# Patient Record
Sex: MALE | Race: OTHER | URBAN - NONMETROPOLITAN AREA
[De-identification: names, ages, dates, MRNs, and addresses within clinical notes are randomized per-mention and may not be internally consistent; named-entity substitution may affect disease eponyms.]

---

## 2017-04-24 ENCOUNTER — APPOINTMENT (RX ONLY)
Dept: URBAN - NONMETROPOLITAN AREA CLINIC 22 | Facility: CLINIC | Age: 77
Setting detail: DERMATOLOGY
End: 2017-04-24

## 2017-04-24 DIAGNOSIS — L71.8 OTHER ROSACEA: ICD-10-CM | Status: INADEQUATELY CONTROLLED

## 2017-04-24 DIAGNOSIS — L57.0 ACTINIC KERATOSIS: ICD-10-CM

## 2017-04-24 DIAGNOSIS — L28.1 PRURIGO NODULARIS: ICD-10-CM

## 2017-04-24 DIAGNOSIS — L57.8 OTHER SKIN CHANGES DUE TO CHRONIC EXPOSURE TO NONIONIZING RADIATION: ICD-10-CM

## 2017-04-24 DIAGNOSIS — D22 MELANOCYTIC NEVI: ICD-10-CM

## 2017-04-24 DIAGNOSIS — L72.0 EPIDERMAL CYST: ICD-10-CM

## 2017-04-24 DIAGNOSIS — L21.8 OTHER SEBORRHEIC DERMATITIS: ICD-10-CM | Status: INADEQUATELY CONTROLLED

## 2017-04-24 PROBLEM — L55.1 SUNBURN OF SECOND DEGREE: Status: ACTIVE | Noted: 2017-04-24

## 2017-04-24 PROBLEM — E78.5 HYPERLIPIDEMIA, UNSPECIFIED: Status: ACTIVE | Noted: 2017-04-24

## 2017-04-24 PROBLEM — H91.90 UNSPECIFIED HEARING LOSS, UNSPECIFIED EAR: Status: ACTIVE | Noted: 2017-04-24

## 2017-04-24 PROBLEM — I10 ESSENTIAL (PRIMARY) HYPERTENSION: Status: ACTIVE | Noted: 2017-04-24

## 2017-04-24 PROBLEM — D22.5 MELANOCYTIC NEVI OF TRUNK: Status: ACTIVE | Noted: 2017-04-24

## 2017-04-24 PROCEDURE — ? LIQUID NITROGEN

## 2017-04-24 PROCEDURE — 17003 DESTRUCT PREMALG LES 2-14: CPT

## 2017-04-24 PROCEDURE — ? ACNE SURGERY

## 2017-04-24 PROCEDURE — ? COUNSELING

## 2017-04-24 PROCEDURE — 99213 OFFICE O/P EST LOW 20 MIN: CPT | Mod: 25

## 2017-04-24 PROCEDURE — ? TREATMENT REGIMEN

## 2017-04-24 PROCEDURE — 17110 DESTRUCTION B9 LES UP TO 14: CPT

## 2017-04-24 PROCEDURE — 17000 DESTRUCT PREMALG LESION: CPT | Mod: 59

## 2017-04-24 PROCEDURE — ? PRESCRIPTION

## 2017-04-24 RX ORDER — SULFACETAMIDE SODIUM 10 MG/ML
LOTION TOPICAL
Qty: 1 | Refills: 0 | Status: ERX

## 2017-04-24 RX ORDER — METRONIDAZOLE 10 MG/G
GEL TOPICAL
Qty: 1 | Refills: 1 | Status: ERX

## 2017-04-24 ASSESSMENT — LOCATION DETAILED DESCRIPTION DERM
LOCATION DETAILED: LEFT MEDIAL MALAR CHEEK
LOCATION DETAILED: INFERIOR MID FOREHEAD
LOCATION DETAILED: NASAL SUPRATIP
LOCATION DETAILED: NASAL ROOT
LOCATION DETAILED: RIGHT MEDIAL MALAR CHEEK
LOCATION DETAILED: LEFT CENTRAL POSTAURICULAR SKIN
LOCATION DETAILED: LEFT SUPERIOR HELIX
LOCATION DETAILED: LEFT MEDIAL SUPERIOR CHEST

## 2017-04-24 ASSESSMENT — LOCATION ZONE DERM
LOCATION ZONE: SCALP
LOCATION ZONE: TRUNK
LOCATION ZONE: NOSE
LOCATION ZONE: EAR
LOCATION ZONE: FACE

## 2017-04-24 ASSESSMENT — LOCATION SIMPLE DESCRIPTION DERM
LOCATION SIMPLE: LEFT CHEEK
LOCATION SIMPLE: LEFT POSTAURICULAR SKIN
LOCATION SIMPLE: CHEST
LOCATION SIMPLE: INFERIOR FOREHEAD
LOCATION SIMPLE: NOSE
LOCATION SIMPLE: SCALP
LOCATION SIMPLE: RIGHT CHEEK
LOCATION SIMPLE: LEFT EAR

## 2017-04-24 NOTE — PROCEDURE: TREATMENT REGIMEN
Detail Level: Generalized
Otc Regimen: Apply head and shoulders selenium shampoo and let sit for a few minutes before washing off.
Continue Regimen: Apply Metrogel 1% topical gel q day to affected area.
Detail Level: Simple
Plan: Vaseline to areas until scabs fall off, encouraged pt to dc picking. If does not resolve bx.
Plan: Apply Klaron b.i.d. to nose/forehead during flare ups and then QD to control.
Plan: Vaseline to area until scab falls off

## 2017-04-24 NOTE — PROCEDURE: LIQUID NITROGEN
Number Of Freeze-Thaw Cycles: 1 freeze-thaw cycle
Duration Of Freeze Thaw-Cycle (Seconds): 10
Medical Necessity Clause: This procedure was medically necessary because the lesions that were treated were:
Consent: The patient's consent was obtained including but not limited to risks of crusting, scabbing, blistering, scarring, darker or lighter pigmentary change, recurrence, incomplete removal and infection.
Include Z78.9 (Other Specified Conditions Influencing Health Status) As An Associated Diagnosis?: No
Post-Care Instructions: I reviewed with the patient in detail post-care instructions. Patient is to wear sunprotection, and avoid picking at any of the treated lesions. Pt may apply Vaseline to crusted or scabbing areas.
Medical Necessity Information: It is in your best interest to select a reason for this procedure from the list below. All of these items fulfill various CMS LCD requirements except the new and changing color options.
Detail Level: Detailed
Render Post-Care Instructions In Note?: yes

## 2017-04-24 NOTE — PROCEDURE: ACNE SURGERY
Consent was obtained and risks were reviewed including but not limited to scarring, infection, bleeding, scabbing, incomplete removal, and allergy to anesthesia.
Render Post-Care Instructions In Note?: no
Extraction Method: cotton-tipped applicators and gentle pressure
Detail Level: Detailed
Post-Care Instructions: I reviewed with the patient in detail post-care instructions. Patient is to keep the treatment areaas dry overnight, and then apply bacitracin twice daily until healed. Patient may apply hydrogen peroxide soaks to remove any crusting.
Prep Text (Optional): Prior to removal the treatment areas were prepped in the usual fashion.
Acne Type: Comedonal Lesions

## 2017-11-17 RX ORDER — SULFACETAMIDE SODIUM 10 MG/ML
LOTION TOPICAL
Qty: 1 | Refills: 0 | Status: ERX

## 2017-11-17 RX ORDER — METRONIDAZOLE 10 MG/G
GEL TOPICAL
Qty: 1 | Refills: 1 | Status: ERX

## 2018-04-24 ENCOUNTER — HOSPITAL ENCOUNTER (OUTPATIENT)
Dept: VASCULAR LAB | Age: 78
Discharge: HOME | End: 2018-04-24
Attending: INTERNAL MEDICINE | Admitting: INTERNAL MEDICINE

## 2018-04-24 DIAGNOSIS — I65.29 OCCLUSION AND STENOSIS OF UNSPECIFIED CAROTID ARTERY: ICD-10-CM

## 2018-05-02 ENCOUNTER — APPOINTMENT (RX ONLY)
Dept: URBAN - NONMETROPOLITAN AREA CLINIC 22 | Facility: CLINIC | Age: 78
Setting detail: DERMATOLOGY
End: 2018-05-02

## 2018-05-02 DIAGNOSIS — D22 MELANOCYTIC NEVI: ICD-10-CM

## 2018-05-02 DIAGNOSIS — L71.8 OTHER ROSACEA: ICD-10-CM | Status: WELL CONTROLLED

## 2018-05-02 DIAGNOSIS — L21.8 OTHER SEBORRHEIC DERMATITIS: ICD-10-CM | Status: INADEQUATELY CONTROLLED

## 2018-05-02 DIAGNOSIS — Z71.89 OTHER SPECIFIED COUNSELING: ICD-10-CM

## 2018-05-02 DIAGNOSIS — D485 NEOPLASM OF UNCERTAIN BEHAVIOR OF SKIN: ICD-10-CM

## 2018-05-02 DIAGNOSIS — L28.1 PRURIGO NODULARIS: ICD-10-CM | Status: RESOLVED

## 2018-05-02 DIAGNOSIS — L57.0 ACTINIC KERATOSIS: ICD-10-CM | Status: RESOLVED

## 2018-05-02 DIAGNOSIS — L57.8 OTHER SKIN CHANGES DUE TO CHRONIC EXPOSURE TO NONIONIZING RADIATION: ICD-10-CM

## 2018-05-02 PROBLEM — E78.5 HYPERLIPIDEMIA, UNSPECIFIED: Status: ACTIVE | Noted: 2018-05-02

## 2018-05-02 PROBLEM — L20.84 INTRINSIC (ALLERGIC) ECZEMA: Status: ACTIVE | Noted: 2018-05-02

## 2018-05-02 PROBLEM — Z85.828 PERSONAL HISTORY OF OTHER MALIGNANT NEOPLASM OF SKIN: Status: ACTIVE | Noted: 2018-05-02

## 2018-05-02 PROBLEM — L55.1 SUNBURN OF SECOND DEGREE: Status: ACTIVE | Noted: 2018-05-02

## 2018-05-02 PROBLEM — F32.9 MAJOR DEPRESSIVE DISORDER, SINGLE EPISODE, UNSPECIFIED: Status: ACTIVE | Noted: 2018-05-02

## 2018-05-02 PROBLEM — I10 ESSENTIAL (PRIMARY) HYPERTENSION: Status: ACTIVE | Noted: 2018-05-02

## 2018-05-02 PROBLEM — D48.5 NEOPLASM OF UNCERTAIN BEHAVIOR OF SKIN: Status: ACTIVE | Noted: 2018-05-02

## 2018-05-02 PROBLEM — D22.5 MELANOCYTIC NEVI OF TRUNK: Status: ACTIVE | Noted: 2018-05-02

## 2018-05-02 PROCEDURE — 99213 OFFICE O/P EST LOW 20 MIN: CPT | Mod: 25

## 2018-05-02 PROCEDURE — ? TREATMENT REGIMEN

## 2018-05-02 PROCEDURE — ? BIOPSY BY SHAVE METHOD

## 2018-05-02 PROCEDURE — ? COUNSELING

## 2018-05-02 PROCEDURE — 11100: CPT

## 2018-05-02 PROCEDURE — ? EDUCATIONAL RESOURCES PROVIDED

## 2018-05-02 ASSESSMENT — LOCATION SIMPLE DESCRIPTION DERM
LOCATION SIMPLE: CHEST
LOCATION SIMPLE: INFERIOR FOREHEAD
LOCATION SIMPLE: LEFT EAR
LOCATION SIMPLE: RIGHT PRETIBIAL REGION
LOCATION SIMPLE: SCALP
LOCATION SIMPLE: RIGHT CHEEK
LOCATION SIMPLE: NOSE
LOCATION SIMPLE: LEFT CHEEK

## 2018-05-02 ASSESSMENT — LOCATION DETAILED DESCRIPTION DERM
LOCATION DETAILED: RIGHT MEDIAL MALAR CHEEK
LOCATION DETAILED: LEFT SUPERIOR HELIX
LOCATION DETAILED: LEFT MEDIAL MALAR CHEEK
LOCATION DETAILED: LEFT MEDIAL SUPERIOR CHEST
LOCATION DETAILED: RIGHT PROXIMAL PRETIBIAL REGION
LOCATION DETAILED: NASAL SUPRATIP
LOCATION DETAILED: INFERIOR MID FOREHEAD
LOCATION DETAILED: LEFT CENTRAL POSTAURICULAR SKIN

## 2018-05-02 ASSESSMENT — LOCATION ZONE DERM
LOCATION ZONE: NOSE
LOCATION ZONE: LEG
LOCATION ZONE: SCALP
LOCATION ZONE: FACE
LOCATION ZONE: EAR
LOCATION ZONE: TRUNK

## 2018-05-02 NOTE — PROCEDURE: TREATMENT REGIMEN
Detail Level: Simple
Plan: Apply Metrogel 1% topical gel q day to affected area.
Plan: Continue Head and Shoulders shampoo regularly. Recommend pt to increase Klaron to QD, and BID during flares.

## 2018-08-01 ENCOUNTER — HOSPITAL ENCOUNTER (OUTPATIENT)
Dept: MRI IMAGING | Age: 78
Discharge: HOME | End: 2018-08-01
Payer: MEDICARE

## 2018-08-01 DIAGNOSIS — R47.01 APHASIA: ICD-10-CM

## 2018-08-01 DIAGNOSIS — I69.393 ATAXIA DUE TO OLD CEREBELLAR INFARCTION: ICD-10-CM

## 2018-08-01 PROCEDURE — 70551 MRI BRAIN STEM W/O DYE: CPT

## 2018-10-12 ENCOUNTER — HOSPITAL ENCOUNTER (OUTPATIENT)
Dept: MRI IMAGING | Age: 78
Discharge: HOME | End: 2018-10-12
Payer: MEDICARE

## 2018-10-12 DIAGNOSIS — R27.0 ATAXIA: ICD-10-CM

## 2018-10-12 PROCEDURE — 72146 MRI CHEST SPINE W/O DYE: CPT

## 2018-10-16 ENCOUNTER — HOSPITAL ENCOUNTER (OUTPATIENT)
Dept: MRI IMAGING | Age: 78
Discharge: HOME | End: 2018-10-16
Payer: MEDICARE

## 2018-10-16 DIAGNOSIS — R27.0 ATAXIA: ICD-10-CM

## 2018-10-16 PROCEDURE — 72141 MRI NECK SPINE W/O DYE: CPT

## 2018-10-25 ENCOUNTER — APPOINTMENT (RX ONLY)
Dept: URBAN - NONMETROPOLITAN AREA CLINIC 22 | Facility: CLINIC | Age: 78
Setting detail: DERMATOLOGY
End: 2018-10-25

## 2018-10-25 VITALS — WEIGHT: 250 LBS | HEIGHT: 72 IN

## 2018-10-25 DIAGNOSIS — L30.1 DYSHIDROSIS [POMPHOLYX]: ICD-10-CM

## 2018-10-25 DIAGNOSIS — L21.8 OTHER SEBORRHEIC DERMATITIS: ICD-10-CM

## 2018-10-25 DIAGNOSIS — L71.8 OTHER ROSACEA: ICD-10-CM

## 2018-10-25 PROBLEM — I10 ESSENTIAL (PRIMARY) HYPERTENSION: Status: ACTIVE | Noted: 2018-10-25

## 2018-10-25 PROCEDURE — ? COUNSELING

## 2018-10-25 PROCEDURE — 99213 OFFICE O/P EST LOW 20 MIN: CPT

## 2018-10-25 PROCEDURE — ? PRESCRIPTION

## 2018-10-25 PROCEDURE — ? TREATMENT REGIMEN

## 2018-10-25 RX ORDER — CLOBETASOL PROPIONATE 0.5 MG/ML
SOLUTION TOPICAL
Qty: 1 | Refills: 1 | Status: ERX

## 2018-10-25 ASSESSMENT — LOCATION DETAILED DESCRIPTION DERM
LOCATION DETAILED: LEFT MEDIAL MALAR CHEEK
LOCATION DETAILED: RIGHT DISTAL PRETIBIAL REGION
LOCATION DETAILED: RIGHT MEDIAL MALAR CHEEK
LOCATION DETAILED: NASAL SUPRATIP
LOCATION DETAILED: INFERIOR MID FOREHEAD

## 2018-10-25 ASSESSMENT — LOCATION SIMPLE DESCRIPTION DERM
LOCATION SIMPLE: RIGHT PRETIBIAL REGION
LOCATION SIMPLE: RIGHT CHEEK
LOCATION SIMPLE: LEFT CHEEK
LOCATION SIMPLE: INFERIOR FOREHEAD
LOCATION SIMPLE: NOSE

## 2018-10-25 ASSESSMENT — LOCATION ZONE DERM
LOCATION ZONE: FACE
LOCATION ZONE: NOSE
LOCATION ZONE: LEG

## 2018-10-25 NOTE — PROCEDURE: TREATMENT REGIMEN
Plan: Apply Metrogel 1% topical gel q day to affected area.
Plan: Continue Head and Shoulders shampoo regularly. Recommend pt to increase Klaron to QD, and BID during flares. Clobetasol scalp soln 1x per day for flares and no longer than a week.\\npt will contact pharmacy when he needs refills.
Detail Level: Zone
Detail Level: Simple
Plan: Pt was Rx’ed Clobetasol cream for issue more than 6 years ago and uses it for flares less than once per month. Pt will call for refill when needed.

## 2018-11-08 ENCOUNTER — HOSPITAL ENCOUNTER (OUTPATIENT)
Dept: CARDIOLOGY | Age: 78
Discharge: HOME | End: 2018-11-08
Payer: MEDICARE

## 2018-11-08 LAB
LV EF: 55 %
LVEF MODALITY: NORMAL

## 2018-11-08 PROCEDURE — 93306 TTE W/DOPPLER COMPLETE: CPT

## 2019-03-06 ENCOUNTER — HOSPITAL ENCOUNTER (OUTPATIENT)
Dept: VASCULAR LAB | Age: 79
Discharge: HOME | End: 2019-03-06
Payer: MEDICARE

## 2019-03-06 PROCEDURE — 93880 EXTRACRANIAL BILAT STUDY: CPT

## 2019-05-17 ENCOUNTER — HOSPITAL ENCOUNTER (INPATIENT)
Age: 79
LOS: 11 days | Discharge: HOME HEALTH | DRG: 552 | End: 2019-05-28
Attending: PHYSICAL MEDICINE & REHABILITATION | Admitting: PHYSICAL MEDICINE & REHABILITATION
Payer: MEDICARE

## 2019-05-17 DIAGNOSIS — S24.133A: Primary | ICD-10-CM

## 2019-05-17 PROCEDURE — 1280000000 HC REHAB R&B

## 2019-05-17 PROCEDURE — 6370000000 HC RX 637 (ALT 250 FOR IP): Performed by: PHYSICAL MEDICINE & REHABILITATION

## 2019-05-17 RX ORDER — OXYCODONE HYDROCHLORIDE AND ACETAMINOPHEN 5; 325 MG/1; MG/1
2 TABLET ORAL EVERY 4 HOURS PRN
Status: DISCONTINUED | OUTPATIENT
Start: 2019-05-17 | End: 2019-05-28 | Stop reason: HOSPADM

## 2019-05-17 RX ORDER — OXYCODONE HYDROCHLORIDE AND ACETAMINOPHEN 5; 325 MG/1; MG/1
1 TABLET ORAL EVERY 4 HOURS PRN
Status: DISCONTINUED | OUTPATIENT
Start: 2019-05-17 | End: 2019-05-28 | Stop reason: HOSPADM

## 2019-05-17 RX ORDER — PANTOPRAZOLE SODIUM 40 MG/1
40 TABLET, DELAYED RELEASE ORAL
Status: DISCONTINUED | OUTPATIENT
Start: 2019-05-18 | End: 2019-05-28 | Stop reason: HOSPADM

## 2019-05-17 RX ORDER — SIMVASTATIN 40 MG
40 TABLET ORAL NIGHTLY
Status: DISCONTINUED | OUTPATIENT
Start: 2019-05-17 | End: 2019-05-28 | Stop reason: HOSPADM

## 2019-05-17 RX ORDER — POLYETHYLENE GLYCOL 3350 17 G/17G
17 POWDER, FOR SOLUTION ORAL DAILY
Status: DISCONTINUED | OUTPATIENT
Start: 2019-05-17 | End: 2019-05-28 | Stop reason: HOSPADM

## 2019-05-17 RX ORDER — AZELASTINE 1 MG/ML
1 SPRAY, METERED NASAL 2 TIMES DAILY
Status: DISCONTINUED | OUTPATIENT
Start: 2019-05-17 | End: 2019-05-28 | Stop reason: HOSPADM

## 2019-05-17 RX ORDER — FINASTERIDE 5 MG/1
5 TABLET, FILM COATED ORAL DAILY
Status: DISCONTINUED | OUTPATIENT
Start: 2019-05-17 | End: 2019-05-28 | Stop reason: HOSPADM

## 2019-05-17 RX ORDER — ACETAMINOPHEN 325 MG/1
650 TABLET ORAL EVERY 4 HOURS PRN
Status: DISCONTINUED | OUTPATIENT
Start: 2019-05-17 | End: 2019-05-28 | Stop reason: HOSPADM

## 2019-05-17 RX ORDER — TAMSULOSIN HYDROCHLORIDE 0.4 MG/1
0.4 CAPSULE ORAL NIGHTLY
Status: DISCONTINUED | OUTPATIENT
Start: 2019-05-17 | End: 2019-05-28 | Stop reason: HOSPADM

## 2019-05-17 RX ORDER — LISINOPRIL 10 MG/1
10 TABLET ORAL DAILY
Status: DISCONTINUED | OUTPATIENT
Start: 2019-05-17 | End: 2019-05-26

## 2019-05-17 RX ORDER — ONDANSETRON 4 MG/1
4 TABLET, ORALLY DISINTEGRATING ORAL EVERY 8 HOURS PRN
Status: DISCONTINUED | OUTPATIENT
Start: 2019-05-17 | End: 2019-05-28 | Stop reason: HOSPADM

## 2019-05-17 RX ORDER — BISACODYL 10 MG
10 SUPPOSITORY, RECTAL RECTAL DAILY PRN
Status: DISCONTINUED | OUTPATIENT
Start: 2019-05-17 | End: 2019-05-28 | Stop reason: HOSPADM

## 2019-05-17 RX ORDER — METHOCARBAMOL 500 MG/1
500 TABLET, FILM COATED ORAL 4 TIMES DAILY PRN
Status: DISCONTINUED | OUTPATIENT
Start: 2019-05-17 | End: 2019-05-28 | Stop reason: HOSPADM

## 2019-05-17 RX ORDER — SENNA AND DOCUSATE SODIUM 50; 8.6 MG/1; MG/1
1 TABLET, FILM COATED ORAL 2 TIMES DAILY
Status: DISCONTINUED | OUTPATIENT
Start: 2019-05-17 | End: 2019-05-28 | Stop reason: HOSPADM

## 2019-05-17 RX ADMIN — AZELASTINE HYDROCHLORIDE 1 SPRAY: 137 SPRAY, METERED NASAL at 23:35

## 2019-05-17 RX ADMIN — SIMVASTATIN 40 MG: 40 TABLET, FILM COATED ORAL at 20:29

## 2019-05-17 RX ADMIN — OXYCODONE HYDROCHLORIDE AND ACETAMINOPHEN 2 TABLET: 5; 325 TABLET ORAL at 20:35

## 2019-05-17 RX ADMIN — METOPROLOL TARTRATE 25 MG: 25 TABLET, FILM COATED ORAL at 20:29

## 2019-05-17 RX ADMIN — TAMSULOSIN HYDROCHLORIDE 0.4 MG: 0.4 CAPSULE ORAL at 20:30

## 2019-05-17 RX ADMIN — SENNOSIDES,DOCUSATE SODIUM 1 TABLET: 8.6; 5 TABLET, FILM COATED ORAL at 20:30

## 2019-05-17 ASSESSMENT — PAIN - FUNCTIONAL ASSESSMENT
PAIN_FUNCTIONAL_ASSESSMENT: PREVENTS OR INTERFERES SOME ACTIVE ACTIVITIES AND ADLS
PAIN_FUNCTIONAL_ASSESSMENT: ACTIVITIES ARE NOT PREVENTED

## 2019-05-17 ASSESSMENT — PAIN SCALES - GENERAL
PAINLEVEL_OUTOF10: 5

## 2019-05-17 ASSESSMENT — PAIN DESCRIPTION - ONSET
ONSET: ON-GOING
ONSET: SUDDEN

## 2019-05-17 ASSESSMENT — PAIN DESCRIPTION - ORIENTATION
ORIENTATION: RIGHT
ORIENTATION: RIGHT

## 2019-05-17 ASSESSMENT — PAIN DESCRIPTION - PROGRESSION
CLINICAL_PROGRESSION: NOT CHANGED
CLINICAL_PROGRESSION: NOT CHANGED

## 2019-05-17 ASSESSMENT — PAIN DESCRIPTION - DESCRIPTORS
DESCRIPTORS: ACHING
DESCRIPTORS: ACHING

## 2019-05-17 ASSESSMENT — PAIN DESCRIPTION - LOCATION
LOCATION: BACK
LOCATION: HIP

## 2019-05-17 ASSESSMENT — PAIN DESCRIPTION - FREQUENCY
FREQUENCY: INTERMITTENT
FREQUENCY: INTERMITTENT

## 2019-05-17 ASSESSMENT — PAIN DESCRIPTION - PAIN TYPE
TYPE: ACUTE PAIN
TYPE: ACUTE PAIN

## 2019-05-17 NOTE — PLAN OF CARE
Patient's bed alarm is on with call light, belongings, and bedside table within reach. Patient call out with any concerns/needs.  Electronically signed by Kayce Martin RN on 5/17/2019 at 4:38 PM

## 2019-05-17 NOTE — PROGRESS NOTES
skin/wound care, pressure relief instruction,dressing changes,  infection protection, DVT prophylaxis, and/or assistance with in room safety with transfers to bed, toilet, wheelchair, shower as well as bathroom activities and hygiene. Patient/caregiver education for:   [x] Disease/sustained injury/management      [x] Medication Use   [x] Surgical intervention   [x] Safety   [x] Body mechanics and or joint protection   [x] Health maintenance         PHYSICAL THERAPY:  Goals:                  Short term goals  Time Frame for Short term goals: 4 days 5/22  Short term goal 1: pt will complete bed mobility with mod ind and maintain spinal precautions. Short term goal 2: pt will complete functional transfers with supv and LRAD. Short term goal 3: pt will ambulate 150 ft with LRAD SBA. Long term goals  Time Frame for Long term goals : 8 days 5/26  Long term goal 1: pt will complete functional transfers with mod ind and LRAD. Long term goal 2: pt will ambulate 150 ft with LRAD mod ind. Long term goal 3: pt will ascend/descend 2 steps with RHR and supv. Long term goal 4: pt will complete car transfer with mod ind and AD  Long term goal 5: pt will complete seated HEP with ind to maintain functional strength. These goals were reviewed with this patient at the time of assessment and Whitney Ma is in agreement.      Plan of Care: Pt to be seen 5 out of 7 days per week, 90   mins (exact) per day for 8 days (exact)                   Current Treatment Recommendations: Strengthening, Transfer Training, Endurance Training, Neuromuscular Re-education, Patient/Caregiver Education & Training, Equipment Evaluation, Education, & procurement, Home Exercise Program, Safety Education & Training, Stair training, Functional Mobility Training, Balance Training, Gait Training, Pain Management      OCCUPATIONAL THERAPY:  Goals:             Short term goals  Time Frame for Short term goals: 5/21/19  Short term goal 1: Pt will complete toileting/toilet transfer SBA with LRAD  Short term goal 2: Pt will complete LB dressing with Yaquelin and AE PRN using LRAD for standing balance  Short term goal 3: Pt will tolerate 10 minutes standing balance activities and/or ADLs with LRAD SBA  Short term goal 4: Pt will verbalize and demo understanding of BUE HEP to increase strength and endurance for transfers/ADLs  Short term goal 5: Pt will verbalize 3/3 back precautions 100% when asked throughout therapy sessions with no cues :  Long term goals  Time Frame for Long term goals : 5/25/19  Long term goal 1: Pt will complete funcitonal mobility in room/bathroom with LRAD to gather all items for bathing/dressing  Long term goal 2: Pt will complete shower transfer/bathing Heladio with LRAD and DME PRN  Long term goal 3: Pt will complete LB/UE dressing Heladio with LRAD and AE PRN  Long term goal 4: Pt will complete 1 simple meal prep and/or IADL task with LRAD Heladio :    These goals were reviewed with this patient at the time of assessment and Vinicius Dee is in agreement    Plan of Care:  Pt to be seen 5 out of 7 days per week, 90   mins (exact) per day for 8 days (exact)  Current Treatment Recommendations: Strengthening, Endurance Training, Patient/Caregiver Education & Training, ROM, Cognitive Reorientation, Equipment Evaluation, Education, & procurement, Self-Care / ADL, Balance Training, Gait Training, Pain Management, Home Management Training, Functional Mobility Training, Safety Education & Training, Positioning      SPEECH THERAPY: Goals will be left blank if speech is not following this patient. Goals:                                                                            These goals were reviewed with this patient at the time of assessment and Vinicius Dee is in agreement    Plan of Care: Pt to be seen 5 out of 7 days per week,    mins (exact) per day for  days (exact)             Dysphagia: Speech/Language/Cognition:       CASE MANAGEMENT:  Goals:   Assist patient/family with discharge planning, patient/family counseling,   and coordination with insurance during ARU stay. Admission Period/Goal FIM SCORES  FIM Admit/Goal Score   Eating/Swallowing    Grooming    Bathing    Upper Body Dressing    Lower Body Dressing    Toileting    Bladder Sea, Accidents = FIM    Bowel  Sea, Accidents = FIM    Bed/Chair Transfer    Toilet Transfer    Tub/Shower Transfer     Locomotion:  Ambulation (Walk) / Wheelchair:  W = walk , w/c = wheelchair  Distance:   1= 0-49 ft , 2=  ft, 3= 150+ ft Distance:    Level of Assist:    Mode:    FIM:       Stairs    Comprehension    Expression    Social Interaction    Problem Solving    Memory         Katherine Shah will be seen a minimum of 3 hours of therapy per day, a minimum of 5 out of 7 days per week. [] In this rare instance due to the nature of this patient's medical involvement, this patient will be seen 15 hours per week (900 minutes within a 7 day period). Treatments may include therapeutic exercises, gait training, neuromuscular re-ed, transfer training, community reintegration, bed mobility, w/c mobility and training, self care, home mgmt, cognitive training, energy conservation,dysphagia tx, speech/language/communication therapy, group therapy, and patient/family education. In addition, dietician/nutritionist may monitor calorie count as well as intake and collaboratively work with SLP on dietary upgrades. Neuropsychology/Psychology may evaluate and provide necessary support.     Medical issues being managed closely and that require 24 hour availability of a physician:   [] Swallowing Precautions  [x] Bowel/Bladder Fx  [] Weight bearing precautions   [x] Wound Care    [x] Pain Mgmt   [x] Infection Protection   [x] DVT Prophylaxis   [x] Fall Precautions  [x] Fluid/Electrolyte/Nutrition Balance   [] Voice Protection   [] Respiratory  []

## 2019-05-18 LAB
ANION GAP SERPL CALCULATED.3IONS-SCNC: 10 MMOL/L (ref 3–16)
BUN BLDV-MCNC: 16 MG/DL (ref 7–20)
CALCIUM SERPL-MCNC: 9.5 MG/DL (ref 8.3–10.6)
CHLORIDE BLD-SCNC: 106 MMOL/L (ref 99–110)
CO2: 23 MMOL/L (ref 21–32)
CREAT SERPL-MCNC: 0.9 MG/DL (ref 0.8–1.3)
GFR AFRICAN AMERICAN: >60
GFR NON-AFRICAN AMERICAN: >60
GLUCOSE BLD-MCNC: 116 MG/DL (ref 70–99)
HCT VFR BLD CALC: 34.3 % (ref 40.5–52.5)
HEMOGLOBIN: 11.6 G/DL (ref 13.5–17.5)
MAGNESIUM: 2.1 MG/DL (ref 1.8–2.4)
MCH RBC QN AUTO: 31.8 PG (ref 26–34)
MCHC RBC AUTO-ENTMCNC: 33.9 G/DL (ref 31–36)
MCV RBC AUTO: 93.7 FL (ref 80–100)
PDW BLD-RTO: 14.6 % (ref 12.4–15.4)
PLATELET # BLD: 223 K/UL (ref 135–450)
PMV BLD AUTO: 9.1 FL (ref 5–10.5)
POTASSIUM REFLEX MAGNESIUM: 4.1 MMOL/L (ref 3.5–5.1)
RBC # BLD: 3.66 M/UL (ref 4.2–5.9)
SODIUM BLD-SCNC: 139 MMOL/L (ref 136–145)
WBC # BLD: 8.9 K/UL (ref 4–11)

## 2019-05-18 PROCEDURE — 97166 OT EVAL MOD COMPLEX 45 MIN: CPT

## 2019-05-18 PROCEDURE — 6370000000 HC RX 637 (ALT 250 FOR IP): Performed by: PHYSICAL MEDICINE & REHABILITATION

## 2019-05-18 PROCEDURE — 97542 WHEELCHAIR MNGMENT TRAINING: CPT

## 2019-05-18 PROCEDURE — 1280000000 HC REHAB R&B

## 2019-05-18 PROCEDURE — 97530 THERAPEUTIC ACTIVITIES: CPT

## 2019-05-18 PROCEDURE — 97162 PT EVAL MOD COMPLEX 30 MIN: CPT

## 2019-05-18 PROCEDURE — 97535 SELF CARE MNGMENT TRAINING: CPT

## 2019-05-18 PROCEDURE — 85027 COMPLETE CBC AUTOMATED: CPT

## 2019-05-18 PROCEDURE — 36415 COLL VENOUS BLD VENIPUNCTURE: CPT

## 2019-05-18 PROCEDURE — 83735 ASSAY OF MAGNESIUM: CPT

## 2019-05-18 PROCEDURE — 97116 GAIT TRAINING THERAPY: CPT

## 2019-05-18 PROCEDURE — 97110 THERAPEUTIC EXERCISES: CPT

## 2019-05-18 PROCEDURE — 80048 BASIC METABOLIC PNL TOTAL CA: CPT

## 2019-05-18 RX ADMIN — SENNOSIDES,DOCUSATE SODIUM 1 TABLET: 8.6; 5 TABLET, FILM COATED ORAL at 09:13

## 2019-05-18 RX ADMIN — AZELASTINE HYDROCHLORIDE 1 SPRAY: 137 SPRAY, METERED NASAL at 21:28

## 2019-05-18 RX ADMIN — MAGNESIUM HYDROXIDE 30 ML: 400 SUSPENSION ORAL at 17:28

## 2019-05-18 RX ADMIN — SENNOSIDES,DOCUSATE SODIUM 1 TABLET: 8.6; 5 TABLET, FILM COATED ORAL at 21:29

## 2019-05-18 RX ADMIN — OXYCODONE HYDROCHLORIDE AND ACETAMINOPHEN 2 TABLET: 5; 325 TABLET ORAL at 18:58

## 2019-05-18 RX ADMIN — PANTOPRAZOLE SODIUM 40 MG: 40 TABLET, DELAYED RELEASE ORAL at 05:15

## 2019-05-18 RX ADMIN — SIMVASTATIN 40 MG: 40 TABLET, FILM COATED ORAL at 21:29

## 2019-05-18 RX ADMIN — METOPROLOL TARTRATE 25 MG: 25 TABLET, FILM COATED ORAL at 21:29

## 2019-05-18 RX ADMIN — METOPROLOL TARTRATE 25 MG: 25 TABLET, FILM COATED ORAL at 09:13

## 2019-05-18 RX ADMIN — TAMSULOSIN HYDROCHLORIDE 0.4 MG: 0.4 CAPSULE ORAL at 21:29

## 2019-05-18 RX ADMIN — AZELASTINE HYDROCHLORIDE 1 SPRAY: 137 SPRAY, METERED NASAL at 09:13

## 2019-05-18 RX ADMIN — POLYETHYLENE GLYCOL 3350 17 G: 17 POWDER, FOR SOLUTION ORAL at 09:12

## 2019-05-18 RX ADMIN — FINASTERIDE 5 MG: 5 TABLET, FILM COATED ORAL at 09:13

## 2019-05-18 RX ADMIN — LISINOPRIL 10 MG: 10 TABLET ORAL at 09:13

## 2019-05-18 RX ADMIN — OXYCODONE HYDROCHLORIDE AND ACETAMINOPHEN 2 TABLET: 5; 325 TABLET ORAL at 05:16

## 2019-05-18 ASSESSMENT — PAIN DESCRIPTION - DESCRIPTORS
DESCRIPTORS: ACHING
DESCRIPTORS: ACHING

## 2019-05-18 ASSESSMENT — PAIN DESCRIPTION - FREQUENCY: FREQUENCY: INTERMITTENT

## 2019-05-18 ASSESSMENT — PAIN DESCRIPTION - LOCATION
LOCATION: BACK
LOCATION: BACK

## 2019-05-18 ASSESSMENT — PAIN SCALES - GENERAL
PAINLEVEL_OUTOF10: 5
PAINLEVEL_OUTOF10: 8
PAINLEVEL_OUTOF10: 5
PAINLEVEL_OUTOF10: 0
PAINLEVEL_OUTOF10: 0

## 2019-05-18 ASSESSMENT — PAIN DESCRIPTION - PROGRESSION: CLINICAL_PROGRESSION: NOT CHANGED

## 2019-05-18 ASSESSMENT — PAIN DESCRIPTION - ORIENTATION: ORIENTATION: RIGHT

## 2019-05-18 ASSESSMENT — PAIN DESCRIPTION - PAIN TYPE
TYPE: ACUTE PAIN
TYPE: ACUTE PAIN

## 2019-05-18 ASSESSMENT — PAIN - FUNCTIONAL ASSESSMENT: PAIN_FUNCTIONAL_ASSESSMENT: ACTIVITIES ARE NOT PREVENTED

## 2019-05-18 NOTE — PROGRESS NOTES
cerebral infarction. has a past surgical history that includes Carotid endarterectomy; Foot surgery; Skin cancer excision; Eye surgery; Facial reconstruction surgery; Tonsillectomy; Colonoscopy; and Colonoscopy (2014). Restrictions  Restrictions/Precautions  Restrictions/Precautions: Up as Tolerated, General Precautions  Position Activity Restriction  Spinal Precautions: No Bending, No Lifting, No Twisting  Spinal Precautions: no lifting >10 lbs, pt may shower, open to air  Other position/activity restrictions: up as tolerated, TLSO, may shower, no BLT  Vision/Hearing  Vision: Impaired  Vision Exceptions: Wears glasses at all times  Hearing: Exceptions to [facility]  Hearing Exceptions: Hard of hearing/hearing concerns     Subjective  General  Chart Reviewed: Yes  Patient assessed for rehabilitation services?: Yes  Response To Previous Treatment: Not applicable  Family / Caregiver Present: No  Referring Practitioner: Marcia Chavez MD  Referral Date : 05/17/19  Diagnosis: SCI  Follows Commands: Within Functional Limits  General Comment  Comments: RN cleared pt to participate  Subjective  Subjective: pt reports 5/10 pain.  pleasant and agreeable   Pain Screening  Patient Currently in Pain: Yes  Pain Assessment  Pain Assessment: 0-10  Pain Level: 5  Pain Type: Acute pain  Pain Location: Back  Pain Orientation: Right  Pain Descriptors: Aching  Non-Pharmaceutical Pain Intervention(s): Therapeutic presence  Vital Signs  Patient Currently in Pain: Yes       Orientation  Orientation  Overall Orientation Status: Within Functional Limits  Social/Functional History  Social/Functional History  Lives With: Spouse(small dog)  Type of Home: House(with basement)  Home Layout: One level  Home Access: Stairs to enter with rails  Entrance Stairs - Number of Steps: 2 BARBY R railing present or 1+1 BARBY from front   Entrance Stairs - Rails: Right  Bathroom Shower/Tub: Walk-in shower, Shower chair without back  Bathroom Toilet: Standard  Bathroom Equipment: Grab bars in shower  Home Equipment: Rolling walker, Cane, Grab bars(adjustable bed)  Receives Help From: Other (comment)(cleaning service 1x/week)  ADL Assistance: Independent  Homemaking Assistance: Independent  Ambulation Assistance: Independent  Transfer Assistance: Independent  Active : Yes  Occupation: Retired  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 L knee exteions: 4-/5  L Hip Flexion: 3/5     Sensation  Overall Sensation Status: Impaired(impaired sensation BLEs (reports N/T) impaired light touch discrimination RLE> LLE L1-2 dermatomes) Intact proprioception BLEs at knees/ankles   Bed mobility  Rolling to Left: Stand by assistance  Rolling to Right: Stand by assistance  Supine to Sit: Stand by assistance  Sit to Supine: Minimal assistance  Comment: with HOB minimally elevated, cues for log rolling to maintain spinal precautions   Transfers  Sit to Stand: Contact guard assistance  Stand to sit: Contact guard assistance  Bed to Chair: Contact guard assistance  Stand Pivot Transfers: Contact guard assistance  Car Transfer: Minimal Assistance  Comment: cues for sequencing/safety   Ambulation  Ambulation?: Yes  Ambulation 1  Surface: level tile  Device: Rolling Walker  Assistance: Contact guard assistance  Quality of Gait: decreased BLE swing phase, decreased heel strike (RLE > LLE), narrow RYAN, forward flexed posture , cues for safety when turning 90*  Distance: 83 ft   Stairs/Curb  Stairs?: Yes  Stairs  # Steps : 4  Stairs Height: 6\"  Rails: Bilateral  Device: Rolling walker  Assistance: Contact guard assistance  Comment: curb step with RW and CGA, cues for safety.  ascend/descend 4 6\" steps with BHR, step to pattern cues to obtain full foot position on step wtih RLE, CGA 4: pt will complete car transfer with mod ind and AD  Long term goal 5: pt will complete seated HEP with ind to maintain functional strength.    Patient Goals   Patient goals : \"to walk\"       Therapy Time   Individual Concurrent Group Co-treatment   Time In 0730         Time Out 0900         Minutes 90         Timed Code Treatment Minutes: 75 Minutes       Scotty Johnson, PT

## 2019-05-18 NOTE — PLAN OF CARE
Pt remains free from falls at this time. Fall precautions in place including: bed alarm, orange blanket, orange armband, bed in lowest position, wheels locked, and SAFE sign outside pts door. Pt instructed to call out with any needs, verbalized understanding. No additional needs at this time, call light within reach, will continue to monitor.

## 2019-05-18 NOTE — H&P
Department of [facility]  Dr. 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 Patient was taken to surgery on  for left thoracotomy and T8, T9 partial discectomy for anterior decompression and T8-9 thoracic interbody fusion with placement of a cage. Postoperatively the patient was fitted with a TLSO body jacket, started in therapies. The patient needs more therapy before discharged to home safely. Patient also has a past medical history positive for hypertension, sleep apnea, and previous mild stroke. Prior Level of Function:  Independent in self care and ADLs prior to admission. Current Level of Function:  PT:  Needs CGA to min assist of his transfers and gait. OT:   Needs CGA to min assist of his self care and ADLs. has a past medical history of Hyperlipemia, Hypertension, Prostate enlargement, and Unspecified cerebral artery occlusion with cerebral infarction. reports that he has never smoked. He has never used smokeless tobacco. He reports that he does not drink alcohol or use drugs. family history is not on file.         REVIEW OF SYSTEMS:   CONSTITUTIONAL: negative for fevers, chills, diaphoresis, activity change, appetite change, fatigue, night sweats and unexpected weight change. EYES: negative for blurred vision, eye discharge, visual disturbance and icterus. HEENT: negative for hearing loss, tinnitus, ear drainage, sinus pressure, nasal congestion, epistaxis and snoring. RESPIRATORY: Negative for hemoptysis, cough, sputum production. CARDIOVASCULAR: negative for chest pain, palpitations, exertional chest pressure/discomfort, edema, syncope. GASTROINTESTINAL: negative for nausea, vomiting, diarrhea, constipation, blood in stool and abdominal pain. GENITOURINARY: negative for frequency, dysuria, urinary incontinence, decreased urine volume, and hematuria. BPH  HEMATOLOGIC/LYMPHATIC: negative for easy bruising, bleeding and lymphadenopathy. ALLERGIC/IMMUNOLOGIC: negative for recurrent infections, angioedema, anaphylaxis and drug reactions. ENDOCRINE: negative for weight changes and diabetic symptoms including polyuria, polydipsia and polyphagia. MUSCULOSKELETAL: negative for pain, joint swelling, decreased range of motion and muscle weakness. NEUROLOGICAL: negative for headaches, slurred speech, unilateral weakness. Old stroke, ataxia  PSYCHIATRIC/BEHAVIORAL: negative for hallucinations, behavioral problems, confusion and agitation. All pertinent positives are noted in the HPI. Physical Examination:  Vitals:   Patient Vitals for the past 24 hrs:   BP Temp Temp src Pulse Resp SpO2 Height Weight   05/18/19 0909 112/69 97.6 °F (36.4 °C) Oral 84 16 94 % -- --   05/17/19 2029 (!) 179/63 98.5 °F (36.9 °C) Tympanic 81 16 95 % -- --   05/17/19 1500 (!) 188/97 98.4 °F (36.9 °C) Oral 85 16 93 % 5' 7\" (1.702 m) 221 lb 5.5 oz (100.4 kg)     Psych: Stable mood, normal judgement, normal affect   Const: No distress  Eyes: Conjunctiva noninjected, no icterus noted; pupils equal, round, and reactive to light. HENT: Atraumatic, normocephalic; Oral mucosa moist  Neck: Trachea midline, neck supple.  No thyromegaly noted.  CV: Regular rate and rhythm, no murmur rub or gallop noted  Resp: Lungs clear to auscultation bilaterally, no rales wheezes or ronchi, no retractions. Respirations unlabored. GI: Obese, Soft, nontender, nondistended. Normal bowel sounds. No palpable masses. Healing incisions noted  Neuro: Alert, oriented, appropriate. No cranial nerve deficits appreciated. Motor examination reveals 4+/5 strength in lowers, 5/5 strength in uppers. Skin: Normal temperature and turgor  MSK: No joint abnormalities noted. Ext: No significant edema appreciated. No varicosities. Lab Results   Component Value Date    WBC 8.9 05/18/2019    HGB 11.6 (L) 05/18/2019    HCT 34.3 (L) 05/18/2019    MCV 93.7 05/18/2019     05/18/2019     Lab Results   Component Value Date    INR 0.96 12/19/2010    PROTIME 10.9 12/19/2010     Lab Results   Component Value Date    CREATININE 0.9 05/18/2019    BUN 16 05/18/2019     05/18/2019    K 4.1 05/18/2019     05/18/2019    CO2 23 05/18/2019     Lab Results   Component Value Date    ALT 20 08/06/2018    AST 15 08/06/2018    ALKPHOS 71 08/06/2018    BILITOT 0.4 08/06/2018       No results found. The above labs and diagnostic studies have been reviewed by myself upon admission to inpatient rehabilitation. Barriers to Discharge: Patient  has a past medical history of Hyperlipemia, Hypertension, Prostate enlargement, and Unspecified cerebral artery occlusion with cerebral infarction. Pt lives at home with his wife. POST ADMISSION PHYSICIAN EVALUATION  The patient has agreed to being admitted to our comprehensive inpatient  rehabilitation facility consisting of at least 180 minutes of therapy a day,  5 out of 7 days a week. The patient/family has a good understanding of our discharge process.  The  patient has potential to make improvement and is in need of at least two of  the following multidisciplinary therapies including but not limited to  physical, occupational, respiratory, and speech, nutritional services, wound care, and prosthetics and orthotics. Given the patients complex condition  and risk of further medical complications, rehabilitation services cannot be  safely provided at a lower level of care such as a skilled nursing facility. I have compared the patients medical and functional status at the time of the  preadmission screening and the same on this date, and there are no significant changes. By signing this document, I acknowledge that I have personally performed a  full physical examination on this patient within 24 hours of admission to  this inpatient rehabilitation facility and have determined the patient to be  able to tolerate the above course of treatment at an intensive level for a  reasonable period of time. I will be completing a detailed individualized  Plan of Care for this patient by day four of the patients stay based upon the  Preadmission Screen, this Post-Admission Evaluation, and the therapy  evaluations. Assessment and Plan:    T8, T9 partial discectomy for anterior decompression and T8-9 thoracic interbody fusion -5/14    Hypertension- lisinopril, lopressor    HLD- lipitor    BPH- proscar    previous mild stroke. Bowels: Schedule Miralax + Senna S. Follow bowel movements. Enema or suppository if needed. Bladder: Check PVR x 3. [address] if PVR > 200ml or if any volume is > 500 ml. Pain:   Percocet is ordered prn.        Pauly Hutchinson MD, 5/18/2019, 9:28 AM

## 2019-05-18 NOTE — PROGRESS NOTES
Occupational Therapy   Occupational Therapy Initial Assessment/Treatment Note  Date: 2019   Patient Name: Neha Duran  MRN: [id number]     : 1940    Date of Service: 2019    Discharge Recommendations:  Home with Home health OT, Home with assist PRN  OT Equipment Recommendations  Equipment Needed: Yes  Mobility Devices: ADL Assistive Devices  ADL Assistive Devices: Sock-Aid Hard;Long-handled Shoe [place];Long-handled Sponge;Dressing Stick  Other: may need AE- will cont to assess pending progress    Assessment   Performance deficits / Impairments: Decreased functional mobility ; Decreased safe awareness;Decreased balance;Decreased ADL status; Decreased endurance;Decreased strength;Decreased cognition;Decreased ROM  Assessment: Pt is 70year old male admitted with dx of SCI. 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 Patient was taken to surgery and  for left thoracotomy and T8, T9 partial discectomy for anterior decompression and T8-9 thoracic interbody fusion with placement of a cage. Prior to admission per patient report, was independent with cane PRN for ADLs, mobility and most IADLs. Pt does have a  1x a week for laundry, light cleaning and vacuuming. Pt was active in community, driving, and completing errands independently. Pt is functioning below his baseline needing CGA for transfers, Yaquelin for mobility, modA for LB dressing and Yaquelin for LB bathing. Pt needs frequent rest breaks 2/2 fatigue. Pt needs cues for safety with sit <> stands, manuevering RW safely, and back precautions at this time. At the end of the evaluation, pt becoming disoriented and more confused needing cues for attn and sequencing of tasks. OT skilled services are indicated in the acute care setting to address above deficits and f/u with patient and/or caregiver education.   Pt would possibly benefit from AE such as a LHS, reacher, sock Entrance Stairs - Rails: Right  Bathroom Shower/Tub: Walk-in shower, Shower chair without back, Doors  Bathroom Toilet: Standard(has a countertop next to toilet in case of needing support during sitting to standing)  Bathroom Equipment: Grab bars in shower, Hand-held shower  Bathroom Accessibility: Accessible  Home Equipment: Rolling walker, Cane, Grab bars(adjustable bed)  Receives Help From: Other (comment)  ADL Assistance: Independent  Homemaking Assistance: Independent  Ambulation Assistance: Independent  Transfer Assistance: Independent  Active : Yes  Occupation: Retired  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  [address]: farm, hunt        Objective   Vision: Impaired  Vision Exceptions: Wears glasses at all times  Hearing: Exceptions to [facility]  Hearing Exceptions: Hard of hearing/hearing concerns    Orientation  Overall Orientation Status: Within Functional Limits(more and more confused and disoriented towards end of session \"is it 11 am?\")     Balance  Sitting Balance: Supervision  Standing Balance: Minimal assistance(Yaquelin PRN to manage RW, mostly CGA-SBA)  Standing Balance  Time: x5 minutes, x7 minutes, 2x2 minutes, x4 minutes, x1 minute, x5 minutes  Activity: mobility WC to sinkside, stance at sinkside grooming, standing x2 for toileting/LB dressing tasks, standing to bathe bottom/front, standing to manage pants/brief up, mobility bathroom to EOB  Comment: RW, cues for safety and manuevering RW in tighter spaces of room and bathroom  Functional Mobility  Functional - Mobility Device: Rolling Walker  Activity: To/from bathroom  Assist Level: Minimal assistance  Functional Mobility Comments: Yaquelin PRN for RW management, mostly CGA-SBA with cues for safety and mechanics  Toilet Transfers  Toilet - Technique: Ambulating; To left; To right  Equipment Used: Standard toilet(RW and grab bar support)  Toilet Transfer: Contact guard assistance  Toilet Transfers Comments: CGA for balance only, mod cues for safety during sit to stands  [address] - Transfer From: Donavan Kyle - Transfer Type: To and From  Shower - Transfer To: Transfer tub bench  Shower - Technique: To left; Ambulating  Shower Transfers: Minimal assistance  Shower Transfers Comments: Yaquelin for RW management and cues for safe transition over threshold of shower  ADL  Feeding: Beverage management;Setup(seated in [facility])  Grooming: Contact guard assistance; Increased time to complete(CGA stance at sinkside to brush hair, teeth and wash face)  UE Bathing: Setup;Verbal cueing; Increased time to complete  LE Bathing: Moderate assistance;Verbal cueing; Increased time to complete(assist with BLE bathing due to back px)  UE Dressing: Setup;Verbal cueing; Increased time to complete  LE Dressing: Moderate assistance;Verbal cueing; Increased time to complete(assist with threading BLE through pants/underwear and donning socks, able to doff socks, dethread pants/brief and stand with CGA to manage up/down)  Toileting: Minimal assistance(Yaquelin in stance for balance with grab bar support)  Additional Comments: Increased time, cues for back precautions throughout; pt fatigued towards end of ADL needing frequent and prolonged seated rest breaks        Bed mobility  Supine to Sit: Unable to assess  Sit to Supine: Minimal assistance  Scooting: Stand by assistance  Transfers  Stand Step Transfers: Minimal assistance;Contact guard assistance  Sit to stand: Contact guard assistance  Stand to sit: Contact guard assistance  Transfer Comments: cues for hand placement and body mechanics, fatigue towards end of session  Vision - Basic Assessment  Prior Vision: Wears glasses all the time  Visual History: No significant visual history  Patient Visual Report: No visual complaint reported. Visual Field Cut: No  Oculo Motor Control: WNL  Cognition  Overall Cognitive Status: Exceptions  Arousal/Alertness: Appropriate responses to stimuli  Following Commands:  Follows multistep commands with repitition; Follows one step commands consistently  Attention Span: Attends with cues to redirect  Memory: Decreased recall of precautions  Safety Judgement: Decreased awareness of need for assistance  Problem Solving: Assistance required to identify errors made  Insights: Decreased awareness of deficits  Initiation: Does not require cues  Sequencing: Does not require cues  Cognition Comment: intermittent decreased attn throughout, does need cues for back precautions (only able to ID 2/3 multiple times throughout session-- precautions written on whiteboard in room); towards end of session pt became more and more confused \"is it 11 am? lets go to the gym\" \"when is therapy\" \"where am I\"-- RN aware, will speak with SLP on possible SLP eval next week  Perception  Overall Perceptual Status: WFL     Sensation  Overall Sensation Status: Impaired(intermittent numbness/tingling in BLE, \"very little\" sensation deficits in B hands and fingers per patient report)        LUE AROM (degrees)  LUE AROM : WFL  Left Hand AROM (degrees)  Left Hand AROM: WFL  RUE AROM (degrees)  RUE AROM : WFL  Right Hand AROM (degrees)  Right Hand AROM: WFL  LUE Strength  Gross LUE Strength: WFL  L Hand Grasp: 4/5  L Hand Release: 4/5  RUE Strength  Gross RUE Strength: WFL  R Hand Grasp: 4/5  R Hand Release: 4/5                   Plan   Plan  Times per week: 5 out of 7 days  Times per day: Daily  Plan weeks: 8 days  Current Treatment Recommendations: Strengthening, Endurance Training, Patient/Caregiver Education & Training, ROM, Cognitive Reorientation, Equipment Evaluation, Education, & procurement, Self-Care / ADL, Balance Training, Gait Training, Pain Management, Home Management Training, Functional Mobility Training, Safety Education & Training, Positioning      Goals  Short term goals  Time Frame for Short term goals: 5/21/19  Short term goal 1: Pt will complete toileting/toilet transfer SBA with LRAD  Short term goal 2: Pt will complete LB dressing with Yaquelin and AE PRN using LRAD for standing balance  Short term goal 3: Pt will tolerate 10 minutes standing balance activities and/or ADLs with LRAD SBA  Short term goal 4: Pt will verbalize and demo understanding of BUE HEP to increase strength and endurance for transfers/ADLs  Short term goal 5: Pt will verbalize 3/3 back precautions 100% when asked throughout therapy sessions with no cues  Long term goals  Time Frame for Long term goals : 5/25/19  Long term goal 1: Pt will complete funcitonal mobility in room/bathroom with LRAD to gather all items for bathing/dressing  Long term goal 2: Pt will complete shower transfer/bathing Heladio with LRAD and DME PRN  Long term goal 3: Pt will complete LB/UE dressing Heladio with LRAD and AE PRN  Long term goal 4: Pt will complete 1 simple meal prep and/or IADL task with LRAD Heladio  Patient Goals   Patient goals :  \"To go home and be independent\"       Therapy Time   Individual Concurrent Group Co-treatment   Time In 0930         Time Out 1100         Minutes 90         Timed Code Treatment Minutes: 75 Minutes(15 min eval)       Paulina Murguia OTR/L

## 2019-05-18 NOTE — PROGRESS NOTES
Nutrition Assessment    Type and Reason for Visit: Initial, Consult    Nutrition Recommendations:   1. Continue General diet (wife to order meals)  2. Will monitor nutritional adequacy, nutrition-related labs, weights, BMs, and clinical progress     Nutrition Assessment: Nutritional status stable AEB eating % meals, no recent weight loss or decreased appetite. Patient discussed corn allergy with RD, reactions are congestion with consumption of corn. Patient and wife are very knowledgeable of what foods to eat and not eat. Will continue to monitor for any further needs. Malnutrition Assessment:  · Malnutrition Status: No malnutrition    Nutrition Risk Level: Low    Nutrition Diagnosis:   · Problem: No nutrition diagnosis at this time  · Etiology: related to       Signs and symptoms:  as evidenced by      Objective Information:  · Nutrition-Focused Physical Findings: no problems chewing or swallowing  · Wound Type: (bruising and abrasions)  · Current Nutrition Therapies:  · Oral Diet Orders: General   · Oral Diet intake: %  · Oral Nutrition Supplement (ONS) Orders: None  · ONS intake:    · Anthropometric Measures:  · Ht: 5' 7\" (170.2 cm)   · Current Body Wt: 221 lb 5 oz (100.4 kg)  · Ideal Body Wt: 142 lb (64.4 kg), % Ideal Body    · BMI Classification: BMI 30.0 - 34.9 Obese Class I    Nutrition Interventions:   Continue current diet  Continued Inpatient Monitoring    Nutrition Evaluation:   · Evaluation: Goals set   · Goals: Patient will eat 50% or greater of meals and supplements.       · Monitoring: Meal Intake, Supplement Intake, Pertinent Labs      Electronically signed by Ifrah Boswell RD, LD on 5/18/19 at 12:40 PM    Contact Number: Office: [phone]; [address] [place] [address] Road: [phone]

## 2019-05-19 PROCEDURE — 1280000000 HC REHAB R&B

## 2019-05-19 PROCEDURE — 6370000000 HC RX 637 (ALT 250 FOR IP): Performed by: PHYSICAL MEDICINE & REHABILITATION

## 2019-05-19 RX ORDER — CETIRIZINE HYDROCHLORIDE 10 MG/1
10 TABLET ORAL DAILY PRN
Status: DISCONTINUED | OUTPATIENT
Start: 2019-05-19 | End: 2019-05-28 | Stop reason: HOSPADM

## 2019-05-19 RX ADMIN — AZELASTINE HYDROCHLORIDE 1 SPRAY: 137 SPRAY, METERED NASAL at 21:01

## 2019-05-19 RX ADMIN — PANTOPRAZOLE SODIUM 40 MG: 40 TABLET, DELAYED RELEASE ORAL at 08:32

## 2019-05-19 RX ADMIN — TAMSULOSIN HYDROCHLORIDE 0.4 MG: 0.4 CAPSULE ORAL at 21:00

## 2019-05-19 RX ADMIN — METOPROLOL TARTRATE 25 MG: 25 TABLET, FILM COATED ORAL at 21:01

## 2019-05-19 RX ADMIN — POLYETHYLENE GLYCOL 3350 17 G: 17 POWDER, FOR SOLUTION ORAL at 08:30

## 2019-05-19 RX ADMIN — AZELASTINE HYDROCHLORIDE 1 SPRAY: 137 SPRAY, METERED NASAL at 08:32

## 2019-05-19 RX ADMIN — OXYCODONE HYDROCHLORIDE AND ACETAMINOPHEN 2 TABLET: 5; 325 TABLET ORAL at 00:31

## 2019-05-19 RX ADMIN — SENNOSIDES,DOCUSATE SODIUM 1 TABLET: 8.6; 5 TABLET, FILM COATED ORAL at 08:30

## 2019-05-19 RX ADMIN — OXYCODONE HYDROCHLORIDE AND ACETAMINOPHEN 2 TABLET: 5; 325 TABLET ORAL at 15:48

## 2019-05-19 RX ADMIN — OXYCODONE HYDROCHLORIDE AND ACETAMINOPHEN 2 TABLET: 5; 325 TABLET ORAL at 21:00

## 2019-05-19 RX ADMIN — LISINOPRIL 10 MG: 10 TABLET ORAL at 08:30

## 2019-05-19 RX ADMIN — SIMVASTATIN 40 MG: 40 TABLET, FILM COATED ORAL at 21:01

## 2019-05-19 RX ADMIN — FINASTERIDE 5 MG: 5 TABLET, FILM COATED ORAL at 08:30

## 2019-05-19 RX ADMIN — SENNOSIDES,DOCUSATE SODIUM 1 TABLET: 8.6; 5 TABLET, FILM COATED ORAL at 21:00

## 2019-05-19 RX ADMIN — METOPROLOL TARTRATE 25 MG: 25 TABLET, FILM COATED ORAL at 08:30

## 2019-05-19 ASSESSMENT — PAIN DESCRIPTION - LOCATION
LOCATION: BACK
LOCATION: BACK;ABDOMEN
LOCATION: BACK

## 2019-05-19 ASSESSMENT — PAIN SCALES - GENERAL
PAINLEVEL_OUTOF10: 4
PAINLEVEL_OUTOF10: 8
PAINLEVEL_OUTOF10: 6
PAINLEVEL_OUTOF10: 6

## 2019-05-19 ASSESSMENT — PAIN DESCRIPTION - PAIN TYPE
TYPE: ACUTE PAIN

## 2019-05-19 NOTE — PLAN OF CARE
Patient using call light most of the time, educated on fall precautions and room safety, free of falls. Call light within reach.

## 2019-05-19 NOTE — PROGRESS NOTES
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 No new issues overnight. Requests allergy medicine. ROS: No cp, sob, n/v, f/c  Objective:  Patient Vitals for the past 24 hrs:   BP Temp Temp src Pulse Resp SpO2 Weight   05/19/19 0822 135/73 98.2 °F (36.8 °C) Oral 72 16 93 % --   05/19/19 0031 -- -- -- -- -- -- 217 lb 8 oz (98.7 kg)   05/18/19 2115 (!) 153/88 98.2 °F (36.8 °C) Oral 76 16 93 % --   05/18/19 0909 112/69 97.6 °F (36.4 °C) Oral 84 16 94 % --     Gen: No distress, pleasant. HEENT: Normocephalic, atraumatic. CV: Regular rate and rhythm. Resp: No respiratory distress. Abd: Soft, nontender   Ext: No edema. Neuro: Alert, oriented, appropriately interactive.    Wt Readings from Last 3 Encounters:   05/19/19 217 lb 8 oz (98.7 kg)   04/28/14 210 lb (95.3 kg)       Laboratory data:   Lab Results   Component Value Date    WBC 8.9 05/18/2019    HGB 11.6 (L) 05/18/2019    HCT 34.3 (L) 05/18/2019    MCV 93.7 05/18/2019     05/18/2019       Lab Results   Component Value Date     05/18/2019    K 4.1 05/18/2019     05/18/2019    CO2 23 05/18/2019    BUN 16 05/18/2019    CREATININE 0.9 05/18/2019    GLUCOSE 116 05/18/2019    CALCIUM 9.5 05/18/2019        Therapy progress:  PT  Position Activity Restriction  Spinal Precautions: No Bending, No Lifting, No Twisting  Spinal Precautions: no lifting >10 lbs, pt may shower, open to air  Other position/activity restrictions: up as tolerated, TLSO, may shower, no BLT  Objective     Sit to Stand: Contact guard assistance  Stand to sit: Contact guard assistance  Bed to Chair: Contact guard assistance  Stand Pivot Transfers: Contact guard assistance  Device: Rolling Walker  Assistance: Contact guard assistance  Distance: 83 ft   OT  PT Equipment Recommendations  Equipment Needed: No(per pt, owns RW)  Toilet - Technique: Ambulating, To left, To right  Equipment Used: Standard toilet(RW and grab bar support)  Toilet Transfers Comments: CGA for balance only, mod cues for safety during sit to stands  Assessment        SLP                Body mass index is 34.07 kg/m². Assessment and Plan:     T8, T9 partial discectomy for anterior decompression and T8-9 thoracic interbody fusion -5/14     Hypertension- lisinopril, lopressor     HLD- lipitor     BPH- proscar     previous mild stroke.      Bowels: Schedule Miralax + Senna S. Follow bowel movements. Enema or suppository if needed.      Bladder: Check PVR x 3. [address] if PVR > 200ml or if any volume is > 500 ml.      Pain:   Percocet is ordered prn. Jerry Simpson MD 5/19/2019, 8:49 AM

## 2019-05-20 ENCOUNTER — APPOINTMENT (RX ONLY)
Dept: URBAN - NONMETROPOLITAN AREA CLINIC 22 | Facility: CLINIC | Age: 79
Setting detail: DERMATOLOGY
End: 2019-05-20

## 2019-05-20 DIAGNOSIS — D18.0 HEMANGIOMA: ICD-10-CM

## 2019-05-20 DIAGNOSIS — Z85.828 PERSONAL HISTORY OF OTHER MALIGNANT NEOPLASM OF SKIN: ICD-10-CM

## 2019-05-20 DIAGNOSIS — Z71.89 OTHER SPECIFIED COUNSELING: ICD-10-CM

## 2019-05-20 DIAGNOSIS — L20.89 OTHER ATOPIC DERMATITIS: ICD-10-CM

## 2019-05-20 DIAGNOSIS — D22 MELANOCYTIC NEVI: ICD-10-CM

## 2019-05-20 DIAGNOSIS — L82.1 OTHER SEBORRHEIC KERATOSIS: ICD-10-CM

## 2019-05-20 DIAGNOSIS — L57.8 OTHER SKIN CHANGES DUE TO CHRONIC EXPOSURE TO NONIONIZING RADIATION: ICD-10-CM

## 2019-05-20 DIAGNOSIS — D485 NEOPLASM OF UNCERTAIN BEHAVIOR OF SKIN: ICD-10-CM

## 2019-05-20 DIAGNOSIS — L70.8 OTHER ACNE: ICD-10-CM

## 2019-05-20 DIAGNOSIS — L30.4 ERYTHEMA INTERTRIGO: ICD-10-CM

## 2019-05-20 PROBLEM — L20.84 INTRINSIC (ALLERGIC) ECZEMA: Status: ACTIVE | Noted: 2019-05-20

## 2019-05-20 PROBLEM — D48.5 NEOPLASM OF UNCERTAIN BEHAVIOR OF SKIN: Status: ACTIVE | Noted: 2019-05-20

## 2019-05-20 PROBLEM — D18.01 HEMANGIOMA OF SKIN AND SUBCUTANEOUS TISSUE: Status: ACTIVE | Noted: 2019-05-20

## 2019-05-20 PROBLEM — D22.5 MELANOCYTIC NEVI OF TRUNK: Status: ACTIVE | Noted: 2019-05-20

## 2019-05-20 PROBLEM — I10 ESSENTIAL (PRIMARY) HYPERTENSION: Status: ACTIVE | Noted: 2019-05-20

## 2019-05-20 PROCEDURE — 97530 THERAPEUTIC ACTIVITIES: CPT

## 2019-05-20 PROCEDURE — 97116 GAIT TRAINING THERAPY: CPT

## 2019-05-20 PROCEDURE — ? PRESCRIPTION

## 2019-05-20 PROCEDURE — 99213 OFFICE O/P EST LOW 20 MIN: CPT | Mod: 25

## 2019-05-20 PROCEDURE — ? TREATMENT REGIMEN

## 2019-05-20 PROCEDURE — 1280000000 HC REHAB R&B

## 2019-05-20 PROCEDURE — ? COUNSELING

## 2019-05-20 PROCEDURE — ? INVENTORY

## 2019-05-20 PROCEDURE — 6370000000 HC RX 637 (ALT 250 FOR IP): Performed by: PHYSICAL MEDICINE & REHABILITATION

## 2019-05-20 PROCEDURE — 97542 WHEELCHAIR MNGMENT TRAINING: CPT

## 2019-05-20 PROCEDURE — 11102 TANGNTL BX SKIN SINGLE LES: CPT

## 2019-05-20 PROCEDURE — 97110 THERAPEUTIC EXERCISES: CPT

## 2019-05-20 PROCEDURE — ? BIOPSY BY SHAVE METHOD

## 2019-05-20 PROCEDURE — 97535 SELF CARE MNGMENT TRAINING: CPT

## 2019-05-20 RX ORDER — TRIAMCINOLONE ACETONIDE 1 MG/G
OINTMENT TOPICAL
Qty: 1 | Refills: 1 | Status: ERX

## 2019-05-20 RX ADMIN — AZELASTINE HYDROCHLORIDE 1 SPRAY: 137 SPRAY, METERED NASAL at 20:48

## 2019-05-20 RX ADMIN — METOPROLOL TARTRATE 25 MG: 25 TABLET, FILM COATED ORAL at 20:46

## 2019-05-20 RX ADMIN — SENNOSIDES,DOCUSATE SODIUM 1 TABLET: 8.6; 5 TABLET, FILM COATED ORAL at 08:13

## 2019-05-20 RX ADMIN — OXYCODONE HYDROCHLORIDE AND ACETAMINOPHEN 2 TABLET: 5; 325 TABLET ORAL at 20:46

## 2019-05-20 RX ADMIN — POLYETHYLENE GLYCOL 3350 17 G: 17 POWDER, FOR SOLUTION ORAL at 08:13

## 2019-05-20 RX ADMIN — METOPROLOL TARTRATE 25 MG: 25 TABLET, FILM COATED ORAL at 08:13

## 2019-05-20 RX ADMIN — TAMSULOSIN HYDROCHLORIDE 0.4 MG: 0.4 CAPSULE ORAL at 20:46

## 2019-05-20 RX ADMIN — PANTOPRAZOLE SODIUM 40 MG: 40 TABLET, DELAYED RELEASE ORAL at 05:24

## 2019-05-20 RX ADMIN — AZELASTINE HYDROCHLORIDE 1 SPRAY: 137 SPRAY, METERED NASAL at 08:15

## 2019-05-20 RX ADMIN — FINASTERIDE 5 MG: 5 TABLET, FILM COATED ORAL at 08:13

## 2019-05-20 RX ADMIN — LISINOPRIL 10 MG: 10 TABLET ORAL at 08:13

## 2019-05-20 RX ADMIN — SENNOSIDES,DOCUSATE SODIUM 1 TABLET: 8.6; 5 TABLET, FILM COATED ORAL at 20:46

## 2019-05-20 RX ADMIN — SIMVASTATIN 40 MG: 40 TABLET, FILM COATED ORAL at 20:46

## 2019-05-20 ASSESSMENT — PAIN SCALES - GENERAL
PAINLEVEL_OUTOF10: 0
PAINLEVEL_OUTOF10: 4
PAINLEVEL_OUTOF10: 6
PAINLEVEL_OUTOF10: 4
PAINLEVEL_OUTOF10: 5
PAINLEVEL_OUTOF10: 0
PAINLEVEL_OUTOF10: 5
PAINLEVEL_OUTOF10: 0

## 2019-05-20 ASSESSMENT — PAIN DESCRIPTION - PAIN TYPE
TYPE: ACUTE PAIN;SURGICAL PAIN
TYPE: ACUTE PAIN

## 2019-05-20 ASSESSMENT — LOCATION DETAILED DESCRIPTION DERM
LOCATION DETAILED: LEFT SUPERIOR UPPER BACK
LOCATION DETAILED: RIGHT LATERAL SUPERIOR CHEST
LOCATION DETAILED: LEFT SUPERIOR MEDIAL UPPER BACK
LOCATION DETAILED: SUPRAPUBIC SKIN
LOCATION DETAILED: RIGHT CENTRAL TEMPLE
LOCATION DETAILED: RIGHT PROXIMAL PRETIBIAL REGION
LOCATION DETAILED: LEFT ANTERIOR DISTAL THIGH
LOCATION DETAILED: RIGHT CENTRAL MALAR CHEEK
LOCATION DETAILED: LEFT ANTERIOR SHOULDER
LOCATION DETAILED: RIGHT SUPERIOR UPPER BACK
LOCATION DETAILED: LEFT PROXIMAL PRETIBIAL REGION

## 2019-05-20 ASSESSMENT — LOCATION ZONE DERM
LOCATION ZONE: ARM
LOCATION ZONE: TRUNK
LOCATION ZONE: TRUNK
LOCATION ZONE: FACE
LOCATION ZONE: LEG

## 2019-05-20 ASSESSMENT — PAIN DESCRIPTION - ONSET: ONSET: ON-GOING

## 2019-05-20 ASSESSMENT — LOCATION SIMPLE DESCRIPTION DERM
LOCATION SIMPLE: RIGHT CHEEK
LOCATION SIMPLE: CHEST
LOCATION SIMPLE: RIGHT UPPER BACK
LOCATION SIMPLE: LEFT SHOULDER
LOCATION SIMPLE: GROIN
LOCATION SIMPLE: RIGHT TEMPLE
LOCATION SIMPLE: LEFT UPPER BACK
LOCATION SIMPLE: LEFT THIGH
LOCATION SIMPLE: LEFT PRETIBIAL REGION
LOCATION SIMPLE: LEFT UPPER BACK
LOCATION SIMPLE: RIGHT PRETIBIAL REGION

## 2019-05-20 ASSESSMENT — PAIN DESCRIPTION - LOCATION
LOCATION: BACK

## 2019-05-20 ASSESSMENT — PAIN DESCRIPTION - FREQUENCY: FREQUENCY: INTERMITTENT

## 2019-05-20 ASSESSMENT — PAIN DESCRIPTION - PROGRESSION: CLINICAL_PROGRESSION: GRADUALLY IMPROVING

## 2019-05-20 ASSESSMENT — PAIN DESCRIPTION - ORIENTATION
ORIENTATION: RIGHT;LOWER
ORIENTATION: RIGHT
ORIENTATION: LOWER

## 2019-05-20 ASSESSMENT — PAIN DESCRIPTION - DESCRIPTORS
DESCRIPTORS: ACHING
DESCRIPTORS: ACHING;DISCOMFORT

## 2019-05-20 ASSESSMENT — PAIN - FUNCTIONAL ASSESSMENT: PAIN_FUNCTIONAL_ASSESSMENT: PREVENTS OR INTERFERES SOME ACTIVE ACTIVITIES AND ADLS

## 2019-05-20 NOTE — DISCHARGE INSTR - COC
***    Patient's personal belongings (please select all that are sent with patient):  None    RN SIGNATURE:  Electronically signed by Aayush Santos RN on 5/28/19 at 11:34 AM    CASE MANAGEMENT/SOCIAL WORK SECTION    Inpatient Status Date: 5- ARU    Readmission Risk Assessment Score:  Readmission Risk              Risk of Unplanned Readmission:        7           Discharging to Facility/ Agency   · Name: [facility]   · Address:  · Phone: [phone]  · Fax: [phone]    / signature: Electronically signed by VAHE York LSW on 5/28/19 at 8:54 AM    PHYSICIAN SECTION    Prognosis: Good    Condition at Discharge: Stable    Rehab Potential (if transferring to Rehab): Good    Recommended Labs or Other Treatments After Discharge: Home PT/OT/SN    Recommended Follow-up, Labs or Other Treatments After Discharge: Follow up with PCP  Follow up with spine surgery  Home PT/OT/SN  Aspirin on hold for 2 weeks per Spine Surgery. Physician Certification: I certify the above information and transfer of Carline Mustafa  is necessary for the continuing treatment of the diagnosis listed and that he requires [address] for less 30 days.      Update Admission H&P: No change in H&P    PHYSICIAN SIGNATURE:  Electronically signed by Cassie Armendariz MD on 5/27/19 at 9:53 AM

## 2019-05-20 NOTE — PROCEDURE: TREATMENT REGIMEN
Initiate Treatment: Nizoral shampoo qd
Detail Level: Zone
Continue Regimen: Clobetasol bid prn but no longer than two weeks per month

## 2019-05-20 NOTE — PROCEDURE: COUNSELING
Detail Level: Generalized
Patient Specific Counseling (Will Not Stick From Patient To Patient): Expressed with pressure
Detail Level: Detailed
Detail Level: Zone

## 2019-05-20 NOTE — PROGRESS NOTES
Physical Therapy  Facility/Department: [address] [place] [address]  Daily Treatment Note  NAME: Herbie Talamantes  : 1940  MRN: [id number]    Date of Service: 2019    Discharge Recommendations:  Home with assist PRN, Home with Home health PT   PT Equipment Recommendations  Equipment Needed: No    Patient Diagnosis(es): There were no encounter diagnoses. has a past medical history of Hyperlipemia, Hypertension, Prostate enlargement, and Unspecified cerebral artery occlusion with cerebral infarction. has a past surgical history that includes Carotid endarterectomy; Foot surgery; Skin cancer excision; Eye surgery; Facial reconstruction surgery; Tonsillectomy; Colonoscopy; and Colonoscopy (). Restrictions  Restrictions/Precautions  Restrictions/Precautions: Up as Tolerated, General Precautions  Position Activity Restriction  Spinal Precautions: No Bending, No Lifting, No Twisting  Spinal Precautions: no lifting >10 lbs, pt may shower, open to air  Other position/activity restrictions: up as tolerated, TLSO, may shower, no BLT  Subjective   General  Chart Reviewed: Yes  Response To Previous Treatment: Patient with no complaints from previous session. Family / Caregiver Present: Yes(wife)  Referring Practitioner: Matt Bustillo MD  Subjective  Subjective: pt repots 4/10 pain in low back. General Comment  Comments: found seated in w/c. Pain Screening  Patient Currently in Pain: Yes  Pain Assessment  Pain Assessment: 0-10  Pain Level: 4  Pain Type: Acute pain  Pain Location: Back  Pain Orientation: Right; Lower  Pain Descriptors: Aching  Vital Signs  Patient Currently in Pain: Yes       Orientation  Orientation  Overall Orientation Status: Within Functional Limits  Cognition      Objective   Bed mobility  Supine to Sit: Stand by assistance  Sit to Supine: Contact guard assistance;Stand by assistance  Comment: with bed flat and cues for log rolling   Transfers  Sit to Stand: Stand by assistance(x10 reps)  Stand to sit: Stand by assistance  Comment: cues for safe hand placement   Ambulation  Ambulation?: Yes  More Ambulation?: Yes  Ambulation 1  Surface: level tile  Device: Rolling Walker  Assistance: Stand by assistance  Quality of Gait: decreased BLE heel stirke (RLE> LLE), improved swing phase, forward flexed posture, cues for increased RYAN  Distance: 250 ft , 130 ft   Ambulation 2  Surface - 2: level tile  Device 2: Single point cane  Assistance 2: Contact guard assistance  Quality of Gait 2: shuffled steps, lack of swing phase, increased forwrd flexed posture, reaching for environment for balance  Distance: 10 ft   Stairs/Curb  Stairs?: Yes  Stairs  # Steps : 12  Stairs Height: 6\"  Rails: Bilateral  Assistance: Stand by assistance  Comment: step to pattern     Balance  Comments: reaching with LUE to ipsilateral side for cone, placing in RUE at midline then placing in various planes on R side of body with SBA provided byt ehrapist, no LOB. pt maintained spinal precautions x 4 reps           Second Session:    Pt found seated in w/c in room. Pt reporting increased fatigue, requesting to use w/c to gym vs walking. Pt propelled w/c x 180 ft with mod ind and use of BUEs and BLEs, good environment awareness, increased time due to decreased velocity. Pt donned 1.5# weights, completed 2x10 BLE seated ankle pumps, LAQ, marches, hip abduction with TKE. Pt left in gym to participate in OT. Assessment   Body structures, Functions, Activity limitations: Decreased functional mobility ; Decreased balance;Decreased safe awareness;Decreased coordination;Decreased endurance;Decreased strength;Decreased sensation; Increased Pain  Assessment: pt pleasant and agreeable. states appropriate answers to orientation questions, however confusion noted during therapy. pt utilized commode durmartina session, had BM. RN notified.  completing pericare/clothing management wiht SBA of therapist. grosly SBA wiht RW for functional mobility, minimal improvement in endurance noted. pt requires muiltiple seated rest breaks however. Treatment Diagnosis: impaired transfers   Prognosis: Good  Patient Education: transfers   REQUIRES PT FOLLOW UP: Yes  Activity Tolerance  Activity Tolerance: Patient limited by pain; Patient limited by endurance          Goals  Short term goals  Time Frame for Short term goals: 4 days 5/22  Short term goal 1: pt will complete bed mobility with mod ind and maintain spinal precautions. Short term goal 2: pt will complete functional transfers with supv and LRAD. Short term goal 3: pt will ambulate 150 ft with LRAD SBA. Long term goals  Time Frame for Long term goals : 8 days 5/26  Long term goal 1: pt will complete functional transfers with mod ind and LRAD. Long term goal 2: pt will ambulate 150 ft with LRAD mod ind. Long term goal 3: pt will ascend/descend 2 steps with RHR and supv. Long term goal 4: pt will complete car transfer with mod ind and AD  Long term goal 5: pt will complete seated HEP with ind to maintain functional strength. Patient Goals   Patient goals : \"to walk\"    Plan    Plan  Times per week: 5-7x/week  Times per day: Daily  Current Treatment Recommendations: Strengthening, Transfer Training, Endurance Training, Neuromuscular Re-education, Patient/Caregiver Education & Training, Equipment Evaluation, Education, & procurement, Home Exercise Program, Safety Education & Training, Stair training, Functional Mobility Training, Balance Training, Gait Training, Pain Management  Safety Devices  Type of devices:  All fall risk precautions in place, Gait belt, Call light within reach, Nurse notified, Chair alarm in place, Left in chair     Therapy Time   Individual Concurrent Group Co-treatment   Time In 1030         Time Out 1130         Minutes 60         Timed Code Treatment Minutes: 60 Minutes     Second Session Therapy Time:   Individual Concurrent Group Co-treatment   Time In 1330         Time Out 1400 Minutes 30           Timed Code Treatment Minutes:  30    Total Treatment Minutes:  90    Analy Lomas, PT

## 2019-05-20 NOTE — PROCEDURE: BIOPSY BY SHAVE METHOD
Render In Bullet Format When Appropriate: No
Wound Care: Petrolatum
Size Of Lesion In Cm: 0
Depth Of Biopsy: dermis
Electrodesiccation Text: The wound bed was treated with electrodesiccation after the biopsy was performed.
Electrodesiccation And Curettage Text: The wound bed was treated with electrodesiccation and curettage after the biopsy was performed.
Type Of Destruction Used: Curettage
Anesthesia Volume In Cc (Will Not Render If 0): 0.5
Billing Type: Third-Party Bill
Biopsy Type: H and E
Post-Care Instructions: I reviewed with the patient in detail post-care instructions. Patient is to keep the biopsy site dry overnight, and then apply vaseline once daily until healed.
Was A Bandage Applied: Yes
Silver Nitrate Text: The wound bed was treated with silver nitrate after the biopsy was performed.
Dressing: bandage
Curettage Text: The wound bed was treated with curettage after the biopsy was performed.
Biopsy Method: Personna blade
Hemostasis: Bhaskar's
Notification Instructions: Patient will be notified of biopsy results. However, patient instructed to call the office if not contacted within 2 weeks.
Consent: Written consent was obtained and risks were reviewed including but not limited to scarring, infection, bleeding, scabbing, incomplete removal, nerve damage and allergy to anesthesia.
Lab: 6
Detail Level: Detailed
Lab Facility: 3
Cryotherapy Text: The wound bed was treated with cryotherapy after the biopsy was performed.
Anesthesia Type: 1% lidocaine with epinephrine

## 2019-05-20 NOTE — PROGRESS NOTES
mod-max verbal and visual cues for RW management)  Standing Balance  Time: 2x6-7 minutes, 2x6-7 minutes, 2x2 minutes, x10 minutes  Activity: mobility in hallway to gather bean bags, standing x2 with RW to play corn hole and then gather with reacher, standing x2 for toileting tasks, stance at sinkside and then mobility back to EOB  Comment: RW, cues for safety and manuevering RW in tighter spaces of room and bathroom  Functional Mobility  Functional - Mobility Device: Rolling Walker  Activity: To/from bathroom;Transport items; Retrieve items  Assist Level: Minimal assistance  Functional Mobility Comments: Yaquelin PRN for RW management, mostly CGA-SBA with cues for safety and mechanics  Toilet Transfers  Toilet - Technique: Ambulating; To left; To right  Equipment Used: Standard toilet(RW)  Toilet Transfer: Contact guard assistance  Toilet Transfers Comments: CGA for balance only, mod cues for safety during sit to stands  Bed mobility  Rolling to Right: Stand by assistance  Supine to Sit: Unable to assess  Sit to Supine: Stand by assistance  Scooting: Stand by assistance  Transfers  Stand Step Transfers: Contact guard assistance;Minimal assistance  Sit to stand: Contact guard assistance  Stand to sit: Contact guard assistance  Transfer Comments: cues for hand placement and body mechanics, fatigue towards end of session; CGA for sit <> stands, Yaquelin for manuevering tight spaces of bathroom and during turns         Coordination  Gross Motor: Pt ambulated in hallway with RW to gather bean bags from various locations L and R side, first attempt pt missing >50% of the bean bags. On the way back to gym to gather more bean bags, pt needing mod-max cues for visual scanning to gather all bean bags. Pt demo'd very poor safety, balance, and problem solving throughout task. Once in gym, pt obtained bean bags with R hand reaching across midline to L side and then tossing to corn hole board.   Pt then ambulated to bean bags to gather from floor level with reacher. Mod-max cues for RW safety and to ambulate close to bean bags so that he didnt lean over with back px, poor carryover demo'd despite max cues. Cognition  Overall Cognitive Status: Exceptions  Arousal/Alertness: Appropriate responses to stimuli  Following Commands: Follows multistep commands with repitition; Follows one step commands consistently  Attention Span: Difficulty dividing attention  Memory: Decreased recall of precautions  Safety Judgement: Decreased awareness of need for assistance  Problem Solving: Assistance required to identify errors made;Assistance required to implement solutions;Assistance required to correct errors made  Insights: Decreased awareness of deficits  Initiation: Does not require cues  Sequencing: Does not require cues  Cognition Comment: Patient with very poor safety awareness, problem solving, and insight to deficits throughout therapy session, bringing RW very far forward and poor carryover to bring closer to body with mobility.                     Type of ROM/Therapeutic Exercise  Type of ROM/Therapeutic Exercise: Free weights  Comment: seated in WC, 2# 2x15 reps adhering to back px and to tolerance, rest breaks throughout and demo needed for some of the exercises  Exercises  Scapular Protraction: x10 no weight  Scapular Retraction: x10 no weight  Shoulder Depression: x10 no weight  Shoulder Flexion: 2x15 2# to 80*  Elbow Flexion: 2x15 2#  Elbow Extension: 2x15 2#  Supination: 2x15 2#  Pronation: 2x15 2#  Wrist Flexion: 2x15 2#  Wrist Extension: 2x15 2#  Grasp/Release: 2x15 with 3 second holds  Other: 2x15 2# chest press, circles forwards/backwards                    Plan   Plan  Times per week: 5 out of 7 days  Times per day: Daily  Plan weeks: 8 days  Current Treatment Recommendations: Strengthening, Endurance Training, Patient/Caregiver Education & Training, ROM, Cognitive Reorientation, Equipment Evaluation, Education, & procurement, Self-Care / ADL, Balance Training, Gait Training, Pain Management, Home Management Training, Functional Mobility Training, Safety Education & Training, Positioning    Goals  Short term goals  Time Frame for Short term goals: 5/21/19  Short term goal 1: Pt will complete toileting/toilet transfer SBA with LRAD  Short term goal 2: Pt will complete LB dressing with Yaquelin and AE PRN using LRAD for standing balance  Short term goal 3: Pt will tolerate 10 minutes standing balance activities and/or ADLs with LRAD SBA  Short term goal 4: Pt will verbalize and demo understanding of BUE HEP to increase strength and endurance for transfers/ADLs  Short term goal 5: Pt will verbalize 3/3 back precautions 100% when asked throughout therapy sessions with no cues  Long term goals  Time Frame for Long term goals : 5/25/19  Long term goal 1: Pt will complete funcitonal mobility in room/bathroom with LRAD to gather all items for bathing/dressing  Long term goal 2: Pt will complete shower transfer/bathing Heladio with LRAD and DME PRN  Long term goal 3: Pt will complete LB/UE dressing Heladio with LRAD and AE PRN  Long term goal 4: Pt will complete 1 simple meal prep and/or IADL task with LRAD Heladio  Patient Goals   Patient goals :  \"To go home and be independent\"       Therapy Time   Individual Concurrent Group Co-treatment   Time In 0900         Time Out 1000         Minutes 60         Timed Code Treatment Minutes: 60 Minutes     Second Session Therapy Time:   Individual Concurrent Group Co-treatment   Time In 1400         Time Out 1440         Minutes 40           Timed Code Treatment Minutes:  40 Minutes    Total Treatment Minutes:  [address], OTR/L

## 2019-05-20 NOTE — PLAN OF CARE
[facility]  Inpatient Rehabilitation  Weekly Team Conference Note    Date:   Patient Name: Katherine Shah        MRN: [id number]    : 1940  (70 y.o.)  Gender: male   Referring Practitioner: Karina Palmer MD  Diagnosis: SCI      Interventions to be utilized toward barriers to discharge, per discipline:  [address] observed barriers to dc: Limited safety awareness and Decreased endurance  Nursing interventions:medication, safety awareness  Family Education: medication, safety awareness   Fall Risk:  Yes      Physical therapy observed barriers to dc:    Baseline: mod ind with SPC for functional mobility   Current level: SBA with RW, min A with SPC up to 250 ft. CGA-SBA for flight of steps   Barriers to DC: endurance, pain, decreased safety awareness    Needs in order to achieve dc home/next level of care: supv-mod ind with LRAD      Physical therapy interventions:   Current Treatment Recommendations: Strengthening, Transfer Training, Endurance Training, Neuromuscular Re-education, Patient/Caregiver Education & Training, Equipment Evaluation, Education, & procurement, Home Exercise Program, Safety Education & Training, Stair training, Functional Mobility Training, Balance Training, Gait Training, Pain Management      PHYSICAL THERAPY    FIMs last conference:  N/a.  First conf    FIMS current conference:  Bed, Chair, Wheel Chair: 5 - Requires setup/supervision/cues  Walk: 5 - Supervision Requires standby supervision or cuing to walk at least 150 feet  Distance Walked: 83 ft CGA  Wheel Chair: 6 - [address] Operates wheelchair at least 150 feet with an ambulatory device, orthosis or prosthesis OR requires extra amount of time OR there is concern for safety  Distance Traveled in Wheel Chair: 180 ft SBA   Stairs: 4- Minimal Contact Assistance Perfoms 75% or more of the effort to go up and down one flight of stairs    PT Equipment Recommendations  Equipment Needed: No    Assessment: pt pleasant and agreeable. states appropriate answers to orientation questions, however confusion noted during therapy. pt utilized commode durign session, had BM. RN notified. completing pericare/clothing management wiht SBA of therapist. grosly SBA wiht RW for functional mobility, minimal improvement in endurance noted. pt requires muiltiple seated rest breaks however.      Occupational therapy observed barriers to dc:    Baseline: Heladio with SPC for mobility in the home, able to complete all IADLs, ADLs, and driving without assist PTA   Current level: CGA-Yaquelin for LB dressing, CGA-SBA for mobility with min-mod cues for safety, problem solving, and body mechanics during mobility   Barriers to DC: poor insight, poor problem solving, decreased STM, unsure how much assist at home wife will be able to provide   Needs in order to achieve dc home/next level of care: SPV-Heladio with all ADLs, mobility, and transfers using LRAD    Occupational Therapy interventions:  Current Treatment Recommendations: Strengthening, Endurance Training, Patient/Caregiver Education & Training, ROM, Cognitive Reorientation, Equipment Evaluation, Education, & procurement, Self-Care / ADL, Balance Training, Gait Training, Pain Management, Home Management Training, Functional Mobility Training, Safety Education & Training, Positioning      OCCUPATIONAL THERAPY  FIMs last conference  N/A first conference    FIMS: current conference  Eatin - Feeds self with setup/supervision/cues and/or requires only setup/supervision/cues to perform tube feedings  Groomin - Able to perform 3-4 tasks with touching help  Bathing: 3 - Able to bathe 5-7 areas  Dressing-Upper: 5 - Requires setup/supervision/cues and/or requires assist with presthesis/brace only  Dressing-Lower: 3 - Requires assist with 2-3 parts of dressing  Toiletin - Requires steadying assistance only  Toilet Transfer: 4 - Requires steadying assistance only < 25% Conference:  : Chester Villalpando MSW, LSW    Occupational Therapist: Tawanna Kevin OT  Physical Therapist: Armani Corral PT  Speech Therapist: n/a  Nurse: Natali Britton RN  Dietician: Isabel Mann RDN, LD  : Bella Booth  Psychiatry: n/a    Family members present at conference: n/a      I led this team conference and I approve the established interdisciplinary plan of care as documented within the medical record of Anel Smart. MD: Nilda Lopez.  Tramaine Soto MD 5/21/2019, 1:51 PM

## 2019-05-20 NOTE — PLAN OF CARE
Pt very resistive to bed/chair alarms, however is very impulsive at times. Needing frequent safety reminders.

## 2019-05-21 PROCEDURE — 97116 GAIT TRAINING THERAPY: CPT

## 2019-05-21 PROCEDURE — 6370000000 HC RX 637 (ALT 250 FOR IP): Performed by: PHYSICAL MEDICINE & REHABILITATION

## 2019-05-21 PROCEDURE — 97110 THERAPEUTIC EXERCISES: CPT

## 2019-05-21 PROCEDURE — 1280000000 HC REHAB R&B

## 2019-05-21 PROCEDURE — 97530 THERAPEUTIC ACTIVITIES: CPT

## 2019-05-21 PROCEDURE — 92523 SPEECH SOUND LANG COMPREHEN: CPT

## 2019-05-21 PROCEDURE — 97542 WHEELCHAIR MNGMENT TRAINING: CPT

## 2019-05-21 PROCEDURE — 97535 SELF CARE MNGMENT TRAINING: CPT

## 2019-05-21 RX ADMIN — POLYETHYLENE GLYCOL 3350 17 G: 17 POWDER, FOR SOLUTION ORAL at 08:49

## 2019-05-21 RX ADMIN — DICLOFENAC 2 G: 10 GEL TOPICAL at 17:19

## 2019-05-21 RX ADMIN — PANTOPRAZOLE SODIUM 40 MG: 40 TABLET, DELAYED RELEASE ORAL at 06:09

## 2019-05-21 RX ADMIN — SENNOSIDES,DOCUSATE SODIUM 1 TABLET: 8.6; 5 TABLET, FILM COATED ORAL at 21:43

## 2019-05-21 RX ADMIN — METOPROLOL TARTRATE 25 MG: 25 TABLET, FILM COATED ORAL at 21:43

## 2019-05-21 RX ADMIN — SIMVASTATIN 40 MG: 40 TABLET, FILM COATED ORAL at 21:43

## 2019-05-21 RX ADMIN — FINASTERIDE 5 MG: 5 TABLET, FILM COATED ORAL at 08:49

## 2019-05-21 RX ADMIN — LISINOPRIL 10 MG: 10 TABLET ORAL at 08:49

## 2019-05-21 RX ADMIN — SENNOSIDES,DOCUSATE SODIUM 1 TABLET: 8.6; 5 TABLET, FILM COATED ORAL at 08:49

## 2019-05-21 RX ADMIN — AZELASTINE HYDROCHLORIDE 1 SPRAY: 137 SPRAY, METERED NASAL at 21:41

## 2019-05-21 RX ADMIN — METOPROLOL TARTRATE 25 MG: 25 TABLET, FILM COATED ORAL at 08:49

## 2019-05-21 RX ADMIN — OXYCODONE HYDROCHLORIDE AND ACETAMINOPHEN 1 TABLET: 5; 325 TABLET ORAL at 08:54

## 2019-05-21 RX ADMIN — TAMSULOSIN HYDROCHLORIDE 0.4 MG: 0.4 CAPSULE ORAL at 21:43

## 2019-05-21 RX ADMIN — AZELASTINE HYDROCHLORIDE 1 SPRAY: 137 SPRAY, METERED NASAL at 06:10

## 2019-05-21 RX ADMIN — DICLOFENAC 2 G: 10 GEL TOPICAL at 21:41

## 2019-05-21 RX ADMIN — OXYCODONE HYDROCHLORIDE AND ACETAMINOPHEN 2 TABLET: 5; 325 TABLET ORAL at 21:43

## 2019-05-21 ASSESSMENT — PAIN DESCRIPTION - PROGRESSION
CLINICAL_PROGRESSION: GRADUALLY IMPROVING
CLINICAL_PROGRESSION: GRADUALLY IMPROVING

## 2019-05-21 ASSESSMENT — PAIN DESCRIPTION - LOCATION
LOCATION: BACK
LOCATION: SHOULDER
LOCATION: BACK

## 2019-05-21 ASSESSMENT — PAIN SCALES - GENERAL
PAINLEVEL_OUTOF10: 3
PAINLEVEL_OUTOF10: 6
PAINLEVEL_OUTOF10: 3
PAINLEVEL_OUTOF10: 6
PAINLEVEL_OUTOF10: 5

## 2019-05-21 ASSESSMENT — PAIN DESCRIPTION - DESCRIPTORS
DESCRIPTORS: ACHING;DISCOMFORT
DESCRIPTORS: ACHING;DISCOMFORT
DESCRIPTORS: ACHING

## 2019-05-21 ASSESSMENT — PAIN DESCRIPTION - PAIN TYPE
TYPE: ACUTE PAIN
TYPE: CHRONIC PAIN
TYPE: ACUTE PAIN

## 2019-05-21 ASSESSMENT — PAIN DESCRIPTION - FREQUENCY
FREQUENCY: INTERMITTENT
FREQUENCY: INTERMITTENT

## 2019-05-21 ASSESSMENT — PAIN - FUNCTIONAL ASSESSMENT: PAIN_FUNCTIONAL_ASSESSMENT: ACTIVITIES ARE NOT PREVENTED

## 2019-05-21 ASSESSMENT — PAIN DESCRIPTION - ORIENTATION
ORIENTATION: LOWER
ORIENTATION: RIGHT;LEFT
ORIENTATION: LEFT;LOWER
ORIENTATION: LEFT
ORIENTATION: LOWER

## 2019-05-21 ASSESSMENT — PAIN DESCRIPTION - ONSET: ONSET: ON-GOING

## 2019-05-21 NOTE — PROGRESS NOTES
Occupational Therapy  Facility/Department: [facility]  Daily Treatment Note  NAME: Georgi Sandhoff  : 1940  MRN: [id number]    Date of Service: 2019    Discharge Recommendations:  Home with Home health OT, Home with assist PRN  OT Equipment Recommendations  Equipment Needed: Yes  Mobility Devices: ADL Assistive Devices  ADL Assistive Devices: Sock-Aid Hard;Long-handled [place]oe [place];Long-handled Sponge;Dressing Stick  Other: may need AE- will cont to assess pending progress    Assessment   Performance deficits / Impairments: Decreased functional mobility ; Decreased safe awareness;Decreased balance;Decreased ADL status; Decreased endurance;Decreased strength;Decreased cognition;Decreased ROM  Assessment: First session: Pt verbalizing pain this AM, agreeable to OT. Pt ambulated to/from bathroom with SBA with RW and mod cues for safety and staying within the RW. Pt completed toileting distant SPV, stood sinkside close SPV with RW for grooming tasks. Once in gym, pt ambulated around area to gather hoops various heights and locations continues to need cues for visual scanning L to R to gather items. Pt then stood 3x to toss hoops with better endurance however still demos poor safety awareness and decreased balance as standing activities progressed. Pt was able to stand to gather hoops with reacher with poor balance and needing cues to stay within RW and ambulate closer to items for safety. Pt sat with BUE 1# wrist weights to obtain nuts/bolts from bolt board with poor attn and problem solving at times needing cues for sequencing of multistep task. Ice pack was placed on L lower back for pain management, pt satisfied. OT educated on home management/safety, pain management, and energy conservation strategies. Second session: Pt EOB, alarm sounding attempting to get to bathroom. Re-educated on importance of calling for assistance, pt with increased agitation with education.   Pt ambulated to bathroom with SPC, CGA at times with mod cues for balance and safety. Pt completed toileting with distant SBA per patient request.  Stood sinkside x10 minutes to wash face and brush teeth. Sat x5 minutes for rest break, self-propelled to gym with increased time. Pt completed BUE ex 2# to start on BUE, needing 1# for RUE due to c/o pain. MD aware. Pt performed 2x15 reps with res tbreaks intermittently and cues for energy conservation. Cont to rec assist PRN with HHOT. Cont OT POC. Prognosis: Fair  Patient Education: role of OT, OT POC, safety, body mechanics, IPR schedule, DME, DC recs  REQUIRES OT FOLLOW UP: Yes  Activity Tolerance  Activity Tolerance: Patient Tolerated treatment well;Patient limited by fatigue  Safety Devices  Safety Devices in place: Yes  Type of devices: Left in chair;Gait belt(left with PT)  Restraints  Initially in place: No         Patient Diagnosis(es): There were no encounter diagnoses. has a past medical history of Hyperlipemia, Hypertension, Prostate enlargement, and Unspecified cerebral artery occlusion with cerebral infarction. has a past surgical history that includes Carotid endarterectomy; Foot surgery; Skin cancer excision; Eye surgery; Facial reconstruction surgery; Tonsillectomy; Colonoscopy; and Colonoscopy (2014). Restrictions  Restrictions/Precautions  Restrictions/Precautions: Up as Tolerated, General Precautions  Position Activity Restriction  Spinal Precautions: No Bending, No Lifting, No Twisting  Spinal Precautions: no lifting >10 lbs, pt may shower, open to air  Other position/activity restrictions: up as tolerated, TLSO, may shower, no BLT  Subjective   General  Chart Reviewed: Yes  Patient assessed for rehabilitation services?: Yes  Family / Caregiver Present: No  Referring Practitioner: Dr. Levon Barger  Diagnosis: T8-T9 disectomy and fusion  Subjective  Subjective: Pt in room, agreebale to OT.   General Comment  Comments: RN clearing patient for tx  Pain Assessment  Pain when reaching to grasp hoops. Pt then stood 3 trials to toss hoops to [employer] with decreased balance/endurance towards end of each stand. Pt used reacher to gather hoops from floor level, continues to need cues for safety, mechanics and maintaining within the RW. Fine Motor: In seated position, pt placed nuts/bolts on bolt board with 1# B wrist weights to increase BUE strength and FM coordination. Pt needed min-mod cues consistently throughout task to follow simple 2 step command and also ID correct nut to correct bolt. Poor attn noted throughout. Cognition  Overall Cognitive Status: Exceptions  Arousal/Alertness: Appropriate responses to stimuli  Following Commands: Follows multistep commands with repitition; Follows one step commands consistently  Attention Span: Difficulty dividing attention  Memory: Decreased recall of precautions  Safety Judgement: Decreased awareness of need for assistance  Problem Solving: Assistance required to identify errors made;Assistance required to implement solutions;Assistance required to correct errors made  Initiation: Does not require cues  Sequencing: Does not require cues  Cognition Comment: Patient with very poor safety awareness, problem solving, and insight to deficits throughout therapy session, bringing RW very far forward and poor carryover to bring closer to body with mobility.                                          Plan   Plan  Times per week: 5 out of 7 days  Times per day: Daily  Plan weeks: 8 days  Current Treatment Recommendations: Strengthening, Endurance Training, Patient/Caregiver Education & Training, ROM, Cognitive Reorientation, Equipment Evaluation, Education, & procurement, Self-Care / ADL, Balance Training, Gait Training, Pain Management, Home Management Training, Functional Mobility Training, Safety Education & Training, Positioning    Goals  Short term goals  Time Frame for Short term goals: 5/21/19  Short term goal 1: Pt will complete toileting/toilet transfer SBA with LRAD  Short term goal 2: Pt will complete LB dressing with Yaquelin and AE PRN using LRAD for standing balance  Short term goal 3: Pt will tolerate 10 minutes standing balance activities and/or ADLs with LRAD SBA  Short term goal 4: Pt will verbalize and demo understanding of BUE HEP to increase strength and endurance for transfers/ADLs  Short term goal 5: Pt will verbalize 3/3 back precautions 100% when asked throughout therapy sessions with no cues  Long term goals  Time Frame for Long term goals : 5/25/19  Long term goal 1: Pt will complete funcitonal mobility in room/bathroom with LRAD to gather all items for bathing/dressing  Long term goal 2: Pt will complete shower transfer/bathing Heladio with LRAD and DME PRN  Long term goal 3: Pt will complete LB/UE dressing Heladio with LRAD and AE PRN  Long term goal 4: Pt will complete 1 simple meal prep and/or IADL task with LRAD Heladio  Patient Goals   Patient goals :  \"To go home and be independent\"       Therapy Time   Individual Concurrent Group Co-treatment   Time In 0900         Time Out 1000         Minutes 60         Timed Code Treatment Minutes: 60 Minutes     Second Session Therapy Time:   Individual Concurrent Group Co-treatment   Time In 1310         Time Out 1340         Minutes 30           Timed Code Treatment Minutes:  30 Minutes    Total Treatment Minutes:  [address], OTR/L

## 2019-05-21 NOTE — PROGRESS NOTES
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  DATE ONSET: 19    Date of Eval: 2019   Evaluating Therapist: MARIANO Shipley    RECENT RESULTS  CT OF HEAD/MRI: N/A    Primary Complaint:  Pt reports increased confusion and difficulty with memory over the last year or two. Pt's goal: \"to go home! \"    Impressions:  Pt presents as a 70year old male who presented to the ED with a three year history of progressive ataxia and difficulty walking. Pt had surgery on  for a left thoracotomy and T8 and T9 discectomy for anterior decompression and T8-9 thoracic interbody fusion with placement of a cage. Pt reported that he lives at home with his wife, active , and is a retired salesman. Pt has two daughters who are involved, and two grandchildren. Pt stated he manages the household finances, assists his wife with his medications, and that his wife manages cooking, cleaning, laundry, and grocery shopping. Pt reported increased confusion and overall reduced recall the last few years. Pt was administered the [facility] scoring a 12/30. A score of 26/30 is considered WFL. Based upon on the results, pt would benefit from continued ST services to improve overall recall, executive function, thought organization, reasoning, problem solving skills, and carryover of compensatory strategies.      Dennis Cognitive Assessment (MoCA)  Section Subtest Score Comments   Visuospatial/ Executive Trail making 0     Copy Cube 0     Clock 0    Naming Picture naming 1/3 Pt stated a giraffe was a zebra and a rhino was a hippo or a pig   Attention Number repetition 1/2 Difficulty repeating numbers in backwards order    Selective attention 1/1     Serial 7s 1/3 Significant difficulty with this taks   Language Repetition 2/2     Fluency 0/1 Pt able to name 10 items, WFL is considered 11<   Abstraction Comparisons 2/2    Delayed Recall Recall 5 novel words 0/5 Improving to 3/5 with category cue and 5/5 with multiple choice cue   Orientation Spatial and Temporal 3/3 Disoriented to month, date, and day of week      Total:  12/30           Pain:  Pain Assessment  Pain Assessment: 0-10      Assessment:  Cognitive Diagnosis: Moderate-severe cognitive linguistic deficit        Recommendations:  Requires SLP Intervention: Yes  Duration/Frequency of Treatment: 5x/wk during LOS  D/C Recommendations: To be determined       Plan:   Goals:  Short-term Goals  Timeframe for Short-term Goals: 5 days (05/26/19)  Goal 1: Pt will complete recall tasks with 80% accuracy given min cues. Goal 2: Pt will complete executive function tasks (meds, finances, math, time, etc) with 80% accuracy given min cues. Goal 3: Pt will complete thought organization and reasoning tasks with 80% accuracy given min cues. Goal 4: Pt will complete problem solving tasks with 80% accuracy given min cues. Long-term Goals  Timeframe for Long-term Goals: 7 days (05/28/19)  Goal 1: Pt will improve overall cognitive linguistic skills to increase safety and independence to return to PLOF. Patient/family involved in developing goals and treatment plan: yes    Subjective:   Previous level of function and limitations: pt lives at home with his wife, manages own finances, assists wife with medications, wife manages all other household tasks    General  Chart Reviewed: Yes  Patient assessed for rehabilitation services?: Yes  Family / Caregiver Present: No  Subjective  Subjective: Pt alert, cooperative and agreeable to participate in evaluation. 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  [address]: farm, hunt   Vision  Vision: Impaired  Vision Exceptions: Wears glasses at all times  Hearing  Hearing: Exceptions to [facility]  Hearing Exceptions: Hard of hearing/hearing concerns;Bilateral hearing aid(hearing aids were not in during eval)           Objective:  Oral/Motor  Oral Motor: Within functional limits    Auditory Comprehension  Comprehension: Exceptions  Complex Questions: Mild    Reading Comprehension  Reading Status: Within functional limits    Expression  Primary Mode of Expression: Verbal    Verbal Expression  Verbal Expression: Within functional limits    Written Expression  Dominant Hand: Right  Written Expression: Within Functional Limits    Motor Speech  Motor Speech: Within Functional Limits    Pragmatics/Social Functioning  Pragmatics: Within functional limits    Cognition:   Orientation  Overall Orientation Status: Impaired  Orientation Level: Disoriented to time  Attention  Attention: Exceptions to [facility]  Alternating Attention: Mild  Divided Attention: Mild  Selective Attention: Mild  Sustained Attention: Mild  Memory  Memory: Exceptions to [facility]  Short-term Memory: Severe  Working Memory: Mild  Problem Solving  Problem Solving: Exceptions to [facility]  Managing Finances: To be assessed in therapy  Managing Medications:  To be assessed in therapy  Verbal Reasoning Skills: Mild  Numeric Reasoning  Numeric Reasoning: Exceptions to [facility] Calculations: Severe  Abstract Reasoning  Abstract Reasoning: Within Functional Limits  Safety/Judgement  Safety/Judgement: Within Functional Limits      Prognosis:  Speech Therapy Prognosis  Prognosis: Good  Individuals consulted  Consulted and agree with results and recommendations: Patient    Education:  Patient Education: Education provided to pt re: role of SLP, results of assessment, and current recommendations. Patient Education Response: Verbalizes understanding  Safety Devices in place: Yes  Type of devices: All fall risk precautions in place; Patient at risk for falls; Left in bed;Call light within reach           Therapy Time:   Individual Concurrent Group Co-treatment   Time In 1415         Time Out 1500         Minutes [address] [facility] Cj MIRANDA CCC-SLP  Speech-Language Pathologist  [id number]

## 2019-05-21 NOTE — PLAN OF CARE
Problem: Neurological  Intervention: Speech Evaluation/treatment  Note:   SLP completed evaluation. Please refer to notes in EMR.

## 2019-05-21 NOTE — PLAN OF CARE
Pt continues to be resistive to safety interventions. Using call light for assistance. Reminders to wait on staff and to slow down.

## 2019-05-21 NOTE — CARE COORDINATION
Team conference/Weekly Summary     Team conference held today. SW met with patient at floor bedside to provide team conference update and confirm anticipated dc date of May 28th with home health care. He stated that he gave his wife the home care list and to call her. He denied any questions or concerns. Placed call to patient's wife Valentino Dove and left a message asking for a call back.      Chester Doctor MSW, LSW

## 2019-05-21 NOTE — PROGRESS NOTES
ankle pumps, heel slides, hip abduction, glut sets. Assessment   Body structures, Functions, Activity limitations: Decreased functional mobility ; Decreased balance;Decreased safe awareness;Decreased coordination;Decreased endurance;Decreased strength;Decreased sensation; Increased Pain  Assessment: pt pleasant and motivated. able to ambulate with SBA with RW and CGA-SBA with use of SPC. conitnue to progress mobility with cane as pt prefers this AD. fair to fair (+) dyn stand balance within spinal precaitions. requires cues for safety with transfers and gait. continue to progress mobility as tolerated. Increased confusion noted in pm session compared to am session, mostly with short term memory. Discussion with pt about using RW when experiencing fatigue for increased stability. Treatment Diagnosis: impaired transfers   Prognosis: Good  Patient Education: transfers   REQUIRES PT FOLLOW UP: Yes  Activity Tolerance  Activity Tolerance: Patient limited by pain; Patient limited by endurance; Patient limited by fatigue          Goals  Short term goals  Time Frame for Short term goals: 4 days 5/22  Short term goal 1: pt will complete bed mobility with mod ind and maintain spinal precautions. Short term goal 2: pt will complete functional transfers with supv and LRAD. - GOAL MET   Short term goal 3: pt will ambulate 150 ft with LRAD SBA. - GOAL MET [facility] RW  Long term goals  Time Frame for Long term goals : 8 days 5/26  Long term goal 1: pt will complete functional transfers with mod ind and LRAD. Long term goal 2: pt will ambulate 150 ft with LRAD mod ind. Long term goal 3: pt will ascend/descend 2 steps with RHR and supv. Long term goal 4: pt will complete car transfer with mod ind and AD  Long term goal 5: pt will complete seated HEP with ind to maintain functional strength.    Patient Goals   Patient goals : \"to walk\"    Plan    Plan  Times per week: 5-7x/week  Times per day: Daily  Current Treatment Recommendations: Strengthening, Transfer Training, Endurance Training, Neuromuscular Re-education, Patient/Caregiver Education & Training, Equipment Evaluation, Education, & procurement, Home Exercise Program, Safety Education & Training, Stair training, Functional Mobility Training, Balance Training, Gait Training, Pain Management  Safety Devices  Type of devices:  All fall risk precautions in place, Gait belt, Call light within reach, Nurse notified, Chair alarm in place, Left in chair     Therapy Time   Individual Concurrent Group Co-treatment   Time In 1000         Time Out 1100         Minutes 60         Timed Code Treatment Minutes: [address] Time:   [facility]   Time In 1340         Time Out 1410         Minutes 30           Timed Code Treatment Minutes:  30    Total Treatment Minutes:  90    Junior Olvera, PT

## 2019-05-21 NOTE — PROGRESS NOTES
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 Patient seen this am sitting up in gym. Reports some right shoulder pain due to recent fall (prior to hospitalization). Notices only with activity during therapies. Voltaren gel ordered. He is otherwise feeling well and happy with progress. ROS: No cp, sob, n/v, f/c  Objective:  Patient Vitals for the past 24 hrs:   BP Temp Temp src Pulse Resp SpO2   05/21/19 0849 (!) 141/79 98.7 °F (37.1 °C) Oral 90 18 95 %   05/20/19 2030 (!) 145/74 98 °F (36.7 °C) Oral 74 18 94 %     Gen: No distress, pleasant. HEENT: Normocephalic, atraumatic. CV: Regular rate and rhythm. Resp: No respiratory distress. Abd: Soft, nontender   Ext: No edema. Neuro: Alert, oriented, appropriately interactive.    Wt Readings from Last 3 Encounters:   05/19/19 217 lb 8 oz (98.7 kg)   04/28/14 210 lb (95.3 kg)       Laboratory data:   Lab Results   Component Value Date    WBC 8.9 05/18/2019    HGB 11.6 (L) 05/18/2019    HCT 34.3 (L) 05/18/2019    MCV 93.7 05/18/2019     05/18/2019       Lab Results   Component Value Date     05/18/2019    K 4.1 05/18/2019     05/18/2019    CO2 23 05/18/2019    BUN 16 05/18/2019    CREATININE 0.9 05/18/2019    GLUCOSE 116 05/18/2019    CALCIUM 9.5 05/18/2019        Therapy progress:  PT  Position Activity Restriction  Spinal Precautions: No Bending, No Lifting, No Twisting  Spinal Precautions: no lifting >10 lbs, pt may shower, open to air  Other position/activity restrictions: up as tolerated, TLSO, may shower, no BLT  Objective     Sit to Stand: Stand by assistance(x10 reps)  Stand to sit: Stand by assistance  Bed to Chair: Contact guard assistance  Stand Pivot Transfers: Contact guard assistance  Device: Rolling Walker  Assistance: Stand by assistance  Distance: 250 ft , 130 ft   OT  PT Equipment Recommendations  Equipment Needed: No  Toilet - Technique: Ambulating, To left, To right  Equipment Used: Standard toilet(RW)  Toilet Transfers Comments: CGA for balance only, mod cues for safety during sit to stands  Assessment        SLP                Body mass index is 34.07 kg/m². Assessment and Plan:     Thoracic myelopathy - s/p T8, T9 partial discectomy for anterior decompression and T8-9 thoracic interbody fusion (5/14 with Dr. Mitchel Phoenix). Wound care. Pain control. PT/OT.      Hypertension - lisinopril, lopressor     HLD - lipitor     Previous mild stroke.      Bowels: Schedule Miralax + Senna S. Follow bowel movements. Enema or suppository if needed.      Bladder: H/o BPH. Check PVR x 3. [address] if PVR > 300cc. Finasteride, Flomax.      Pain:   Percocet and methocarbamol prn. Interdisciplinary team conference was held today with entire rehab treatment team including PT, OT, SLP, Dietician, RN, and SW. Discussion focused on progress toward rehab goals and discharge planning. Making good progress with functional mobility, working with different assistive devices. OT noting safety awareness deficits. SLP to eval today. Separate conference then held with patient/family, questions answered and concerns addressed. Total treatment time >35 min with greater than 50% spent in care coordination. Anticipated dispo: home with wife  Services: TERRY PT, OT, RN, ? SLP  DME: has all  ELOS: 5/28    Andrew Lowery.  Joana Johns MD 5/21/2019, 9:42 AM

## 2019-05-22 PROCEDURE — 97535 SELF CARE MNGMENT TRAINING: CPT

## 2019-05-22 PROCEDURE — 97116 GAIT TRAINING THERAPY: CPT

## 2019-05-22 PROCEDURE — 6370000000 HC RX 637 (ALT 250 FOR IP)

## 2019-05-22 PROCEDURE — 97110 THERAPEUTIC EXERCISES: CPT

## 2019-05-22 PROCEDURE — 97127 HC SP THER IVNTJ W/FOCUS COG FUNCJ: CPT

## 2019-05-22 PROCEDURE — 97530 THERAPEUTIC ACTIVITIES: CPT

## 2019-05-22 PROCEDURE — 6370000000 HC RX 637 (ALT 250 FOR IP): Performed by: PHYSICAL MEDICINE & REHABILITATION

## 2019-05-22 PROCEDURE — 1280000000 HC REHAB R&B

## 2019-05-22 RX ADMIN — CETIRIZINE HYDROCHLORIDE 10 MG: 10 TABLET, FILM COATED ORAL at 05:16

## 2019-05-22 RX ADMIN — OXYCODONE HYDROCHLORIDE AND ACETAMINOPHEN 1 TABLET: 5; 325 TABLET ORAL at 12:22

## 2019-05-22 RX ADMIN — SIMVASTATIN 40 MG: 40 TABLET, FILM COATED ORAL at 21:20

## 2019-05-22 RX ADMIN — LISINOPRIL 10 MG: 10 TABLET ORAL at 07:50

## 2019-05-22 RX ADMIN — FINASTERIDE 5 MG: 5 TABLET, FILM COATED ORAL at 07:50

## 2019-05-22 RX ADMIN — SENNOSIDES,DOCUSATE SODIUM 1 TABLET: 8.6; 5 TABLET, FILM COATED ORAL at 21:19

## 2019-05-22 RX ADMIN — AZELASTINE HYDROCHLORIDE 1 SPRAY: 137 SPRAY, METERED NASAL at 21:21

## 2019-05-22 RX ADMIN — DICLOFENAC 2 G: 10 GEL TOPICAL at 15:13

## 2019-05-22 RX ADMIN — TAMSULOSIN HYDROCHLORIDE 0.4 MG: 0.4 CAPSULE ORAL at 21:20

## 2019-05-22 RX ADMIN — DICLOFENAC 2 G: 10 GEL TOPICAL at 21:21

## 2019-05-22 RX ADMIN — OXYCODONE HYDROCHLORIDE AND ACETAMINOPHEN 1 TABLET: 5; 325 TABLET ORAL at 07:50

## 2019-05-22 RX ADMIN — AZELASTINE HYDROCHLORIDE 1 SPRAY: 137 SPRAY, METERED NASAL at 07:51

## 2019-05-22 RX ADMIN — METOPROLOL TARTRATE 25 MG: 25 TABLET, FILM COATED ORAL at 07:50

## 2019-05-22 RX ADMIN — OXYCODONE HYDROCHLORIDE AND ACETAMINOPHEN 1 TABLET: 5; 325 TABLET ORAL at 21:20

## 2019-05-22 RX ADMIN — SENNOSIDES,DOCUSATE SODIUM 1 TABLET: 8.6; 5 TABLET, FILM COATED ORAL at 07:50

## 2019-05-22 RX ADMIN — POLYETHYLENE GLYCOL 3350 17 G: 17 POWDER, FOR SOLUTION ORAL at 07:50

## 2019-05-22 RX ADMIN — METOPROLOL TARTRATE 25 MG: 25 TABLET, FILM COATED ORAL at 21:20

## 2019-05-22 RX ADMIN — Medication: at 15:13

## 2019-05-22 RX ADMIN — PANTOPRAZOLE SODIUM 40 MG: 40 TABLET, DELAYED RELEASE ORAL at 05:16

## 2019-05-22 RX ADMIN — DICLOFENAC 2 G: 10 GEL TOPICAL at 07:52

## 2019-05-22 ASSESSMENT — PAIN SCALES - GENERAL
PAINLEVEL_OUTOF10: 0
PAINLEVEL_OUTOF10: 4
PAINLEVEL_OUTOF10: 3
PAINLEVEL_OUTOF10: 0
PAINLEVEL_OUTOF10: 5
PAINLEVEL_OUTOF10: 5
PAINLEVEL_OUTOF10: 0
PAINLEVEL_OUTOF10: 4
PAINLEVEL_OUTOF10: 5
PAINLEVEL_OUTOF10: 4

## 2019-05-22 ASSESSMENT — PAIN DESCRIPTION - PAIN TYPE
TYPE: ACUTE PAIN

## 2019-05-22 ASSESSMENT — PAIN DESCRIPTION - ORIENTATION
ORIENTATION: LEFT
ORIENTATION: LOWER

## 2019-05-22 ASSESSMENT — PAIN DESCRIPTION - LOCATION
LOCATION: BACK

## 2019-05-22 NOTE — PROGRESS NOTES
Pt alert/oriented x 3. Up with walker and slow steady gait, drags right foot at times, needs safety reminders. Appetite good, fluids taken well. Continent of bowel and bladder.

## 2019-05-22 NOTE — CARE COORDINATION
Received voicemail from patient's wife Ross Olvera on 5-21-19. Placed return call to patient's wife Ross Olvera. Explained team conference from yesterday. She stated no preference on the home care company and requested writer obtain a company. Explained referral would be placed to [facility]. She is agreeable. Also met with patient at bedside who is agreeable to this.      Message left for Billie with [facility] for the referral    Derrick Mazariegos MSW, LSW

## 2019-05-22 NOTE — CARE COORDINATION
[facility]    Referral received from [facility] MSW with request for SN/PT/OT/ST. Writer has reviewed H&P and most recent MD progress note. Writer is aware the pt is s/p s/p T8, T9 partial discectomy for anterior decompression and T8-9 thoracic interbody fusion on 5/14 with Dr. Owen Vicente. MD, If pt will need wound care at home please include orders for wound care in the order for home care/ LIV. RN, please send pt home with supplies for 5 days worth of dressing changes. Writer is aware the pt's anticipated DC is 5/28/19. I will continue to follow patient for needs, and arrange [facility] services prior to DC. Should pt dc over the weekend please fax facesheet, order, LIV, and H&P to [facility].      Gisselle Hoskins RN CTN with [facility] [address]  [facility]:[phone]

## 2019-05-22 NOTE — PROGRESS NOTES
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 ([address] [S24.133A]    Precautions: falls  Home situation: lives at home with his wife   Dx: [] Aphasia  [] Dysarthria  [] Apraxia   [] Oropharyngeal dysphagia [x] Cognitive Impairment  [] Other:   Date of Admit: 5/17/2019  Room #: 0155/0155-01    Current functional status (updated daily):         Pt being seen for : [] Speech/Language Treatment  [] Dysphagia Treatment [x] Cognitive Treatment  [] Other:  Communication: [x]WFL  [] Aphasia  [] Dysarthria  [] Apraxia  [] Pragmatic Impairment [] Non-verbal  [] Hearing Loss  [] Other:   Cognition: [] WFL  [] Mild  [x] Moderate  [x] Severe [] Unable to Assess  [] Other:  Memory: [] WFL  [] Mild  [x] Moderate  [x] Severe [] Unable to Assess  [] Other:  Behavior: [x] Alert  [x] Cooperative  [x]  Pleasant  [] Confused  [] Agitated  [] Uncooperative  [] Distractible [] Motivated  [] Self-Limiting [] Anxious  [] Other:  Endurance:  [x] Adequate for participation in SLP sessions  [] Reduced overall  [] Lethargic  [] Other:  Safety: [] No concerns at this time  [x] Reduced insight into deficits  []  Reduced safety awareness [] Not following call light procedures  [] Unable to Assess  [] Other:    Current Diet Order:DIET GENERAL;  Swallowing Precautions: Sit up for all meals and thereafter for 30 minutes, Drink with a straw only with small sips and Alternate solids with liquids       Date: 5/22/2019      Tx session 1  [id number] Tx session 2  [id number]   Total Timed Code Min 30 30   Total Treatment Minutes 30 30   Individual Treatment Minutes 30 30   Group Treatment Minutes 0 0   Co-Treat Minutes 0 0   Variance/Reason:      Pain None reported    Pain Intervention [] RN notified  [] Repositioned  [] Intervention offered and patient declined  [x] N/A  [] Other: [] RN notified  [] Repositioned  [] Intervention offered and patient declined  [x] N/A  [] Other:   Subjective     Pt alert and oriented, cooperative and agreeable to participate in therapy. Pt seen at bedside. Pt alert and oriented, cooperative and agreeable to participate in therapy. Pt seen at bedside. Objective:  Goals     Goal 1: Pt will complete recall tasks with 80% accuracy given min cues. SLP educated pt about use of compensatory memory strategies, introducing WRAP acronym (writing, repetition, association, picturing). Functional recall of a picture:  -60% accuracy  -pt implemented use of writing and association compensatory memory strategies. Delayed recall task from earlier tx session re: picture- 100% accuracy    Short term recall of 4 new items:  -0% accuracy independently improving to 50% accuracy given mod cues    Word list retention:  -pt provided with 4 words and then asked a question about the words   -ex: foil, sneakers, blanket, box- which is the softest?  -91% accuracy independently improving to 100% accuracy given min cues. Goal 2: Pt will complete executive function tasks (meds, finances, math, time, etc) with 80% accuracy given min cues. Time word problems:  -42% accuracy independently improving to 100% accuracy given mod-max cues. Math word problems:  -50% accuracy independently improving to 100% accuracy given mod-max cues   Goal 3: Pt will complete thought organization and reasoning tasks with 80% accuracy given min cues. Did not target. Alternating attention task/trail making:  -0% accuracy independently, improving to 50% accuracy given max cues.    -pt required max cues to alternate between letters and numbers and then to decipher which letter or number came next      Goal 4: Pt will complete problem solving tasks with 80% accuracy given min cues. SLP and pt discussed and reviewed safety strategies. Pt had to use the restroom towards end of tx session and pt began to get up right away from his wheelchair (didn't put on wheelchair brakes, connected to alarm, and didn't mention anything to SLP prior). SLP then assisted pt to restroom. SLP reinforced use of call light and to let SLP know when he was done in the restroom, however pt got up by himself from the toilet, didn't inform SLP (standing outside of door) and pt didn't use call light. SLP provided further education re: importance of using calling and overall safety awareness. Other areas targeted: Orientation:  Pt able to view calendar provided on wall and determine orientation concepts with 100% accuracy no cues provided    RN informed SLP that pt's daughter spoke with her last night and wanted to pass along information to SLP re: pt being dyslexic and having ADHD, per pt daughter report. Education:   Education provided to pt re: compensatory memory strategies, safety strategies, and rationale for tx tasks provided. Education provided to pt re: compensatory memory strategies, rationale for tx tasks provided. Safety Devices: [x] Call light within reach  [x] Chair alarm activated  [] Bed alarm activated  [x] Other: pt left in wheelchair, all belongings within reach [x] Call light within reach  [] Chair alarm activated  [x] Bed alarm activated  [x] Other: pt left in bed, all belongings within reach   Assessment: Session 1: Pt pleasant and cooperative, agreeable to participate in all tx tasks. Pt with overall reduced insight into deficits and safety awareness. Pt requiring reinforcement for use of call light and using wheelchair brakes when transferring. Pt also will benefit from further education and reinforcement for use of compensatory memory strategies to improve functional and short term recall skills. Session 2: Pt asleep upon SLP arrival, easily awakened.  Pt cooperative and agreeable to participate. Pt demonstrated difficulty with simple math problems requiring mod-max cues. Pt did well with delayed recall tasks, but continues to demonstrate with overall reduced functional and short term recall skills. Pt with reduced thought organization observed this tx session as well. Continue goals as listed above. Plan: Continue as per plan of care. Additional Information:     Barriers toward progress: Cognitive deficit  Discharge recommendations:  [] Home independently  [x] Home with assistance []  24 hour supervision  [] ECF [] Other:  Continued Tx Upon Discharge: ? [] Yes [] No [x] TBD based on progress while on ARU [] Vital Stim indicated [] Other:   Estimated discharge date: 05/28/19    Interventions used this date:  [] Speech/Language Treatment  [] Instruction in HEP [] Group [] Dysphagia Treatment [] Cognitive Treatment   [] Other:    Admitting SLP FIM scores:  Comprehension: 4 - Patient understands basic needs 75-90%+ of the time  Expression: 6 - Device used to express complex ideas/needs  Social Interaction: 6 - Patient requires medication for mood and/or effect  Problem Solving: 3 - Patient solves simple/routine tasks 50%-74%  Memory: 2 - Patient remembers 25%-49% of the time    Current SLP FIM scores:  Comprehension: 4 - Patient understands basic needs 75-90%+ of the time  Expression: 6 - Device used to express complex ideas/needs  Social Interaction: 6 - Patient requires medication for mood and/or effect  Problem Solving: 3 - Patient solves simple/routine tasks 50%-74%  Memory: 3 - Patient remembers 50%-74% of the time    Total Time Breakdown / Charges    Time in Time out Total Time / units   Cognitive Tx 0800  1430 0830  1500 30 mins/ 2 units  30 mins/ 2 units   Speech Tx -- -- --   Dysphagia Tx -- -- --     Electronically Signed by     Kamryn East. A CCC-SLP  Speech-Language Pathologist  [id number]

## 2019-05-22 NOTE — PROGRESS NOTES
from chair to RW, x 2 reps from commode to RW  Ambulation  Ambulation?: Yes  Ambulation 1  Surface: level tile  Device: Single point cane  Assistance: Moderate assistance  Quality of Gait: use of environment for balance, increased kyphotic posture, decreased swing phase BLE, decreased heel strike 1 significant LOB with mod A to correct  Distance: 15 ft   Ambulation 2  Surface - 2: level tile  Device 2: Rolling Walker  Assistance 2: Contact guard assistance;Stand by assistance  Quality of Gait 2: cues for upright posture, decreased RLE step length/heel strike/swing phase, decreased velocity , cues for environment awareness  Distance: 150 ft, 180 ft, 90 ft   Stairs/Curb  Stairs?: Yes  Stairs  # Steps : 12  Rails: Bilateral  Assistance: Stand by assistance  Comment: step to pattern     Balance  Standing - Dynamic: Fair  Comments: pt retrieved objects placed around unit in various places from floor to shoulder height. pt requires max VC to maintain spinal precautions (use reacher for items on floor, no twsting to get objects), and cues for environment awareness. required 1 seated rest rbeak during activity due to fatigue. pt completed 2x20 alternating toe taps onto 6\" step to improve RYAN/ian, cues provided for increased hip flexion to prevent cathcing foot (RLE > LLE). Second Session:    Pt found seated in gym. Pt reporting increased fatigue, OT stating pt did not tolerate session well. Wife present. Therapist educated pt and wife on use fo RW vs SPC for increased safety, wife stating agreement and understanding. Wife observed session and pt ambulating. Sit to stand with supv from w/c to RW  Ambulated 173 ft with RW, SBA with wife observing. Therapist educating wife on how to cue pt appropriately with RW (upright posture, increased step lengths/heel strike, environment awareness). Wife able to cue pt approprietly. Pt propelled w/c x 130 ft , 150 ft with mod ind and BUE and BLEs.      Pt donned 2# weights to BLE, completed 2x10 BLE seated ankle pumps, LAQ, marches, hip abduction with yellow theraband. Stand pivot from w/c to EOB with supv. Sit to supine with mod ind. Assessment   Body structures, Functions, Activity limitations: Decreased functional mobility ; Decreased balance;Decreased safe awareness;Decreased coordination;Decreased endurance;Decreased strength;Decreased sensation; Increased Pain  Assessment: pt limited by increased pain this session, RN made aware. pt exhibiting multiple LOB wiht use of SPC for mobility with up to mod A to correct, demonstratign delayed stepping reactions. therapist educated pt on benefits of RW vs SPC for increased stabiliy with ambulation adn that therapist recommends using RW until balance/endurance improves. pt stating \"I will probably use the cane\". max VC for safety provided this date to maintian spinal precations (pt attempting to flex spine to bend to floor to retrieve objects, as well as rotate). continue to recommend 24/7 supv for increased safety upon d/c. Treatment Diagnosis: impaired transfers   Prognosis: Good  Patient Education: spinal precaitions, use of AD  REQUIRES PT FOLLOW UP: Yes  Activity Tolerance  Activity Tolerance: Patient limited by pain; Patient limited by endurance; Patient limited by fatigue;Patient limited by cognitive status  Activity Tolerance: increased cues provided for safety this date. Goals  Short term goals  Time Frame for Short term goals: 4 days 5/22  Short term goal 1: pt will complete bed mobility with mod ind and maintain spinal precautions. - GOAL MET   Short term goal 2: pt will complete functional transfers with supv and LRAD. - GOAL MET   Short term goal 3: pt will ambulate 150 ft with LRAD SBA. - GOAL MET wiht RW  Long term goals  Time Frame for Long term goals : 8 days 5/26  Long term goal 1: pt will complete functional transfers with mod ind and LRAD.    Long term goal 2: pt will ambulate 150 ft with LRAD mod Kacey Shirts term goal 3: pt will ascend/descend 2 steps with RHR and supv. Long term goal 4: pt will complete car transfer with mod ind and AD  Long term goal 5: pt will complete seated HEP with ind to maintain functional strength. Patient Goals   Patient goals : \"to walk\"    Plan    Plan  Times per week: 5-7x/week  Times per day: Daily  Current Treatment Recommendations: Strengthening, Transfer Training, Endurance Training, Neuromuscular Re-education, Patient/Caregiver Education & Training, Equipment Evaluation, Education, & procurement, Home Exercise Program, Safety Education & Training, Stair training, Functional Mobility Training, Balance Training, Gait Training, Pain Management  Safety Devices  Type of devices:  All fall risk precautions in place, Gait belt, Call light within reach, Nurse notified, Chair alarm in place, Left in chair     Therapy Time   Individual Concurrent Group Co-treatment   Time In 1030         Time Out 1130         Minutes 60         Timed Code Treatment Minutes: Edmund 61 Time:   Individual Concurrent Group Co-treatment   Time In 1330         Time Out 1400         Minutes 30           Timed Code Treatment Minutes:  30    Total Treatment Minutes:  90    Samuel Belle, PT

## 2019-05-22 NOTE — PROGRESS NOTES
Occupational Therapy  Facility/Department: [facility]  Daily Treatment Note  NAME: Whitney Ma  : 1940  MRN: [id number]    Date of Service: 2019    Discharge Recommendations:  Home with Home health OT, Home with assist PRN  OT Equipment Recommendations  Equipment Needed: Yes  Mobility Devices: ADL Assistive Devices  ADL Assistive Devices: Sock-Aid Hard;Long-handled Shoe Horn;Long-handled Sponge;Dressing Stick  Other: may need AE- will cont to assess pending progress    Assessment   Performance deficits / Impairments: Decreased functional mobility ; Decreased safe awareness;Decreased balance;Decreased ADL status; Decreased endurance;Decreased strength;Decreased cognition;Decreased ROM  Assessment: First session: Pt progressing well with SPC this date to/from bathroom at a close SBA level. Pt does need consistent mod cues for safety during mobility tasks however does demonstrate resistance to education PRN. Pt able to tolerate prolonged standing sinkside and during mobility with better endurance. Second session:  Pt needed mod-max encouragement, extensive education and therapeutic listening to participate in this therapy session this date. Pt was able to ambulate with RW only this date, very poor balance with SPC so was not used. Pt was able to complete dressing, toileting, bathing, and grooming with close-distant SBA with RW, min cues >75% of the time for back px, safety awareness, and body mechanics during mobility, transfers, and ADL tasks. Pt tolerated BUE ex with max encouragement, 2x10-15 reps with 2# free weight LUE, 1# free weight RUE (due to pain.) Pt continues to demo poor cognition which limits safety with RW and mobility tasks. Pt is resistant at times for education regarding safety awareness, body mechanics, and back px during mobility and ADLs. Rec 24 hour SPV if possible at home due to poor cognition and if not possible assist with higher level IADLs and ADL tasks.   Cont OT POC.  Prognosis: Fair  Patient Education: role of OT, OT POC, safety, body mechanics, IPR schedule, DME, DC recs  REQUIRES OT FOLLOW UP: Yes  Activity Tolerance  Activity Tolerance: Patient Tolerated treatment well;Patient limited by fatigue  Safety Devices  Safety Devices in place: Yes  Type of devices: Left in bed;Call light within reach; Patient at risk for falls; Bed alarm in place;Gait belt;Nurse notified  Restraints  Initially in place: No         Patient Diagnosis(es): There were no encounter diagnoses. has a past medical history of Hyperlipemia, Hypertension, Prostate enlargement, and Unspecified cerebral artery occlusion with cerebral infarction. has a past surgical history that includes Carotid endarterectomy; Foot surgery; Skin cancer excision; Eye surgery; Facial reconstruction surgery; Tonsillectomy; Colonoscopy; and Colonoscopy (2014). Restrictions  Restrictions/Precautions  Restrictions/Precautions: Up as Tolerated, General Precautions  Position Activity Restriction  Spinal Precautions: No Bending, No Lifting, No Twisting  Spinal Precautions: no lifting >10 lbs, pt may shower, open to air  Other position/activity restrictions: up as tolerated, TLSO, may shower, no BLT  Subjective   General  Chart Reviewed: Yes  Patient assessed for rehabilitation services?: Yes  Family / Caregiver Present: No  Referring Practitioner: Dr. Lyle Valdivia  Diagnosis: T8-T9 disectomy and fusion  Subjective  Subjective: First session: Pt in room, agreebale to OT. Second session: Pt sitting in WC, angry about therapy times. Therapeutic listening x10 minutes, agreeable to a shower. Increased confusion was noted this date. General Comment  Comments: RN clearing patient for tx  Vital Signs  Patient Currently in Pain: Denies   Orientation  Orientation  Overall Orientation Status: Within Functional Limits  Objective    ADL  Feeding: Modified independent ; Beverage management(sitting EOB and in WC)  Grooming: Stand by assistance; Increased time to complete(standing sinkside with countertop support)  UE Bathing: Setup;Verbal cueing; Increased time to complete(sitting on TTB)  LE Bathing: Stand by assistance; Increased time to complete;Verbal cueing(close SBA standing, distance SBA sitting)  UE Dressing: Setup;Verbal cueing; Increased time to complete  LE Dressing: Setup;Verbal cueing; Increased time to complete;Stand by assistance(SBA-Setup with max cues for back px during task)  Toileting: Increased time to complete;Supervision(distant SPV with SPC and grab bar support)  Additional Comments: Increased time, cues PRN for safety with limited receptivity to education        Balance  Sitting Balance: Supervision  Standing Balance: Stand by assistance(SPC)  Standing Balance  Time: First session: x9 minutes, 2x2 minutes, x3 minutes; second session:  x5 minutes, 2x2 minutes, 2x3-4 minutes, x1 minute, x8 minutes  Activity: first session: to bathroom, standing sinkside and then to [facility]; second session: mobility WC to bathroom, standing x2 for toileting/LB dressing, standing x1 for LB bathing, x1 for LB dressing, standing to manage shirt, standing grooming tasks  Comment: first session: SPC with mod cues for safety and manuevering in tighter spaces of room and bathroom; second session: poor balance so RW was used for all functional mobility/transfers  Functional Mobility  Functional - Mobility Device: Cane(SPC)  Activity: To/from bathroom  Assist Level: Stand by assistance  Functional Mobility Comments: first session: close SBA with SPC, mod cues for safety with increased resistance noted and verbalized by patient as activities progressed; second session: poor balance and cognition this session so RW was used   Toilet Transfers  Toilet - Technique: Ambulating; To left; To right  Equipment Used: Standard toilet(SPC)  Toilet Transfer: Stand by assistance  Toilet Transfers Comments: SBA mod cues for safety during sit to stands  Bed mobility  Rolling to Left: Stand by assistance  Rolling to Right: Stand by assistance  Supine to Sit: Unable to assess(seated in [facility] upon arrival to room)  Sit to Supine: Stand by assistance(cues for back px)  Scooting: Stand by assistance  Comment: increased time, bed flat, cues for log rolling  Transfers  Sit to stand: Stand by assistance  Stand to sit: Stand by assistance  Transfer Comments: cues for hand placement and body mechanics, fatigue towards end of session SBA for sit <> stands                       Cognition  Overall Cognitive Status: Exceptions  Arousal/Alertness: Appropriate responses to stimuli  Following Commands: Follows multistep commands with repitition; Follows one step commands consistently  Attention Span: Difficulty dividing attention  Memory: Decreased recall of precautions  Safety Judgement: Decreased awareness of need for assistance  Problem Solving: Assistance required to identify errors made;Assistance required to implement solutions;Assistance required to correct errors made  Insights: Decreased awareness of deficits  Initiation: Does not require cues  Sequencing: Does not require cues  Cognition Comment: Patient with very poor safety awareness, problem solving, and insight to deficits throughout therapy session, bringing RW very far forward and poor carryover to bring closer to body with mobility.                     Type of ROM/Therapeutic Exercise  Type of ROM/Therapeutic Exercise: Free weights  Comment: seated in WC, 2# 2x15 reps adhering to back px and to tolerance, rest breaks throughout and demo needed for some of the exercises  Exercises  Scapular Protraction: x10 no weight  Scapular Retraction: x10 no weight  Shoulder Depression: x10 no weight  Shoulder Flexion: 2x15 2# to 80*  Elbow Flexion: 2x15 2#  Elbow Extension: 2x15 2#  Supination: 2x15 2#  Pronation: 2x15 2#  Wrist Flexion: 2x15 2#  Wrist Extension: 2x15 2#  Grasp/Release: 2x15 with 3 second holds  Other: 2x15 2# chest press, circles forwards/backwards                    Plan   Plan  Times per week: 5 out of 7 days  Times per day: Daily  Plan weeks: 8 days  Current Treatment Recommendations: Strengthening, Endurance Training, Patient/Caregiver Education & Training, ROM, Cognitive Reorientation, Equipment Evaluation, Education, & procurement, Self-Care / ADL, Balance Training, Gait Training, Pain Management, Home Management Training, Functional Mobility Training, Safety Education & Training, Positioning    Goals  Short term goals  Time Frame for Short term goals: 5/21/19  Short term goal 1: Pt will complete toileting/toilet transfer SBA with LRAD- GOAL MET 5/22  Short term goal 2: Pt will complete LB dressing with Yaquelin and AE PRN using LRAD for standing balance  Short term goal 3: Pt will tolerate 10 minutes standing balance activities and/or ADLs with LRAD SBA- GOAL MET 5/22  Short term goal 4: Pt will verbalize and demo understanding of BUE HEP to increase strength and endurance for transfers/ADLs- cont to progress  Short term goal 5: Pt will verbalize 3/3 back precautions 100% when asked throughout therapy sessions with no cues- 75% of sessiong 5/22  Long term goals  Time Frame for Long term goals : 5/25/19  Long term goal 1: Pt will complete funcitonal mobility in room/bathroom with LRAD to gather all items for bathing/dressing  Long term goal 2: Pt will complete shower transfer/bathing Heladio with LRAD and DME PRN  Long term goal 3: Pt will complete LB/UE dressing Heladio with LRAD and AE PRN  Long term goal 4: Pt will complete 1 simple meal prep and/or IADL task with LRAD Heladio  Patient Goals   Patient goals :  \"To go home and be independent\"       Therapy Time   Individual Concurrent Group Co-treatment   Time In 0900         Time Out 0930         Minutes 30         Timed Code Treatment Minutes: 30 Minutes     Second Session Therapy Time:   Individual Concurrent Group Co-treatment   Time In 1230         Time Out 1330         Minutes 61 Timed Code Treatment Minutes:  60 Minutes    Total Treatment Minutes:  925 [facility] ServicesMark Hines

## 2019-05-23 LAB
ANION GAP SERPL CALCULATED.3IONS-SCNC: 11 MMOL/L (ref 3–16)
BASOPHILS ABSOLUTE: 0.1 K/UL (ref 0–0.2)
BASOPHILS RELATIVE PERCENT: 0.9 %
BUN BLDV-MCNC: 18 MG/DL (ref 7–20)
CALCIUM SERPL-MCNC: 10 MG/DL (ref 8.3–10.6)
CHLORIDE BLD-SCNC: 105 MMOL/L (ref 99–110)
CO2: 25 MMOL/L (ref 21–32)
CREAT SERPL-MCNC: 1.2 MG/DL (ref 0.8–1.3)
EOSINOPHILS ABSOLUTE: 0.5 K/UL (ref 0–0.6)
EOSINOPHILS RELATIVE PERCENT: 5.1 %
GFR AFRICAN AMERICAN: >60
GFR NON-AFRICAN AMERICAN: 59
GLUCOSE BLD-MCNC: 140 MG/DL (ref 70–99)
HCT VFR BLD CALC: 36.9 % (ref 40.5–52.5)
HEMOGLOBIN: 12.2 G/DL (ref 13.5–17.5)
LYMPHOCYTES ABSOLUTE: 2.4 K/UL (ref 1–5.1)
LYMPHOCYTES RELATIVE PERCENT: 25 %
MCH RBC QN AUTO: 31.2 PG (ref 26–34)
MCHC RBC AUTO-ENTMCNC: 33.2 G/DL (ref 31–36)
MCV RBC AUTO: 94 FL (ref 80–100)
MONOCYTES ABSOLUTE: 0.9 K/UL (ref 0–1.3)
MONOCYTES RELATIVE PERCENT: 9.1 %
NEUTROPHILS ABSOLUTE: 5.7 K/UL (ref 1.7–7.7)
NEUTROPHILS RELATIVE PERCENT: 59.9 %
PDW BLD-RTO: 14.6 % (ref 12.4–15.4)
PLATELET # BLD: 369 K/UL (ref 135–450)
PMV BLD AUTO: 8.3 FL (ref 5–10.5)
POTASSIUM REFLEX MAGNESIUM: 4.6 MMOL/L (ref 3.5–5.1)
RBC # BLD: 3.93 M/UL (ref 4.2–5.9)
SODIUM BLD-SCNC: 141 MMOL/L (ref 136–145)
WBC # BLD: 9.6 K/UL (ref 4–11)

## 2019-05-23 PROCEDURE — 97530 THERAPEUTIC ACTIVITIES: CPT

## 2019-05-23 PROCEDURE — 97535 SELF CARE MNGMENT TRAINING: CPT

## 2019-05-23 PROCEDURE — 85025 COMPLETE CBC W/AUTO DIFF WBC: CPT

## 2019-05-23 PROCEDURE — 97116 GAIT TRAINING THERAPY: CPT

## 2019-05-23 PROCEDURE — 36415 COLL VENOUS BLD VENIPUNCTURE: CPT

## 2019-05-23 PROCEDURE — 80048 BASIC METABOLIC PNL TOTAL CA: CPT

## 2019-05-23 PROCEDURE — 97110 THERAPEUTIC EXERCISES: CPT

## 2019-05-23 PROCEDURE — 6370000000 HC RX 637 (ALT 250 FOR IP): Performed by: PHYSICAL MEDICINE & REHABILITATION

## 2019-05-23 PROCEDURE — 97127 HC SP THER IVNTJ W/FOCUS COG FUNCJ: CPT

## 2019-05-23 PROCEDURE — 1280000000 HC REHAB R&B

## 2019-05-23 RX ADMIN — MAGNESIUM HYDROXIDE 30 ML: 400 SUSPENSION ORAL at 08:30

## 2019-05-23 RX ADMIN — SENNOSIDES,DOCUSATE SODIUM 1 TABLET: 8.6; 5 TABLET, FILM COATED ORAL at 20:16

## 2019-05-23 RX ADMIN — METOPROLOL TARTRATE 25 MG: 25 TABLET, FILM COATED ORAL at 20:16

## 2019-05-23 RX ADMIN — PANTOPRAZOLE SODIUM 40 MG: 40 TABLET, DELAYED RELEASE ORAL at 06:01

## 2019-05-23 RX ADMIN — POLYETHYLENE GLYCOL 3350 17 G: 17 POWDER, FOR SOLUTION ORAL at 08:30

## 2019-05-23 RX ADMIN — OXYCODONE HYDROCHLORIDE AND ACETAMINOPHEN 1 TABLET: 5; 325 TABLET ORAL at 12:18

## 2019-05-23 RX ADMIN — OXYCODONE HYDROCHLORIDE AND ACETAMINOPHEN 2 TABLET: 5; 325 TABLET ORAL at 20:16

## 2019-05-23 RX ADMIN — SIMVASTATIN 40 MG: 40 TABLET, FILM COATED ORAL at 20:16

## 2019-05-23 RX ADMIN — DICLOFENAC 2 G: 10 GEL TOPICAL at 14:49

## 2019-05-23 RX ADMIN — SENNOSIDES,DOCUSATE SODIUM 1 TABLET: 8.6; 5 TABLET, FILM COATED ORAL at 08:29

## 2019-05-23 RX ADMIN — DICLOFENAC 2 G: 10 GEL TOPICAL at 08:31

## 2019-05-23 RX ADMIN — LISINOPRIL 10 MG: 10 TABLET ORAL at 08:28

## 2019-05-23 RX ADMIN — DICLOFENAC 2 G: 10 GEL TOPICAL at 20:18

## 2019-05-23 RX ADMIN — OXYCODONE HYDROCHLORIDE AND ACETAMINOPHEN 2 TABLET: 5; 325 TABLET ORAL at 03:39

## 2019-05-23 RX ADMIN — AZELASTINE HYDROCHLORIDE 1 SPRAY: 137 SPRAY, METERED NASAL at 20:18

## 2019-05-23 RX ADMIN — FINASTERIDE 5 MG: 5 TABLET, FILM COATED ORAL at 08:28

## 2019-05-23 RX ADMIN — AZELASTINE HYDROCHLORIDE 1 SPRAY: 137 SPRAY, METERED NASAL at 08:30

## 2019-05-23 RX ADMIN — OXYCODONE HYDROCHLORIDE AND ACETAMINOPHEN 1 TABLET: 5; 325 TABLET ORAL at 08:30

## 2019-05-23 RX ADMIN — METOPROLOL TARTRATE 25 MG: 25 TABLET, FILM COATED ORAL at 08:30

## 2019-05-23 RX ADMIN — TAMSULOSIN HYDROCHLORIDE 0.4 MG: 0.4 CAPSULE ORAL at 20:16

## 2019-05-23 ASSESSMENT — PAIN DESCRIPTION - FREQUENCY
FREQUENCY: INTERMITTENT
FREQUENCY: CONTINUOUS
FREQUENCY: CONTINUOUS

## 2019-05-23 ASSESSMENT — PAIN SCALES - GENERAL
PAINLEVEL_OUTOF10: 5
PAINLEVEL_OUTOF10: 0
PAINLEVEL_OUTOF10: 0
PAINLEVEL_OUTOF10: 6
PAINLEVEL_OUTOF10: 0
PAINLEVEL_OUTOF10: 6
PAINLEVEL_OUTOF10: 0
PAINLEVEL_OUTOF10: 4
PAINLEVEL_OUTOF10: 0
PAINLEVEL_OUTOF10: 3
PAINLEVEL_OUTOF10: 0

## 2019-05-23 ASSESSMENT — PAIN DESCRIPTION - PROGRESSION
CLINICAL_PROGRESSION: GRADUALLY WORSENING
CLINICAL_PROGRESSION: GRADUALLY WORSENING

## 2019-05-23 ASSESSMENT — PAIN - FUNCTIONAL ASSESSMENT
PAIN_FUNCTIONAL_ASSESSMENT: ACTIVITIES ARE NOT PREVENTED

## 2019-05-23 ASSESSMENT — PAIN DESCRIPTION - ORIENTATION
ORIENTATION: LEFT
ORIENTATION: LEFT
ORIENTATION: LOWER

## 2019-05-23 ASSESSMENT — PAIN SCALES - WONG BAKER: WONGBAKER_NUMERICALRESPONSE: 0

## 2019-05-23 ASSESSMENT — PAIN DESCRIPTION - PAIN TYPE
TYPE: ACUTE PAIN
TYPE: ACUTE PAIN;SURGICAL PAIN
TYPE: CHRONIC PAIN

## 2019-05-23 ASSESSMENT — PAIN DESCRIPTION - DESCRIPTORS
DESCRIPTORS: ACHING;SHARP
DESCRIPTORS: ACHING
DESCRIPTORS: ACHING

## 2019-05-23 ASSESSMENT — PAIN DESCRIPTION - LOCATION
LOCATION: BACK
LOCATION: BACK
LOCATION: INCISION

## 2019-05-23 ASSESSMENT — PAIN DESCRIPTION - ONSET
ONSET: ON-GOING
ONSET: ON-GOING

## 2019-05-23 NOTE — PROGRESS NOTES
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 Patient seen this am resting between therapy sessions. Pain controlled. Having some constipation, RN gave MoM today. ROS: No cp, sob, n/v, f/c  Objective:  Patient Vitals for the past 24 hrs:   BP Temp Temp src Pulse Resp SpO2   05/23/19 0730 (!) 154/76 97.6 °F (36.4 °C) Oral 83 18 94 %   05/22/19 2015 (!) 198/84 98.5 °F (36.9 °C) Oral 82 18 93 %     Gen: No distress, pleasant. HEENT: Normocephalic, atraumatic. CV: Regular rate and rhythm. Resp: No respiratory distress. Abd: Soft, nontender   Ext: No edema. Neuro: Alert, oriented, appropriately interactive. Wt Readings from Last 3 Encounters:   05/19/19 217 lb 8 oz (98.7 kg)   04/28/14 210 lb (95.3 kg)       Laboratory data:   Lab Results   Component Value Date    WBC 9.6 05/23/2019    HGB 12.2 (L) 05/23/2019    HCT 36.9 (L) 05/23/2019    MCV 94.0 05/23/2019     05/23/2019       Lab Results   Component Value Date     05/18/2019    K 4.1 05/18/2019     05/18/2019    CO2 23 05/18/2019    BUN 16 05/18/2019    CREATININE 0.9 05/18/2019    GLUCOSE 116 05/18/2019    CALCIUM 9.5 05/18/2019        Therapy progress:  PT  Position Activity Restriction  Spinal Precautions: No Bending, No Lifting, No Twisting  Spinal Precautions: no lifting >10 lbs, pt may shower, open to air  Other position/activity restrictions: up as tolerated, TLSO, may shower, no BLT  Objective     Sit to Stand: Supervision  Stand to sit: Supervision  Bed to Chair: Supervision  Stand Pivot Transfers: Supervision  Device: Single point cane  Assistance:  Moderate assistance  Distance: 15 ft   OT  PT Equipment Recommendations  Equipment Needed: No  Toilet - Technique: Ambulating, To left, To right  Equipment Used: Standard toilet(SPC)  Toilet Transfers Comments: SBA mod cues for safety during sit to stands  Assessment        SLP                Body mass index is 34.07 kg/m². Assessment and Plan:     Thoracic myelopathy - s/p T8, T9 partial discectomy for anterior decompression and T8-9 thoracic interbody fusion (5/14 with Dr. Lien Rico). Wound care. Pain control. PT/OT.      Hypertension - lisinopril, lopressor     HLD - lipitor     Previous mild stroke.  ASA on hold x 2 weeks per Spine surgery.      Bowels: Schedule Miralax + Senna S. Follow bowel movements. Enema or suppository if needed.      Bladder: H/o BPH. Check PVR x 3. [facility] if PVR > 300cc. Finasteride, Flomax.      Pain:   Percocet and methocarbamol prn. Anticipated dispo: home with wife  Services:  PT, OT, RN, ? SLP  DME: has all  ELOS: 5/28    Marivel E Gris Gonsalez.  Richard Hodgkin, MD 5/23/2019, 9:21 AM

## 2019-05-23 NOTE — CARE COORDINATION
Placed call to patient's wife Gorge Mccray who was updated that [facility] can take patient on discharge. She was agreeable. Attempted to update patient but he was working with therapy and not in his room. Will attempt again when able.      Suzanne Ayon MSW, LSW

## 2019-05-23 NOTE — PROGRESS NOTES
Physical Therapy  Facility/Department: [facility]  Daily Treatment Note  NAME: Yissel Tony  : 1940  MRN: [id number]    Date of Service: 2019    Discharge Recommendations:  Home with assist PRN, Home with Home health PT        Patient Diagnosis(es): There were no encounter diagnoses. has a past medical history of Hyperlipemia, Hypertension, Prostate enlargement, and Unspecified cerebral artery occlusion with cerebral infarction. has a past surgical history that includes Carotid endarterectomy; Foot surgery; Skin cancer excision; Eye surgery; Facial reconstruction surgery; Tonsillectomy; Colonoscopy; and Colonoscopy (). Restrictions  Restrictions/Precautions  Restrictions/Precautions: Up as Tolerated, General Precautions  Position Activity Restriction  Spinal Precautions: No Bending, No Lifting, No Twisting  Spinal Precautions: no lifting >10 lbs, pt may shower, open to air  Other position/activity restrictions: up as tolerated, may shower, no BLT  Subjective   General  Chart Reviewed: Yes  Response To Previous Treatment: Patient with no complaints from previous session. Family / Caregiver Present: No  Referring Practitioner: Ady Kent MD  Subjective  Subjective: denies pain at this time.    General Comment  Comments: reporting increased fatgue, however agreeable   Pain Screening  Patient Currently in Pain: Denies  Vital Signs  Patient Currently in Pain: Denies       Orientation  Orientation  Overall Orientation Status: Within Functional Limits  Cognition      Objective      Transfers  Sit to Stand: Supervision  Stand to sit: Supervision  Bed to Chair: Supervision  Ambulation  Ambulation?: Yes  Ambulation 1  Surface: level tile  Device: Rolling Walker  Assistance: Stand by assistance;Supervision  Quality of Gait: decreased step length/heel strike RLE > LLE, improved upright posture and cues for safety when transitioning to carpet  Distance: 250 ft   Stairs/Curb  Stairs?: Yes  Stairs  # Steps : 12  Rails: Bilateral  Assistance: Supervision  Comment: step to pattern  Neuromuscular Education  NDT Treatment: Standing  Neuromuscular Comments: alternating BLE toe taps to target 2x20 to improve swing phase of gait to prevent toe dragging. increased BLE knee flexion noted with increased fatigue. seated rest break between bouts. Balance  Comments: dyn stand activity, reaching for bean bag in various planes (maintained spinal precautions), then tossing to target 7 ft away. x 2 reps with feet shoulder width apart and SBa provided by therapist. pt required cues to not twist spine. Exercises  Heelslides: 2x10 yellow theraband   Hip Flexion: 2x10 BLE   Hip Abduction: 2x10 BLE with yellow theraband   Knee Long Arc Quad: 2x10 BLE   Ankle Pumps: 2x10 BLE   Comments: completed seated with 1.5# weights donned BLE           Assessment   Body structures, Functions, Activity limitations: Decreased functional mobility ; Decreased balance;Decreased safe awareness;Decreased coordination;Decreased endurance;Decreased strength;Decreased sensation; Increased Pain  Assessment: improved stability/safety noted with use of RW this date. pt stating increased fatigue from pain meds recieved last night, however motivated to participate. improved swing phase/foot clearance noted with gait this date. continue to progress mobility as tolerated. Treatment Diagnosis: impaired transfers   Prognosis: Good  Patient Education: spinal precaitions, use of AD  REQUIRES PT FOLLOW UP: Yes  Activity Tolerance  Activity Tolerance: Patient limited by fatigue;Patient limited by endurance       Goals  Short term goals  Time Frame for Short term goals: 4 days 5/22  Short term goal 1: pt will complete bed mobility with mod ind and maintain spinal precautions.  - GOAL MET   Short term goal 2: pt will complete functional transfers with supv and LRAD. - GOAL MET   Short term goal 3: pt will ambulate 150 ft with LRAD SBA. - GOAL MET kellie RW  Long term goals  Time Frame for Long term goals : 8 days 5/26  Long term goal 1: pt will complete functional transfers with mod ind and LRAD. Long term goal 2: pt will ambulate 150 ft with LRAD mod ind. Long term goal 3: pt will ascend/descend 2 steps with RHR and supv. Long term goal 4: pt will complete car transfer with mod ind and AD  Long term goal 5: pt will complete seated HEP with ind to maintain functional strength. Patient Goals   Patient goals : \"to walk\"    Plan    Plan  Times per week: 5-7x/week  Times per day: Daily  Current Treatment Recommendations: Strengthening, Transfer Training, Endurance Training, Neuromuscular Re-education, Patient/Caregiver Education & Training, Equipment Evaluation, Education, & procurement, Home Exercise Program, Safety Education & Training, Stair training, Functional Mobility Training, Balance Training, Gait Training, Pain Management  Safety Devices  Type of devices:  All fall risk precautions in place, Gait belt, Call light within reach, Nurse notified, Chair alarm in place, Left in chair     Therapy Time   Individual Concurrent Group Co-treatment   Time In 1030         Time Out 1130         Minutes 60         Timed Code Treatment Minutes: 60 Minutes       Yue Juan, PT

## 2019-05-23 NOTE — PROGRESS NOTES
Occupational Therapy  Facility/Department: [facility]U  Daily Treatment Note  NAME: Herbie Talamantes  : 1940  MRN: [id number]    Date of Service: 2019    Discharge Recommendations:  Home with Home health OT, Home with assist PRN  OT Equipment Recommendations  Equipment Needed: Yes  ADL Assistive Devices: Sock-Aid Hard;Long-handled Shoe Horn;Long-handled Sponge;Dressing Stick  Other: may need AE- will cont to assess pending progress    Assessment   Performance deficits / Impairments: Decreased functional mobility ; Decreased safe awareness;Decreased balance;Decreased ADL status; Decreased endurance;Decreased strength;Decreased cognition;Decreased ROM  Assessment: Pt continues to demonstrate poor safety and balance limiting Heladio with RW for mobility and transfers this date. Pt with ability to only tolerate ~3-6 minutes of standing during ADLs and transfers/mobility. Rest breaks needed for prolonged time during medicine ball EOM exercises. Pt extensively educated on home safety, problem solving/sequencing, fall prevention, energy conservation, back precautions. Pt remains resistant to OT education during unsafe mobility techniques. Extensively educated on reasoning behind using RW vs. SPC at home however pt still verbalizes will \"probably just use the cane\" despite OT recs on RW. Pt needed to sit for prolonged time to complete grooming tasks vs. standing despite encouragement. Pt complete simple home management task but needed consistent cues for safety. Cont to rec 24 hour SPV with HHOT. Cont OT POC.   Prognosis: Fair  Patient Education: role of OT, OT POC, safety, body mechanics, IPR schedule, DME, DC recs  REQUIRES OT FOLLOW UP: Yes  Activity Tolerance  Activity Tolerance: Patient Tolerated treatment well;Patient limited by fatigue  Safety Devices  Safety Devices in place: Yes  Type of devices: Left in chair;Gait belt(left with PT)  Restraints  Initially in place: No         Patient Diagnosis(es): problem solving, and insight to deficits throughout therapy session, bringing RW very far forward and poor carryover to bring closer to body with mobility.                     Type of ROM/Therapeutic Exercise  Type of ROM/Therapeutic Exercise: Medicine Ball  Comment: seated EOM 2x15 with 2-4# medicine ball per tolerance  Exercises  Scapular Protraction: x10 no weight  Scapular Retraction: x10 no weight  Shoulder Depression: x10 no weight  Shoulder Flexion: 2x15 2# to 80*  Elbow Flexion: 2x15 4#  Elbow Extension: 2x15 4#  Supination: 2x15 2# free weight seated in WC  Pronation: 2x15 2# free weight seated in WC  Wrist Flexion: 2x15 2# free weight seated in WC  Wrist Extension: 2x15 2# free weight seated in WC  Grasp/Release: 2x15 with 3 second holds  Other: 2x15 2# medicine ball chest press, circles forwards/backwards                    Plan   Plan  Times per week: 5 out of 7 days  Times per day: Daily  Plan weeks: 8 days  Current Treatment Recommendations: Strengthening, Endurance Training, Patient/Caregiver Education & Training, ROM, Cognitive Reorientation, Equipment Evaluation, Education, & procurement, Self-Care / ADL, Balance Training, Gait Training, Pain Management, Home Management Training, Functional Mobility Training, Safety Education & Training, Positioning       Goals  Short term goals  Time Frame for Short term goals: 5/21/19  Short term goal 1: Pt will complete toileting/toilet transfer SBA with LRAD- GOAL MET 5/22  Short term goal 2: Pt will complete LB dressing with Yaquelin and AE PRN using LRAD for standing balance  Short term goal 3: Pt will tolerate 10 minutes standing balance activities and/or ADLs with LRAD SBA- GOAL MET 5/22  Short term goal 4: Pt will verbalize and demo understanding of BUE HEP to increase strength and endurance for transfers/ADLs- cont to progress  Short term goal 5: Pt will verbalize 3/3 back precautions 100% when asked throughout therapy sessions with no cues- 75% of sessiong 5/22  Long term goals  Time Frame for Long term goals : 5/25/19  Long term goal 1: Pt will complete funcitonal mobility in room/bathroom with LRAD to gather all items for bathing/dressing- cont to address, SPV at this time  Long term goal 2: Pt will complete shower transfer/bathing Heladio with LRAD and DME PRN  Long term goal 3: Pt will complete LB/UE dressing Heladio with LRAD and AE PRN  Long term goal 4: Pt will complete 1 simple meal prep and/or IADL task with LRAD Heladio- SPV level to hang 3 shirts in the closet RW level with min cues for safety  Patient Goals   Patient goals :  \"To go home and be independent\"       Therapy Time   Individual Concurrent Group Co-treatment   Time In 0930         Time Out 1030         Minutes 60         Timed Code Treatment Minutes: [address], OTR/L

## 2019-05-23 NOTE — PROGRESS NOTES
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 ([address] [S24.133A]    Precautions: falls  Home situation: lives at home with his wife   Dx: [] Aphasia  [] Dysarthria  [] Apraxia   [] Oropharyngeal dysphagia [x] Cognitive Impairment  [] Other:   Date of Admit: 5/17/2019  Room #: 0155/0155-01    Current functional status (updated daily):         Pt being seen for : [] Speech/Language Treatment  [] Dysphagia Treatment [x] Cognitive Treatment  [] Other:  Communication: [x]WFL  [] Aphasia  [] Dysarthria  [] Apraxia  [] Pragmatic Impairment [] Non-verbal  [] Hearing Loss  [] Other:   Cognition: [] WFL  [] Mild  [x] Moderate  [x] Severe [] Unable to Assess  [] Other:  Memory: [] WFL  [] Mild  [x] Moderate  [x] Severe [] Unable to Assess  [] Other:  Behavior: [x] Alert  [x] Cooperative  [x]  Pleasant  [] Confused  [] Agitated  [] Uncooperative  [] Distractible [] Motivated  [] Self-Limiting [] Anxious  [] Other:  Endurance:  [x] Adequate for participation in SLP sessions  [] Reduced overall  [] Lethargic  [] Other:  Safety: [] No concerns at this time  [x] Reduced insight into deficits  []  Reduced safety awareness [] Not following call light procedures  [] Unable to Assess  [] Other:    Current Diet Order:DIET GENERAL;  Swallowing Precautions: Sit up for all meals and thereafter for 30 minutes, Drink with a straw only with small sips and Alternate solids with liquids       Date: 5/23/2019      Tx session 1  0830-0930 Tx session 2  All tx needs met session 1   Total Timed Code Min 60 0   Total Treatment Minutes 60 0   Individual Treatment Minutes 60 0   Group Treatment Minutes 0 0   Co-Treat Minutes 0 0 rationale for today's structured tasks and importance of assistance upon return home with executive function tasks (I.e. Money management). Education provided to pt re: compensatory memory strategies, rationale for tx tasks provided. Safety Devices: [x] Call light within reach  [] Chair alarm activated  [x] Bed alarm activated  [x] Other: pt left in wheelchair, all belongings within reach   [x] Call light within reach  [] Chair alarm activated  [x] Bed alarm activated  [x] Other: pt left in bed, all belongings within reach   Assessment: Pt pleasant and cooperative throughout session, participated in all tasks with SLP. Pt demonstrated singificant difficulty with money management task this date (both identifying target amounts and forming appropriate amounts for transactions), requiring consistent mod-max cues to complete. Pt continues to demonstrate poor insight into deficits and reduced safety awareness, requiring continued reinforcement and reminders for overall improved safety. Pt will benefit from assistance upon return home with executive function and problem-solving tasks. Plan: Continue as per plan of care.       Additional Information:     Barriers toward progress: Cognitive deficit  Discharge recommendations:  [] Home independently  [x] Home with assistance []  24 hour supervision  [] ECF [] Other: See PT/OT  Continued Tx Upon Discharge: ? [] Yes [] No [x] TBD based on progress while on ARU [] Vital Stim indicated [] Other:   Estimated discharge date: 05/28/19    Interventions used this date:  [] Speech/Language Treatment  [] Instruction in HEP [] Group [] Dysphagia Treatment [] Cognitive Treatment   [] Other:    Admitting SLP FIM scores:  Comprehension: 4 - Patient understands basic needs 75-90%+ of the time  Expression: 6 - Device used to express complex ideas/needs  Social Interaction: 6 - Patient requires medication for mood and/or effect  Problem Solving: 3 - Patient solves simple/routine

## 2019-05-24 PROCEDURE — 97110 THERAPEUTIC EXERCISES: CPT

## 2019-05-24 PROCEDURE — 97530 THERAPEUTIC ACTIVITIES: CPT

## 2019-05-24 PROCEDURE — 97127 HC SP THER IVNTJ W/FOCUS COG FUNCJ: CPT

## 2019-05-24 PROCEDURE — 97112 NEUROMUSCULAR REEDUCATION: CPT

## 2019-05-24 PROCEDURE — 97116 GAIT TRAINING THERAPY: CPT

## 2019-05-24 PROCEDURE — 97535 SELF CARE MNGMENT TRAINING: CPT

## 2019-05-24 PROCEDURE — 6370000000 HC RX 637 (ALT 250 FOR IP): Performed by: PHYSICAL MEDICINE & REHABILITATION

## 2019-05-24 PROCEDURE — 1280000000 HC REHAB R&B

## 2019-05-24 RX ADMIN — OXYCODONE HYDROCHLORIDE AND ACETAMINOPHEN 1 TABLET: 5; 325 TABLET ORAL at 09:31

## 2019-05-24 RX ADMIN — SENNOSIDES,DOCUSATE SODIUM 1 TABLET: 8.6; 5 TABLET, FILM COATED ORAL at 09:31

## 2019-05-24 RX ADMIN — AZELASTINE HYDROCHLORIDE 1 SPRAY: 137 SPRAY, METERED NASAL at 21:09

## 2019-05-24 RX ADMIN — SIMVASTATIN 40 MG: 40 TABLET, FILM COATED ORAL at 21:08

## 2019-05-24 RX ADMIN — OXYCODONE HYDROCHLORIDE AND ACETAMINOPHEN 1 TABLET: 5; 325 TABLET ORAL at 16:28

## 2019-05-24 RX ADMIN — OXYCODONE HYDROCHLORIDE AND ACETAMINOPHEN 2 TABLET: 5; 325 TABLET ORAL at 21:08

## 2019-05-24 RX ADMIN — METOPROLOL TARTRATE 25 MG: 25 TABLET, FILM COATED ORAL at 09:31

## 2019-05-24 RX ADMIN — CETIRIZINE HYDROCHLORIDE 10 MG: 10 TABLET, FILM COATED ORAL at 04:16

## 2019-05-24 RX ADMIN — FINASTERIDE 5 MG: 5 TABLET, FILM COATED ORAL at 09:31

## 2019-05-24 RX ADMIN — SENNOSIDES,DOCUSATE SODIUM 1 TABLET: 8.6; 5 TABLET, FILM COATED ORAL at 21:08

## 2019-05-24 RX ADMIN — POLYETHYLENE GLYCOL 3350 17 G: 17 POWDER, FOR SOLUTION ORAL at 09:31

## 2019-05-24 RX ADMIN — METOPROLOL TARTRATE 25 MG: 25 TABLET, FILM COATED ORAL at 21:08

## 2019-05-24 RX ADMIN — TAMSULOSIN HYDROCHLORIDE 0.4 MG: 0.4 CAPSULE ORAL at 21:08

## 2019-05-24 RX ADMIN — AZELASTINE HYDROCHLORIDE 1 SPRAY: 137 SPRAY, METERED NASAL at 09:34

## 2019-05-24 RX ADMIN — LISINOPRIL 10 MG: 10 TABLET ORAL at 09:31

## 2019-05-24 RX ADMIN — PANTOPRAZOLE SODIUM 40 MG: 40 TABLET, DELAYED RELEASE ORAL at 05:15

## 2019-05-24 ASSESSMENT — PAIN SCALES - GENERAL
PAINLEVEL_OUTOF10: 5
PAINLEVEL_OUTOF10: 0
PAINLEVEL_OUTOF10: 6
PAINLEVEL_OUTOF10: 0
PAINLEVEL_OUTOF10: 0
PAINLEVEL_OUTOF10: 4
PAINLEVEL_OUTOF10: 0
PAINLEVEL_OUTOF10: 0
PAINLEVEL_OUTOF10: 5

## 2019-05-24 ASSESSMENT — PAIN DESCRIPTION - ORIENTATION
ORIENTATION: LEFT

## 2019-05-24 ASSESSMENT — PAIN DESCRIPTION - ONSET
ONSET: GRADUAL
ONSET: ON-GOING
ONSET: ON-GOING

## 2019-05-24 ASSESSMENT — PAIN DESCRIPTION - DESCRIPTORS
DESCRIPTORS: ACHING

## 2019-05-24 ASSESSMENT — PAIN - FUNCTIONAL ASSESSMENT
PAIN_FUNCTIONAL_ASSESSMENT: ACTIVITIES ARE NOT PREVENTED

## 2019-05-24 ASSESSMENT — PAIN DESCRIPTION - PROGRESSION
CLINICAL_PROGRESSION: GRADUALLY WORSENING
CLINICAL_PROGRESSION: GRADUALLY WORSENING
CLINICAL_PROGRESSION: GRADUALLY IMPROVING

## 2019-05-24 ASSESSMENT — PAIN DESCRIPTION - LOCATION
LOCATION: BACK
LOCATION: BACK
LOCATION: INCISION;OTHER (COMMENT)

## 2019-05-24 ASSESSMENT — PAIN DESCRIPTION - PAIN TYPE
TYPE: ACUTE PAIN;SURGICAL PAIN

## 2019-05-24 ASSESSMENT — PAIN DESCRIPTION - FREQUENCY
FREQUENCY: INTERMITTENT
FREQUENCY: INTERMITTENT
FREQUENCY: CONTINUOUS

## 2019-05-24 NOTE — PROGRESS NOTES
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 ([address] [S24.133A]    Precautions: falls  Home situation: lives at home with his wife   Dx: [] Aphasia  [] Dysarthria  [] Apraxia   [] Oropharyngeal dysphagia [x] Cognitive Impairment  [] Other:   Date of Admit: 5/17/2019  Room #: 0155/0155-01    Current functional status (updated daily):         Pt being seen for : [] Speech/Language Treatment  [] Dysphagia Treatment [x] Cognitive Treatment  [] Other:  Communication: [x]WFL  [] Aphasia  [] Dysarthria  [] Apraxia  [] Pragmatic Impairment [] Non-verbal  [] Hearing Loss  [] Other:   Cognition: [] WFL  [] Mild  [x] Moderate  [x] Severe [] Unable to Assess  [] Other:  Memory: [] WFL  [] Mild  [x] Moderate  [x] Severe [] Unable to Assess  [] Other:  Behavior: [x] Alert  [x] Cooperative  [x]  Pleasant  [] Confused  [] Agitated  [] Uncooperative  [] Distractible [] Motivated  [] Self-Limiting [] Anxious  [] Other:  Endurance:  [x] Adequate for participation in SLP sessions  [] Reduced overall  [] Lethargic  [] Other:  Safety: [] No concerns at this time  [x] Reduced insight into deficits  []  Reduced safety awareness [] Not following call light procedures  [] Unable to Assess  [] Other:    Current Diet Order:DIET GENERAL;  Swallowing Precautions: Sit up for all meals and thereafter for 30 minutes, Drink with a straw only with small sips and Alternate solids with liquids       Date: 5/24/2019      Tx session 1  [id number] Tx session 2  All tx needs met session 1   Total Timed Code Min 60 0   Total Treatment Minutes 60 0   Individual Treatment Minutes 60 0   Group Treatment Minutes 0 0   Co-Treat Minutes 0 0 Variance/Reason:  n/a    Pain No c/o pain during tx    Pain Intervention [] RN notified  [] Repositioned  [] Intervention offered and patient declined  [x] N/A  [] Other: [] RN notified  [] Repositioned  [] Intervention offered and patient declined  [x] N/A  [] Other:   Subjective     Pt alert and cooperative, seen in community room. Objective:  Goals     Goal 1: Pt will complete recall tasks with 80% accuracy given min cues. Immediate recall (3 words, ranking): 4/7 (57% accuracy), mod cues    -Pt consistently recalled 2/3 words, however, was unable to recall the third word. -He benefited from frequent repetition of stimuli        Goal 2: Pt will complete executive function tasks (meds, finances, math, time, etc) with 80% accuracy given min cues. Time word problems: 6/10 (60% average accuracy), mod cues; pt benefited from simplification of instructions as able. Goal 3: Pt will complete thought organization and reasoning tasks with 80% accuracy given min cues. 4-step sequencing cards: 5/6 (  ), min-mod cues; pt benefited from cues to attend to details and double check his work          Goal 4: Pt will complete problem solving tasks with 80% accuracy given min cues. See goal 2        Other areas targeted: Orientation concepts:  -Month: independent  -Year: independent  -Situation: independent  -Place: independent      Education:   SLP and pt discussed that pt would benefit from continued ST after discharge to address cognitive-linguistic and speech (*word finding) deficits. Safety Devices: [] Call light within reach  [] Chair alarm activated  [] Bed alarm activated  [x] Other: pt to restroom. RN notified. [] Call light within reach  [] Chair alarm activated  [] Bed alarm activated  [] Other:    Assessment: Pt pleasant and cooperative throughout session, seen in community room. He actively participated in all tasks with SLP.   Pt continues to have increased difficulty with recall and Signed by     Luigi oWlfe M.S. [id number] [facility]  Speech-language pathologist  [phone]

## 2019-05-24 NOTE — CARE COORDINATION
Updated clinicals faxed to [facility] [employer] [facility] at this time.  Electronically signed by Aylin Burger RN on 5/24/2019 at 1:16 PM

## 2019-05-24 NOTE — PROGRESS NOTES
Physical Therapy  Facility/Department: [facility]  Daily Treatment Note  NAME: Domonique Yoon  : 1940  MRN: [id number]    Date of Service: 2019    Discharge Recommendations:  Home with assist PRN, Home with Home health PT   PT Equipment Recommendations  Equipment Needed: No    Patient Diagnosis(es): There were no encounter diagnoses. has a past medical history of Hyperlipemia, Hypertension, Prostate enlargement, and Unspecified cerebral artery occlusion with cerebral infarction. has a past surgical history that includes Carotid endarterectomy; Foot surgery; Skin cancer excision; Eye surgery; Facial reconstruction surgery; Tonsillectomy; Colonoscopy; and Colonoscopy (). Restrictions  Restrictions/Precautions  Restrictions/Precautions: Up as Tolerated, General Precautions  Position Activity Restriction  Spinal Precautions: No Bending, No Lifting, No Twisting  Spinal Precautions: no lifting >10 lbs, pt may shower, open to air  Other position/activity restrictions: up as tolerated, may shower, no BLT  Subjective   General  Chart Reviewed: Yes  Response To Previous Treatment: Patient with no complaints from previous session. Family / Caregiver Present: No  Referring Practitioner: Sandra Garay MD  Subjective  Subjective: denies pain. General Comment  Comments: found seated on commode upon therapist entry.    Pain Screening  Patient Currently in Pain: No  Vital Signs  Patient Currently in Pain: No       Orientation  Orientation  Overall Orientation Status: Within Functional Limits  Cognition      Objective      Transfers  Sit to Stand: Supervision  Stand to sit: Supervision  Comment: cues for safety/RW management   Ambulation  Ambulation?: Yes  Ambulation 1  Surface: level tile  Device: Rolling Walker  Assistance: Supervision  Quality of Gait: decreased swong phase/heel strike (improved following exercise), trendelenburg BLEs, cues for obstacle awareness/ cues when turning   Distance: 215 ft, 175 ft , 130 ft   Neuromuscular Education  NDT Treatment: Standing  Neuromuscular Comments: 2x20 standing toe taps over cone to target to imprvoe hip flexion/heel strike during gait with BUE Support, completed RLE and LLE with SBA. decreased coordination noted, pt knocking over 5-6 cones per rep. Exercises  Comments: pt completed 2x 10 step ups onto 6\" step with SBA and BUE support  Other exercises  Other exercises?: Yes  Other exercises 1: pt completed 10 min on SCIFIT level 1 with BLES only (maintained spinal precautions) for increased LE strength/ cardiorespiratory endurance. pt utilized seated rest breaks every 2-3 min due to fatigue                        Assessment   Body structures, Functions, Activity limitations: Decreased functional mobility ; Decreased balance;Decreased safe awareness;Decreased coordination;Decreased endurance;Decreased strength;Decreased sensation; Increased Pain  Assessment: pt found seated on commode upon therapist entrance to room. supv for commode transfer, clothing management. completes functional mobility with supv due to need for cues for safety (i.e. no spinal rotation, square self and RW to chair prior to sitting). limited throughout by fatigue, utilizes multiple seated rest breaks. continue to progress mobility as tolerated. Confusion noted throughout session : pt provided with 1 step direction and requires cues to follow through  Treatment Diagnosis: impaired transfers   Prognosis: Good  Patient Education: spinal precaitions, use of AD  REQUIRES PT FOLLOW UP: Yes  Activity Tolerance  Activity Tolerance: Patient limited by fatigue;Patient limited by endurance       Goals  Short term goals  Time Frame for Short term goals: 4 days 5/22  Short term goal 1: pt will complete bed mobility with mod ind and maintain spinal precautions.  - GOAL MET   Short term goal 2: pt will complete functional transfers with supv and LRAD. - GOAL MET   Short term goal 3: pt will ambulate 150 ft with LRAD SBA. - GOAL MET [facility]  Long term goals  Time Frame for Long term goals : 8 days 5/26  Long term goal 1: pt will complete functional transfers with mod ind and LRAD. Long term goal 2: pt will ambulate 150 ft with LRAD mod ind. Long term goal 3: pt will ascend/descend 2 steps with RHR and supv. Long term goal 4: pt will complete car transfer with mod ind and AD  Long term goal 5: pt will complete seated HEP with ind to maintain functional strength. Patient Goals   Patient goals : \"to walk\"    Plan    Plan  Times per week: 5-7x/week  Times per day: Daily  Current Treatment Recommendations: Strengthening, Transfer Training, Endurance Training, Neuromuscular Re-education, Patient/Caregiver Education & Training, Equipment Evaluation, Education, & procurement, Home Exercise Program, Safety Education & Training, Stair training, Functional Mobility Training, Balance Training, Gait Training, Pain Management  Safety Devices  Type of devices:  All fall risk precautions in place, Gait belt, Call light within reach, Nurse notified, Chair alarm in place, Left in chair     Therapy Time   Individual Concurrent Group Co-treatment   Time In 0800         Time Out 0900         Minutes 60         Timed Code Treatment Minutes: 60 Minutes       Eun Yuen, PT

## 2019-05-24 NOTE — PROGRESS NOTES
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 Patient seen this am setting up in his room. His spinal cord pain is under control. Patient progressing in his therapies. Patient was discussed earlier in the week in rehabilitation conference, and estimated discharge date is next Tuesday to home with home therapies. He will have help at home from his wife at discharge. Further record review reveals patient can restart his AC (ASA,Plavix), 14 days post op, which would be 5/28/19. .    ROS: No cp, sob, n/v, f/c    Objective:  Patient Vitals for the past 24 hrs:   BP Temp Temp src Pulse Resp SpO2   05/24/19 0931 136/80 -- -- 77 -- --   05/23/19 1945 (!) 165/88 98.4 °F (36.9 °C) Oral 76 16 94 %     Gen: No distress, pleasant. HEENT: Normocephalic, atraumatic. CV: Regular rate and rhythm. Resp: No respiratory distress. Abd: Soft, nontender   Ext: No edema. Neuro: Alert, oriented, appropriately interactive.    Wt Readings from Last 3 Encounters:   05/19/19 217 lb 8 oz (98.7 kg)   04/28/14 210 lb (95.3 kg)       Laboratory data:   Lab Results   Component Value Date    WBC 9.6 05/23/2019    HGB 12.2 (L) 05/23/2019    HCT 36.9 (L) 05/23/2019    MCV 94.0 05/23/2019     05/23/2019       Lab Results   Component Value Date     05/23/2019    K 4.6 05/23/2019     05/23/2019    CO2 25 05/23/2019    BUN 18 05/23/2019    CREATININE 1.2 05/23/2019    GLUCOSE 140 05/23/2019    CALCIUM 10.0 05/23/2019        Therapy progress:  PT  Position Activity Restriction  Spinal Precautions: No Bending, No Lifting, No Twisting  Spinal Precautions: no lifting >10 lbs, pt may shower, open to air  Other position/activity restrictions: up as tolerated, may shower, no BLT  Objective     Sit to Stand: Supervision  Stand to sit: Supervision  Bed to Chair: Supervision  Stand Pivot Transfers: Supervision  Device: [address]: Supervision  Distance: 215 ft, 175 ft , 130 ft   OT  PT Equipment Recommendations  Equipment Needed: No  Toilet - Technique: Ambulating, To left, To right  Equipment Used: Standard toilet(SPC)  Toilet Transfers Comments: SBA mod cues for safety during sit to stands  Assessment                    Body mass index is 34.07 kg/m². Assessment and Plan:     Thoracic myelopathy - s/p T8, T9 partial discectomy for anterior decompression and T8-9 thoracic interbody fusion (5/14 with Dr. Sree Sloan). Wound care. Pain control. PT/OT.      Hypertension - lisinopril, lopressor     HLD - lipitor     Previous mild stroke.  ASA on hold x 2 weeks per Spine surgery.      Bowels: Schedule Miralax + Senna S. Follow bowel movements. Enema or suppository if needed.      Bladder: H/o BPH. Check PVR x 3. [address] if PVR > 300cc. Finasteride, Flomax.      Pain:   Percocet and methocarbamol prn. Anticipated dispo: home with wife  Services:  PT, OT, RN, ? SLP  DME: has all  ELOS: 5/28    Noemí Aguilar.  MD Ritu 5/24/2019, 1:17 PM

## 2019-05-25 PROCEDURE — 51798 US URINE CAPACITY MEASURE: CPT

## 2019-05-25 PROCEDURE — 1280000000 HC REHAB R&B

## 2019-05-25 PROCEDURE — 6370000000 HC RX 637 (ALT 250 FOR IP): Performed by: PHYSICAL MEDICINE & REHABILITATION

## 2019-05-25 RX ADMIN — FINASTERIDE 5 MG: 5 TABLET, FILM COATED ORAL at 08:52

## 2019-05-25 RX ADMIN — OXYCODONE HYDROCHLORIDE AND ACETAMINOPHEN 2 TABLET: 5; 325 TABLET ORAL at 05:44

## 2019-05-25 RX ADMIN — LISINOPRIL 10 MG: 10 TABLET ORAL at 08:52

## 2019-05-25 RX ADMIN — TAMSULOSIN HYDROCHLORIDE 0.4 MG: 0.4 CAPSULE ORAL at 19:55

## 2019-05-25 RX ADMIN — POLYETHYLENE GLYCOL 3350 17 G: 17 POWDER, FOR SOLUTION ORAL at 08:52

## 2019-05-25 RX ADMIN — PANTOPRAZOLE SODIUM 40 MG: 40 TABLET, DELAYED RELEASE ORAL at 05:44

## 2019-05-25 RX ADMIN — DICLOFENAC 2 G: 10 GEL TOPICAL at 08:53

## 2019-05-25 RX ADMIN — SIMVASTATIN 40 MG: 40 TABLET, FILM COATED ORAL at 19:55

## 2019-05-25 RX ADMIN — OXYCODONE HYDROCHLORIDE AND ACETAMINOPHEN 2 TABLET: 5; 325 TABLET ORAL at 19:55

## 2019-05-25 RX ADMIN — SENNOSIDES,DOCUSATE SODIUM 1 TABLET: 8.6; 5 TABLET, FILM COATED ORAL at 19:55

## 2019-05-25 RX ADMIN — AZELASTINE HYDROCHLORIDE 1 SPRAY: 137 SPRAY, METERED NASAL at 19:56

## 2019-05-25 RX ADMIN — METOPROLOL TARTRATE 25 MG: 25 TABLET, FILM COATED ORAL at 08:52

## 2019-05-25 RX ADMIN — AZELASTINE HYDROCHLORIDE 1 SPRAY: 137 SPRAY, METERED NASAL at 08:53

## 2019-05-25 RX ADMIN — METOPROLOL TARTRATE 25 MG: 25 TABLET, FILM COATED ORAL at 19:55

## 2019-05-25 RX ADMIN — SENNOSIDES,DOCUSATE SODIUM 1 TABLET: 8.6; 5 TABLET, FILM COATED ORAL at 08:52

## 2019-05-25 ASSESSMENT — PAIN - FUNCTIONAL ASSESSMENT
PAIN_FUNCTIONAL_ASSESSMENT: ACTIVITIES ARE NOT PREVENTED
PAIN_FUNCTIONAL_ASSESSMENT: ACTIVITIES ARE NOT PREVENTED

## 2019-05-25 ASSESSMENT — PAIN DESCRIPTION - ORIENTATION
ORIENTATION: MID;LOWER
ORIENTATION: LOWER;MID

## 2019-05-25 ASSESSMENT — PAIN SCALES - GENERAL
PAINLEVEL_OUTOF10: 6
PAINLEVEL_OUTOF10: 0
PAINLEVEL_OUTOF10: 4
PAINLEVEL_OUTOF10: 4
PAINLEVEL_OUTOF10: 0
PAINLEVEL_OUTOF10: 6

## 2019-05-25 ASSESSMENT — PAIN DESCRIPTION - FREQUENCY
FREQUENCY: INTERMITTENT
FREQUENCY: INTERMITTENT

## 2019-05-25 ASSESSMENT — PAIN DESCRIPTION - ONSET
ONSET: ON-GOING
ONSET: GRADUAL

## 2019-05-25 ASSESSMENT — PAIN DESCRIPTION - PROGRESSION
CLINICAL_PROGRESSION: GRADUALLY IMPROVING
CLINICAL_PROGRESSION: GRADUALLY IMPROVING

## 2019-05-25 ASSESSMENT — PAIN DESCRIPTION - PAIN TYPE
TYPE: ACUTE PAIN;SURGICAL PAIN
TYPE: ACUTE PAIN;SURGICAL PAIN

## 2019-05-25 ASSESSMENT — PAIN DESCRIPTION - LOCATION
LOCATION: BACK
LOCATION: BACK

## 2019-05-25 ASSESSMENT — PAIN DESCRIPTION - DESCRIPTORS
DESCRIPTORS: ACHING;DISCOMFORT
DESCRIPTORS: ACHING;DISCOMFORT

## 2019-05-25 NOTE — PLAN OF CARE
Patient is a risk for falls, does use call light for bathroom needs, agreed to wear gait belt with transfers and ambulation but refuses to wear non skid socks . Bed and chair alarms in place. Bed in lowest position. Frequent rounding to anticipate needs.  Moshe Eugene

## 2019-05-25 NOTE — PROGRESS NOTES
Patient frequently refusing the use of gait belt and non-skid socks during transfers. RN has provided education regarding fall and safety precautions. Patient verbalizes understanding but continues refuse. Will continue to encourage and provide education.

## 2019-05-25 NOTE — PLAN OF CARE
Patient frequently refusing the use of the gait belt and non-skid socks during transfers/ambulation. Staff will continue to encourage and provide education. Call light and belongings are in reach and other fall precautions remain in place. Remains free of falls thus far this shift.

## 2019-05-26 PROCEDURE — 1280000000 HC REHAB R&B

## 2019-05-26 PROCEDURE — 6370000000 HC RX 637 (ALT 250 FOR IP): Performed by: PHYSICAL MEDICINE & REHABILITATION

## 2019-05-26 RX ORDER — OXYCODONE HYDROCHLORIDE AND ACETAMINOPHEN 5; 325 MG/1; MG/1
1 TABLET ORAL EVERY 4 HOURS PRN
Qty: 28 TABLET | Refills: 0 | Status: SHIPPED | OUTPATIENT
Start: 2019-05-26 | End: 2019-05-29

## 2019-05-26 RX ORDER — LISINOPRIL 20 MG/1
20 TABLET ORAL DAILY
Qty: 30 TABLET | Refills: 3 | Status: ON HOLD | OUTPATIENT
Start: 2019-05-27 | End: 2022-01-01 | Stop reason: HOSPADM

## 2019-05-26 RX ORDER — LISINOPRIL 20 MG/1
20 TABLET ORAL DAILY
Status: DISCONTINUED | OUTPATIENT
Start: 2019-05-27 | End: 2019-05-28 | Stop reason: HOSPADM

## 2019-05-26 RX ORDER — FINASTERIDE 5 MG/1
5 TABLET, FILM COATED ORAL DAILY
Qty: 30 TABLET | Refills: 3 | Status: ON HOLD | OUTPATIENT
Start: 2019-05-27 | End: 2021-01-01

## 2019-05-26 RX ADMIN — METOPROLOL TARTRATE 25 MG: 25 TABLET, FILM COATED ORAL at 07:47

## 2019-05-26 RX ADMIN — POLYETHYLENE GLYCOL 3350 17 G: 17 POWDER, FOR SOLUTION ORAL at 07:46

## 2019-05-26 RX ADMIN — SENNOSIDES,DOCUSATE SODIUM 1 TABLET: 8.6; 5 TABLET, FILM COATED ORAL at 20:16

## 2019-05-26 RX ADMIN — OXYCODONE HYDROCHLORIDE AND ACETAMINOPHEN 2 TABLET: 5; 325 TABLET ORAL at 20:16

## 2019-05-26 RX ADMIN — SIMVASTATIN 40 MG: 40 TABLET, FILM COATED ORAL at 20:16

## 2019-05-26 RX ADMIN — OXYCODONE HYDROCHLORIDE AND ACETAMINOPHEN 2 TABLET: 5; 325 TABLET ORAL at 15:32

## 2019-05-26 RX ADMIN — AZELASTINE HYDROCHLORIDE 1 SPRAY: 137 SPRAY, METERED NASAL at 20:18

## 2019-05-26 RX ADMIN — PANTOPRAZOLE SODIUM 40 MG: 40 TABLET, DELAYED RELEASE ORAL at 05:57

## 2019-05-26 RX ADMIN — AZELASTINE HYDROCHLORIDE 1 SPRAY: 137 SPRAY, METERED NASAL at 07:47

## 2019-05-26 RX ADMIN — LISINOPRIL 10 MG: 10 TABLET ORAL at 07:47

## 2019-05-26 RX ADMIN — TAMSULOSIN HYDROCHLORIDE 0.4 MG: 0.4 CAPSULE ORAL at 20:16

## 2019-05-26 RX ADMIN — DICLOFENAC 2 G: 10 GEL TOPICAL at 07:48

## 2019-05-26 RX ADMIN — SENNOSIDES,DOCUSATE SODIUM 1 TABLET: 8.6; 5 TABLET, FILM COATED ORAL at 07:47

## 2019-05-26 RX ADMIN — FINASTERIDE 5 MG: 5 TABLET, FILM COATED ORAL at 07:47

## 2019-05-26 RX ADMIN — METOPROLOL TARTRATE 25 MG: 25 TABLET, FILM COATED ORAL at 20:16

## 2019-05-26 ASSESSMENT — PAIN DESCRIPTION - DESCRIPTORS
DESCRIPTORS: ACHING;DISCOMFORT
DESCRIPTORS: ACHING;DISCOMFORT

## 2019-05-26 ASSESSMENT — PAIN DESCRIPTION - LOCATION
LOCATION: GENERALIZED
LOCATION: BACK

## 2019-05-26 ASSESSMENT — PAIN DESCRIPTION - FREQUENCY
FREQUENCY: INTERMITTENT
FREQUENCY: INTERMITTENT

## 2019-05-26 ASSESSMENT — PAIN DESCRIPTION - PROGRESSION
CLINICAL_PROGRESSION: GRADUALLY IMPROVING
CLINICAL_PROGRESSION: NOT CHANGED

## 2019-05-26 ASSESSMENT — PAIN SCALES - GENERAL
PAINLEVEL_OUTOF10: 6
PAINLEVEL_OUTOF10: 4
PAINLEVEL_OUTOF10: 0
PAINLEVEL_OUTOF10: 6

## 2019-05-26 ASSESSMENT — PAIN DESCRIPTION - ONSET
ONSET: GRADUAL
ONSET: ON-GOING

## 2019-05-26 ASSESSMENT — PAIN DESCRIPTION - PAIN TYPE
TYPE: SURGICAL PAIN;ACUTE PAIN
TYPE: ACUTE PAIN;SURGICAL PAIN

## 2019-05-26 ASSESSMENT — PAIN DESCRIPTION - ORIENTATION: ORIENTATION: LOWER;MID

## 2019-05-26 NOTE — PROGRESS NOTES
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 No new complaints. Planning for d/c on Tuesday. ROS: No cp, sob, n/v, f/c  Objective:  Patient Vitals for the past 24 hrs:   BP Temp Temp src Pulse Resp SpO2   05/26/19 0741 (!) 161/77 98.3 °F (36.8 °C) Oral 87 16 93 %   05/25/19 1945 (!) 161/84 98.3 °F (36.8 °C) Oral 72 18 93 %     Gen: No distress, pleasant. HEENT: Normocephalic, atraumatic. CV: Regular rate and rhythm. Resp: No respiratory distress. Abd: Soft, nontender   Ext: No edema. Neuro: Alert, oriented, appropriately interactive.    Wt Readings from Last 3 Encounters:   05/19/19 217 lb 8 oz (98.7 kg)   04/28/14 210 lb (95.3 kg)       Laboratory data:   Lab Results   Component Value Date    WBC 9.6 05/23/2019    HGB 12.2 (L) 05/23/2019    HCT 36.9 (L) 05/23/2019    MCV 94.0 05/23/2019     05/23/2019       Lab Results   Component Value Date     05/23/2019    K 4.6 05/23/2019     05/23/2019    CO2 25 05/23/2019    BUN 18 05/23/2019    CREATININE 1.2 05/23/2019    GLUCOSE 140 05/23/2019    CALCIUM 10.0 05/23/2019        Therapy progress:  PT  Position Activity Restriction  Spinal Precautions: No Bending, No Lifting, No Twisting  Spinal Precautions: no lifting >10 lbs, pt may shower, open to air  Other position/activity restrictions: up as tolerated, may shower, no BLT  Objective     Sit to Stand: Supervision  Stand to sit: Supervision  Bed to Chair: Supervision  Stand Pivot Transfers: Supervision  Device: [address]: Supervision  Distance: 215 ft, 175 ft , 130 ft   OT  PT Equipment Recommendations  Equipment Needed: No  Toilet - Technique: Ambulating, To left, To right  Equipment Used: Standard toilet(SPC)  Toilet Transfers Comments: SBA mod cues for safety during sit to stands  Assessment        SLP                Body mass index is 34.07 kg/m². Assessment and Plan:     Thoracic myelopathy - s/p T8, T9 partial discectomy for anterior decompression and T8-9 thoracic interbody fusion (5/14 with Dr. Owen Vicente). Wound care. Pain control. PT/OT.      Hypertension - lisinopril, lopressor; increase regimen.      HLD - lipitor     Previous mild stroke.  ASA on hold x 2 weeks per Spine surgery.      Bowels: Schedule Miralax + Senna S. Follow bowel movements. Enema or suppository if needed.      Bladder: H/o BPH. Check PVR x 3. [address] if PVR > 300cc. Finasteride, Flomax.      Pain:   Percocet and methocarbamol prn. Anticipated dispo: home with wife  Services:  PT, OT, RN, ? SLP  DME: has all  ELOS: 5/28    Jerry Falcon MD 5/26/2019, 9:49 AM

## 2019-05-26 NOTE — PLAN OF CARE
Patient is alert and oriented, initiates self care tasks, ambulates to bathroom with walker and gait belt. Denies pain. Call light within reach.  Fatoumata Olea

## 2019-05-27 LAB
ANION GAP SERPL CALCULATED.3IONS-SCNC: 8 MMOL/L (ref 3–16)
BASOPHILS ABSOLUTE: 0.1 K/UL (ref 0–0.2)
BASOPHILS RELATIVE PERCENT: 1 %
BUN BLDV-MCNC: 17 MG/DL (ref 7–20)
CALCIUM SERPL-MCNC: 9.8 MG/DL (ref 8.3–10.6)
CHLORIDE BLD-SCNC: 106 MMOL/L (ref 99–110)
CO2: 27 MMOL/L (ref 21–32)
CREAT SERPL-MCNC: 1 MG/DL (ref 0.8–1.3)
EOSINOPHILS ABSOLUTE: 0.5 K/UL (ref 0–0.6)
EOSINOPHILS RELATIVE PERCENT: 5 %
GFR AFRICAN AMERICAN: >60
GFR NON-AFRICAN AMERICAN: >60
GLUCOSE BLD-MCNC: 116 MG/DL (ref 70–99)
HCT VFR BLD CALC: 37.7 % (ref 40.5–52.5)
HEMOGLOBIN: 12.3 G/DL (ref 13.5–17.5)
LYMPHOCYTES ABSOLUTE: 1.6 K/UL (ref 1–5.1)
LYMPHOCYTES RELATIVE PERCENT: 16.6 %
MCH RBC QN AUTO: 30.6 PG (ref 26–34)
MCHC RBC AUTO-ENTMCNC: 32.6 G/DL (ref 31–36)
MCV RBC AUTO: 93.9 FL (ref 80–100)
MONOCYTES ABSOLUTE: 0.7 K/UL (ref 0–1.3)
MONOCYTES RELATIVE PERCENT: 7 %
NEUTROPHILS ABSOLUTE: 6.9 K/UL (ref 1.7–7.7)
NEUTROPHILS RELATIVE PERCENT: 70.4 %
PDW BLD-RTO: 14.7 % (ref 12.4–15.4)
PLATELET # BLD: 400 K/UL (ref 135–450)
PMV BLD AUTO: 8.5 FL (ref 5–10.5)
POTASSIUM REFLEX MAGNESIUM: 4.8 MMOL/L (ref 3.5–5.1)
RBC # BLD: 4.02 M/UL (ref 4.2–5.9)
SODIUM BLD-SCNC: 141 MMOL/L (ref 136–145)
WBC # BLD: 9.7 K/UL (ref 4–11)

## 2019-05-27 PROCEDURE — 80048 BASIC METABOLIC PNL TOTAL CA: CPT

## 2019-05-27 PROCEDURE — 97535 SELF CARE MNGMENT TRAINING: CPT

## 2019-05-27 PROCEDURE — 97530 THERAPEUTIC ACTIVITIES: CPT

## 2019-05-27 PROCEDURE — 6370000000 HC RX 637 (ALT 250 FOR IP): Performed by: PHYSICAL MEDICINE & REHABILITATION

## 2019-05-27 PROCEDURE — 97116 GAIT TRAINING THERAPY: CPT

## 2019-05-27 PROCEDURE — 97127 HC SP THER IVNTJ W/FOCUS COG FUNCJ: CPT

## 2019-05-27 PROCEDURE — 1280000000 HC REHAB R&B

## 2019-05-27 PROCEDURE — 36415 COLL VENOUS BLD VENIPUNCTURE: CPT

## 2019-05-27 PROCEDURE — 97110 THERAPEUTIC EXERCISES: CPT

## 2019-05-27 PROCEDURE — 85025 COMPLETE CBC W/AUTO DIFF WBC: CPT

## 2019-05-27 RX ADMIN — METOPROLOL TARTRATE 25 MG: 25 TABLET, FILM COATED ORAL at 07:37

## 2019-05-27 RX ADMIN — FINASTERIDE 5 MG: 5 TABLET, FILM COATED ORAL at 07:36

## 2019-05-27 RX ADMIN — TAMSULOSIN HYDROCHLORIDE 0.4 MG: 0.4 CAPSULE ORAL at 20:31

## 2019-05-27 RX ADMIN — POLYETHYLENE GLYCOL 3350 17 G: 17 POWDER, FOR SOLUTION ORAL at 07:37

## 2019-05-27 RX ADMIN — AZELASTINE HYDROCHLORIDE 1 SPRAY: 137 SPRAY, METERED NASAL at 20:32

## 2019-05-27 RX ADMIN — AZELASTINE HYDROCHLORIDE 1 SPRAY: 137 SPRAY, METERED NASAL at 07:35

## 2019-05-27 RX ADMIN — LISINOPRIL 20 MG: 20 TABLET ORAL at 07:37

## 2019-05-27 RX ADMIN — OXYCODONE HYDROCHLORIDE AND ACETAMINOPHEN 1 TABLET: 5; 325 TABLET ORAL at 20:31

## 2019-05-27 RX ADMIN — METOPROLOL TARTRATE 25 MG: 25 TABLET, FILM COATED ORAL at 20:31

## 2019-05-27 RX ADMIN — PANTOPRAZOLE SODIUM 40 MG: 40 TABLET, DELAYED RELEASE ORAL at 05:24

## 2019-05-27 RX ADMIN — OXYCODONE HYDROCHLORIDE AND ACETAMINOPHEN 2 TABLET: 5; 325 TABLET ORAL at 12:11

## 2019-05-27 RX ADMIN — SENNOSIDES,DOCUSATE SODIUM 1 TABLET: 8.6; 5 TABLET, FILM COATED ORAL at 07:38

## 2019-05-27 RX ADMIN — SIMVASTATIN 40 MG: 40 TABLET, FILM COATED ORAL at 20:31

## 2019-05-27 RX ADMIN — SENNOSIDES,DOCUSATE SODIUM 1 TABLET: 8.6; 5 TABLET, FILM COATED ORAL at 20:31

## 2019-05-27 ASSESSMENT — PAIN DESCRIPTION - ORIENTATION: ORIENTATION: LOWER;MID

## 2019-05-27 ASSESSMENT — PAIN DESCRIPTION - LOCATION
LOCATION: ABDOMEN
LOCATION: GENERALIZED
LOCATION: BACK

## 2019-05-27 ASSESSMENT — PAIN DESCRIPTION - DESCRIPTORS: DESCRIPTORS: ACHING;DISCOMFORT

## 2019-05-27 ASSESSMENT — PAIN SCALES - GENERAL
PAINLEVEL_OUTOF10: 0
PAINLEVEL_OUTOF10: 6
PAINLEVEL_OUTOF10: 3
PAINLEVEL_OUTOF10: 6
PAINLEVEL_OUTOF10: 6
PAINLEVEL_OUTOF10: 0

## 2019-05-27 ASSESSMENT — PAIN DESCRIPTION - PAIN TYPE
TYPE: ACUTE PAIN;SURGICAL PAIN
TYPE: ACUTE PAIN;SURGICAL PAIN

## 2019-05-27 ASSESSMENT — PAIN DESCRIPTION - ONSET: ONSET: ON-GOING

## 2019-05-27 ASSESSMENT — PAIN - FUNCTIONAL ASSESSMENT: PAIN_FUNCTIONAL_ASSESSMENT: ACTIVITIES ARE NOT PREVENTED

## 2019-05-27 ASSESSMENT — PAIN DESCRIPTION - PROGRESSION: CLINICAL_PROGRESSION: GRADUALLY IMPROVING

## 2019-05-27 ASSESSMENT — PAIN DESCRIPTION - FREQUENCY: FREQUENCY: INTERMITTENT

## 2019-05-27 NOTE — PROGRESS NOTES
Modified independent  Transfer Comments: RW     Cognition  Overall Cognitive Status: Exceptions  Arousal/Alertness: Appropriate responses to stimuli  Following Commands: Follows multistep commands with repitition; Follows one step commands consistently  Attention Span: Difficulty dividing attention  Memory: Decreased recall of precautions  Safety Judgement: Decreased awareness of need for assistance  Problem Solving: Assistance required to identify errors made;Assistance required to implement solutions;Assistance required to correct errors made  Insights: Decreased awareness of deficits  Initiation: Does not require cues  Sequencing: Does not require cues     LUE AROM (degrees)  LUE AROM : WFL  Left Hand AROM (degrees)  Left Hand AROM: WFL  RUE AROM (degrees)  RUE AROM : WFL  Right Hand AROM (degrees)  Right Hand AROM: WFL     Plan   Plan  Times per week: 5 out of 7 days  Times per day: Daily  Plan weeks: 8 days  Current Treatment Recommendations: Strengthening, Endurance Training, Patient/Caregiver Education & Training, ROM, Cognitive Reorientation, Equipment Evaluation, Education, & procurement, Self-Care / ADL, Balance Training, Gait Training, Pain Management, Home Management Training, Functional Mobility Training, Safety Education & Training, Positioning    Goals  Short term goals  Time Frame for Short term goals: 5/21/19  Short term goal 1: Pt will complete toileting/toilet transfer SBA with LRAD- GOAL MET 5/22  Short term goal 2: Pt will complete LB dressing with Yaquelin and AE PRN using LRAD for standing balance- GOAL MET 5/27  Short term goal 3: Pt will tolerate 10 minutes standing balance activities and/or ADLs with LRAD SBA- GOAL MET 5/22  Short term goal 4: Pt will verbalize and demo understanding of BUE HEP to increase strength and endurance for transfers/ADLs- GOAL NOT MET  Short term goal 5: Pt will verbalize 3/3 back precautions 100% when asked throughout therapy sessions with no cues- GOAL MET 5/24  Long term goals  Time Frame for Long term goals : 5/25/19  Long term goal 1: Pt will complete funcitonal mobility in room/bathroom with LRAD to gather all items for bathing/dressing- GOAL MET 5/27  Long term goal 2: Pt will complete shower transfer/bathing Heladio with LRAD and DME PRN- GOAL MET 5/27  Long term goal 3: Pt will complete LB/UE dressing Heladio with LRAD and AE PRN - GOAL MET 5/27  Long term goal 4: Pt will complete 1 simple meal prep and/or IADL task with LRAD Heladio- GOAL NOT MET  Patient Goals   Patient goals :  \"To go home and be independent\"     Therapy Time   Individual Concurrent Group Co-treatment   Time In 1230         Time Out 1330         Minutes 60         Timed Code Treatment Minutes: Ashok 13, OTR/L

## 2019-05-27 NOTE — PATIENT CARE CONFERENCE
[facility]  Inpatient Rehabilitation  Weekly Team Conference Note    Date:   Patient Name: Katherine Shah        MRN: [id number]    : 1940  (70 y.o.)  Gender: male   Referring Practitioner: Karina Palmer MD  Diagnosis: SCI      Interventions to be utilized toward barriers to discharge, per discipline:  [address] observed barriers to dc: none, discharging today  Nursing interventions:  Family Education: discharge instructions  Fall Risk:  Yes      Physical therapy observed barriers to dc:    Baseline: Independent w/o AD   Current level: Supervision at    Barriers to DC: Lack of insight into cog deficits and decreased safety awareness. Needs in order to achieve dc home/next level of care: Pt will need initial 24H supervision and assist PRN, recommend home PT.         Physical therapy interventions:   Current Treatment Recommendations: Strengthening, Transfer Training, Endurance Training, Neuromuscular Re-education, Patient/Caregiver Education & Training, Equipment Evaluation, Education, & procurement, Home Exercise Program, Safety Education & Training, Stair training, Functional Mobility Training, Balance Training, Gait Training, Pain Management      PHYSICAL THERAPY    FIMS current conference:  [employer], Chair, Wheel Chair: 5 - Requires setup/supervision/cues  Walk: 5 - Exception [employer]hold Locomotion Walks at least 50 feet Independently with or without a device May require an extra amount of time OR there may be a concern for safety  Distance Walked: 333 ft  Wheel Chair: 6 - [address] [place] Operates wheelchair at least 150 feet with an ambulatory device, orthosis or prosthesis OR requires extra amount of time OR there is concern for safety  Distance Traveled in Wheel Chair: 180 ft SBA   Stairs: 4- Minimal Contact Assistance Perfoms 75% or more of the effort to go up and down one flight of stairs    PT Equipment Recommendations  Equipment Needed: No    Assessment: pt found seated on commode upon therapist entrance to room. supv for commode transfer, clothing management. completes functional mobility with supv due to need for cues for safety (i.e. no spinal rotation, square self and RW to chair prior to sitting). limited throughout by fatigue, utilizes multiple seated rest breaks. continue to progress mobility as tolerated. Occupational therapy observed barriers to dc:    Baseline: Mod I with SPC for mobility in home, able to complete all IADLs, ADLs and driving without assist PTA   Current level: Mod I for all ADLs   Barriers to DC: supervision needed and available at home   Needs in order to achieve dc home/next level of care: pt ready to discharge home with increased time for ADLs and occasional supervision for transfers.     Occupational Therapy interventions:  Current Treatment Recommendations: Strengthening, Endurance Training, Patient/Caregiver Education & Training, ROM, Cognitive Reorientation, Equipment Evaluation, Education, & procurement, Self-Care / ADL, Balance Training, Gait Training, Pain Management, Home Management Training, Functional Mobility Training, Safety Education & Training, Positioning      OCCUPATIONAL THERAPY  FIMs last conference  Eatin - Feeds self with setup/supervision/cues and/or requires only setup/supervision/cues to perform tube feedings  Groomin - Able to perform 3-4 tasks with touching help  Bathing: 3 - Able to bathe 5-7 areas  Dressing-Upper: 5 - Requires setup/supervision/cues and/or requires assist with presthesis/brace only  Dressing-Lower: 3 - Requires assist with 2-3 parts of dressing  Toiletin - Requires steadying assistance only  Toilet Transfer: 4 - Requires steadying assistance only < 25% assist  Shower Transfer: 4 - Minimal contact assistance, pt. expends 75% or more effort    FIMS: current conference  Eatin - Patient feeds self  Groomin - Independent with all tasks using assistive device  Bathin - Able to bathe all 10 areas with device  Dressing-Upper: 6 - Independent with device/prosthesis  Dressing-Lower: 6 - Independent with device/prosthesis  Toiletin - Requires device (grab bar/walker/etc.)  Toilet Transfer: 6 - Independent with device (grab bar/walker/slide bar)  Shower Transfer: 5 - Supervision, set-up, cues      Assessment: Pt agreeable to therapy. Pt with Mod I/supervision for functional mobility/transfers using RW. Pt completed bathing and UB/LB dressing Mod I. Pt completed standing grooming task Mod I at sink. Pt completed multiple functional transfers with Mod I/supervision and occasional verbal cues for safety with RW use. Speech therapy observed barriers to dc:    Baseline: Home with family   Current level: Cog deficits   Barriers to DC: Lack of insight into cog deficits and decreased safety awareness. Needs in order to achieve dc home/next level of care: Assistance at home with higher level cog tasks (lives with wife who manages meds, daughter right down the street). Speech Therapy interventions:  Dysphagia: N/A  Speech/Language/Cognition: Recall, compensatory strategy training, problem solving, exec function, thought org, attention, reasoning. SPEECH THERAPY  FIMS: current conference  Comprehension: 5 - Patient understands basic needs (hungry/hot/pain)  Expression: 6 - Device used to express complex ideas/needs  Social Interaction: 6 - Patient requires medication for mood and/or effect  Problem Solving: 3 - Patient solves simple/routine tasks 50%-74%  Memory: 3 - Patient remembers 50%-74% of the time    Assessment: See note for goal status at discharge; planned d/c home tomorrow. Pt demonstrated difficulty throughout duration of treatment with functional use of learned compensatory strategies to promote improved organization, problem solving, thought organization, and memory.   Recommend assistance with higher level cog tasks at home (money and med management, for example) as

## 2019-05-27 NOTE — PROGRESS NOTES
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 has a past medical history of Hyperlipemia, Hypertension, Prostate enlargement, and Unspecified cerebral artery occlusion with cerebral infarction. has a past surgical history that includes Carotid endarterectomy; Foot surgery; Skin cancer excision; Eye surgery; Facial reconstruction surgery; Tonsillectomy; Colonoscopy; and Colonoscopy (). Restrictions  Restrictions/Precautions  Restrictions/Precautions: Up as Tolerated, General Precautions  Position Activity Restriction  Spinal Precautions: No Bending, No Lifting, No Twisting  Spinal Precautions: no lifting >10 lbs, pt may shower, open to air  Other position/activity restrictions: up as tolerated, may shower, no BLT  Subjective   Subjective  Subjective: Pt was finishing up with OT upon arrival   Pain Screening  Patient Currently in Pain: Yes  Pain Assessment  Pain Assessment: 0-10  Pain Level: 6  Pain Type: Acute pain;Surgical pain  Pain Location: Back  Pain Orientation: Lower;Mid  Vital Signs  Patient Currently in Pain: Yes       Orientation  Orientation  Overall Orientation Status: Within Functional Limits  Objective   Transfers  Sit to Stand: Supervision  Stand to sit: Supervision  Ambulation 1  Surface: level tile  Device: Rolling Walker  Assistance: Supervision  Quality of Gait: decreased swing phase/heel strike, trendelenburg BLEs, cues for obstacle awareness/ cues when turning   Distance: 2x300 ft (2 standing rest breaks throughout + 1 sitting rest break).     Balance  Sitting - Static: Fair;+  Sitting - Dynamic: Fair;+  Standing - Static: Fair;-  Standing - Dynamic: Fair  Standing Exercises in // bars:   FSU: 6 in step  Standing marching: x60\"   Romberg: EO/EC x60\"      Assessment   Assessment: Pt was limited due generalized deconditioning; requiring frequent rest breaks during gait training. Transfers and gait training were steady and safe without sequencing cues. Presented with decreased righting reactions during standing balance actvities balance requiring UE support to prevent falls. Treatment Diagnosis: impaired transfers   Prognosis: Good  Patient Education: spinal precaitions, use of AD  REQUIRES PT FOLLOW UP: Yes     Second session:   Pt required VCs to remind of spinal precautions (ie avoiding trunk flexion at  during gait, safe hand placement with [place] at [facility] Supine to/from flat bed w/o rails: supervision, VCs to use log roll technique and maintain precautions  Rolling to R/L: supervision  Sit to/from stand at RW: supervision  Bed to chair transfer: stand pivot transfer at [facility]  Car transfers: Supervision, VCs for hand placement and spinal precautions  Stairs: 12 steps with B HR switching back and forth between step-to and alternating pattern, CGA  Curb: Up/down 2 6\" curb steps forward with CGA and VCs to maintain spinal precautions  Ambulation: 333 ft + 150 ft (level tile, 2 turns in first 50 ft) at  with supervision, VCs to correct trunk posture by maintaining proximity to RW  Ambulation on carpet: 30 ft at [facility] [place]    Goals  Short term goals  Time Frame for Short term goals: 4 days 5/22  Short term goal 1: pt will complete bed mobility with mod ind and maintain spinal precautions. - GOAL MET   Short term goal 2: pt will complete functional transfers with supv and LRAD. - GOAL MET   Short term goal 3: pt will ambulate 150 ft with LRAD SBA. - GOAL MET [facility]  Long term goals  Time Frame for Long term goals : 8 days 5/26  Long term goal 1: pt will complete functional transfers with mod ind and LRAD. Long term goal 2: pt will ambulate 150 ft with LRAD mod ind. Long term goal 3: pt will ascend/descend 2 steps with RHR and supv. Long term goal 4: pt will complete car transfer with mod ind and AD  Long term goal 5: pt will complete seated HEP with ind to maintain functional strength. Patient Goals   Patient goals : \"to walk\"    Plan    Plan  Times per week: 5-7x/week  Times per day: Daily  Current Treatment Recommendations: Strengthening, Transfer Training, Endurance Training, Neuromuscular Re-education, Patient/Caregiver Education & Training, Equipment Evaluation, Education, & procurement, Home Exercise Program, Safety Education & Training, Stair training, Functional Mobility Training, Balance Training, Gait Training, Pain Management  Safety Devices  Type of devices:  All fall risk precautions in place, Gait belt, Call light within reach, Nurse notified, Chair alarm in place, Left in chair     Therapy Time   Individual Concurrent Group Co-treatment   Time In 1330         Time Out 1400         Minutes 30         Timed Code Treatment Minutes: Amisha 145, PTA      Second Session Therapy Time:   Individual Concurrent Group Co-treatment   Time In 1555         Time Out 1640         Minutes 45           Timed Code Treatment Minutes:  45    Total Treatment Minutes:   75 minutes    Nancy Rubalcava, PT, DPT   #[phone]

## 2019-05-27 NOTE — PLAN OF CARE
Patient able to use pain rating scale 0/10 adequately without problems. Pain medications explained along with frequency and when to notify the nurse with  possible side effects. Verbalizes understanding. Call light within reach. Resting quietly in bed. Denies needs at present. Karine Gates

## 2019-05-27 NOTE — PROGRESS NOTES
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 ([facility] [address] [S24.133A]    Precautions: falls  Home situation: lives at home with his wife  ST Dx: [] Aphasia  [] Dysarthria  [] Apraxia   [] Oropharyngeal dysphagia [x] Cognitive Impairment  [] Other:   Date of Admit: 5/17/2019  Room #: 0155/0155-01    Current functional status (updated daily):         Pt being seen for : [] Speech/Language Treatment  [] Dysphagia Treatment [x] Cognitive Treatment  [] Other:  Communication: [x]WFL  [] Aphasia  [] Dysarthria  [] Apraxia  [] Pragmatic Impairment [] Non-verbal  [] Hearing Loss  [] Other:   Cognition: [] WFL  [] Mild  [x] Moderate  [x] Severe [] Unable to Assess  [] Other:  Memory: [] WFL  [] Mild  [x] Moderate  [x] Severe [] Unable to Assess  [] Other:  Behavior: [x] Alert  [x] Cooperative  [x]  Pleasant  [] Confused  [] Agitated  [] Uncooperative  [] Distractible [] Motivated  [] Self-Limiting [] Anxious  [] Other:  Endurance:  [x] Adequate for participation in SLP sessions  [] Reduced overall  [] Lethargic  [] Other:  Safety: [] No concerns at this time  [x] Reduced insight into deficits  []  Reduced safety awareness [] Not following call light procedures  [] Unable to Assess  [] Other:    Current Diet Order:DIET GENERAL;  Swallowing Precautions: Sit up for all meals and thereafter for 30 minutes, Drink with a straw only with small sips and Alternate solids with liquids       Date: 5/27/2019      Tx session 1  [id number] DISCHARGE SUMMARY   Total Timed Code Min 60 0   Total Treatment Minutes 60 0   Individual Treatment Minutes 60 0   Group Treatment Minutes 0 0   Co-Treat Minutes 0 0   Variance/Reason:  N/A N/A   Pain None reported.   N/A - discharge summary. Pain Intervention [] RN notified  [] Repositioned  [] Intervention offered and patient declined  [x] N/A  [] Other: [] RN notified  [] Repositioned  [] Intervention offered and patient declined  [x] N/A  [] Other:   Subjective     Pt alert, upright at EOB. Family (zakuther and grandson) present throughout duration of session. Objective:     Goal 1: Pt will complete recall tasks with 80% accuracy given min cues. Poor functional recall during YESSICA tasks:  - Frequent repetition of target amounts needed during  Money task. Goal met: When considering immediate recall with cueing for use of compensatory strategies. Goal not met with consideration for delayed recall tasks and STM tasks. Continued ST recommended after discharge. Goal 2: Pt will complete executive function tasks (meds, finances, math, time, etc) with 80% accuracy given min cues. Money management:  - Completed money counting portion of the YESSICA  - Unable to complete entire subtest d/t difficulty with simple counting tasks (did not attempt change making exchanges d/t suspected difficulty)  - Overall, pt completed money counting tasks with 50% accy, given MAX cueing.   - Daughter was present to witness difficulty pt demonstrated with this task. Time concepts:  - Simple time telling on YESSICA  - PT completed subtest with 40% accy, improving to 90% with min to mod verbal cueing to take a second look and attempt again. Check writing  - Pt stated he manages household finances and bill paying at home   - Pt was instructed to write a check per YESSICA subtest  - Pt did not completely fill out the check, requiring moderate cueing to recognize error and correct. - Mod to max cues required to organize and effectively complete simple checking register transactions. Goal not met  Pt demonstrated difficulty with functional taks throughout course of treatment including money, time, and math concepts.   Continued ST recommended and supervision / assist recommended with higher level cog tasks at home. Goal 3: Pt will complete thought organization and reasoning tasks with 80% accuracy given min cues. Discussed planning and head and logical solutions to help with safety and judgement at home.    - IE; discussed looking ahead to make sure there were no physical barriers to his walking path through the house, making sure he has shower items within reach when in the bathroom, etc.   - Discussed how this may benefit pt's independence and safety.   - Pt stated \"I have my routine at home and I'm not going to change it. \"    Daughter was present for this discussion and education, however, and was receptive to given education. Pt was resistant to changing home routine. Goal not met  Pt demonstrated difficulty with both structured and situational / non-structured tasks throughout duration of therapy. Strategies for organization reviewed with pt, though cueing was required to initiate use. Continued ST recommended after d/c. Goal 4: Pt will complete problem solving tasks with 80% accuracy given min cues. Discussed problem solving situations as they relate to money counting, time telling, bill paying / check writing, etc.     Pt was resistant to given strategies to promote independence and accy within functional tasks at home. Goal not met  Deficits persist and mod-max cueing needed to work through both structured and non-structured activities in therapy. Recommend assistance / supervision with higher level problem solving tasks at home with continued speech therapy after d/c.     Other areas targeted: N/A N/A   Education:   Education to pt and daughter re: goals, rationale for compensatory strategy use, and recommendation for assist during high level tasks and continued ST after d/c.  Education and training provided to pt throughout course of treatment for compensatory strategy use and carryover (though pt occasionally resistant to using newly learned strategies). Safety Devices: [x] Call light within reach  [] Chair alarm activated  [x] Bed alarm activated  [] Other:     [] Call light within reach  [] Chair alarm activated  [] Bed alarm activated  [] Other:    Assessment: See above for goal status at discharge; planned d/c home tomorrow. Pt demonstrated difficulty throughout duration of treatment with functional use of learned compensatory strategies to promote improved organization, problem solving, thought organization, and memory. Recommend assistance with higher level cog tasks at home (money and med management, for example) as well as continued speech therapy after d/c.    Plan: Planned discharge home tomorrow.       Additional Information: N/A     Barriers toward progress: Cognitive deficit  Discharge recommendations:  [] Home independently  [x] Home with assistance []  24 hour supervision  [] ECF [x] Other: See PT/OT  Continued Tx Upon Discharge: ? [x] Yes [] No [] TBD based on progress while on ARU [] Vital Stim indicated [] Other:   Estimated discharge date: 05/28/19     Interventions used this date:  [] Speech/Language Treatment  [] Instruction in HEP [] Group [] Dysphagia Treatment [] Cognitive Treatment   [] Other:    Admitting SLP FIM scores:  Comprehension: 4 - Patient understands basic needs 75-90%+ of the time  Expression: 6 - Device used to express complex ideas/needs  Social Interaction: 6 - Patient requires medication for mood and/or effect  Problem Solving: 3 - Patient solves simple/routine tasks 50%-74%  Memory: 2 - Patient remembers 25%-49% of the time    Current SLP FIM scores:  Comprehension: 5 - Patient understands basic needs (hungry/hot/pain)  Expression: 6 - Device used to express complex ideas/needs  Social Interaction: 6 - Patient requires medication for mood and/or effect  Problem Solving: 3 - Patient solves simple/routine tasks 50%-74%  Memory: 3 - Patient remembers 50%-74% of the time    Total Time Breakdown / Charges    Time in Time out Total Time / units   Cognitive Tx 0930 1030 60 min / 4 units   Speech Tx -- -- --   Dysphagia Tx -- -- --     Electronically Signed by     Patricia Salas MS CCC-SLP #[id number]  Speech-Language Pathologist

## 2019-05-27 NOTE — DISCHARGE SUMMARY
UE Dressing: Modified independent   LE Dressing: Modified independent (pt educated on elyse hose application and completed Mod I; pt donned socks, pants, and shoes with increased time)  Toileting: Modified independent   Additional Comments: Increased time, cues PRN for safety with limited receptivity to education    Bed mobility  Rolling to Left: Stand by assistance  Rolling to Right: Stand by assistance  Supine to Sit: Modified independent(HOB slightly elevated)  Sit to Supine: Unable to assess  Scooting: Modified independent  Comment: Pt in WC upon arrival and departure from room    Functional Transfers: Toilet Transfers  Toilet - Technique: Ambulating  Equipment Used: Grab bars  Toilet Transfer: Modified independent  Toilet Transfers Comments: SBA mod cues for safety during sit to stands         Shower Transfers  Shower - Transfer From: Ramona Deshpande - Transfer Type: To and From  Shower - Transfer To: Transfer tub bench  Shower - Technique: To left, Ambulating  Shower Transfers: Minimal assistance  Shower Transfers Comments: Yaquelin for RW management and cues for safe transition over threshold of shower           Functional Mobility  Functional - Mobility Device: Rolling Walker  Activity: To/from bathroom, To/From therapy gym  Assist Level: Supervision  Functional Mobility Comments: SBA with mod-max cues for safety, pt still not maintaining within RW during mobility with poor carryover and resistant to OT education. Pt swinging RW away before sitting and OT extensively educated and demonstrated proper mechanics with turning nad sitting. Pt with very close SBA in kitchen to gather cones from various appliances and cabinets with poor safety. Extensively educated on home safety and management to prevent falls and sliding objects on countertop vs. attempting to carry items with RW.      Instrumental ADL's  Instrumental ADLs: Yes     Light Housekeeping  Light Housekeeping Level: Rodman Lora Housekeeping Level of Assistance: Stand by assistance  Light Housekeeping: Pt gathered cones from various places and heights in ADL kitchen with MAX cues for safety, mechanics within RW and energy conservation techniques. Pt continues to be resistant to OT education and continues to lack proper mecahnics for safe transitions in the kitchen. Educated patient to only prepare light meals, use microwave, and place items within reaching distance. Unsure of carryover of education. Perception  Overall Perceptual Status: WFL         UE Function:                Admitting OT FIM scores  Eatin - Feeds self with setup/supervision/cues and/or requires only setup/supervision/cues to perform tube feedings  Groomin - Able to perform 3-4 tasks with touching help  Bathing: 3 - Able to bathe 5-7 areas  Dressing-Upper: 5 - Requires setup/supervision/cues and/or requires assist with presthesis/brace only  Dressing-Lower: 3 - Requires assist with 2-3 parts of dressing  Toiletin - Requires steadying assistance only  Toilet Transfer: 4 - Requires steadying assistance only < 25% assist  Shower Transfer: 4 - Minimal contact assistance, pt. expends 75% or more effort    Discharge OT FIM scores  Eatin - Patient feeds self  Groomin - Independent with all tasks using assistive device  Bathin - Able to bathe all 10 areas with device  Dressing-Upper: 6 - Independent with device/prosthesis  Dressing-Lower: 6 - Independent with device/prosthesis  Toiletin - Requires device (grab bar/walker/etc.)  Toilet Transfer: 6 - Independent with device (grab bar/walker/slide bar)  Shower Transfer: 5 - Supervision, set-up, cues    Assessment:   Assessment: Pt agreeable to therapy. Pt with Mod I/supervision for functional mobility/transfers using RW. Pt completed bathing and UB/LB dressing Mod I. Pt completed standing grooming task Mod I at sink.  Pt completed multiple functional transfers with Mod I/supervision and occasional verbal cues for safety with RW use.   Prognosis: Fair     REQUIRES OT FOLLOW UP: Yes  Discharge Recommendations: Home with Home health OT, Home with assist PRN    Equipment Recommendations:  none         Electronically signed by Nolan Mejia OT on 5/27/2019 at 4:22 PM

## 2019-05-28 VITALS
OXYGEN SATURATION: 96 % | WEIGHT: 214.8 LBS | TEMPERATURE: 97.8 F | HEIGHT: 67 IN | DIASTOLIC BLOOD PRESSURE: 78 MMHG | RESPIRATION RATE: 16 BRPM | SYSTOLIC BLOOD PRESSURE: 128 MMHG | HEART RATE: 79 BPM | BODY MASS INDEX: 33.71 KG/M2

## 2019-05-28 PROCEDURE — 6370000000 HC RX 637 (ALT 250 FOR IP): Performed by: PHYSICAL MEDICINE & REHABILITATION

## 2019-05-28 RX ADMIN — SENNOSIDES,DOCUSATE SODIUM 1 TABLET: 8.6; 5 TABLET, FILM COATED ORAL at 09:20

## 2019-05-28 RX ADMIN — PANTOPRAZOLE SODIUM 40 MG: 40 TABLET, DELAYED RELEASE ORAL at 05:09

## 2019-05-28 RX ADMIN — POLYETHYLENE GLYCOL 3350 17 G: 17 POWDER, FOR SOLUTION ORAL at 09:21

## 2019-05-28 RX ADMIN — LISINOPRIL 20 MG: 20 TABLET ORAL at 09:21

## 2019-05-28 RX ADMIN — AZELASTINE HYDROCHLORIDE 1 SPRAY: 137 SPRAY, METERED NASAL at 09:23

## 2019-05-28 RX ADMIN — FINASTERIDE 5 MG: 5 TABLET, FILM COATED ORAL at 09:20

## 2019-05-28 RX ADMIN — CETIRIZINE HYDROCHLORIDE 10 MG: 10 TABLET, FILM COATED ORAL at 03:32

## 2019-05-28 RX ADMIN — METOPROLOL TARTRATE 25 MG: 25 TABLET, FILM COATED ORAL at 09:20

## 2019-05-28 RX ADMIN — OXYCODONE HYDROCHLORIDE AND ACETAMINOPHEN 1 TABLET: 5; 325 TABLET ORAL at 09:20

## 2019-05-28 ASSESSMENT — PAIN SCALES - GENERAL
PAINLEVEL_OUTOF10: 0
PAINLEVEL_OUTOF10: 4
PAINLEVEL_OUTOF10: 0

## 2019-05-28 ASSESSMENT — PAIN SCALES - WONG BAKER: WONGBAKER_NUMERICALRESPONSE: 0

## 2019-05-28 ASSESSMENT — PAIN DESCRIPTION - LOCATION: LOCATION: BACK

## 2019-05-28 ASSESSMENT — PAIN DESCRIPTION - ORIENTATION: ORIENTATION: LOWER;MID

## 2019-05-28 ASSESSMENT — PAIN DESCRIPTION - DESCRIPTORS: DESCRIPTORS: ACHING;DISCOMFORT

## 2019-05-28 ASSESSMENT — PAIN - FUNCTIONAL ASSESSMENT: PAIN_FUNCTIONAL_ASSESSMENT: ACTIVITIES ARE NOT PREVENTED

## 2019-05-28 ASSESSMENT — PAIN DESCRIPTION - PAIN TYPE: TYPE: ACUTE PAIN

## 2019-05-28 ASSESSMENT — PAIN DESCRIPTION - FREQUENCY: FREQUENCY: CONTINUOUS

## 2019-05-28 ASSESSMENT — PAIN DESCRIPTION - ONSET: ONSET: ON-GOING

## 2019-05-28 ASSESSMENT — PAIN DESCRIPTION - PROGRESSION: CLINICAL_PROGRESSION: GRADUALLY WORSENING

## 2019-05-28 NOTE — PLAN OF CARE
Problem: Pain:  Goal: Control of chronic pain  Description  Control of chronic pain  Outcome: Ongoing   Patient able to use pain rating scale 0/10 adequately without problems. Pain medications explained along with frequency. Verbalizes understanding. Call light within reach. Percocet given before bed. Resting quietly in bed now. Denies needs at present. Problem: Falls - Risk of:  Goal: Will remain free from falls  Description  Will remain free from falls  Outcome: Ongoing   Fall precautions in place, bed alarm on, nonskid foot wear applied, bed in lowest position, and call light within reach. Will continue to monitor.

## 2019-05-28 NOTE — PROGRESS NOTES
Orders for discharge. Discharge orders reviewed with patient and his wife. Copy of discharge instructions given to patient, copy placed on chart. Patient taken to private vehicle by wheelchair in no apparent distress. Personal belongings sent with patient.

## 2019-05-28 NOTE — DISCHARGE SUMMARY
s/p T8, T9 partial discectomy for anterior decompression and T8-9 thoracic interbody fusion (5/14 with Dr. Emma Frederick). Incisions healing well. Pain controlled with prn Percocet. PT/OT.      Hypertension - lisinopril (dose increased), lopressor.      HLD - lipitor     Previous mild stroke.  ASA on hold x 2 weeks per Spine surgery.      H/o BPH. Voiding volitionally. Finasteride, Flomax.        Discharge Exam:  Gen: No distress, pleasant. HEENT: Normocephalic, atraumatic. CV: Regular rate and rhythm. Resp: No respiratory distress. Abd: Soft, nontender   Ext: No edema. Neuro: Alert, oriented, appropriately interactive.      Discharge Functional Status:    Physical therapy:  Bed Mobility: Scooting: Modified independent  Transfers: Sit to Stand: Supervision  Stand to sit: Supervision  Bed to Chair: Supervision  Stand Pivot Transfers: Supervision, Ambulation 1  Surface: level tile  Device: Rolling Walker  Assistance: Supervision  Quality of Gait: decreased swing phase/heel strike, trendelenburg BLEs, cues for obstacle awareness/ cues when turning   Distance: 300 ft , Stairs  # Steps : 12  Stairs Height: 6\"  Rails: Bilateral  Device: Rolling walker  Assistance: Supervision  Comment: step to pattern  Mobility: Bed, Chair, Wheel Chair: 5 - Requires setup/supervision/cues  Walk: 5 - Exception Household Locomotion Walks at least 50 feet Independently with or without a device May require an extra amount of time OR there may be a concern for safety  Distance Walked: 333 ft  Wheel Chair: 6 - Modified [place] Operates wheelchair at least 150 feet with an ambulatory device, orthosis or prosthesis OR requires extra amount of time OR there is concern for safety  Distance Traveled in Wheel Chair: 180 ft SBA   Stairs: 4- Minimal Contact Assistance Perfoms 75% or more of the effort to go up and down one flight of stairs, PT Equipment Recommendations  Equipment Needed: No, Assessment: pt found seated on commode upon therapist after visit summary from hospitalization    Discharge Medications:     Medication List      START taking these medications    finasteride 5 MG tablet  Commonly known as:  PROSCAR  Take 1 tablet by mouth daily     metoprolol tartrate 25 MG tablet  Commonly known as:  LOPRESSOR  Take 1 tablet by mouth 2 times daily     oxyCODONE-acetaminophen 5-325 MG per tablet  Commonly known as:  PERCOCET  Take 1 tablet by mouth every 4 hours as needed (pain level 1-5) for up to 3 days. CHANGE how you take these medications    aspirin 81 MG tablet  What changed:  Another medication with the same name was removed. Continue taking this medication, and follow the directions you see here. lisinopril 20 MG tablet  Commonly known as:  PRINIVIL;ZESTRIL  Take 1 tablet by mouth daily  What changed:    · medication strength  · how much to take     PLAVIX 75 MG tablet  Generic drug:  clopidogrel  What changed:  Another medication with the same name was removed. Continue taking this medication, and follow the directions you see here. CONTINUE taking these medications    simvastatin 80 MG tablet  Commonly known as:  ZOCOR     terazosin 5 MG capsule  Commonly known as:  HYTRIN        STOP taking these medications    zolpidem 5 MG tablet  Commonly known as:  AMBIEN           Where to Get Your Medications      These medications were sent to [address] [place] [address], [address] [place] [address] [address] [facility] [phone]  [address] [facility], [address] [address]    Phone:  [phone]   · finasteride 5 MG tablet  · lisinopril 20 MG tablet  · metoprolol tartrate 25 MG tablet  · oxyCODONE-acetaminophen 5-325 MG per tablet           I spent over 35 minutes on this discharge encounter between counseling, coordination of care, and medication reconciliation.       Rayshawn Baker MD

## 2019-05-28 NOTE — CARE COORDINATION
[facility] at 11:15    Spoke with pt at bedside about [facility], all questions answered. Pt aware agency will call to set up [facility] \[facility] [place] [facility]\"", pt request for the agency to call his wife to set up services. Pt is agreeable to having a SOC within 2 days of DC. Demographics verified, pt lives at home with spouse. Orders faxed to [facility] at 12:10.   Lorenda Dandy RN CTN with [facility]  SLT:[phone]

## 2019-07-20 ENCOUNTER — HOSPITAL ENCOUNTER (EMERGENCY)
Age: 79
Discharge: HOME | End: 2019-07-20
Attending: EMERGENCY MEDICINE
Payer: MEDICARE

## 2019-07-20 ENCOUNTER — APPOINTMENT (OUTPATIENT)
Dept: GENERAL RADIOLOGY | Age: 79
End: 2019-07-20
Payer: MEDICARE

## 2019-07-20 VITALS
BODY MASS INDEX: 32.13 KG/M2 | HEART RATE: 80 BPM | HEIGHT: 68 IN | OXYGEN SATURATION: 92 % | WEIGHT: 212 LBS | DIASTOLIC BLOOD PRESSURE: 89 MMHG | TEMPERATURE: 98.1 F | SYSTOLIC BLOOD PRESSURE: 153 MMHG | RESPIRATION RATE: 16 BRPM

## 2019-07-20 DIAGNOSIS — I49.9 CARDIAC ARRHYTHMIA, UNSPECIFIED CARDIAC ARRHYTHMIA TYPE: Primary | ICD-10-CM

## 2019-07-20 LAB
A/G RATIO: 1.2 (ref 1.1–2.2)
ALBUMIN SERPL-MCNC: 3.7 G/DL (ref 3.4–5)
ALP BLD-CCNC: 100 U/L (ref 40–129)
ALT SERPL-CCNC: 13 U/L (ref 10–40)
ANION GAP SERPL CALCULATED.3IONS-SCNC: 11 MMOL/L (ref 3–16)
AST SERPL-CCNC: 15 U/L (ref 15–37)
BASOPHILS ABSOLUTE: 0.1 K/UL (ref 0–0.2)
BASOPHILS RELATIVE PERCENT: 0.6 %
BILIRUB SERPL-MCNC: <0.2 MG/DL (ref 0–1)
BUN BLDV-MCNC: 24 MG/DL (ref 7–20)
CALCIUM SERPL-MCNC: 9.9 MG/DL (ref 8.3–10.6)
CHLORIDE BLD-SCNC: 106 MMOL/L (ref 99–110)
CO2: 23 MMOL/L (ref 21–32)
CREAT SERPL-MCNC: 1.4 MG/DL (ref 0.8–1.3)
EOSINOPHILS ABSOLUTE: 0.4 K/UL (ref 0–0.6)
EOSINOPHILS RELATIVE PERCENT: 4.1 %
GFR AFRICAN AMERICAN: 59
GFR NON-AFRICAN AMERICAN: 49
GLOBULIN: 3 G/DL
GLUCOSE BLD-MCNC: 121 MG/DL (ref 70–99)
HCT VFR BLD CALC: 41.6 % (ref 40.5–52.5)
HEMOGLOBIN: 13.8 G/DL (ref 13.5–17.5)
INR BLD: 0.95 (ref 0.86–1.14)
LYMPHOCYTES ABSOLUTE: 2.2 K/UL (ref 1–5.1)
LYMPHOCYTES RELATIVE PERCENT: 24.2 %
MAGNESIUM: 2.3 MG/DL (ref 1.8–2.4)
MCH RBC QN AUTO: 30.7 PG (ref 26–34)
MCHC RBC AUTO-ENTMCNC: 33.1 G/DL (ref 31–36)
MCV RBC AUTO: 92.6 FL (ref 80–100)
MONOCYTES ABSOLUTE: 0.9 K/UL (ref 0–1.3)
MONOCYTES RELATIVE PERCENT: 9.7 %
NEUTROPHILS ABSOLUTE: 5.7 K/UL (ref 1.7–7.7)
NEUTROPHILS RELATIVE PERCENT: 61.4 %
PDW BLD-RTO: 14.5 % (ref 12.4–15.4)
PLATELET # BLD: 273 K/UL (ref 135–450)
PMV BLD AUTO: 9.4 FL (ref 5–10.5)
POTASSIUM REFLEX MAGNESIUM: 4.4 MMOL/L (ref 3.5–5.1)
PROTHROMBIN TIME: 10.8 SEC (ref 9.8–13)
RBC # BLD: 4.49 M/UL (ref 4.2–5.9)
SODIUM BLD-SCNC: 140 MMOL/L (ref 136–145)
TOTAL PROTEIN: 6.7 G/DL (ref 6.4–8.2)
TROPONIN: <0.01 NG/ML
WBC # BLD: 9.2 K/UL (ref 4–11)

## 2019-07-20 PROCEDURE — 83735 ASSAY OF MAGNESIUM: CPT

## 2019-07-20 PROCEDURE — 71046 X-RAY EXAM CHEST 2 VIEWS: CPT

## 2019-07-20 PROCEDURE — 84484 ASSAY OF TROPONIN QUANT: CPT

## 2019-07-20 PROCEDURE — 85610 PROTHROMBIN TIME: CPT

## 2019-07-20 PROCEDURE — 99284 EMERGENCY DEPT VISIT MOD MDM: CPT

## 2019-07-20 PROCEDURE — 85025 COMPLETE CBC W/AUTO DIFF WBC: CPT

## 2019-07-20 PROCEDURE — 93005 ELECTROCARDIOGRAM TRACING: CPT | Performed by: EMERGENCY MEDICINE

## 2019-07-20 PROCEDURE — 80053 COMPREHEN METABOLIC PANEL: CPT

## 2019-07-21 LAB
EKG ATRIAL RATE: 97 BPM
EKG DIAGNOSIS: NORMAL
EKG P AXIS: 12 DEGREES
EKG P-R INTERVAL: 168 MS
EKG Q-T INTERVAL: 364 MS
EKG QRS DURATION: 94 MS
EKG QTC CALCULATION (BAZETT): 447 MS
EKG R AXIS: -7 DEGREES
EKG T AXIS: 19 DEGREES
EKG VENTRICULAR RATE: 91 BPM

## 2019-07-21 PROCEDURE — 93010 ELECTROCARDIOGRAM REPORT: CPT | Performed by: INTERNAL MEDICINE

## 2019-08-21 ENCOUNTER — HOSPITAL ENCOUNTER (OUTPATIENT)
Dept: GENERAL RADIOLOGY | Age: 79
Discharge: HOME | End: 2019-08-21
Payer: MEDICARE

## 2019-08-21 ENCOUNTER — HOSPITAL ENCOUNTER (OUTPATIENT)
Age: 79
Discharge: HOME | End: 2019-08-21
Payer: MEDICARE

## 2019-08-21 DIAGNOSIS — M51.04 THORACIC DISC DISORDER WITH MYELOPATHY: ICD-10-CM

## 2019-08-21 PROCEDURE — 72082 X-RAY EXAM ENTIRE SPI 2/3 VW: CPT

## 2019-09-05 ENCOUNTER — HOSPITAL ENCOUNTER (OUTPATIENT)
Dept: NON INVASIVE DIAGNOSTICS | Age: 79
Discharge: HOME | End: 2019-09-05
Payer: MEDICARE

## 2019-09-05 ENCOUNTER — HOSPITAL ENCOUNTER (OUTPATIENT)
Dept: NUCLEAR MEDICINE | Age: 79
Discharge: HOME | End: 2019-09-05
Payer: MEDICARE

## 2019-09-05 LAB
LV EF: 60 %
LVEF MODALITY: NORMAL

## 2019-09-05 PROCEDURE — 78452 HT MUSCLE IMAGE SPECT MULT: CPT

## 2019-09-05 PROCEDURE — 3430000000 HC RX DIAGNOSTIC RADIOPHARMACEUTICAL: Performed by: INTERNAL MEDICINE

## 2019-09-05 PROCEDURE — 93017 CV STRESS TEST TRACING ONLY: CPT

## 2019-09-05 PROCEDURE — 6360000002 HC RX W HCPCS: Performed by: INTERNAL MEDICINE

## 2019-09-05 PROCEDURE — A9502 TC99M TETROFOSMIN: HCPCS | Performed by: INTERNAL MEDICINE

## 2019-09-05 RX ADMIN — TETROFOSMIN 10.9 MILLICURIE: 1.38 INJECTION, POWDER, LYOPHILIZED, FOR SOLUTION INTRAVENOUS at 09:53

## 2019-09-05 RX ADMIN — REGADENOSON 0.4 MG: 0.08 INJECTION, SOLUTION INTRAVENOUS at 11:00

## 2019-09-05 RX ADMIN — TETROFOSMIN 32.8 MILLICURIE: 1.38 INJECTION, POWDER, LYOPHILIZED, FOR SOLUTION INTRAVENOUS at 11:00

## 2019-12-02 ENCOUNTER — PROCEDURE VISIT (OUTPATIENT)
Dept: SURGERY | Age: 79
End: 2019-12-02
Payer: MEDICARE

## 2019-12-02 VITALS — HEART RATE: 56 BPM | OXYGEN SATURATION: 93 % | TEMPERATURE: 97.9 F | WEIGHT: 223 LBS | BODY MASS INDEX: 33.91 KG/M2

## 2019-12-02 DIAGNOSIS — C44.219 BASAL CELL CARCINOMA OF LEFT EAR: Primary | ICD-10-CM

## 2019-12-02 PROCEDURE — 17312 MOHS ADDL STAGE: CPT | Performed by: DERMATOLOGY

## 2019-12-02 PROCEDURE — 17311 MOHS 1 STAGE H/N/HF/G: CPT | Performed by: DERMATOLOGY

## 2019-12-03 ENCOUNTER — TELEPHONE (OUTPATIENT)
Dept: SURGERY | Age: 79
End: 2019-12-03

## 2019-12-18 ENCOUNTER — OFFICE VISIT (OUTPATIENT)
Dept: SURGERY | Age: 79
End: 2019-12-18
Payer: MEDICARE

## 2019-12-18 DIAGNOSIS — Z51.89 VISIT FOR WOUND CHECK: Primary | ICD-10-CM

## 2019-12-18 PROCEDURE — 99212 OFFICE O/P EST SF 10 MIN: CPT | Performed by: DERMATOLOGY

## 2019-12-18 PROCEDURE — 1036F TOBACCO NON-USER: CPT | Performed by: DERMATOLOGY

## 2019-12-18 PROCEDURE — G8427 DOCREV CUR MEDS BY ELIG CLIN: HCPCS | Performed by: DERMATOLOGY

## 2019-12-18 PROCEDURE — G8484 FLU IMMUNIZE NO ADMIN: HCPCS | Performed by: DERMATOLOGY

## 2019-12-18 PROCEDURE — 4040F PNEUMOC VAC/ADMIN/RCVD: CPT | Performed by: DERMATOLOGY

## 2019-12-18 PROCEDURE — 1123F ACP DISCUSS/DSCN MKR DOCD: CPT | Performed by: DERMATOLOGY

## 2019-12-18 PROCEDURE — G8417 CALC BMI ABV UP PARAM F/U: HCPCS | Performed by: DERMATOLOGY

## 2020-01-14 ENCOUNTER — TELEPHONE (OUTPATIENT)
Dept: SURGERY | Age: 80
End: 2020-01-14

## 2020-03-24 ENCOUNTER — RX ONLY (OUTPATIENT)
Age: 80
Setting detail: RX ONLY
End: 2020-03-24

## 2020-03-24 RX ORDER — SULFACETAMIDE SODIUM 100 MG/ML
LOTION TOPICAL
Qty: 1 | Refills: 6 | Status: ERX

## 2020-03-24 RX ORDER — METRONIDAZOLE 10 MG/G
GEL TOPICAL
Qty: 1 | Refills: 6 | Status: ERX

## 2020-03-24 RX ORDER — CLOBETASOL PROPIONATE 0.5 MG/ML
SOLUTION TOPICAL
Qty: 1 | Refills: 6 | Status: ERX

## 2020-03-27 ENCOUNTER — APPOINTMENT (OUTPATIENT)
Dept: CT IMAGING | Age: 80
End: 2020-03-27
Payer: MEDICARE

## 2020-03-27 ENCOUNTER — HOSPITAL ENCOUNTER (EMERGENCY)
Age: 80
Discharge: ANOTHER HOSPITAL | End: 2020-03-27
Attending: EMERGENCY MEDICINE
Payer: MEDICARE

## 2020-03-27 ENCOUNTER — APPOINTMENT (OUTPATIENT)
Dept: GENERAL RADIOLOGY | Age: 80
End: 2020-03-27
Payer: MEDICARE

## 2020-03-27 VITALS
TEMPERATURE: 97.4 F | OXYGEN SATURATION: 92 % | RESPIRATION RATE: 23 BRPM | WEIGHT: 216.5 LBS | DIASTOLIC BLOOD PRESSURE: 72 MMHG | HEART RATE: 80 BPM | SYSTOLIC BLOOD PRESSURE: 121 MMHG | BODY MASS INDEX: 34.79 KG/M2 | HEIGHT: 66 IN

## 2020-03-27 LAB
A/G RATIO: 1.5 (ref 1.1–2.2)
ALBUMIN SERPL-MCNC: 3.6 G/DL (ref 3.4–5)
ALP BLD-CCNC: 81 U/L (ref 40–129)
ALT SERPL-CCNC: 14 U/L (ref 10–40)
ANION GAP SERPL CALCULATED.3IONS-SCNC: 19 MMOL/L (ref 3–16)
AST SERPL-CCNC: 18 U/L (ref 15–37)
BACTERIA: ABNORMAL /HPF
BASOPHILS ABSOLUTE: 0.1 K/UL (ref 0–0.2)
BASOPHILS RELATIVE PERCENT: 0.4 %
BILIRUB SERPL-MCNC: 0.4 MG/DL (ref 0–1)
BILIRUBIN URINE: ABNORMAL
BLOOD, URINE: ABNORMAL
BUN BLDV-MCNC: 34 MG/DL (ref 7–20)
CALCIUM SERPL-MCNC: 9.9 MG/DL (ref 8.3–10.6)
CHLORIDE BLD-SCNC: 102 MMOL/L (ref 99–110)
CLARITY: ABNORMAL
CO2: 16 MMOL/L (ref 21–32)
COLOR: ABNORMAL
CREAT SERPL-MCNC: 1.9 MG/DL (ref 0.8–1.3)
EKG ATRIAL RATE: 90 BPM
EKG DIAGNOSIS: NORMAL
EKG P-R INTERVAL: 168 MS
EKG Q-T INTERVAL: 414 MS
EKG QRS DURATION: 96 MS
EKG QTC CALCULATION (BAZETT): 506 MS
EKG R AXIS: 4 DEGREES
EKG T AXIS: 0 DEGREES
EKG VENTRICULAR RATE: 90 BPM
EOSINOPHILS ABSOLUTE: 0 K/UL (ref 0–0.6)
EOSINOPHILS RELATIVE PERCENT: 0 %
EPITHELIAL CELLS, UA: ABNORMAL /HPF (ref 0–5)
GFR AFRICAN AMERICAN: 42
GFR NON-AFRICAN AMERICAN: 34
GLOBULIN: 2.4 G/DL
GLUCOSE BLD-MCNC: 298 MG/DL (ref 70–99)
GLUCOSE BLD-MCNC: 358 MG/DL (ref 70–99)
GLUCOSE URINE: ABNORMAL MG/DL
HCT VFR BLD CALC: 36.2 % (ref 40.5–52.5)
HEMOGLOBIN: 11.6 G/DL (ref 13.5–17.5)
INR BLD: 1.24 (ref 0.86–1.14)
KETONES, URINE: ABNORMAL MG/DL
LEUKOCYTE ESTERASE, URINE: ABNORMAL
LYMPHOCYTES ABSOLUTE: 1.4 K/UL (ref 1–5.1)
LYMPHOCYTES RELATIVE PERCENT: 5.8 %
MCH RBC QN AUTO: 30.5 PG (ref 26–34)
MCHC RBC AUTO-ENTMCNC: 32.2 G/DL (ref 31–36)
MCV RBC AUTO: 94.8 FL (ref 80–100)
MICROSCOPIC EXAMINATION: YES
MONOCYTES ABSOLUTE: 1.2 K/UL (ref 0–1.3)
MONOCYTES RELATIVE PERCENT: 5 %
NEUTROPHILS ABSOLUTE: 20.9 K/UL (ref 1.7–7.7)
NEUTROPHILS RELATIVE PERCENT: 88.8 %
NITRITE, URINE: ABNORMAL
PDW BLD-RTO: 14.8 % (ref 12.4–15.4)
PERFORMED ON: ABNORMAL
PH UA: ABNORMAL (ref 5–8)
PLATELET # BLD: 249 K/UL (ref 135–450)
PMV BLD AUTO: 9.2 FL (ref 5–10.5)
POTASSIUM REFLEX MAGNESIUM: 5 MMOL/L (ref 3.5–5.1)
PROTEIN UA: ABNORMAL MG/DL
PROTHROMBIN TIME: 14.4 SEC (ref 10–13.2)
RBC # BLD: 3.82 M/UL (ref 4.2–5.9)
RBC UA: >100 /HPF (ref 0–4)
SODIUM BLD-SCNC: 137 MMOL/L (ref 136–145)
SPECIFIC GRAVITY UA: ABNORMAL (ref 1–1.03)
TOTAL PROTEIN: 6 G/DL (ref 6.4–8.2)
TROPONIN: 0.02 NG/ML
URINE REFLEX TO CULTURE: YES
URINE TYPE: ABNORMAL
UROBILINOGEN, URINE: ABNORMAL E.U./DL
WBC # BLD: 23.6 K/UL (ref 4–11)
WBC UA: ABNORMAL /HPF (ref 0–5)

## 2020-03-27 PROCEDURE — 70450 CT HEAD/BRAIN W/O DYE: CPT

## 2020-03-27 PROCEDURE — 2580000003 HC RX 258: Performed by: EMERGENCY MEDICINE

## 2020-03-27 PROCEDURE — 71045 X-RAY EXAM CHEST 1 VIEW: CPT

## 2020-03-27 PROCEDURE — 85610 PROTHROMBIN TIME: CPT

## 2020-03-27 PROCEDURE — C9132 KCENTRA, PER I.U.: HCPCS | Performed by: EMERGENCY MEDICINE

## 2020-03-27 PROCEDURE — 80053 COMPREHEN METABOLIC PANEL: CPT

## 2020-03-27 PROCEDURE — 96365 THER/PROPH/DIAG IV INF INIT: CPT

## 2020-03-27 PROCEDURE — 99285 EMERGENCY DEPT VISIT HI MDM: CPT

## 2020-03-27 PROCEDURE — 93005 ELECTROCARDIOGRAM TRACING: CPT | Performed by: EMERGENCY MEDICINE

## 2020-03-27 PROCEDURE — 84484 ASSAY OF TROPONIN QUANT: CPT

## 2020-03-27 PROCEDURE — 6360000004 HC RX CONTRAST MEDICATION: Performed by: EMERGENCY MEDICINE

## 2020-03-27 PROCEDURE — 93010 ELECTROCARDIOGRAM REPORT: CPT | Performed by: INTERNAL MEDICINE

## 2020-03-27 PROCEDURE — 74176 CT ABD & PELVIS W/O CONTRAST: CPT

## 2020-03-27 PROCEDURE — 6360000002 HC RX W HCPCS: Performed by: EMERGENCY MEDICINE

## 2020-03-27 PROCEDURE — 87086 URINE CULTURE/COLONY COUNT: CPT

## 2020-03-27 PROCEDURE — 85025 COMPLETE CBC W/AUTO DIFF WBC: CPT

## 2020-03-27 PROCEDURE — 96375 TX/PRO/DX INJ NEW DRUG ADDON: CPT

## 2020-03-27 PROCEDURE — 70496 CT ANGIOGRAPHY HEAD: CPT

## 2020-03-27 PROCEDURE — 81001 URINALYSIS AUTO W/SCOPE: CPT

## 2020-03-27 RX ORDER — DONEPEZIL HYDROCHLORIDE 10 MG/1
10 TABLET, FILM COATED ORAL NIGHTLY
COMMUNITY

## 2020-03-27 RX ORDER — FENTANYL CITRATE 50 UG/ML
25 INJECTION, SOLUTION INTRAMUSCULAR; INTRAVENOUS ONCE
Status: COMPLETED | OUTPATIENT
Start: 2020-03-27 | End: 2020-03-27

## 2020-03-27 RX ORDER — 0.9 % SODIUM CHLORIDE 0.9 %
1000 INTRAVENOUS SOLUTION INTRAVENOUS ONCE
Status: COMPLETED | OUTPATIENT
Start: 2020-03-27 | End: 2020-03-27

## 2020-03-27 RX ORDER — 0.9 % SODIUM CHLORIDE 0.9 %
500 INTRAVENOUS SOLUTION INTRAVENOUS ONCE
Status: COMPLETED | OUTPATIENT
Start: 2020-03-27 | End: 2020-03-27

## 2020-03-27 RX ORDER — CYCLOBENZAPRINE HCL 5 MG
5 TABLET ORAL EVERY EVENING
Status: ON HOLD | COMMUNITY
End: 2021-01-01

## 2020-03-27 RX ORDER — CLOPIDOGREL BISULFATE 75 MG/1
75 TABLET ORAL 2 TIMES DAILY
COMMUNITY
End: 2020-03-27

## 2020-03-27 RX ORDER — OMEPRAZOLE 20 MG/1
20 CAPSULE, DELAYED RELEASE ORAL DAILY
COMMUNITY
End: 2021-01-01

## 2020-03-27 RX ADMIN — IOPAMIDOL 75 ML: 755 INJECTION, SOLUTION INTRAVENOUS at 04:49

## 2020-03-27 RX ADMIN — SODIUM CHLORIDE 1000 ML: 9 INJECTION, SOLUTION INTRAVENOUS at 08:00

## 2020-03-27 RX ADMIN — PROTHROMBIN, COAGULATION FACTOR VII HUMAN, COAGULATION FACTOR IX HUMAN, COAGULATION FACTOR X HUMAN, PROTEIN C, PROTEIN S HUMAN, AND WATER 4910 UNITS: KIT at 09:10

## 2020-03-27 RX ADMIN — SODIUM CHLORIDE 500 ML: 9 INJECTION, SOLUTION INTRAVENOUS at 05:06

## 2020-03-27 RX ADMIN — FENTANYL CITRATE 25 MCG: 50 INJECTION, SOLUTION INTRAMUSCULAR; INTRAVENOUS at 05:26

## 2020-03-27 ASSESSMENT — PAIN SCALES - GENERAL
PAINLEVEL_OUTOF10: 10
PAINLEVEL_OUTOF10: 4
PAINLEVEL_OUTOF10: 6

## 2020-03-27 ASSESSMENT — PAIN DESCRIPTION - ORIENTATION: ORIENTATION: RIGHT

## 2020-03-27 ASSESSMENT — PAIN DESCRIPTION - LOCATION: LOCATION: BACK

## 2020-03-27 NOTE — ED NOTES
0971 - called Dr Delisa Abreu office per PCP consult  Re: informational only  1112 - Dr Zoltan Banegas connected to Dr Kari Nichols to speak about pt     Margo Blizzard  03/27/20 [id number]

## 2020-03-27 NOTE — ED PROVIDER NOTES
CHIEF COMPLAINT  Fall (pain left side, pt was \"unable to move from bed\")      [facility]  Zara Mijares is a 70 y.o. male who presents to the ED with pain on the left side and generalized weakness was unable to move out of his bed and slid to the floor tonight where he was awakened to try to go to the bathroom. Reportedly was last seen normal about 5 hours prior to arrival here. He had a mechanical fall shortly prior to this at home and was complaining a lot of pain in his back at that time. There was also report of a episode of bloody urine and the patient is on Eliquis. There also was report from EMS that he was drooling out of the left side of his mouth and seemed like he was favoring the left side and confused. Was hard to get a good history of exactly what happened between EMS the patient and the patient's wife. There is a reported history of TIA in the past..   No other complaints, modifying factors or associated symptoms. I have reviewed the following from the nursing documentation. Past Medical History:   Diagnosis Date    Hyperlipemia     Hypertension     Prostate enlargement     Unspecified cerebral artery occlusion with cerebral infarction 2000     Past Surgical History:   Procedure Laterality Date    CAROTID ENDARTERECTOMY      right    COLONOSCOPY      COLONOSCOPY  2014    polyps    EYE SURGERY      FACIAL RECONSTRUCTION SURGERY      metal plate under left eye    FOOT SURGERY      nerve right    SKIN CANCER EXCISION      face x several    TONSILLECTOMY       History reviewed. No pertinent family history.   Social History     Socioeconomic History    Marital status:      Spouse name: Not on file    Number of children: Not on file    Years of education: Not on file    Highest education level: Not on file   Occupational History    Not on file   Social Needs    Financial resource strain: Not on file    Food insecurity     Worry: Not on file Inability: Not on file    Transportation needs     Medical: Not on file     Non-medical: Not on file   Tobacco Use    Smoking status: Never Smoker    Smokeless tobacco: Never Used   Substance and Sexual Activity    Alcohol use: No    Drug use: No    Sexual activity: Yes     Partners: Female   Lifestyle    Physical activity     Days per week: Not on file     Minutes per session: Not on file    Stress: Not on file   Relationships    Social connections     Talks on phone: Not on file     Gets together: Not on file     Attends Worship service: Not on file     Active member of club or organization: Not on file     Attends meetings of clubs or organizations: Not on file     Relationship status: Not on file    Intimate partner violence     Fear of current or ex partner: Not on file     Emotionally abused: Not on file     Physically abused: Not on file     Forced sexual activity: Not on file   Other Topics Concern    Not on file   Social History Narrative    Not on file     Current Facility-Administered Medications   Medication Dose Route Frequency Provider Last Rate Last Dose    0.9 % sodium chloride bolus  500 mL Intravenous Once Eva Sanchez V, DO 1,000 mL/hr at 03/27/20 0506 500 mL at 03/27/20 0506    fentaNYL (SUBLIMAZE) injection 25 mcg  25 mcg Intravenous Once Nevaeh Deng DO         Current Outpatient Medications   Medication Sig Dispense Refill    metoprolol tartrate (LOPRESSOR) 25 MG tablet Take 2 tablets by mouth 2 times daily 120 tablet 0    lisinopril (PRINIVIL;ZESTRIL) 20 MG tablet Take 1 tablet by mouth daily 30 tablet 3    finasteride (PROSCAR) 5 MG tablet Take 1 tablet by mouth daily 30 tablet 3    aspirin 81 MG tablet Take 81 mg by mouth daily.  clopidogrel (PLAVIX) 75 MG tablet Take 75 mg by mouth daily.  simvastatin (ZOCOR) 80 MG tablet Take 80 mg by mouth nightly.  terazosin (HYTRIN) 5 MG capsule Take 5 mg by mouth nightly.        Allergies   Allergen Reactions    Corn-Containing Products Other (See Comments)     Nasal congestion       REVIEW OF SYSTEMS  10 systems reviewed, pertinent positives per HPI otherwise noted to be negative. PHYSICAL EXAM  /65   Pulse 86   Temp 97.4 °F (36.3 °C) (Axillary)   Resp 20   Ht 5' 6\" (1.676 m)   Wt 216 lb 8 oz (98.2 kg)   BMI 34.94 kg/m²   GENERAL APPEARANCE: Awake and alert. Cooperative. HEAD: Normocephalic. Atraumatic. EYES: PERRL. EOM's grossly intact. ENT: Mucous membranes are moist.   NECK: Supple. HEART: RRR. CHEST/LUNGS: Chest atraumatic, nontender, respirations unlabored. CTAB. Good air exchange. Speaking comfortably in full sentences. BACK: No midline spinal tenderness or step-off. Left lower lumbar and left CVA tenderness, no ecchymosis  ABDOMEN: Soft. Non-distended. Non-tender. No guarding or rebound. Normal bowel sounds. EXTREMITIES: No peripheral edema. Moves all extremities equally. All extremities neurovascularly intact. SKIN: Warm and dry. No acute rashes. NEUROLOGICAL: Alert and oriented. CN 2-12 intact, No gross facial drooping. Strength 5/5, sensation intact. Normal coordination. LABS  I have reviewed all labs for this visit.    Results for orders placed or performed during the hospital encounter of 03/27/20   CBC Auto Differential   Result Value Ref Range    WBC 23.6 (H) 4.0 - 11.0 K/uL    RBC 3.82 (L) 4.20 - 5.90 M/uL    Hemoglobin 11.6 (L) 13.5 - 17.5 g/dL    Hematocrit 36.2 (L) 40.5 - 52.5 %    MCV 94.8 80.0 - 100.0 fL    MCH 30.5 26.0 - 34.0 pg    MCHC 32.2 31.0 - 36.0 g/dL    RDW 14.8 12.4 - 15.4 %    Platelets 076 932 - 368 K/uL    MPV 9.2 5.0 - 10.5 fL    Neutrophils % 88.8 %    Lymphocytes % 5.8 %    Monocytes % 5.0 %    Eosinophils % 0.0 %    Basophils % 0.4 %    Neutrophils Absolute 20.9 (H) 1.7 - 7.7 K/uL    Lymphocytes Absolute 1.4 1.0 - 5.1 K/uL    Monocytes Absolute 1.2 0.0 - 1.3 K/uL    Eosinophils Absolute 0.0 0.0 - 0.6 K/uL    Basophils Absolute 0.1 0.0 - 0.2 K/uL Result   No acute intracranial abnormality. Specifically, no acute intracranial   hemorrhage or mass effect. Extensive chronic small vessel ischemic disease. Critical results were called by Dr. Wandy Bernal to [facility] on   3/27/2020 at 04:03. CRITICAL CARE TIME ATTESTATION (45 minutes):    Due to the high probability of imminent or life-threatening deterioration this patient required my direct attention, interventions and personal management. I personally provided 45 minutes of critical care time exclusive of time spent on separate billable procedures. Time includes review and interpretation of laboratory data, review and interpretation of radiology results, discussions with consultants as applicable while monitoring for potential decompensation. Interventions were otherwise performed as documented above. ED COURSE/MDM  Patient seen and evaluated. Patient had a fall that was mechanical earlier in the night before bed. Then later in the night when awakened was having a lot of weakness. Here he is white count of 23,000 he was hypotensive with a systolic in the 49C but is now been in the low 100s to 110s I did start half a liter of fluids on the patient as well. He is having significant pain in the left lower back. This combined with the report of the blood in the urine, the fall, and being on Eliquis,  I have concern for kidney contusion or retroperitoneal hematoma and initially this was not as apparent considering the report that I got of what happened as well as my exam but the patient with his pinpoint pupils and what appeared to be some problems following commands I was concerned of a bleed/stroke and that is why had the patient emergently taken to CT scan. I will however now do a CT scan of the abdomen pelvis which I cannot unfortunately cannot get with contrast as the patient already had contrast and he does have CKD.   However regardless I think the patient is going to need admission and further evaluation and management. 0600: SIGNED OUT TO DR ILSA MITCHELL FOR F/U ON CT AND ADMISSION. All diagnostic tests reviewed and results discussed with patient. Plan of care discussed with patient. Patient in agreement with plan. CLINICAL IMPRESSION  1. Fall, initial encounter    2. Generalized weakness    3. Left flank pain        Blood pressure 108/65, pulse 86, temperature 97.4 °F (36.3 °C), temperature source Axillary, resp. rate 20, height 5' 6\" (1.676 m), weight 216 lb 8 oz (98.2 kg). [address] was SIGNED OUT TO DR Noah MITCHELL IN STABLE CONDITION. This chart was generated in part by using Dragon Dictation system and may contain errors related to that system including errors in grammar, punctuation, and spelling, as well as words and phrases that may be inappropriate. When dictating, effort is made to correct spelling/grammar errors. If there are any questions or concerns please feel free to contact the dictating provider for clarification.      Dimitry Hall,   ATTENDING, [address], DO  03/28/20 [facility]., DO  03/28/20 0146

## 2020-03-28 LAB — URINE CULTURE, ROUTINE: NORMAL

## 2020-05-20 ENCOUNTER — APPOINTMENT (RX ONLY)
Dept: URBAN - NONMETROPOLITAN AREA CLINIC 18 | Facility: CLINIC | Age: 80
Setting detail: DERMATOLOGY
End: 2020-05-20

## 2020-05-20 VITALS — HEIGHT: 73 IN | WEIGHT: 250 LBS

## 2020-05-20 DIAGNOSIS — Z71.89 OTHER SPECIFIED COUNSELING: ICD-10-CM

## 2020-05-20 DIAGNOSIS — L20.89 OTHER ATOPIC DERMATITIS: ICD-10-CM | Status: INADEQUATELY CONTROLLED

## 2020-05-20 DIAGNOSIS — D485 NEOPLASM OF UNCERTAIN BEHAVIOR OF SKIN: ICD-10-CM

## 2020-05-20 DIAGNOSIS — L57.8 OTHER SKIN CHANGES DUE TO CHRONIC EXPOSURE TO NONIONIZING RADIATION: ICD-10-CM

## 2020-05-20 DIAGNOSIS — Z85.828 PERSONAL HISTORY OF OTHER MALIGNANT NEOPLASM OF SKIN: ICD-10-CM

## 2020-05-20 DIAGNOSIS — D18.0 HEMANGIOMA: ICD-10-CM

## 2020-05-20 DIAGNOSIS — D22 MELANOCYTIC NEVI: ICD-10-CM

## 2020-05-20 DIAGNOSIS — L82.1 OTHER SEBORRHEIC KERATOSIS: ICD-10-CM

## 2020-05-20 PROBLEM — D18.01 HEMANGIOMA OF SKIN AND SUBCUTANEOUS TISSUE: Status: ACTIVE | Noted: 2020-05-20

## 2020-05-20 PROBLEM — L20.84 INTRINSIC (ALLERGIC) ECZEMA: Status: ACTIVE | Noted: 2020-05-20

## 2020-05-20 PROBLEM — D22.61 MELANOCYTIC NEVI OF RIGHT UPPER LIMB, INCLUDING SHOULDER: Status: ACTIVE | Noted: 2020-05-20

## 2020-05-20 PROBLEM — D48.5 NEOPLASM OF UNCERTAIN BEHAVIOR OF SKIN: Status: ACTIVE | Noted: 2020-05-20

## 2020-05-20 PROCEDURE — ? BIOPSY BY SHAVE METHOD

## 2020-05-20 PROCEDURE — 11102 TANGNTL BX SKIN SINGLE LES: CPT

## 2020-05-20 PROCEDURE — ? PRESCRIPTION

## 2020-05-20 PROCEDURE — ? TREATMENT REGIMEN

## 2020-05-20 PROCEDURE — ? COUNSELING

## 2020-05-20 PROCEDURE — ? ADDITIONAL NOTES

## 2020-05-20 PROCEDURE — ? FULL BODY SKIN EXAM

## 2020-05-20 PROCEDURE — 99214 OFFICE O/P EST MOD 30 MIN: CPT | Mod: 25

## 2020-05-20 ASSESSMENT — LOCATION ZONE DERM
LOCATION ZONE: ARM
LOCATION ZONE: LEG
LOCATION ZONE: TRUNK
LOCATION ZONE: FACE

## 2020-05-20 ASSESSMENT — LOCATION DETAILED DESCRIPTION DERM
LOCATION DETAILED: RIGHT MEDIAL MALAR CHEEK
LOCATION DETAILED: PERIUMBILICAL SKIN
LOCATION DETAILED: RIGHT PROXIMAL POSTERIOR UPPER ARM
LOCATION DETAILED: RIGHT DISTAL PRETIBIAL REGION
LOCATION DETAILED: RIGHT MEDIAL SUPERIOR CHEST
LOCATION DETAILED: LEFT MID-UPPER BACK

## 2020-05-20 ASSESSMENT — LOCATION SIMPLE DESCRIPTION DERM
LOCATION SIMPLE: ABDOMEN
LOCATION SIMPLE: RIGHT CHEEK
LOCATION SIMPLE: LEFT UPPER BACK
LOCATION SIMPLE: CHEST
LOCATION SIMPLE: RIGHT POSTERIOR UPPER ARM
LOCATION SIMPLE: RIGHT PRETIBIAL REGION

## 2020-05-20 NOTE — PROCEDURE: MIPS QUALITY
Detail Level: Simple
Quality 226: Preventive Care And Screening: Tobacco Use: Screening And Cessation Intervention: Patient screened for tobacco use and is an ex/non-smoker
Quality 130: Documentation Of Current Medications In The Medical Record: Current Medications Documented
Quality 431: Preventive Care And Screening: Unhealthy Alcohol Use - Screening: Patient screened for unhealthy alcohol use using a single question and scores less than 2 times per year
Quality 265: Biopsy Follow-Up: Biopsy results reviewed, communicated, tracked, and documented
Quality 47: Advance Care Plan: Advance Care Planning discussed and documented; advance care plan or surrogate decision maker documented in the medical record.

## 2020-05-20 NOTE — PROCEDURE: BIOPSY BY SHAVE METHOD
Detail Level: Detailed
Depth Of Biopsy: dermis
Was A Bandage Applied: Yes
Size Of Lesion In Cm: 0
Biopsy Type: H and E
Biopsy Method: Personna blade
Anesthesia Type: 1% lidocaine with epinephrine
Anesthesia Volume In Cc (Will Not Render If 0): 0.5
Hemostasis: Bhaskar's
Wound Care: Petrolatum
Dressing: bandage
Destruction After The Procedure: No
Type Of Destruction Used: Curettage
Curettage Text: The wound bed was treated with curettage after the biopsy was performed.
Cryotherapy Text: The wound bed was treated with cryotherapy after the biopsy was performed.
Electrodesiccation Text: The wound bed was treated with electrodesiccation after the biopsy was performed.
Electrodesiccation And Curettage Text: The wound bed was treated with electrodesiccation and curettage after the biopsy was performed.
Silver Nitrate Text: The wound bed was treated with silver nitrate after the biopsy was performed.
Lab: 6
Lab Facility: 3
Consent: Written consent was obtained and risks were reviewed including but not limited to scarring, infection, bleeding, scabbing, incomplete removal, nerve damage and allergy to anesthesia.
Post-Care Instructions: I reviewed with the patient in detail post-care instructions. Patient is to keep the biopsy site dry overnight, and then apply vaseline once daily until healed.
Notification Instructions: Patient will be notified of biopsy results. However, patient instructed to call the office if not contacted within 2 weeks.
Billing Type: Third-Party Bill
Information: Selecting Yes will display possible errors in your note based on the variables you have selected. This validation is only offered as a suggestion for you. PLEASE NOTE THAT THE VALIDATION TEXT WILL BE REMOVED WHEN YOU FINALIZE YOUR NOTE. IF YOU WANT TO FAX A PRELIMINARY NOTE YOU WILL NEED TO TOGGLE THIS TO 'NO' IF YOU DO NOT WANT IT IN YOUR FAXED NOTE.

## 2020-05-20 NOTE — PROCEDURE: TREATMENT REGIMEN
Detail Level: Zone
Initiate Treatment: Apply Triamcinalone .1% ointment to legs bid for up to two weeks

## 2020-08-17 ENCOUNTER — OFFICE VISIT (OUTPATIENT)
Dept: PRIMARY CARE CLINIC | Age: 80
End: 2020-08-17
Payer: MEDICARE

## 2020-08-17 PROCEDURE — 99211 OFF/OP EST MAY X REQ PHY/QHP: CPT | Performed by: NURSE PRACTITIONER

## 2020-08-17 PROCEDURE — G8428 CUR MEDS NOT DOCUMENT: HCPCS | Performed by: NURSE PRACTITIONER

## 2020-08-17 PROCEDURE — G8417 CALC BMI ABV UP PARAM F/U: HCPCS | Performed by: NURSE PRACTITIONER

## 2020-08-18 LAB — SARS-COV-2, NAA: NOT DETECTED

## 2021-01-01 ENCOUNTER — NURSE ONLY (OUTPATIENT)
Dept: SURGERY | Age: 81
End: 2021-01-01

## 2021-01-01 ENCOUNTER — APPOINTMENT (OUTPATIENT)
Dept: CT IMAGING | Age: 81
DRG: 177 | End: 2021-01-01
Payer: MEDICARE

## 2021-01-01 ENCOUNTER — APPOINTMENT (OUTPATIENT)
Dept: CT IMAGING | Age: 81
End: 2021-01-01
Payer: MEDICARE

## 2021-01-01 ENCOUNTER — APPOINTMENT (OUTPATIENT)
Dept: GENERAL RADIOLOGY | Age: 81
DRG: 065 | End: 2021-01-01
Payer: MEDICARE

## 2021-01-01 ENCOUNTER — TELEPHONE (OUTPATIENT)
Dept: SURGERY | Age: 81
End: 2021-01-01

## 2021-01-01 ENCOUNTER — APPOINTMENT (OUTPATIENT)
Dept: CT IMAGING | Age: 81
DRG: 065 | End: 2021-01-01
Payer: MEDICARE

## 2021-01-01 ENCOUNTER — HOSPITAL ENCOUNTER (INPATIENT)
Age: 81
LOS: 10 days | Discharge: NURSING, REHAB OR LONG-TERM CARE | DRG: 177 | End: 2022-01-04
Attending: EMERGENCY MEDICINE | Admitting: INTERNAL MEDICINE
Payer: MEDICARE

## 2021-01-01 ENCOUNTER — PROCEDURE VISIT (OUTPATIENT)
Dept: SURGERY | Age: 81
End: 2021-01-01
Payer: MEDICARE

## 2021-01-01 ENCOUNTER — APPOINTMENT (OUTPATIENT)
Dept: MRI IMAGING | Age: 81
DRG: 065 | End: 2021-01-01
Payer: MEDICARE

## 2021-01-01 ENCOUNTER — APPOINTMENT (OUTPATIENT)
Dept: VASCULAR LAB | Age: 81
DRG: 065 | End: 2021-01-01
Payer: MEDICARE

## 2021-01-01 ENCOUNTER — APPOINTMENT (OUTPATIENT)
Dept: GENERAL RADIOLOGY | Age: 81
End: 2021-01-01
Payer: MEDICARE

## 2021-01-01 ENCOUNTER — HOSPITAL ENCOUNTER (INPATIENT)
Age: 81
LOS: 5 days | Discharge: NURSING, REHAB OR LONG-TERM CARE | DRG: 065 | End: 2021-11-04
Attending: EMERGENCY MEDICINE | Admitting: INTERNAL MEDICINE
Payer: MEDICARE

## 2021-01-01 ENCOUNTER — APPOINTMENT (OUTPATIENT)
Dept: GENERAL RADIOLOGY | Age: 81
DRG: 177 | End: 2021-01-01
Payer: MEDICARE

## 2021-01-01 ENCOUNTER — APPOINTMENT (OUTPATIENT)
Dept: MRI IMAGING | Age: 81
DRG: 177 | End: 2021-01-01
Payer: MEDICARE

## 2021-01-01 ENCOUNTER — HOSPITAL ENCOUNTER (EMERGENCY)
Age: 81
Discharge: HOME | End: 2021-11-24
Attending: STUDENT IN AN ORGANIZED HEALTH CARE EDUCATION/TRAINING PROGRAM
Payer: MEDICARE

## 2021-01-01 VITALS
OXYGEN SATURATION: 97 % | HEIGHT: 70 IN | BODY MASS INDEX: 24.34 KG/M2 | WEIGHT: 170 LBS | HEART RATE: 100 BPM | DIASTOLIC BLOOD PRESSURE: 90 MMHG | SYSTOLIC BLOOD PRESSURE: 166 MMHG | RESPIRATION RATE: 18 BRPM | TEMPERATURE: 97.7 F

## 2021-01-01 VITALS
WEIGHT: 198.2 LBS | DIASTOLIC BLOOD PRESSURE: 97 MMHG | SYSTOLIC BLOOD PRESSURE: 177 MMHG | TEMPERATURE: 98.1 F | BODY MASS INDEX: 28.37 KG/M2 | HEIGHT: 70 IN | RESPIRATION RATE: 18 BRPM | HEART RATE: 73 BPM | OXYGEN SATURATION: 93 %

## 2021-01-01 VITALS — DIASTOLIC BLOOD PRESSURE: 77 MMHG | SYSTOLIC BLOOD PRESSURE: 139 MMHG

## 2021-01-01 VITALS — TEMPERATURE: 97.6 F

## 2021-01-01 DIAGNOSIS — C44.629 SQUAMOUS CELL CARCINOMA OF LEFT HAND: Primary | ICD-10-CM

## 2021-01-01 DIAGNOSIS — R79.89 ELEVATED LACTIC ACID LEVEL: ICD-10-CM
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
## 2021-01-01 DIAGNOSIS — Z48.02 VISIT FOR SUTURE REMOVAL: Primary | ICD-10-CM

## 2021-01-01 DIAGNOSIS — R41.82 ALTERED MENTAL STATUS, UNSPECIFIED ALTERED MENTAL STATUS TYPE: Primary | ICD-10-CM

## 2021-01-01 DIAGNOSIS — R26.81 UNSTEADY GAIT: ICD-10-CM

## 2021-01-01 DIAGNOSIS — R79.89 ELEVATED BRAIN NATRIURETIC PEPTIDE (BNP) LEVEL: ICD-10-CM

## 2021-01-01 DIAGNOSIS — R47.01 APHASIA: ICD-10-CM

## 2021-01-01 DIAGNOSIS — E86.9 VOLUME DEPLETION: ICD-10-CM

## 2021-01-01 DIAGNOSIS — R47.01 APHASIA: Primary | ICD-10-CM

## 2021-01-01 DIAGNOSIS — R53.1 GENERAL WEAKNESS: ICD-10-CM

## 2021-01-01 DIAGNOSIS — I48.91 ATRIAL FIBRILLATION, UNSPECIFIED TYPE (HCC): ICD-10-CM
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
## 2021-01-01 DIAGNOSIS — R82.4 KETONURIA: ICD-10-CM

## 2021-01-01 DIAGNOSIS — U07.1 COVID-19: ICD-10-CM

## 2021-01-01 DIAGNOSIS — R55 SYNCOPE AND COLLAPSE: ICD-10-CM

## 2021-01-01 LAB
A/G RATIO: 1.2 (ref 1.1–2.2)
A/G RATIO: 1.2 (ref 1.1–2.2)
A/G RATIO: 1.3 (ref 1.1–2.2)
ABO/RH: NORMAL
ALBUMIN SERPL-MCNC: 3 G/DL (ref 3.4–5)
ALBUMIN SERPL-MCNC: 3.2 G/DL (ref 3.4–5)
ALBUMIN SERPL-MCNC: 3.2 G/DL (ref 3.4–5)
ALBUMIN SERPL-MCNC: 3.4 G/DL (ref 3.4–5)
ALBUMIN SERPL-MCNC: 3.5 G/DL (ref 3.4–5)
ALP BLD-CCNC: 64 U/L (ref 40–129)
ALP BLD-CCNC: 64 U/L (ref 40–129)
ALP BLD-CCNC: 73 U/L (ref 40–129)
ALP BLD-CCNC: 77 U/L (ref 40–129)
ALT SERPL-CCNC: 11 U/L (ref 10–40)
ALT SERPL-CCNC: 13 U/L (ref 10–40)
ALT SERPL-CCNC: 14 U/L (ref 10–40)
ALT SERPL-CCNC: 19 U/L (ref 10–40)
AMMONIA: 25 UMOL/L (ref 16–60)
ANION GAP SERPL CALCULATED.3IONS-SCNC: 10 MMOL/L (ref 3–16)
ANION GAP SERPL CALCULATED.3IONS-SCNC: 11 MMOL/L (ref 3–16)
ANION GAP SERPL CALCULATED.3IONS-SCNC: 11 MMOL/L (ref 3–16)
ANION GAP SERPL CALCULATED.3IONS-SCNC: 12 MMOL/L (ref 3–16)
ANION GAP SERPL CALCULATED.3IONS-SCNC: 5 MMOL/L (ref 3–16)
ANION GAP SERPL CALCULATED.3IONS-SCNC: 9 MMOL/L (ref 3–16)
ANTIBODY SCREEN: NORMAL
AST SERPL-CCNC: 12 U/L (ref 15–37)
AST SERPL-CCNC: 13 U/L (ref 15–37)
AST SERPL-CCNC: 13 U/L (ref 15–37)
AST SERPL-CCNC: 16 U/L (ref 15–37)
BASOPHILS ABSOLUTE: 0 K/UL (ref 0–0.2)
BASOPHILS RELATIVE PERCENT: 0.4 %
BASOPHILS RELATIVE PERCENT: 0.4 %
BASOPHILS RELATIVE PERCENT: 0.5 %
BASOPHILS RELATIVE PERCENT: 0.6 %
BASOPHILS RELATIVE PERCENT: 0.6 %
BILIRUB SERPL-MCNC: 0.3 MG/DL (ref 0–1)
BILIRUB SERPL-MCNC: 0.4 MG/DL (ref 0–1)
BILIRUB SERPL-MCNC: 0.6 MG/DL (ref 0–1)
BILIRUB SERPL-MCNC: 0.6 MG/DL (ref 0–1)
BILIRUBIN DIRECT: <0.2 MG/DL (ref 0–0.3)
BILIRUBIN URINE: NEGATIVE
BILIRUBIN URINE: NEGATIVE
BILIRUBIN, INDIRECT: ABNORMAL MG/DL (ref 0–1)
BLOOD, URINE: NEGATIVE
BLOOD, URINE: NEGATIVE
BUN BLDV-MCNC: 15 MG/DL (ref 7–20)
BUN BLDV-MCNC: 17 MG/DL (ref 7–20)
BUN BLDV-MCNC: 18 MG/DL (ref 7–20)
BUN BLDV-MCNC: 19 MG/DL (ref 7–20)
BUN BLDV-MCNC: 19 MG/DL (ref 7–20)
BUN BLDV-MCNC: 28 MG/DL (ref 7–20)
CALCIUM SERPL-MCNC: 10 MG/DL (ref 8.3–10.6)
CALCIUM SERPL-MCNC: 9.6 MG/DL (ref 8.3–10.6)
CALCIUM SERPL-MCNC: 9.8 MG/DL (ref 8.3–10.6)
CALCIUM SERPL-MCNC: 9.8 MG/DL (ref 8.3–10.6)
CHLORIDE BLD-SCNC: 103 MMOL/L (ref 99–110)
CHLORIDE BLD-SCNC: 105 MMOL/L (ref 99–110)
CHLORIDE BLD-SCNC: 106 MMOL/L (ref 99–110)
CHLORIDE BLD-SCNC: 106 MMOL/L (ref 99–110)
CHLORIDE BLD-SCNC: 108 MMOL/L (ref 99–110)
CHLORIDE BLD-SCNC: 109 MMOL/L (ref 99–110)
CHOLESTEROL, TOTAL: 109 MG/DL (ref 0–199)
CLARITY: CLEAR
CLARITY: CLEAR
CO2: 22 MMOL/L (ref 21–32)
CO2: 23 MMOL/L (ref 21–32)
CO2: 23 MMOL/L (ref 21–32)
CO2: 24 MMOL/L (ref 21–32)
CO2: 25 MMOL/L (ref 21–32)
CO2: 26 MMOL/L (ref 21–32)
COLOR: YELLOW
COLOR: YELLOW
CREAT SERPL-MCNC: 1 MG/DL (ref 0.8–1.3)
CREAT SERPL-MCNC: 1.1 MG/DL (ref 0.8–1.3)
CREAT SERPL-MCNC: 1.1 MG/DL (ref 0.8–1.3)
CREAT SERPL-MCNC: 1.3 MG/DL (ref 0.8–1.3)
EKG ATRIAL RATE: 141 BPM
EKG ATRIAL RATE: 159 BPM
EKG ATRIAL RATE: 220 BPM
EKG ATRIAL RATE: 66 BPM
EKG ATRIAL RATE: 76 BPM
EKG DIAGNOSIS: NORMAL
EKG P AXIS: -19 DEGREES
EKG P AXIS: 7 DEGREES
EKG P-R INTERVAL: 166 MS
EKG P-R INTERVAL: 168 MS
EKG Q-T INTERVAL: 310 MS
EKG Q-T INTERVAL: 330 MS
EKG Q-T INTERVAL: 350 MS
EKG Q-T INTERVAL: 426 MS
EKG Q-T INTERVAL: 446 MS
EKG QRS DURATION: 134 MS
EKG QRS DURATION: 138 MS
EKG QRS DURATION: 140 MS
EKG QRS DURATION: 142 MS
EKG QRS DURATION: 148 MS
EKG QTC CALCULATION (BAZETT): 440 MS
EKG QTC CALCULATION (BAZETT): 467 MS
EKG QTC CALCULATION (BAZETT): 479 MS
EKG QTC CALCULATION (BAZETT): 507 MS
EKG QTC CALCULATION (BAZETT): 511 MS
EKG R AXIS: 10 DEGREES
EKG R AXIS: 12 DEGREES
EKG R AXIS: 15 DEGREES
EKG R AXIS: 18 DEGREES
EKG R AXIS: 40 DEGREES
EKG T AXIS: -16 DEGREES
EKG T AXIS: -26 DEGREES
EKG T AXIS: -28 DEGREES
EKG T AXIS: -6 DEGREES
EKG T AXIS: 2 DEGREES
EKG VENTRICULAR RATE: 121 BPM
EKG VENTRICULAR RATE: 128 BPM
EKG VENTRICULAR RATE: 142 BPM
EKG VENTRICULAR RATE: 66 BPM
EKG VENTRICULAR RATE: 76 BPM
EOSINOPHILS ABSOLUTE: 0 K/UL (ref 0–0.6)
EOSINOPHILS ABSOLUTE: 0 K/UL (ref 0–0.6)
EOSINOPHILS ABSOLUTE: 0.1 K/UL (ref 0–0.6)
EOSINOPHILS ABSOLUTE: 0.2 K/UL (ref 0–0.6)
EOSINOPHILS ABSOLUTE: 0.2 K/UL (ref 0–0.6)
EOSINOPHILS RELATIVE PERCENT: 0 %
EOSINOPHILS RELATIVE PERCENT: 0.5 %
EOSINOPHILS RELATIVE PERCENT: 1.7 %
EOSINOPHILS RELATIVE PERCENT: 2.5 %
EOSINOPHILS RELATIVE PERCENT: 2.6 %
ESTIMATED AVERAGE GLUCOSE: 116.9 MG/DL
ETHANOL: NORMAL MG/DL (ref 0–0.08)
GFR AFRICAN AMERICAN: >60
GFR NON-AFRICAN AMERICAN: 53
GFR NON-AFRICAN AMERICAN: >60
GLUCOSE BLD-MCNC: 100 MG/DL (ref 70–99)
GLUCOSE BLD-MCNC: 101 MG/DL (ref 70–99)
GLUCOSE BLD-MCNC: 101 MG/DL (ref 70–99)
GLUCOSE BLD-MCNC: 104 MG/DL (ref 70–99)
GLUCOSE BLD-MCNC: 110 MG/DL (ref 70–99)
GLUCOSE BLD-MCNC: 131 MG/DL (ref 70–99)
GLUCOSE BLD-MCNC: 138 MG/DL (ref 70–99)
GLUCOSE BLD-MCNC: 171 MG/DL (ref 70–99)
GLUCOSE BLD-MCNC: 99 MG/DL (ref 70–99)
GLUCOSE URINE: NEGATIVE MG/DL
GLUCOSE URINE: NEGATIVE MG/DL
HBA1C MFR BLD: 5.7 %
HCT VFR BLD CALC: 39.1 % (ref 40.5–52.5)
HCT VFR BLD CALC: 40.2 % (ref 40.5–52.5)
HCT VFR BLD CALC: 40.6 % (ref 40.5–52.5)
HCT VFR BLD CALC: 40.9 % (ref 40.5–52.5)
HCT VFR BLD CALC: 42.2 % (ref 40.5–52.5)
HCT VFR BLD CALC: 43.3 % (ref 40.5–52.5)
HCT VFR BLD CALC: 44.6 % (ref 40.5–52.5)
HDLC SERPL-MCNC: 41 MG/DL (ref 40–60)
HEMOGLOBIN: 13 G/DL (ref 13.5–17.5)
HEMOGLOBIN: 13.3 G/DL (ref 13.5–17.5)
HEMOGLOBIN: 13.5 G/DL (ref 13.5–17.5)
HEMOGLOBIN: 13.5 G/DL (ref 13.5–17.5)
HEMOGLOBIN: 14.1 G/DL (ref 13.5–17.5)
HEMOGLOBIN: 14.2 G/DL (ref 13.5–17.5)
HEMOGLOBIN: 14.7 G/DL (ref 13.5–17.5)
INR BLD: 1.07 (ref 0.88–1.12)
KETONES, URINE: 15 MG/DL
KETONES, URINE: 15 MG/DL
LACTIC ACID: 2.6 MMOL/L (ref 0.4–2)
LACTIC ACID: 2.8 MMOL/L (ref 0.4–2)
LDL CHOLESTEROL CALCULATED: 53 MG/DL
LEUKOCYTE ESTERASE, URINE: NEGATIVE
LEUKOCYTE ESTERASE, URINE: NEGATIVE
LV EF: 55 %
LVEF MODALITY: NORMAL
LYMPHOCYTES ABSOLUTE: 0.8 K/UL (ref 1–5.1)
LYMPHOCYTES ABSOLUTE: 1.1 K/UL (ref 1–5.1)
LYMPHOCYTES ABSOLUTE: 1.3 K/UL (ref 1–5.1)
LYMPHOCYTES ABSOLUTE: 1.6 K/UL (ref 1–5.1)
LYMPHOCYTES ABSOLUTE: 1.8 K/UL (ref 1–5.1)
LYMPHOCYTES RELATIVE PERCENT: 17.3 %
LYMPHOCYTES RELATIVE PERCENT: 21.2 %
LYMPHOCYTES RELATIVE PERCENT: 23.6 %
LYMPHOCYTES RELATIVE PERCENT: 27.3 %
LYMPHOCYTES RELATIVE PERCENT: 7.2 %
MCH RBC QN AUTO: 31.4 PG (ref 26–34)
MCH RBC QN AUTO: 31.7 PG (ref 26–34)
MCH RBC QN AUTO: 31.8 PG (ref 26–34)
MCH RBC QN AUTO: 31.9 PG (ref 26–34)
MCHC RBC AUTO-ENTMCNC: 32.9 G/DL (ref 31–36)
MCHC RBC AUTO-ENTMCNC: 33 G/DL (ref 31–36)
MCHC RBC AUTO-ENTMCNC: 33.1 G/DL (ref 31–36)
MCHC RBC AUTO-ENTMCNC: 33.1 G/DL (ref 31–36)
MCHC RBC AUTO-ENTMCNC: 33.2 G/DL (ref 31–36)
MCHC RBC AUTO-ENTMCNC: 33.3 G/DL (ref 31–36)
MCHC RBC AUTO-ENTMCNC: 33.4 G/DL (ref 31–36)
MCV RBC AUTO: 94.8 FL (ref 80–100)
MCV RBC AUTO: 95 FL (ref 80–100)
MCV RBC AUTO: 95.1 FL (ref 80–100)
MCV RBC AUTO: 95.2 FL (ref 80–100)
MCV RBC AUTO: 95.5 FL (ref 80–100)
MCV RBC AUTO: 95.5 FL (ref 80–100)
MCV RBC AUTO: 95.6 FL (ref 80–100)
MICROSCOPIC EXAMINATION: ABNORMAL
MICROSCOPIC EXAMINATION: ABNORMAL
MONOCYTES ABSOLUTE: 0.7 K/UL (ref 0–1.3)
MONOCYTES ABSOLUTE: 0.8 K/UL (ref 0–1.3)
MONOCYTES ABSOLUTE: 0.9 K/UL (ref 0–1.3)
MONOCYTES ABSOLUTE: 1 K/UL (ref 0–1.3)
MONOCYTES ABSOLUTE: 1 K/UL (ref 0–1.3)
MONOCYTES RELATIVE PERCENT: 10.7 %
MONOCYTES RELATIVE PERCENT: 12.8 %
MONOCYTES RELATIVE PERCENT: 13.2 %
MONOCYTES RELATIVE PERCENT: 19.2 %
MONOCYTES RELATIVE PERCENT: 7.8 %
NEUTROPHILS ABSOLUTE: 3.1 K/UL (ref 1.7–7.7)
NEUTROPHILS ABSOLUTE: 3.9 K/UL (ref 1.7–7.7)
NEUTROPHILS ABSOLUTE: 4 K/UL (ref 1.7–7.7)
NEUTROPHILS ABSOLUTE: 5.2 K/UL (ref 1.7–7.7)
NEUTROPHILS ABSOLUTE: 9.6 K/UL (ref 1.7–7.7)
NEUTROPHILS RELATIVE PERCENT: 58.5 %
NEUTROPHILS RELATIVE PERCENT: 58.8 %
NEUTROPHILS RELATIVE PERCENT: 60.6 %
NEUTROPHILS RELATIVE PERCENT: 67.4 %
NEUTROPHILS RELATIVE PERCENT: 84.6 %
NITRITE, URINE: NEGATIVE
NITRITE, URINE: NEGATIVE
PDW BLD-RTO: 14.4 % (ref 12.4–15.4)
PDW BLD-RTO: 14.6 % (ref 12.4–15.4)
PDW BLD-RTO: 14.7 % (ref 12.4–15.4)
PDW BLD-RTO: 14.8 % (ref 12.4–15.4)
PDW BLD-RTO: 14.8 % (ref 12.4–15.4)
PDW BLD-RTO: 15 % (ref 12.4–15.4)
PDW BLD-RTO: 15.4 % (ref 12.4–15.4)
PERFORMED ON: ABNORMAL
PH UA: 6.5 (ref 5–8)
PH UA: 6.5 (ref 5–8)
PHOSPHORUS: 3.5 MG/DL (ref 2.5–4.9)
PLATELET # BLD: 150 K/UL (ref 135–450)
PLATELET # BLD: 159 K/UL (ref 135–450)
PLATELET # BLD: 165 K/UL (ref 135–450)
PLATELET # BLD: 176 K/UL (ref 135–450)
PLATELET # BLD: 176 K/UL (ref 135–450)
PLATELET # BLD: 179 K/UL (ref 135–450)
PLATELET # BLD: 185 K/UL (ref 135–450)
PMV BLD AUTO: 9.3 FL (ref 5–10.5)
PMV BLD AUTO: 9.4 FL (ref 5–10.5)
PMV BLD AUTO: 9.4 FL (ref 5–10.5)
PMV BLD AUTO: 9.5 FL (ref 5–10.5)
PMV BLD AUTO: 9.7 FL (ref 5–10.5)
POTASSIUM REFLEX MAGNESIUM: 3.8 MMOL/L (ref 3.5–5.1)
POTASSIUM REFLEX MAGNESIUM: 4.2 MMOL/L (ref 3.5–5.1)
POTASSIUM REFLEX MAGNESIUM: 4.3 MMOL/L (ref 3.5–5.1)
POTASSIUM REFLEX MAGNESIUM: 4.3 MMOL/L (ref 3.5–5.1)
POTASSIUM SERPL-SCNC: 4.1 MMOL/L (ref 3.5–5.1)
POTASSIUM SERPL-SCNC: 4.2 MMOL/L (ref 3.5–5.1)
PRO-BNP: 1233 PG/ML (ref 0–449)
PROCALCITONIN: 0.08 NG/ML (ref 0–0.15)
PROTEIN UA: NEGATIVE MG/DL
PROTEIN UA: NEGATIVE MG/DL
PROTHROMBIN TIME: 12.1 SEC (ref 9.9–12.7)
RBC # BLD: 4.09 M/UL (ref 4.2–5.9)
RBC # BLD: 4.24 M/UL (ref 4.2–5.9)
RBC # BLD: 4.24 M/UL (ref 4.2–5.9)
RBC # BLD: 4.31 M/UL (ref 4.2–5.9)
RBC # BLD: 4.43 M/UL (ref 4.2–5.9)
RBC # BLD: 4.54 M/UL (ref 4.2–5.9)
RBC # BLD: 4.69 M/UL (ref 4.2–5.9)
SARS-COV-2, NAAT: DETECTED
SARS-COV-2, NAAT: NOT DETECTED
SODIUM BLD-SCNC: 137 MMOL/L (ref 136–145)
SODIUM BLD-SCNC: 139 MMOL/L (ref 136–145)
SODIUM BLD-SCNC: 140 MMOL/L (ref 136–145)
SODIUM BLD-SCNC: 142 MMOL/L (ref 136–145)
SPECIFIC GRAVITY UA: 1.02 (ref 1–1.03)
SPECIFIC GRAVITY UA: 1.02 (ref 1–1.03)
SPECIMEN STATUS: NORMAL
TOTAL PROTEIN: 5.7 G/DL (ref 6.4–8.2)
TOTAL PROTEIN: 5.7 G/DL (ref 6.4–8.2)
TOTAL PROTEIN: 6.2 G/DL (ref 6.4–8.2)
TOTAL PROTEIN: 6.5 G/DL (ref 6.4–8.2)
TRIGL SERPL-MCNC: 73 MG/DL (ref 0–150)
TROPONIN: 0.01 NG/ML
TROPONIN: <0.01 NG/ML
TSH REFLEX FT4: 1.97 UIU/ML (ref 0.27–4.2)
URINE REFLEX TO CULTURE: ABNORMAL
URINE TYPE: ABNORMAL
URINE TYPE: ABNORMAL
UROBILINOGEN, URINE: 0.2 E.U./DL
UROBILINOGEN, URINE: 0.2 E.U./DL
VALPROIC ACID LEVEL: 37.8 UG/ML (ref 50–100)
VLDLC SERPL CALC-MCNC: 15 MG/DL
WBC # BLD: 11.4 K/UL (ref 4–11)
WBC # BLD: 5.3 K/UL (ref 4–11)
WBC # BLD: 5.9 K/UL (ref 4–11)
WBC # BLD: 6.4 K/UL (ref 4–11)
WBC # BLD: 6.6 K/UL (ref 4–11)
WBC # BLD: 6.7 K/UL (ref 4–11)
WBC # BLD: 7.6 K/UL (ref 4–11)

## 2021-01-01 PROCEDURE — 92523 SPEECH SOUND LANG COMPREHEN: CPT

## 2021-01-01 PROCEDURE — C8929 TTE W OR WO FOL WCON,DOPPLER: HCPCS

## 2021-01-01 PROCEDURE — 97162 PT EVAL MOD COMPLEX 30 MIN: CPT

## 2021-01-01 PROCEDURE — 70553 MRI BRAIN STEM W/O & W/DYE: CPT

## 2021-01-01 PROCEDURE — 6370000000 HC RX 637 (ALT 250 FOR IP): Performed by: NURSE PRACTITIONER

## 2021-01-01 PROCEDURE — 97530 THERAPEUTIC ACTIVITIES: CPT

## 2021-01-01 PROCEDURE — 6370000000 HC RX 637 (ALT 250 FOR IP): Performed by: INTERNAL MEDICINE

## 2021-01-01 PROCEDURE — 1200000000 HC SEMI PRIVATE

## 2021-01-01 PROCEDURE — 87635 SARS-COV-2 COVID-19 AMP PRB: CPT

## 2021-01-01 PROCEDURE — 93010 ELECTROCARDIOGRAM REPORT: CPT | Performed by: INTERNAL MEDICINE

## 2021-01-01 PROCEDURE — 99233 SBSQ HOSP IP/OBS HIGH 50: CPT | Performed by: NURSE PRACTITIONER

## 2021-01-01 PROCEDURE — 94761 N-INVAS EAR/PLS OXIMETRY MLT: CPT

## 2021-01-01 PROCEDURE — 70450 CT HEAD/BRAIN W/O DYE: CPT

## 2021-01-01 PROCEDURE — 92610 EVALUATE SWALLOWING FUNCTION: CPT

## 2021-01-01 PROCEDURE — 97535 SELF CARE MNGMENT TRAINING: CPT

## 2021-01-01 PROCEDURE — 80048 BASIC METABOLIC PNL TOTAL CA: CPT

## 2021-01-01 PROCEDURE — 80061 LIPID PANEL: CPT

## 2021-01-01 PROCEDURE — 2700000000 HC OXYGEN THERAPY PER DAY

## 2021-01-01 PROCEDURE — 92526 ORAL FUNCTION THERAPY: CPT

## 2021-01-01 PROCEDURE — 2580000003 HC RX 258: Performed by: INTERNAL MEDICINE

## 2021-01-01 PROCEDURE — 36415 COLL VENOUS BLD VENIPUNCTURE: CPT

## 2021-01-01 PROCEDURE — 97116 GAIT TRAINING THERAPY: CPT

## 2021-01-01 PROCEDURE — P9612 CATHETERIZE FOR URINE SPEC: HCPCS

## 2021-01-01 PROCEDURE — 97166 OT EVAL MOD COMPLEX 45 MIN: CPT

## 2021-01-01 PROCEDURE — 86901 BLOOD TYPING SEROLOGIC RH(D): CPT

## 2021-01-01 PROCEDURE — 6360000002 HC RX W HCPCS: Performed by: NURSE PRACTITIONER

## 2021-01-01 PROCEDURE — 17311 MOHS 1 STAGE H/N/HF/G: CPT | Performed by: DERMATOLOGY

## 2021-01-01 PROCEDURE — 2580000003 HC RX 258: Performed by: NURSE PRACTITIONER

## 2021-01-01 PROCEDURE — 99284 EMERGENCY DEPT VISIT MOD MDM: CPT

## 2021-01-01 PROCEDURE — 84443 ASSAY THYROID STIM HORMONE: CPT

## 2021-01-01 PROCEDURE — 85025 COMPLETE CBC W/AUTO DIFF WBC: CPT

## 2021-01-01 PROCEDURE — 97163 PT EVAL HIGH COMPLEX 45 MIN: CPT

## 2021-01-01 PROCEDURE — 80069 RENAL FUNCTION PANEL: CPT

## 2021-01-01 PROCEDURE — 93005 ELECTROCARDIOGRAM TRACING: CPT | Performed by: INTERNAL MEDICINE

## 2021-01-01 PROCEDURE — 81003 URINALYSIS AUTO W/O SCOPE: CPT

## 2021-01-01 PROCEDURE — 83605 ASSAY OF LACTIC ACID: CPT

## 2021-01-01 PROCEDURE — 84484 ASSAY OF TROPONIN QUANT: CPT

## 2021-01-01 PROCEDURE — 80164 ASSAY DIPROPYLACETIC ACD TOT: CPT

## 2021-01-01 PROCEDURE — 6370000000 HC RX 637 (ALT 250 FOR IP): Performed by: EMERGENCY MEDICINE

## 2021-01-01 PROCEDURE — 93005 ELECTROCARDIOGRAM TRACING: CPT | Performed by: EMERGENCY MEDICINE

## 2021-01-01 PROCEDURE — 6360000004 HC RX CONTRAST MEDICATION: Performed by: EMERGENCY MEDICINE

## 2021-01-01 PROCEDURE — 99223 1ST HOSP IP/OBS HIGH 75: CPT | Performed by: PSYCHIATRY & NEUROLOGY

## 2021-01-01 PROCEDURE — 99223 1ST HOSP IP/OBS HIGH 75: CPT | Performed by: NURSE PRACTITIONER

## 2021-01-01 PROCEDURE — 85027 COMPLETE CBC AUTOMATED: CPT

## 2021-01-01 PROCEDURE — 72125 CT NECK SPINE W/O DYE: CPT

## 2021-01-01 PROCEDURE — 84145 PROCALCITONIN (PCT): CPT

## 2021-01-01 PROCEDURE — 83880 ASSAY OF NATRIURETIC PEPTIDE: CPT

## 2021-01-01 PROCEDURE — 99232 SBSQ HOSP IP/OBS MODERATE 35: CPT | Performed by: NURSE PRACTITIONER

## 2021-01-01 PROCEDURE — 6360000002 HC RX W HCPCS: Performed by: INTERNAL MEDICINE

## 2021-01-01 PROCEDURE — A9579 GAD-BASE MR CONTRAST NOS,1ML: HCPCS | Performed by: INTERNAL MEDICINE

## 2021-01-01 PROCEDURE — 93880 EXTRACRANIAL BILAT STUDY: CPT

## 2021-01-01 PROCEDURE — 70551 MRI BRAIN STEM W/O DYE: CPT

## 2021-01-01 PROCEDURE — 71045 X-RAY EXAM CHEST 1 VIEW: CPT

## 2021-01-01 PROCEDURE — 2580000003 HC RX 258: Performed by: EMERGENCY MEDICINE

## 2021-01-01 PROCEDURE — 6360000004 HC RX CONTRAST MEDICATION: Performed by: INTERNAL MEDICINE

## 2021-01-01 PROCEDURE — 99233 SBSQ HOSP IP/OBS HIGH 50: CPT | Performed by: INTERNAL MEDICINE

## 2021-01-01 PROCEDURE — 12042 INTMD RPR N-HF/GENIT2.6-7.5: CPT | Performed by: DERMATOLOGY

## 2021-01-01 PROCEDURE — 80076 HEPATIC FUNCTION PANEL: CPT

## 2021-01-01 PROCEDURE — 2500000003 HC RX 250 WO HCPCS: Performed by: EMERGENCY MEDICINE

## 2021-01-01 PROCEDURE — 2500000003 HC RX 250 WO HCPCS: Performed by: INTERNAL MEDICINE

## 2021-01-01 PROCEDURE — 99285 EMERGENCY DEPT VISIT HI MDM: CPT

## 2021-01-01 PROCEDURE — 99223 1ST HOSP IP/OBS HIGH 75: CPT | Performed by: INTERNAL MEDICINE

## 2021-01-01 PROCEDURE — 86850 RBC ANTIBODY SCREEN: CPT

## 2021-01-01 PROCEDURE — 82077 ASSAY SPEC XCP UR&BREATH IA: CPT

## 2021-01-01 PROCEDURE — 82140 ASSAY OF AMMONIA: CPT

## 2021-01-01 PROCEDURE — 2500000003 HC RX 250 WO HCPCS: Performed by: NURSE PRACTITIONER

## 2021-01-01 PROCEDURE — 80053 COMPREHEN METABOLIC PANEL: CPT

## 2021-01-01 PROCEDURE — 86900 BLOOD TYPING SEROLOGIC ABO: CPT

## 2021-01-01 PROCEDURE — 70496 CT ANGIOGRAPHY HEAD: CPT

## 2021-01-01 PROCEDURE — 83036 HEMOGLOBIN GLYCOSYLATED A1C: CPT

## 2021-01-01 PROCEDURE — 96374 THER/PROPH/DIAG INJ IV PUSH: CPT

## 2021-01-01 PROCEDURE — 85610 PROTHROMBIN TIME: CPT

## 2021-01-01 PROCEDURE — 73030 X-RAY EXAM OF SHOULDER: CPT

## 2021-01-01 PROCEDURE — 97110 THERAPEUTIC EXERCISES: CPT

## 2021-01-01 RX ORDER — TERAZOSIN 5 MG/1
5 CAPSULE ORAL NIGHTLY
Qty: 30 CAPSULE | Refills: 3 | Status: ON HOLD | OUTPATIENT
Start: 2021-01-01 | End: 2022-01-01 | Stop reason: HOSPADM

## 2021-01-01 RX ORDER — DONEPEZIL HYDROCHLORIDE 5 MG/1
10 TABLET, FILM COATED ORAL NIGHTLY
Status: DISCONTINUED | OUTPATIENT
Start: 2021-01-01 | End: 2021-01-01 | Stop reason: HOSPADM

## 2021-01-01 RX ORDER — HYDRALAZINE HYDROCHLORIDE 20 MG/ML
5 INJECTION INTRAMUSCULAR; INTRAVENOUS EVERY 6 HOURS PRN
Status: DISCONTINUED | OUTPATIENT
Start: 2021-01-01 | End: 2021-01-01 | Stop reason: HOSPADM

## 2021-01-01 RX ORDER — DIVALPROEX SODIUM 125 MG/1
250 CAPSULE, COATED PELLETS ORAL EVERY 12 HOURS SCHEDULED
Status: DISCONTINUED | OUTPATIENT
Start: 2021-01-01 | End: 2022-01-01 | Stop reason: HOSPADM

## 2021-01-01 RX ORDER — PANTOPRAZOLE SODIUM 40 MG/1
40 TABLET, DELAYED RELEASE ORAL
Status: DISCONTINUED | OUTPATIENT
Start: 2021-01-01 | End: 2022-01-01 | Stop reason: HOSPADM

## 2021-01-01 RX ORDER — SODIUM CHLORIDE 0.9 % (FLUSH) 0.9 %
5-40 SYRINGE (ML) INJECTION PRN
Status: DISCONTINUED | OUTPATIENT
Start: 2021-01-01 | End: 2022-01-01 | Stop reason: HOSPADM

## 2021-01-01 RX ORDER — SODIUM CHLORIDE 9 MG/ML
25 INJECTION, SOLUTION INTRAVENOUS PRN
Status: DISCONTINUED | OUTPATIENT
Start: 2021-01-01 | End: 2022-01-01 | Stop reason: HOSPADM

## 2021-01-01 RX ORDER — DIVALPROEX SODIUM 125 MG/1
125 CAPSULE, COATED PELLETS ORAL EVERY 12 HOURS SCHEDULED
Status: DISCONTINUED | OUTPATIENT
Start: 2021-01-01 | End: 2021-01-01

## 2021-01-01 RX ORDER — SODIUM CHLORIDE 9 MG/ML
25 INJECTION, SOLUTION INTRAVENOUS PRN
Status: DISCONTINUED | OUTPATIENT
Start: 2021-01-01 | End: 2021-01-01 | Stop reason: HOSPADM

## 2021-01-01 RX ORDER — POLYETHYLENE GLYCOL 3350 17 G/17G
17 POWDER, FOR SOLUTION ORAL DAILY PRN
Status: DISCONTINUED | OUTPATIENT
Start: 2021-01-01 | End: 2022-01-01 | Stop reason: HOSPADM

## 2021-01-01 RX ORDER — SODIUM CHLORIDE 0.9 % (FLUSH) 0.9 %
5-40 SYRINGE (ML) INJECTION EVERY 12 HOURS SCHEDULED
Status: DISCONTINUED | OUTPATIENT
Start: 2021-01-01 | End: 2021-01-01 | Stop reason: HOSPADM

## 2021-01-01 RX ORDER — ONDANSETRON 4 MG/1
4 TABLET, ORALLY DISINTEGRATING ORAL EVERY 8 HOURS PRN
Qty: 10 TABLET | Refills: 2 | Status: ON HOLD | OUTPATIENT
Start: 2021-01-01 | End: 2022-01-01 | Stop reason: HOSPADM

## 2021-01-01 RX ORDER — ONDANSETRON 2 MG/ML
4 INJECTION INTRAMUSCULAR; INTRAVENOUS EVERY 6 HOURS PRN
Status: DISCONTINUED | OUTPATIENT
Start: 2021-01-01 | End: 2021-01-01 | Stop reason: HOSPADM

## 2021-01-01 RX ORDER — DILTIAZEM HYDROCHLORIDE 5 MG/ML
10 INJECTION INTRAVENOUS ONCE
Status: COMPLETED | OUTPATIENT
Start: 2021-01-01 | End: 2021-01-01

## 2021-01-01 RX ORDER — CYCLOBENZAPRINE HCL 5 MG
5 TABLET ORAL 2 TIMES DAILY PRN
Qty: 60 TABLET | Refills: 3 | Status: SHIPPED | OUTPATIENT
Start: 2021-01-01

## 2021-01-01 RX ORDER — TAMSULOSIN HYDROCHLORIDE 0.4 MG/1
0.4 CAPSULE ORAL DAILY
Status: DISCONTINUED | OUTPATIENT
Start: 2021-01-01 | End: 2021-01-01 | Stop reason: HOSPADM

## 2021-01-01 RX ORDER — ASPIRIN 81 MG/1
81 TABLET ORAL DAILY
Status: DISCONTINUED | OUTPATIENT
Start: 2021-01-01 | End: 2022-01-01 | Stop reason: HOSPADM

## 2021-01-01 RX ORDER — ACETAMINOPHEN 325 MG/1
650 TABLET ORAL EVERY 6 HOURS PRN
Status: DISCONTINUED | OUTPATIENT
Start: 2021-01-01 | End: 2022-01-01 | Stop reason: HOSPADM

## 2021-01-01 RX ORDER — LISINOPRIL 20 MG/1
20 TABLET ORAL DAILY
Status: DISCONTINUED | OUTPATIENT
Start: 2021-01-01 | End: 2021-01-01 | Stop reason: HOSPADM

## 2021-01-01 RX ORDER — POLYETHYLENE GLYCOL 3350 17 G/17G
17 POWDER, FOR SOLUTION ORAL DAILY PRN
Status: DISCONTINUED | OUTPATIENT
Start: 2021-01-01 | End: 2021-01-01 | Stop reason: SDUPTHER

## 2021-01-01 RX ORDER — ATORVASTATIN CALCIUM 10 MG/1
20 TABLET, FILM COATED ORAL DAILY
Status: DISCONTINUED | OUTPATIENT
Start: 2021-01-01 | End: 2021-01-01 | Stop reason: DRUGHIGH

## 2021-01-01 RX ORDER — ONDANSETRON 4 MG/1
4 TABLET, ORALLY DISINTEGRATING ORAL EVERY 8 HOURS PRN
Status: DISCONTINUED | OUTPATIENT
Start: 2021-01-01 | End: 2022-01-01 | Stop reason: HOSPADM

## 2021-01-01 RX ORDER — DIVALPROEX SODIUM 250 MG/1
250 TABLET, DELAYED RELEASE ORAL 2 TIMES DAILY
COMMUNITY
Start: 2021-01-01

## 2021-01-01 RX ORDER — TAMSULOSIN HYDROCHLORIDE 0.4 MG/1
0.4 CAPSULE ORAL DAILY
Status: DISCONTINUED | OUTPATIENT
Start: 2021-01-01 | End: 2022-01-01 | Stop reason: HOSPADM

## 2021-01-01 RX ORDER — SODIUM CHLORIDE 0.9 % (FLUSH) 0.9 %
5-40 SYRINGE (ML) INJECTION PRN
Status: DISCONTINUED | OUTPATIENT
Start: 2021-01-01 | End: 2021-01-01 | Stop reason: HOSPADM

## 2021-01-01 RX ORDER — POLYETHYLENE GLYCOL 3350 17 G/17G
17 POWDER, FOR SOLUTION ORAL DAILY PRN
Status: DISCONTINUED | OUTPATIENT
Start: 2021-01-01 | End: 2021-01-01 | Stop reason: HOSPADM

## 2021-01-01 RX ORDER — ONDANSETRON 4 MG/1
4 TABLET, ORALLY DISINTEGRATING ORAL EVERY 8 HOURS PRN
Status: DISCONTINUED | OUTPATIENT
Start: 2021-01-01 | End: 2021-01-01 | Stop reason: HOSPADM

## 2021-01-01 RX ORDER — TAMSULOSIN HYDROCHLORIDE 0.4 MG/1
0.4 CAPSULE ORAL DAILY
COMMUNITY

## 2021-01-01 RX ORDER — LISINOPRIL 20 MG/1
20 TABLET ORAL DAILY
Status: DISCONTINUED | OUTPATIENT
Start: 2021-01-01 | End: 2022-01-01 | Stop reason: HOSPADM

## 2021-01-01 RX ORDER — ASPIRIN 300 MG/1
300 SUPPOSITORY RECTAL DAILY
Status: DISCONTINUED | OUTPATIENT
Start: 2021-01-01 | End: 2021-01-01

## 2021-01-01 RX ORDER — DONEPEZIL HYDROCHLORIDE 5 MG/1
10 TABLET, FILM COATED ORAL NIGHTLY
Status: DISCONTINUED | OUTPATIENT
Start: 2021-01-01 | End: 2022-01-01 | Stop reason: HOSPADM

## 2021-01-01 RX ORDER — ONDANSETRON 4 MG/1
4 TABLET, ORALLY DISINTEGRATING ORAL EVERY 8 HOURS PRN
Status: DISCONTINUED | OUTPATIENT
Start: 2021-01-01 | End: 2021-01-01

## 2021-01-01 RX ORDER — HYDRALAZINE HYDROCHLORIDE 20 MG/ML
10 INJECTION INTRAMUSCULAR; INTRAVENOUS EVERY 6 HOURS PRN
Status: DISCONTINUED | OUTPATIENT
Start: 2021-01-01 | End: 2021-01-01

## 2021-01-01 RX ORDER — OMEGA-3S/DHA/EPA/FISH OIL/D3 300MG-1000
400 CAPSULE ORAL DAILY
Qty: 30 TABLET | Refills: 0 | Status: SHIPPED | OUTPATIENT
Start: 2021-01-01

## 2021-01-01 RX ORDER — FINASTERIDE 5 MG/1
5 TABLET, FILM COATED ORAL DAILY
Status: DISCONTINUED | OUTPATIENT
Start: 2021-01-01 | End: 2021-01-01 | Stop reason: HOSPADM

## 2021-01-01 RX ORDER — ASPIRIN 81 MG/1
81 TABLET ORAL DAILY
Status: DISCONTINUED | OUTPATIENT
Start: 2021-01-01 | End: 2021-01-01

## 2021-01-01 RX ORDER — ACETAMINOPHEN 650 MG/1
650 SUPPOSITORY RECTAL EVERY 6 HOURS PRN
Status: DISCONTINUED | OUTPATIENT
Start: 2021-01-01 | End: 2022-01-01 | Stop reason: HOSPADM

## 2021-01-01 RX ORDER — ONDANSETRON 2 MG/ML
4 INJECTION INTRAMUSCULAR; INTRAVENOUS EVERY 6 HOURS PRN
Status: DISCONTINUED | OUTPATIENT
Start: 2021-01-01 | End: 2021-01-01

## 2021-01-01 RX ORDER — ACETAMINOPHEN 650 MG/1
650 SUPPOSITORY RECTAL EVERY 6 HOURS PRN
Status: DISCONTINUED | OUTPATIENT
Start: 2021-01-01 | End: 2021-01-01 | Stop reason: HOSPADM

## 2021-01-01 RX ORDER — HYDRALAZINE HYDROCHLORIDE 20 MG/ML
20 INJECTION INTRAMUSCULAR; INTRAVENOUS EVERY 6 HOURS PRN
Status: DISCONTINUED | OUTPATIENT
Start: 2021-01-01 | End: 2022-01-01 | Stop reason: HOSPADM

## 2021-01-01 RX ORDER — ASPIRIN 81 MG/1
324 TABLET, CHEWABLE ORAL ONCE
Status: COMPLETED | OUTPATIENT
Start: 2021-01-01 | End: 2021-01-01

## 2021-01-01 RX ORDER — 0.9 % SODIUM CHLORIDE 0.9 %
30 INTRAVENOUS SOLUTION INTRAVENOUS ONCE
Status: COMPLETED | OUTPATIENT
Start: 2021-01-01 | End: 2021-01-01

## 2021-01-01 RX ORDER — OMEGA-3S/DHA/EPA/FISH OIL/D3 300MG-1000
400 CAPSULE ORAL DAILY
Status: DISCONTINUED | OUTPATIENT
Start: 2021-01-01 | End: 2022-01-01 | Stop reason: HOSPADM

## 2021-01-01 RX ORDER — ACETAMINOPHEN 325 MG/1
650 TABLET ORAL EVERY 6 HOURS PRN
Status: DISCONTINUED | OUTPATIENT
Start: 2021-01-01 | End: 2021-01-01 | Stop reason: HOSPADM

## 2021-01-01 RX ORDER — ATORVASTATIN CALCIUM 80 MG/1
80 TABLET, FILM COATED ORAL NIGHTLY
Status: DISCONTINUED | OUTPATIENT
Start: 2021-01-01 | End: 2021-01-01 | Stop reason: HOSPADM

## 2021-01-01 RX ORDER — ATORVASTATIN CALCIUM 10 MG/1
20 TABLET, FILM COATED ORAL DAILY
Status: DISCONTINUED | OUTPATIENT
Start: 2021-01-01 | End: 2022-01-01 | Stop reason: HOSPADM

## 2021-01-01 RX ORDER — LISINOPRIL 10 MG/1
10 TABLET ORAL DAILY
Status: DISCONTINUED | OUTPATIENT
Start: 2021-01-01 | End: 2021-01-01

## 2021-01-01 RX ORDER — OMEGA-3S/DHA/EPA/FISH OIL/D3 300MG-1000
400 CAPSULE ORAL DAILY
Status: ON HOLD | COMMUNITY
End: 2021-01-01

## 2021-01-01 RX ORDER — SODIUM CHLORIDE 0.9 % (FLUSH) 0.9 %
5-40 SYRINGE (ML) INJECTION EVERY 12 HOURS SCHEDULED
Status: DISCONTINUED | OUTPATIENT
Start: 2021-01-01 | End: 2022-01-01 | Stop reason: HOSPADM

## 2021-01-01 RX ORDER — ONDANSETRON 2 MG/ML
4 INJECTION INTRAMUSCULAR; INTRAVENOUS EVERY 6 HOURS PRN
Status: DISCONTINUED | OUTPATIENT
Start: 2021-01-01 | End: 2022-01-01 | Stop reason: HOSPADM

## 2021-01-01 RX ADMIN — SODIUM CHLORIDE, PRESERVATIVE FREE 10 ML: 5 INJECTION INTRAVENOUS at 21:58

## 2021-01-01 RX ADMIN — APIXABAN 2.5 MG: 2.5 TABLET, FILM COATED ORAL at 21:41

## 2021-01-01 RX ADMIN — SODIUM CHLORIDE, PRESERVATIVE FREE 10 ML: 5 INJECTION INTRAVENOUS at 20:09

## 2021-01-01 RX ADMIN — METOPROLOL TARTRATE 25 MG: 25 TABLET, FILM COATED ORAL at 16:00

## 2021-01-01 RX ADMIN — APIXABAN 5 MG: 5 TABLET, FILM COATED ORAL at 09:34

## 2021-01-01 RX ADMIN — ASPIRIN 81 MG: 81 TABLET, COATED ORAL at 10:36

## 2021-01-01 RX ADMIN — CHOLECALCIFEROL TAB 10 MCG (400 UNIT) 400 UNITS: 10 TAB at 10:37

## 2021-01-01 RX ADMIN — APIXABAN 5 MG: 5 TABLET, FILM COATED ORAL at 19:56

## 2021-01-01 RX ADMIN — LISINOPRIL 20 MG: 20 TABLET ORAL at 10:22

## 2021-01-01 RX ADMIN — APIXABAN 2.5 MG: 2.5 TABLET, FILM COATED ORAL at 21:49

## 2021-01-01 RX ADMIN — SODIUM CHLORIDE 3000 ML: 9 INJECTION, SOLUTION INTRAVENOUS at 11:30

## 2021-01-01 RX ADMIN — GADOTERIDOL 15 ML: 279.3 INJECTION, SOLUTION INTRAVENOUS at 18:41

## 2021-01-01 RX ADMIN — SODIUM CHLORIDE, PRESERVATIVE FREE 10 ML: 5 INJECTION INTRAVENOUS at 20:27

## 2021-01-01 RX ADMIN — DONEPEZIL HYDROCHLORIDE 10 MG: 5 TABLET, FILM COATED ORAL at 19:55

## 2021-01-01 RX ADMIN — FINASTERIDE 5 MG: 5 TABLET, FILM COATED ORAL at 10:07

## 2021-01-01 RX ADMIN — ASPIRIN 81 MG: 81 TABLET, COATED ORAL at 08:01

## 2021-01-01 RX ADMIN — DIVALPROEX SODIUM 250 MG: 125 CAPSULE ORAL at 21:48

## 2021-01-01 RX ADMIN — SODIUM CHLORIDE, PRESERVATIVE FREE 10 ML: 5 INJECTION INTRAVENOUS at 19:57

## 2021-01-01 RX ADMIN — TAMSULOSIN HYDROCHLORIDE 0.4 MG: 0.4 CAPSULE ORAL at 08:56

## 2021-01-01 RX ADMIN — FINASTERIDE 5 MG: 5 TABLET, FILM COATED ORAL at 09:20

## 2021-01-01 RX ADMIN — SODIUM CHLORIDE, PRESERVATIVE FREE 10 ML: 5 INJECTION INTRAVENOUS at 21:09

## 2021-01-01 RX ADMIN — TAMSULOSIN HYDROCHLORIDE 0.4 MG: 0.4 CAPSULE ORAL at 09:02

## 2021-01-01 RX ADMIN — METOPROLOL TARTRATE 25 MG: 25 TABLET, FILM COATED ORAL at 21:09

## 2021-01-01 RX ADMIN — DONEPEZIL HYDROCHLORIDE 10 MG: 5 TABLET, FILM COATED ORAL at 21:41

## 2021-01-01 RX ADMIN — SODIUM CHLORIDE, PRESERVATIVE FREE 10 ML: 5 INJECTION INTRAVENOUS at 22:12

## 2021-01-01 RX ADMIN — ASPIRIN 324 MG: 81 TABLET, CHEWABLE ORAL at 11:00

## 2021-01-01 RX ADMIN — ASPIRIN 81 MG: 81 TABLET, COATED ORAL at 09:20

## 2021-01-01 RX ADMIN — METOPROLOL TARTRATE 25 MG: 25 TABLET, FILM COATED ORAL at 19:56

## 2021-01-01 RX ADMIN — LISINOPRIL 20 MG: 20 TABLET ORAL at 08:01

## 2021-01-01 RX ADMIN — METOPROLOL TARTRATE 25 MG: 25 TABLET, FILM COATED ORAL at 10:27

## 2021-01-01 RX ADMIN — ASPIRIN 81 MG: 81 TABLET, COATED ORAL at 09:34

## 2021-01-01 RX ADMIN — LISINOPRIL 10 MG: 10 TABLET ORAL at 09:20

## 2021-01-01 RX ADMIN — METOPROLOL TARTRATE 25 MG: 25 TABLET, FILM COATED ORAL at 09:20

## 2021-01-01 RX ADMIN — PANTOPRAZOLE SODIUM 40 MG: 40 TABLET, DELAYED RELEASE ORAL at 06:54

## 2021-01-01 RX ADMIN — HYDRALAZINE HYDROCHLORIDE 5 MG: 20 INJECTION INTRAMUSCULAR; INTRAVENOUS at 16:31

## 2021-01-01 RX ADMIN — LISINOPRIL 20 MG: 20 TABLET ORAL at 10:37

## 2021-01-01 RX ADMIN — DONEPEZIL HYDROCHLORIDE 10 MG: 5 TABLET, FILM COATED ORAL at 21:38

## 2021-01-01 RX ADMIN — SODIUM CHLORIDE, PRESERVATIVE FREE 10 ML: 5 INJECTION INTRAVENOUS at 10:47

## 2021-01-01 RX ADMIN — PANTOPRAZOLE SODIUM 40 MG: 40 TABLET, DELAYED RELEASE ORAL at 06:18

## 2021-01-01 RX ADMIN — DIVALPROEX SODIUM 250 MG: 125 CAPSULE ORAL at 21:07

## 2021-01-01 RX ADMIN — TAMSULOSIN HYDROCHLORIDE 0.4 MG: 0.4 CAPSULE ORAL at 09:20

## 2021-01-01 RX ADMIN — DONEPEZIL HYDROCHLORIDE 10 MG: 5 TABLET, FILM COATED ORAL at 21:07

## 2021-01-01 RX ADMIN — DIVALPROEX SODIUM 125 MG: 125 CAPSULE ORAL at 21:49

## 2021-01-01 RX ADMIN — DONEPEZIL HYDROCHLORIDE 10 MG: 5 TABLET, FILM COATED ORAL at 20:09

## 2021-01-01 RX ADMIN — TAMSULOSIN HYDROCHLORIDE 0.4 MG: 0.4 CAPSULE ORAL at 09:34

## 2021-01-01 RX ADMIN — ENOXAPARIN SODIUM 40 MG: 40 INJECTION SUBCUTANEOUS at 10:27

## 2021-01-01 RX ADMIN — METOPROLOL TARTRATE 25 MG: 25 TABLET, FILM COATED ORAL at 09:34

## 2021-01-01 RX ADMIN — HYDRALAZINE HYDROCHLORIDE 5 MG: 20 INJECTION INTRAMUSCULAR; INTRAVENOUS at 16:51

## 2021-01-01 RX ADMIN — HYDRALAZINE HYDROCHLORIDE 20 MG: 20 INJECTION INTRAMUSCULAR; INTRAVENOUS at 00:53

## 2021-01-01 RX ADMIN — SODIUM CHLORIDE, PRESERVATIVE FREE 10 ML: 5 INJECTION INTRAVENOUS at 21:07

## 2021-01-01 RX ADMIN — SODIUM CHLORIDE, PRESERVATIVE FREE 10 ML: 5 INJECTION INTRAVENOUS at 21:50

## 2021-01-01 RX ADMIN — DONEPEZIL HYDROCHLORIDE 10 MG: 5 TABLET, FILM COATED ORAL at 21:09

## 2021-01-01 RX ADMIN — ATORVASTATIN CALCIUM 20 MG: 10 TABLET, FILM COATED ORAL at 09:34

## 2021-01-01 RX ADMIN — ATORVASTATIN CALCIUM 20 MG: 10 TABLET, FILM COATED ORAL at 08:01

## 2021-01-01 RX ADMIN — ATORVASTATIN CALCIUM 20 MG: 10 TABLET, FILM COATED ORAL at 10:37

## 2021-01-01 RX ADMIN — APIXABAN 2.5 MG: 2.5 TABLET, FILM COATED ORAL at 10:47

## 2021-01-01 RX ADMIN — TAMSULOSIN HYDROCHLORIDE 0.4 MG: 0.4 CAPSULE ORAL at 10:27

## 2021-01-01 RX ADMIN — CHOLECALCIFEROL TAB 10 MCG (400 UNIT) 400 UNITS: 10 TAB at 09:34

## 2021-01-01 RX ADMIN — METOPROLOL TARTRATE 25 MG: 25 TABLET, FILM COATED ORAL at 21:41

## 2021-01-01 RX ADMIN — ATORVASTATIN CALCIUM 80 MG: 80 TABLET, FILM COATED ORAL at 20:09

## 2021-01-01 RX ADMIN — ATORVASTATIN CALCIUM 20 MG: 10 TABLET, FILM COATED ORAL at 10:21

## 2021-01-01 RX ADMIN — CHOLECALCIFEROL TAB 10 MCG (400 UNIT) 400 UNITS: 10 TAB at 08:56

## 2021-01-01 RX ADMIN — IOPAMIDOL 75 ML: 755 INJECTION, SOLUTION INTRAVENOUS at 09:20

## 2021-01-01 RX ADMIN — APIXABAN 5 MG: 5 TABLET, FILM COATED ORAL at 10:21

## 2021-01-01 RX ADMIN — CHOLECALCIFEROL TAB 10 MCG (400 UNIT) 400 UNITS: 10 TAB at 08:01

## 2021-01-01 RX ADMIN — METOPROLOL TARTRATE 25 MG: 25 TABLET, FILM COATED ORAL at 21:38

## 2021-01-01 RX ADMIN — DIVALPROEX SODIUM 250 MG: 125 CAPSULE ORAL at 22:11

## 2021-01-01 RX ADMIN — SODIUM CHLORIDE, PRESERVATIVE FREE 10 ML: 5 INJECTION INTRAVENOUS at 20:57

## 2021-01-01 RX ADMIN — TAMSULOSIN HYDROCHLORIDE 0.4 MG: 0.4 CAPSULE ORAL at 10:07

## 2021-01-01 RX ADMIN — SODIUM CHLORIDE, PRESERVATIVE FREE 10 ML: 5 INJECTION INTRAVENOUS at 08:56

## 2021-01-01 RX ADMIN — DILTIAZEM HYDROCHLORIDE 10 MG: 5 INJECTION INTRAVENOUS at 13:09

## 2021-01-01 RX ADMIN — ATORVASTATIN CALCIUM 20 MG: 10 TABLET, FILM COATED ORAL at 21:38

## 2021-01-01 RX ADMIN — DIVALPROEX SODIUM 125 MG: 125 CAPSULE ORAL at 21:41

## 2021-01-01 RX ADMIN — METOPROLOL TARTRATE 25 MG: 25 TABLET, FILM COATED ORAL at 10:36

## 2021-01-01 RX ADMIN — CHOLECALCIFEROL TAB 10 MCG (400 UNIT) 400 UNITS: 10 TAB at 15:59

## 2021-01-01 RX ADMIN — METOPROLOL TARTRATE 25 MG: 25 TABLET, FILM COATED ORAL at 10:07

## 2021-01-01 RX ADMIN — LISINOPRIL 10 MG: 10 TABLET ORAL at 14:24

## 2021-01-01 RX ADMIN — SODIUM CHLORIDE, PRESERVATIVE FREE 10 ML: 5 INJECTION INTRAVENOUS at 21:38

## 2021-01-01 RX ADMIN — SODIUM CHLORIDE, PRESERVATIVE FREE 10 ML: 5 INJECTION INTRAVENOUS at 09:20

## 2021-01-01 RX ADMIN — METOPROLOL TARTRATE 25 MG: 25 TABLET, FILM COATED ORAL at 10:22

## 2021-01-01 RX ADMIN — APIXABAN 5 MG: 5 TABLET, FILM COATED ORAL at 09:35

## 2021-01-01 RX ADMIN — METOPROLOL TARTRATE 25 MG: 25 TABLET, FILM COATED ORAL at 08:56

## 2021-01-01 RX ADMIN — ACETAMINOPHEN 650 MG: 325 TABLET ORAL at 00:24

## 2021-01-01 RX ADMIN — ASPIRIN 81 MG: 81 TABLET, COATED ORAL at 10:21

## 2021-01-01 RX ADMIN — METOPROLOL TARTRATE 25 MG: 25 TABLET, FILM COATED ORAL at 20:09

## 2021-01-01 RX ADMIN — METOPROLOL TARTRATE 25 MG: 25 TABLET, FILM COATED ORAL at 08:01

## 2021-01-01 RX ADMIN — APIXABAN 5 MG: 5 TABLET, FILM COATED ORAL at 22:11

## 2021-01-01 RX ADMIN — DIVALPROEX SODIUM 250 MG: 125 CAPSULE ORAL at 19:55

## 2021-01-01 RX ADMIN — SODIUM CHLORIDE, PRESERVATIVE FREE 10 ML: 5 INJECTION INTRAVENOUS at 21:41

## 2021-01-01 RX ADMIN — HYDRALAZINE HYDROCHLORIDE 20 MG: 20 INJECTION INTRAMUSCULAR; INTRAVENOUS at 09:41

## 2021-01-01 RX ADMIN — ATORVASTATIN CALCIUM 20 MG: 10 TABLET, FILM COATED ORAL at 10:27

## 2021-01-01 RX ADMIN — ATORVASTATIN CALCIUM 20 MG: 10 TABLET, FILM COATED ORAL at 09:02

## 2021-01-01 RX ADMIN — ASPIRIN 81 MG: 81 TABLET, COATED ORAL at 17:26

## 2021-01-01 RX ADMIN — DIVALPROEX SODIUM 250 MG: 125 CAPSULE ORAL at 10:37

## 2021-01-01 RX ADMIN — PANTOPRAZOLE SODIUM 40 MG: 40 TABLET, DELAYED RELEASE ORAL at 06:59

## 2021-01-01 RX ADMIN — DONEPEZIL HYDROCHLORIDE 10 MG: 5 TABLET, FILM COATED ORAL at 22:11

## 2021-01-01 RX ADMIN — TAMSULOSIN HYDROCHLORIDE 0.4 MG: 0.4 CAPSULE ORAL at 08:02

## 2021-01-01 RX ADMIN — PANTOPRAZOLE SODIUM 40 MG: 40 TABLET, DELAYED RELEASE ORAL at 06:47

## 2021-01-01 RX ADMIN — FINASTERIDE 5 MG: 5 TABLET, FILM COATED ORAL at 10:27

## 2021-01-01 RX ADMIN — ACETAMINOPHEN 650 MG: 325 TABLET ORAL at 21:45

## 2021-01-01 RX ADMIN — DIVALPROEX SODIUM 250 MG: 125 CAPSULE ORAL at 09:34

## 2021-01-01 RX ADMIN — ENOXAPARIN SODIUM 40 MG: 40 INJECTION SUBCUTANEOUS at 10:07

## 2021-01-01 RX ADMIN — HYDRALAZINE HYDROCHLORIDE 10 MG: 20 INJECTION INTRAMUSCULAR; INTRAVENOUS at 04:48

## 2021-01-01 RX ADMIN — APIXABAN 2.5 MG: 2.5 TABLET, FILM COATED ORAL at 08:56

## 2021-01-01 RX ADMIN — LISINOPRIL 20 MG: 20 TABLET ORAL at 16:12

## 2021-01-01 RX ADMIN — METOPROLOL TARTRATE 25 MG: 25 TABLET, FILM COATED ORAL at 22:11

## 2021-01-01 RX ADMIN — HYDRALAZINE HYDROCHLORIDE 10 MG: 20 INJECTION INTRAMUSCULAR; INTRAVENOUS at 12:08

## 2021-01-01 RX ADMIN — DONEPEZIL HYDROCHLORIDE 10 MG: 5 TABLET, FILM COATED ORAL at 21:49

## 2021-01-01 RX ADMIN — ASPIRIN 81 MG: 81 TABLET, COATED ORAL at 08:56

## 2021-01-01 RX ADMIN — APIXABAN 5 MG: 5 TABLET, FILM COATED ORAL at 21:50

## 2021-01-01 RX ADMIN — ATORVASTATIN CALCIUM 60 MG: 40 TABLET, FILM COATED ORAL at 20:56

## 2021-01-01 RX ADMIN — TAMSULOSIN HYDROCHLORIDE 0.4 MG: 0.4 CAPSULE ORAL at 10:37

## 2021-01-01 RX ADMIN — DILTIAZEM HYDROCHLORIDE 10 MG: 5 INJECTION INTRAVENOUS at 20:56

## 2021-01-01 RX ADMIN — METOPROLOL TARTRATE 25 MG: 25 TABLET, FILM COATED ORAL at 10:47

## 2021-01-01 RX ADMIN — DONEPEZIL HYDROCHLORIDE 10 MG: 5 TABLET, FILM COATED ORAL at 21:58

## 2021-01-01 RX ADMIN — METOPROLOL TARTRATE 25 MG: 25 TABLET, FILM COATED ORAL at 21:49

## 2021-01-01 RX ADMIN — APIXABAN 5 MG: 5 TABLET, FILM COATED ORAL at 21:06

## 2021-01-01 RX ADMIN — SODIUM CHLORIDE, PRESERVATIVE FREE 10 ML: 5 INJECTION INTRAVENOUS at 09:05

## 2021-01-01 RX ADMIN — LISINOPRIL 20 MG: 20 TABLET ORAL at 09:34

## 2021-01-01 RX ADMIN — DIVALPROEX SODIUM 250 MG: 125 CAPSULE ORAL at 10:21

## 2021-01-01 RX ADMIN — ATORVASTATIN CALCIUM 20 MG: 10 TABLET, FILM COATED ORAL at 09:35

## 2021-01-01 RX ADMIN — METOPROLOL TARTRATE 25 MG: 25 TABLET, FILM COATED ORAL at 09:02

## 2021-01-01 RX ADMIN — SODIUM CHLORIDE, PRESERVATIVE FREE 10 ML: 5 INJECTION INTRAVENOUS at 10:07

## 2021-01-01 RX ADMIN — ATORVASTATIN CALCIUM 20 MG: 10 TABLET, FILM COATED ORAL at 15:59

## 2021-01-01 RX ADMIN — APIXABAN 2.5 MG: 2.5 TABLET, FILM COATED ORAL at 16:00

## 2021-01-01 RX ADMIN — PANTOPRAZOLE SODIUM 40 MG: 40 TABLET, DELAYED RELEASE ORAL at 05:39

## 2021-01-01 RX ADMIN — TAMSULOSIN HYDROCHLORIDE 0.4 MG: 0.4 CAPSULE ORAL at 10:47

## 2021-01-01 RX ADMIN — DIVALPROEX SODIUM 250 MG: 125 CAPSULE ORAL at 20:09

## 2021-01-01 RX ADMIN — APIXABAN 2.5 MG: 2.5 TABLET, FILM COATED ORAL at 10:37

## 2021-01-01 RX ADMIN — METOPROLOL TARTRATE 25 MG: 25 TABLET, FILM COATED ORAL at 21:07

## 2021-01-01 RX ADMIN — SODIUM CHLORIDE, PRESERVATIVE FREE 10 ML: 5 INJECTION INTRAVENOUS at 09:36

## 2021-01-01 RX ADMIN — TAMSULOSIN HYDROCHLORIDE 0.4 MG: 0.4 CAPSULE ORAL at 15:59

## 2021-01-01 RX ADMIN — LISINOPRIL 20 MG: 20 TABLET ORAL at 10:47

## 2021-01-01 RX ADMIN — ENOXAPARIN SODIUM 40 MG: 40 INJECTION SUBCUTANEOUS at 09:02

## 2021-01-01 RX ADMIN — SODIUM CHLORIDE, PRESERVATIVE FREE 10 ML: 5 INJECTION INTRAVENOUS at 10:22

## 2021-01-01 RX ADMIN — ATORVASTATIN CALCIUM 20 MG: 10 TABLET, FILM COATED ORAL at 10:07

## 2021-01-01 RX ADMIN — DONEPEZIL HYDROCHLORIDE 10 MG: 5 TABLET, FILM COATED ORAL at 21:03

## 2021-01-01 RX ADMIN — FINASTERIDE 5 MG: 5 TABLET, FILM COATED ORAL at 09:02

## 2021-01-01 RX ADMIN — SODIUM CHLORIDE, PRESERVATIVE FREE 10 ML: 5 INJECTION INTRAVENOUS at 09:35

## 2021-01-01 RX ADMIN — METOPROLOL TARTRATE 25 MG: 25 TABLET, FILM COATED ORAL at 20:56

## 2021-01-01 RX ADMIN — ATORVASTATIN CALCIUM 20 MG: 10 TABLET, FILM COATED ORAL at 08:56

## 2021-01-01 RX ADMIN — METOPROLOL TARTRATE 25 MG: 25 TABLET, FILM COATED ORAL at 19:55

## 2021-01-01 RX ADMIN — DONEPEZIL HYDROCHLORIDE 10 MG: 5 TABLET, FILM COATED ORAL at 19:56

## 2021-01-01 RX ADMIN — METOPROLOL TARTRATE 25 MG: 25 TABLET, FILM COATED ORAL at 21:58

## 2021-01-01 RX ADMIN — DILTIAZEM HYDROCHLORIDE 10 MG: 5 INJECTION INTRAVENOUS at 18:29

## 2021-01-01 RX ADMIN — METOPROLOL TARTRATE 25 MG: 25 TABLET, FILM COATED ORAL at 16:54

## 2021-01-01 RX ADMIN — APIXABAN 5 MG: 5 TABLET, FILM COATED ORAL at 08:02

## 2021-01-01 RX ADMIN — DIVALPROEX SODIUM 125 MG: 125 CAPSULE ORAL at 08:56

## 2021-01-01 RX ADMIN — CHOLECALCIFEROL TAB 10 MCG (400 UNIT) 400 UNITS: 10 TAB at 10:22

## 2021-01-01 RX ADMIN — FINASTERIDE 5 MG: 5 TABLET, FILM COATED ORAL at 10:47

## 2021-01-01 RX ADMIN — ENOXAPARIN SODIUM 40 MG: 40 INJECTION SUBCUTANEOUS at 09:20

## 2021-01-01 RX ADMIN — PANTOPRAZOLE SODIUM 40 MG: 40 TABLET, DELAYED RELEASE ORAL at 06:06

## 2021-01-01 RX ADMIN — TAMSULOSIN HYDROCHLORIDE 0.4 MG: 0.4 CAPSULE ORAL at 10:22

## 2021-01-01 RX ADMIN — SODIUM CHLORIDE, PRESERVATIVE FREE 10 ML: 5 INJECTION INTRAVENOUS at 10:27

## 2021-01-01 ASSESSMENT — ENCOUNTER SYMPTOMS
SORE THROAT: 0
SINUS PRESSURE: 0
CONSTIPATION: 0
RHINORRHEA: 0
VOMITING: 0
EYE REDNESS: 0
BLOOD IN STOOL: 0
BACK PAIN: 0
EYE PAIN: 0
ANAL BLEEDING: 0
VOICE CHANGE: 0
NAUSEA: 0
EYE ITCHING: 0
TROUBLE SWALLOWING: 0
ABDOMINAL PAIN: 0
STRIDOR: 0
CHEST TIGHTNESS: 0
WHEEZING: 0
ABDOMINAL DISTENTION: 0
DIARRHEA: 0
COUGH: 0
FACIAL SWELLING: 0
EYE DISCHARGE: 0
SHORTNESS OF BREATH: 0
PHOTOPHOBIA: 0

## 2021-01-01 ASSESSMENT — PAIN SCALES - GENERAL
PAINLEVEL_OUTOF10: 4
PAINLEVEL_OUTOF10: 0
PAINLEVEL_OUTOF10: 2
PAINLEVEL_OUTOF10: 0
PAINLEVEL_OUTOF10: 5
PAINLEVEL_OUTOF10: 0
PAINLEVEL_OUTOF10: 3
PAINLEVEL_OUTOF10: 0
PAINLEVEL_OUTOF10: 9
PAINLEVEL_OUTOF10: 1
PAINLEVEL_OUTOF10: 0

## 2021-05-04 ENCOUNTER — RX ONLY (OUTPATIENT)
Age: 81
Setting detail: RX ONLY
End: 2021-05-04

## 2021-05-04 RX ORDER — CLOBETASOL PROPIONATE 0.5 MG/ML
SOLUTION TOPICAL
Qty: 1 | Refills: 0 | Status: ERX

## 2021-05-05 ENCOUNTER — RX ONLY (OUTPATIENT)
Age: 81
Setting detail: RX ONLY
End: 2021-05-05

## 2021-05-05 RX ORDER — CLOBETASOL PROPIONATE 0.5 MG/ML
SOLUTION TOPICAL
Qty: 1 | Refills: 1 | Status: ERX

## 2021-05-20 NOTE — PROGRESS NOTES
MOHS PROCEDURE NOTE    PHYSICIAN:  Yanira Briones. Charmaine Rea MD    ASSISTANT: Zeke Bo RN     REFERRING PROVIDER:  Nivia Chen CNP    PREOPERATIVE DIAGNOSIS: Superficial squamous cell carcinoma     SPECIFIC MOHS INDICATIONS:  location    AUC SCORIN/9    POSTOPERATIVE DIAGNOSIS: SAME    LOCATION: Left medial hand    OPERATIVE PROCEDURE:  MOHS MICROGRAPHIC SURGERY    RECONSTRUCTION OF DEFECT: Intermediate layered closure    PREOPERATIVE SIZE: 16x13 MM    DEFECT SIZE: 26x20 MM    LENGTH OF REPAIRED WOUND/SIZE OF FLAP/SIZE OF GRAFT:  48 MM    ANESTHESIA: 5 mL 1% lidocaine with epinephrine 1:100,000 buffered. EBL:  MINIMAL    DURATION OF PROCEDURE:  0.75 HOUR    POSTOPERATIVE OBSERVATION: 0.75 HOUR    SPECIMENS:  SEE MOHS MAP    COMPLICATIONS:  NONE    DESCRIPTION OF PROCEDURE:  The patient was given a mirror, as appropriate, and the biopsy site was identified, marked with a surgical marking pen, and verified by the patient. Options for treatment were discussed and the patient was informed that Mohs surgery was the selected treatment based on its lower recurrence rate, given the features listed above, as compared to other treatment modalities such as excision, radiation, or curettage, and agreed with this treatment plan. Risks and benefits including bruising, swelling, bleeding, infection, nerve injury, recurrence, and scarring were discussed with the patient prior to the procedure and a written consent detailing these and other risks was reviewed with the patient and signed. There was a time out for person and procedure verification. The surgical site was prepped with an antiseptic solution. Application of an antiseptic solution was repeated before each surgical stage. Stage I:  The clinically-apparent tumor was carefully defined and debulked, determining the edge of the surgical excision.     A thin layer of tumor-laden tissue was excised with a narrow margin of normal-appearing skin, using the technique of Mohs. A map was prepared to correspond to the area of skin from which it was excised. Hemostasis was achieved using electrosurgery. The wound was bandaged. The tissue was prepared for the cryostat and sectioned. 1 section(s) prepared. Each section was coded, cut, and stained for microscopic examination. The entire base and margins of the excised piece of tissue were examined by the surgeon. The tissue was examined to the level of subcut fat. No tumor was identified at the peripheral margins of stage I of microscopically controlled surgery. DEFECT MANAGEMENT:    REPAIR DESCRIPTION:  Various closure modalities were discussed with the patient, and it was decided that an intermediate layered repair would best preserve normal anatomic and functional relationships. Additional risk of wound dehiscence was discussed. The area was anesthetized with 1% lidocaine with epinephrine 1:100,000 buffered, was given a sterile prep using Chlorhexidine gluconate 4% solution and draped in the usual sterile fashion. Recreation and enlargement of the wound was performed by excising cones of tissue via the triangulation technique. The final incision lines were placed with respect for the patient's natural skin tension lines in a linear configuration to avoid functional and aesthetic distortion of adjacent free margins. Following minimal undermining, meticulous hemostasis was obtained with spot monopolar electrocoagulation. Subcutaneous dead space and dermis were closed using 4-0 Vicryl buried subcutaneous interrupted suture and the epidermis was approximated with 4-0 Ethilon running epidermal sutures. .    WOUND COVERAGE:  The wound was cleaned with normal saline solution, dried off, Aquaphor ointment was applied, and the wound was covered. A dressing was applied for stabilization and light pressure. The patient was given detailed oral and written instructions on postoperative care. There were no complications. The patient left the Unit in good medical condition. FOLLOW-UP:  The patient will return for suture removal in 14 days.

## 2021-05-20 NOTE — PROGRESS NOTES
PRE-PROCEDURE SCREENING    Pacemaker/ICD: No  Difficulty with numbing in the past: No  Local Anesthesia Reaction/passing out: No  Latex or adhesive allergy:  No  Bleeding/Clotting Disorders: No  Anticoagulant Therapy: Yes, eliquis  Joint prosthesis: No  Artificial Heart Valve: No  Stroke or Seizures: Yes, stroke approx 10 years ago  Organ Transplant or Lymphoma: No  Immunosuppression: No  Respiratory Problems: Yes, COPD

## 2021-05-20 NOTE — PATIENT INSTRUCTIONS
[facility] Office Hours:    Monday-Thursday  7:30 AM-4:30 PM    Friday  9:00 AM-1:00 PM      POST-OPERATIVE CARE FOR STICHES  Bandage change after 48 hours    CARING FOR YOUR SURGICAL SITE  The bandage should remain on and completely dry for 48 hours. Do NOT get the bandage wet. 1. After the first 48 hours, gently remove the remaining part of the bandage. It can be helpful to moisten the bandage edges in the shower. Steri strips may still be on the wound. It is ok, they will fall off slowly with the daily bandage changes. 2. Gently clean the wound daily with mild soap and water. Try to clean off crust and debris. 3. Dry (pat) the area with a clean Q-tip or gauze. 4. Apply a layer of Vaseline/ Aquaphor (or Bacitracin if your doctor recommends) to the wound area only. 5. Cut a piece of Telfa (or any non-stick dressing) to fit just over the wound and secure it with paper tape. If the wound is small you may use a Band- Aid. Keep area covered for a total of 2 week(s). If the dressing comes off or if you have questions, or concerns about the dressing, please call the office for instructions! POST OPERATIVE INSTRUCTIONS    1. Activity: Do not lift anything heavier than a gallon of milk for 1 week. Also, avoid strenuous activity such as running, power walking or contact sports. 2. Eating and drinking: Do not drink alcohol for 48 hours after your procedure. Alcohol increases the chances of bleeding. 3. Medicines   -If you have discomfort, take Acetaminophen (Tylenol or Extra Strength Tylenol). Follow the instructions and warning on the bottle. -If your doctor has prescribed you an Aspirin daily, please keep taking it. Do not take extra Aspirin or medicines containing Aspirin (such as [place] and Excedrin) for 48 hours after your procedure. Bleeding: If bleeding occurs, DO NOT remove the bandage. Put firm pressure on the area with gauze for 20 minutes without peeking.  If the bleeding continues, apply pressure for another 20 minutes. If the bleeding does not stop after you apply pressure, call us right away. If you can not call, go to the nearest emergency room or urgent care facility. What to expect:  You may have these symptoms. They are normal and should get better with time:  1. Swelling. Swelling usually increases for the first 48 hours after your procedure and then begins to improve. Some soreness and redness around your wound. If we worked close to your eyes (forehead, nose, temple, or upper cheeks) your eyes may become swollen and/ or black and blue. 2. Bruising, which could last 1 week or more. 3. Pink and bumpy appearance to the scar. This may happen a few weeks after your procedure. After 4 weeks, you may gently massage the area each day with facial moisturizer or petroleum jelly (Vaseline or Aquaphor). This will help to smooth the skin and improve the appearance of the scar. The color of your scar will fade over time, but it may be pink for several months after the procedure. The scar may take 6 months to 1 year to reach its final color and appearance. 4. \"Spitting\" suture. Occasionally, an inside suture (stitch) does not completely dissolve. When this happens, (generally 4-8 weeks after surgery), it causes a bump or \"pimple\" to form on the scar. This is easily removed and is not at all serious. It does not mean the skin cancer has returned. Contact us if it happens, but do not be alarmed. Vitamin E oil is NOT necessary. A good moisturizer is just as effective. Sunscreen IS necessary. Use at least and SPF 30 sunscreen daily- even in winter    Call us at [phone] right away if you have any of the following symptoms:  -Bleeding that you can not stop (see highlighted area above). -Pain that lasts longer than 48 hours.  -Your wound becomes more painful, red or hot.   -Bruising and swelling that does not begin to improve within the 48 hours or gets worse suddenly.

## 2021-05-26 ENCOUNTER — APPOINTMENT (RX ONLY)
Dept: URBAN - NONMETROPOLITAN AREA CLINIC 18 | Facility: CLINIC | Age: 81
Setting detail: DERMATOLOGY
End: 2021-05-26

## 2021-05-26 VITALS — WEIGHT: 245 LBS | HEIGHT: 72 IN

## 2021-05-26 DIAGNOSIS — L20.89 OTHER ATOPIC DERMATITIS: ICD-10-CM

## 2021-05-26 DIAGNOSIS — D18.0 HEMANGIOMA: ICD-10-CM

## 2021-05-26 DIAGNOSIS — L71.8 OTHER ROSACEA: ICD-10-CM

## 2021-05-26 DIAGNOSIS — L82.1 OTHER SEBORRHEIC KERATOSIS: ICD-10-CM

## 2021-05-26 DIAGNOSIS — L21.8 OTHER SEBORRHEIC DERMATITIS: ICD-10-CM

## 2021-05-26 DIAGNOSIS — L57.8 OTHER SKIN CHANGES DUE TO CHRONIC EXPOSURE TO NONIONIZING RADIATION: ICD-10-CM

## 2021-05-26 DIAGNOSIS — D22 MELANOCYTIC NEVI: ICD-10-CM

## 2021-05-26 PROBLEM — D22.61 MELANOCYTIC NEVI OF RIGHT UPPER LIMB, INCLUDING SHOULDER: Status: ACTIVE | Noted: 2021-05-26

## 2021-05-26 PROBLEM — D18.01 HEMANGIOMA OF SKIN AND SUBCUTANEOUS TISSUE: Status: ACTIVE | Noted: 2021-05-26

## 2021-05-26 PROBLEM — L20.84 INTRINSIC (ALLERGIC) ECZEMA: Status: ACTIVE | Noted: 2021-05-26

## 2021-05-26 PROCEDURE — 99214 OFFICE O/P EST MOD 30 MIN: CPT

## 2021-05-26 PROCEDURE — ? TREATMENT REGIMEN

## 2021-05-26 PROCEDURE — ? COUNSELING

## 2021-05-26 PROCEDURE — ? ADDITIONAL NOTES

## 2021-05-26 PROCEDURE — ? PRESCRIPTION MEDICATION MANAGEMENT

## 2021-05-26 PROCEDURE — ? FULL BODY SKIN EXAM

## 2021-05-26 ASSESSMENT — LOCATION ZONE DERM
LOCATION ZONE: SCALP
LOCATION ZONE: TRUNK
LOCATION ZONE: NOSE
LOCATION ZONE: FACE
LOCATION ZONE: LEG
LOCATION ZONE: ARM

## 2021-05-26 ASSESSMENT — LOCATION SIMPLE DESCRIPTION DERM
LOCATION SIMPLE: RIGHT PRETIBIAL REGION
LOCATION SIMPLE: INFERIOR FOREHEAD
LOCATION SIMPLE: SCALP
LOCATION SIMPLE: RIGHT POSTERIOR UPPER ARM
LOCATION SIMPLE: CHEST
LOCATION SIMPLE: NOSE
LOCATION SIMPLE: RIGHT CHEEK
LOCATION SIMPLE: LEFT UPPER BACK
LOCATION SIMPLE: LEFT CHEEK

## 2021-05-26 ASSESSMENT — LOCATION DETAILED DESCRIPTION DERM
LOCATION DETAILED: LEFT MID-UPPER BACK
LOCATION DETAILED: INFERIOR MID FOREHEAD
LOCATION DETAILED: NASAL SUPRATIP
LOCATION DETAILED: RIGHT MEDIAL SUPERIOR CHEST
LOCATION DETAILED: RIGHT PROXIMAL POSTERIOR UPPER ARM
LOCATION DETAILED: RIGHT DISTAL PRETIBIAL REGION
LOCATION DETAILED: RIGHT MEDIAL MALAR CHEEK
LOCATION DETAILED: RIGHT SUPERIOR PARIETAL SCALP
LOCATION DETAILED: LEFT MEDIAL MALAR CHEEK

## 2021-05-26 NOTE — PROCEDURE: TREATMENT REGIMEN
Initiate Treatment: Sunscreen (SPF 30 or greater) should be applied every 1.5-2 hours, during peak UV exposure (between 10am and 2pm) and reapplied after exercise or swimming.
Detail Level: Zone
Plan: The ABCDEs of melanoma were reviewed with the patient, and the importance of monthly self-examination of moles was emphasized. Should any moles change in shape or color, or itch, bleed or burn, patient will contact our office for evaluation sooner than their interval appointment.\\nSun protective clothing is also effective at protecting against UV rays that cause skin cancer.\\n
Plan: Patient has rx for sulfacetamide and metrogel, has very inconsistently used them; last rx from 2018, ON states metrogel.  Instructed to begin with metronidazole qhs x 3 mo, then stop and use sulfacetamide qhs x 3mo and eval which is most effective
Detail Level: Simple

## 2021-05-26 NOTE — PROCEDURE: PRESCRIPTION MEDICATION MANAGEMENT
Render In Strict Bullet Format?: No
Detail Level: Zone
Continue Regimen: Apply Metrogel 1% topical gel q day to affected area prn
Continue Regimen: Clobetasol scalp soln 1x per day for flares and no longer than a week; otherwise maintenance 1-3x/wk\\npt will contact pharmacy when he needs refills.
Continue Regimen: Apply Triamcinalone .1% ointment to legs bid for up to two weeks as needed

## 2021-05-26 NOTE — PROCEDURE: MIPS QUALITY
Quality 47: Advance Care Plan: Advance Care Planning discussed and documented in the medical record; patient did not wish or was not able to name a surrogate decision maker or provide an advance care plan.
Quality 265: Biopsy Follow-Up: Biopsy results reviewed, communicated, tracked, and documented
Quality 431: Preventive Care And Screening: Unhealthy Alcohol Use - Screening: Patient screened for unhealthy alcohol use using a single question and scores less than 2 times per year
Quality 226: Preventive Care And Screening: Tobacco Use: Screening And Cessation Intervention: Patient screened for tobacco use and is an ex/non-smoker
Quality 130: Documentation Of Current Medications In The Medical Record: Current Medications Documented
Detail Level: Simple

## 2021-06-02 NOTE — PATIENT INSTRUCTIONS
[facility] Office Hours:    Monday-Thursday  7:30 AM-4:30 PM    Friday  9:00 AM-1:00 PM    WOUND CARE AFTER SUTURE REMOVAL    After your stiches have been removed, your scar is still very fragile. In fact, scars continue to change and evolve, what we call remodel, for about a year after your procedure. Follow the following steps below to ensure that your scar heals well. Instructions    1. If Steri-strips were applied, keep them on until they fall off on their own. 2. Protect your scar from the sun. Use a sunscreen or bandage to cover your scar. [place] exposure can cause your scar to become discolored and appear red or brown. 3. To help soften your scar more rapidly, it is helpful, but not necessary, for you to   massage the scar gently each night for twenty minutes. 4. Spitting suture. Occasionally, an inside suture (stitch) does not completely dissolve. When this happens, (generally 4-8 weeks after surgery), it causes a bump or pimple to form on the scar. This is easily removed and is not at all serious. It   does not mean the skin cancer has returned. Contact us if it happens, but do not be alarmed. 5. If you scar becomes tender, itchy or becomes very large, let Dr. Tiago Barrios know. There    are treatments that can improve the appearance of your scar or help make it more comfortable.

## 2021-06-02 NOTE — PROGRESS NOTES
S:  The patient is here for suture removal s/p Mohs surgery on the left medial hand and Intermediate layered closure repair, 2 week(s) ago. The site appears well-healed without signs of infection (redness, pain or discharge). The sutures were removed. Vaseline was applied. Wound care and activity instructions given. The patient was scheduled for follow-up prn for scar/wound check. The patient was scheduled for f/u with General Dermatology per their instructions.

## 2021-10-30 PROBLEM — I48.91 ATRIAL FIBRILLATION WITH RVR (HCC): Status: ACTIVE | Noted: 2021-01-01

## 2021-10-30 NOTE — ED NOTES
Bed: 17  Expected date:   Expected time:   Means of arrival:   Comments:  Yusuf Park RN  10/30/21 [id number]

## 2021-10-30 NOTE — H&P
Hospital Medicine History & Physical      PCP: Ramin Kang MD    Date of Admission: 10/30/2021    Date of Service: Pt seen/examined on 10/30/21 and admitted to Inpatient with expected LOS greater than two midnights due to medical therapy. Chief Complaint:        History Of Present Illness:    80 y.o. male with a PMH of HLD, HTN, PAF and CVA who presented to [facility] d/t confusion and fall this morning. Family not present in room upon evaluation and patient unable to provide history. He is alert, oriented to self, following commands. Per record, Family reports he has deteriorated over the last year with moderate confusion as his baseline, wheelchair-bound, and severe hearing loss. Chart review revealed a previous visit on 3/27/2020 that required transfer to  due to a large retroperitoneal and perinephric and subcapsular hematoma after a fall. Patient reportedly stopped eliquis one month ago. ECG afib RVR, WBC 11.4, LA 2.8, afebrile, Trop <0.01, glucose 171, UA unremarkable, CT head no acute abnormality, csvd, CXR stable. Past Medical History:          Diagnosis Date    Hyperlipemia     Hypertension     Prostate enlargement     Unspecified cerebral artery occlusion with cerebral infarction 2000       Past Surgical History:          Procedure Laterality Date    CAROTID ENDARTERECTOMY      right    COLONOSCOPY      COLONOSCOPY  2014    polyps    EYE SURGERY      FACIAL RECONSTRUCTION SURGERY      metal plate under left eye    FOOT SURGERY      nerve right    MOHS SURGERY      SKIN CANCER EXCISION      face x several    TONSILLECTOMY         Medications Prior to Admission:      Prior to Admission medications    Medication Sig Start Date End Date Taking?  Authorizing Provider   divalproex (DEPAKOTE) 125 MG DR tablet  4/22/21   Historical Provider, MD   tamsulosin (FLOMAX) 0.4 MG capsule Take 0.4 mg by mouth daily    Historical Provider, MD   omeprazole (PRILOSEC) 20 MG delayed release capsule Take 20 mg by mouth daily    Historical Provider, MD   donepezil (ARICEPT) 10 MG tablet Take 10 mg by mouth nightly    Historical Provider, MD   cyclobenzaprine (FLEXERIL) 5 MG tablet Take 5 mg by mouth every evening    Historical Provider, MD   metoprolol tartrate (LOPRESSOR) 25 MG tablet Take 2 tablets by mouth 2 times daily  Patient taking differently: Take 50 mg by mouth 2 times daily 25 QAM, 50QHS 7/20/19 5/20/21  Jaci Hernandezs, APRN - CNP   lisinopril (PRINIVIL;ZESTRIL) 20 MG tablet Take 1 tablet by mouth daily 5/27/19   Renea Obrien MD   finasteride (PROSCAR) 5 MG tablet Take 1 tablet by mouth daily 5/27/19   Rneea Obrien MD   simvastatin (ZOCOR) 80 MG tablet Take 40 mg by mouth nightly     Historical Provider, MD   terazosin (HYTRIN) 5 MG capsule Take 5 mg by mouth nightly. Historical Provider, MD       Allergies:  Corn-containing products    Social History:      The patient currently lives at home    TOBACCO:   reports that he has never smoked. He has never used smokeless tobacco.  ETOH:   reports no history of alcohol use. E-Cigarettes/Vaping Use     Questions Responses    E-Cigarette/Vaping Use Never User    Start Date     Passive Exposure     Quit Date     Counseling Given     Comments             Family History:      Reviewed in detail and negative for DM, CAD, Cancer, CVA. Positive as follows:    History reviewed. No pertinent family history. REVIEW OF SYSTEMS COMPLETED:   Pertinent positives as noted in the HPI. All other systems reviewed and negative. PHYSICAL EXAM PERFORMED:    BP (!) 155/85   Pulse 88   Temp 97.6 °F (36.4 °C)   Resp 24   Ht 5' 10\" (1.778 m)   Wt 216 lb (98 kg)   SpO2 97%   BMI 30.99 kg/m²     General appearance: A/Ox1, [place]  HEENT:  Normal cephalic, atraumatic without obvious deformity. Pupils equal, round, and reactive to light. Extra ocular muscles intact. Conjunctivae/corneas clear. Neck: Supple, with full range of motion.  No jugular venous distention. Trachea midline. Respiratory:  Normal respiratory effort. Diminished bases  Cardiovascular:  Irregular rate and rhythm  Abdomen: Soft, non-tender, non-distended with normal bowel sounds. Musculoskeletal:  No clubbing, cyanosis or edema bilaterally. Full range of motion without deformity. Skin: Skin color-pale, texture, turgor normal.  No rashes or lesions. Neurologic:  No obvious focal deficits, speech clear, no droop, THOMAS, gen weak  Psychiatric:  Alert and oriented x1, poor insight  Capillary Refill: Brisk,3 seconds, normal  Peripheral Pulses: +2 palpable, equal bilaterally       Labs:     Recent Labs     10/30/21  0934   WBC 11.4*   HGB 14.2   HCT 43.3        Recent Labs     10/30/21  0934      K 4.2      CO2 22   BUN 17   CREATININE 1.3   CALCIUM 10.0     Recent Labs     10/30/21  0934   AST 13*   ALT 14   BILITOT 0.6   ALKPHOS 77     No results for input(s): INR in the last 72 hours. Recent Labs     10/30/21  0934   TROPONINI <0.01       Urinalysis:      Lab Results   Component Value Date    NITRU Negative 10/30/2021    WBCUA see below 2020    BACTERIA 3+ 2020    RBCUA >100 2020    BLOODU Negative 10/30/2021    SPECGRAV 1.020 10/30/2021    GLUCOSEU Negative 10/30/2021       Radiology:     CXR: I have reviewed the CXR with the following interpretation: No acute cardiopulmonary process demonstrated   EKG:  I have reviewed the EKG with the following interpretation: Afib RVR    CT HEAD WO CONTRAST   Final Result   No evidence of acute intracranial process. Moderate atrophy and chronic   small vessel ischemic changes noted. XR CHEST PORTABLE   Final Result   No acute cardiopulmonary process demonstrated             ASSESSMENT:    There are no active hospital problems to display for this patient.         PLAN:    AMS, likely 2/2 Dementia progression  -CT Brain: no acute abnormality, mod atrophy,   -CXR stable  -UA unremarkable  -LA 2.8->2.6 poss 2/2

## 2021-10-30 NOTE — ED NOTES
Spoke with wife and daughter Yonny Dickens and they states pt has significantly declined the past year. State pt doesn't function well and can barely walk short distances with walker; Incontinent of urine and stool. Baselinevery  confused and extremely [place].      Lucero Milner RN  10/30/21 [id number]

## 2021-10-30 NOTE — ED PROVIDER NOTES
I interviewed and examined this patient with Dr. Esdras Martin, resident. Please see their note for details of H&P. Kevin Gavin is a 80year old male whose last normal time was last evening before he went to bed. Approximately 1 hour prior to arrival his family reports that they had difficulty arousing him and that he was unable to speak. He fell attempting to get up. NIH stroke score at the time of his arrival is 0. He is oriented x3, answers questions appropriately, but has difficulty following commands. He denies any pain. He has a history of stroke in the past.        BP (!) 125/95   Pulse 126   Temp 97.6 °F (36.4 °C)   Resp 23   Ht 5' 10\" (1.778 m)   Wt 216 lb (98 kg)   SpO2 92%   BMI 30.99 kg/m²     I have reviewed the following from the nursing documentation:      Prior to Admission medications    Medication Sig Start Date End Date Taking?  Authorizing Provider   divalproex (DEPAKOTE) 125 MG DR tablet  4/22/21   Historical Provider, MD   tamsulosin (FLOMAX) 0.4 MG capsule Take 0.4 mg by mouth daily    Historical Provider, MD   apixaban (ELIQUIS) 5 MG TABS tablet Take 5 mg by mouth 2 times daily    Historical Provider, MD   omeprazole (PRILOSEC) 20 MG delayed release capsule Take 20 mg by mouth daily    Historical Provider, MD   donepezil (ARICEPT) 10 MG tablet Take 10 mg by mouth nightly    Historical Provider, MD   cyclobenzaprine (FLEXERIL) 5 MG tablet Take 5 mg by mouth every evening    Historical Provider, MD   metoprolol tartrate (LOPRESSOR) 25 MG tablet Take 2 tablets by mouth 2 times daily  Patient taking differently: Take 50 mg by mouth 2 times daily 25 QAM, 50QHS 7/20/19 5/20/21  Betty Red, APRN - CNP   lisinopril (PRINIVIL;ZESTRIL) 20 MG tablet Take 1 tablet by mouth daily 5/27/19   Damion Baltazar MD   finasteride (PROSCAR) 5 MG tablet Take 1 tablet by mouth daily 5/27/19   Damion Baltazar MD   simvastatin (ZOCOR) 80 MG tablet Take 40 mg by mouth nightly     Historical Provider, MD   terazosin (HYTRIN) 5 MG capsule Take 5 mg by mouth nightly. Historical Provider, MD       Allergies as of 10/30/2021 - Fully Reviewed 06/02/2021   Allergen Reaction Noted    Corn-containing products Other (See Comments) 12/19/2010       Past Medical History:   Diagnosis Date    Hyperlipemia     Hypertension     Prostate enlargement     Unspecified cerebral artery occlusion with cerebral infarction 2000        Surgical History:   Past Surgical History:   Procedure Laterality Date    CAROTID ENDARTERECTOMY      right    COLONOSCOPY      COLONOSCOPY  2014    polyps    EYE SURGERY      FACIAL RECONSTRUCTION SURGERY      metal plate under left eye    FOOT SURGERY      nerve right    MOHS SURGERY      SKIN CANCER EXCISION      face x several    TONSILLECTOMY          Family History:  No family history on file. Social History     Socioeconomic History    Marital status:      Spouse name: Not on file    Number of children: Not on file    Years of education: Not on file    Highest education level: Not on file   Occupational History    Not on file   Tobacco Use    Smoking status: Never Smoker    Smokeless tobacco: Never Used   Vaping Use    Vaping Use: Never used   Substance and Sexual Activity    Alcohol use: No    Drug use: No    Sexual activity: Yes     Partners: Female   Other Topics Concern    Not on file   Social History Narrative    Not on file     Social Determinants of Health     Financial Resource Strain:     Difficulty of Paying Living Expenses:    Food Insecurity:     Worried About Running Out of Food in the Last Year:     [address] in the Last Year:    Transportation Needs:     Lack of Transportation (Medical):      Lack of Transportation (Non-Medical):    Physical Activity:     Days of Exercise per Week:     Minutes of Exercise per Session:    Stress:     Feeling of Stress :    Social Connections:     Frequency of Communication with Friends and Family:     Frequency of Social Gatherings with Friends and Family:     Attends Synagogue Services:     Active Member of Clubs or Organizations:     Attends Club or Organization Meetings:     Marital Status:    Intimate Partner Violence:     Fear of Current or Ex-Partner:     Emotionally Abused:     Physically Abused:     Sexually Abused:          Review of Systems   Constitutional: Negative for activity change, appetite change, chills, diaphoresis, fatigue and fever. HENT: Negative. Negative for congestion, dental problem, ear pain, facial swelling, rhinorrhea, sinus pressure, sneezing, sore throat, tinnitus, trouble swallowing and voice change. Eyes: Negative for photophobia, pain, discharge, redness, itching and visual disturbance. Respiratory: Negative for cough, chest tightness, shortness of breath, wheezing and stridor. Cardiovascular: Negative for chest pain, palpitations and leg swelling. Gastrointestinal: Negative for abdominal distention, abdominal pain, anal bleeding, blood in stool, constipation, diarrhea, nausea and vomiting. Genitourinary: Negative for difficulty urinating, discharge, dysuria, frequency, hematuria, testicular pain and urgency. Musculoskeletal: Negative for back pain, joint swelling, neck pain and neck stiffness. Skin: Negative for rash and wound. Neurological: Positive for speech difficulty and weakness. Negative for dizziness, syncope, facial asymmetry, numbness and headaches. Hematological: Does not bruise/bleed easily. Psychiatric/Behavioral: Negative for agitation, confusion, hallucinations, self-injury, sleep disturbance and suicidal ideas. The patient is not nervous/anxious. All other systems reviewed and are negative. Physical Exam  Vitals and nursing note reviewed. Constitutional:       General: He is not in acute distress. Appearance: He is well-developed. HENT:      Head: Normocephalic and atraumatic.       Right Ear: External ear normal.      Left Ear: External ear normal.      Nose: Nose normal.      Mouth/Throat:      Pharynx: No oropharyngeal exudate. Eyes:      General: No scleral icterus. Right eye: No discharge. Left eye: No discharge. Conjunctiva/sclera: Conjunctivae normal.      Pupils: Pupils are equal, round, and reactive to light. Neck:      Vascular: No JVD. Trachea: No tracheal deviation. Cardiovascular:      Rate and Rhythm: Normal rate and regular rhythm. Heart sounds: Normal heart sounds. No murmur heard. No friction rub. No gallop. Pulmonary:      Effort: Pulmonary effort is normal. No respiratory distress. Breath sounds: Normal breath sounds. No wheezing or rales. Abdominal:      General: Bowel sounds are normal. There is no distension. Palpations: Abdomen is soft. There is no mass. Tenderness: There is no abdominal tenderness. There is no guarding or rebound. Musculoskeletal:         General: No tenderness. Normal range of motion. Cervical back: Normal range of motion and neck supple. Lymphadenopathy:      Cervical: No cervical adenopathy. Skin:     General: Skin is warm and dry. Coloration: Skin is not pale. Findings: No erythema or rash. Neurological:      Mental Status: He is alert and oriented to person, place, and time. GCS: GCS eye subscore is 4. GCS verbal subscore is 5. GCS motor subscore is 6. Cranial Nerves: Cranial nerves are intact. No cranial nerve deficit. Sensory: Sensation is intact. Motor: Weakness (symmetric, generalized) and tremor present. No abnormal muscle tone. Coordination: Coordination is intact. Coordination normal.      Deep Tendon Reflexes: Reflexes normal. Babinski sign absent on the right side. Babinski sign absent on the left side. Reflex Scores:       Bicep reflexes are 1+ on the right side and 1+ on the left side.        Patellar reflexes are 1+ on the right side and 1+ on the left side. Comments: Coordination, speech, balance and cognition are intact. There is no nuchal rigidity or evidence of meningismus. Negative Kernig's and Brudzinski's signs. All sensory and motor components of the brachial/lumbosacral plexus tested are symmetric and intact. No focal deficits appreciated. Psychiatric:         Behavior: Behavior normal.         Thought Content:  Thought content normal.         Judgment: Judgment normal.          Procedures     MDM   Results for orders placed or performed during the hospital encounter of 10/30/21   CBC auto differential   Result Value Ref Range    WBC 11.4 (H) 4.0 - 11.0 K/uL    RBC 4.54 4.20 - 5.90 M/uL    Hemoglobin 14.2 13.5 - 17.5 g/dL    Hematocrit 43.3 40.5 - 52.5 %    MCV 95.5 80.0 - 100.0 fL    MCH 31.4 26.0 - 34.0 pg    MCHC 32.9 31.0 - 36.0 g/dL    RDW 14.4 12.4 - 15.4 %    Platelets 144 577 - 791 K/uL    MPV 9.4 5.0 - 10.5 fL    Neutrophils % 84.6 %    Lymphocytes % 7.2 %    Monocytes % 7.8 %    Eosinophils % 0.0 %    Basophils % 0.4 %    Neutrophils Absolute 9.6 (H) 1.7 - 7.7 K/uL    Lymphocytes Absolute 0.8 (L) 1.0 - 5.1 K/uL    Monocytes Absolute 0.9 0.0 - 1.3 K/uL    Eosinophils Absolute 0.0 0.0 - 0.6 K/uL    Basophils Absolute 0.0 0.0 - 0.2 K/uL   Comprehensive metabolic panel   Result Value Ref Range    Sodium 137 136 - 145 mmol/L    Potassium 4.2 3.5 - 5.1 mmol/L    Chloride 103 99 - 110 mmol/L    CO2 22 21 - 32 mmol/L    Anion Gap 12 3 - 16    Glucose 171 (H) 70 - 99 mg/dL    BUN 17 7 - 20 mg/dL    CREATININE 1.3 0.8 - 1.3 mg/dL    GFR Non- 53 (A) >60    GFR African American >60 >60    Calcium 10.0 8.3 - 10.6 mg/dL    Total Protein 6.5 6.4 - 8.2 g/dL    Albumin 3.5 3.4 - 5.0 g/dL    Albumin/Globulin Ratio 1.2 1.1 - 2.2    Total Bilirubin 0.6 0.0 - 1.0 mg/dL    Alkaline Phosphatase 77 40 - 129 U/L    ALT 14 10 - 40 U/L    AST 13 (L) 15 - 37 U/L   Troponin   Result Value Ref Range    Troponin <0.01 <0.01 ng/mL   Lactic Acid, Plasma   Result Value Ref Range    Lactic Acid 2.8 (H) 0.4 - 2.0 mmol/L   Urinalysis, reflex to microscopic   Result Value Ref Range    Color, UA Yellow Straw/Yellow    Clarity, UA Clear Clear    Glucose, Ur Negative Negative mg/dL    Bilirubin Urine Negative Negative    Ketones, Urine 15 (A) Negative mg/dL    Specific Gravity, UA 1.020 1.005 - 1.030    Blood, Urine Negative Negative    pH, UA 6.5 5.0 - 8.0    Protein, UA Negative Negative mg/dL    Urobilinogen, Urine 0.2 <2.0 E.U./dL    Nitrite, Urine Negative Negative    Leukocyte Esterase, Urine Negative Negative    Microscopic Examination Not Indicated     Urine Type NotGiven    Sample possible blood bank testing   Result Value Ref Range    Specimen Status DAIANA    Lactic Acid, Plasma   Result Value Ref Range    Lactic Acid 2.6 (H) 0.4 - 2.0 mmol/L   EKG 12 Lead   Result Value Ref Range    Ventricular Rate 142 BPM    Atrial Rate 159 BPM    QRS Duration 134 ms    Q-T Interval 330 ms    QTc Calculation (Bazett) 507 ms    R Axis 40 degrees    T Axis -28 degrees    Diagnosis       Atrial fibrillation with rapid ventricular response with premature ventricular or aberrantly conducted complexesRight bundle branch blockAbnormal ECGWhen compared with ECG of 27-MAR-2020 05:08,Atrial fibrillation has replaced Sinus rhythmVent. rate has increased BY  52 BPMRight bundle branch block is now PresentConfirmed by Franklyn Borges MD, [address] ([id number]) on 10/30/2021 10:23:22 AM       I spoke with Dr. Jac Dee, hospitalist. We thoroughly discussed the history, physical exam, laboratory and imaging studies, as well as, emergency department course. Based upon that discussion, we've decided to admit Niki Coronado for further observation and evaluation of Karli Coates's mental status change.   As I have deemed necessary from their history, physical and studies, I have considered and evaluated Niki Coronado for the following diagnoses:  DIABETES, INTRACRANIAL HEMORRHAGE, MENINGITIS, SEPSIS SUBARACHNOID HEMORRHAGE, SUBDURAL HEMATOMA, & STROKE. FINAL IMPRESSION  1. Altered mental status, unspecified altered mental status type    2. Fall, initial encounter    3. Elevated lactic acid level    4. Ketonuria    5. Volume depletion    6. Aphasia    7. Atrial fibrillation, unspecified type (HCC)        Vitals:  Blood pressure (!) 175/89, pulse 124, temperature 97.6 °F (36.4 °C), resp. rate 16, height 5' 10\" (1.778 m), weight 216 lb (98 kg), SpO2 97 %. Radiology  CT HEAD WO CONTRAST    Result Date: 10/30/2021  No evidence of acute intracranial process. Moderate atrophy and chronic small vessel ischemic changes noted. XR CHEST PORTABLE    Result Date: 10/30/2021  No acute cardiopulmonary process demonstrated       EKG Interpretation. The Ekg interpreted by me in the absence of a cardiologist shows. atrial fibrillation with a rate of 142 with RBBB and baseline artifact present  Axis is   Normal  QTc is  507  Intervals and Durations are unremarkable. No specific ST-T wave changes appreciated. No evidence of acute ischemia.    This EKG was compared with EKG dated 27 march 2020, which was NSR at that time,          Zuhair Gaines MD  10/30/21 7410

## 2021-10-30 NOTE — PROGRESS NOTES
4 Eyes Skin Assessment     The patient is being assess for  Admission    I agree that 2 RN's have performed a thorough Head to Toe Skin Assessment on the patient. ALL assessment sites listed below have been assessed. Areas assessed by both nurses:   [x]   Head, Face, and Ears   [x]   Shoulders, Back, and Chest  [x]   Arms, Elbows, and Hands   [x]   Coccyx, Sacrum, and Ischum  [x]   Legs, Feet, and Heels        Does the Patient have Skin Breakdown?   Yes a wound was noted on the Admission Assessment and an WOUND LDA was Initiated documentation include the Christy-wound, Wound Assessment, Measurements, Dressing Treatment, Drainage, and Color\",         [place] Prevention initiated:  Yes   Wound Care Orders initiated:  Yes      [id number] nurse consulted for Pressure Injury (Stage 3,4, Unstageable, DTI, NWPT, and Complex wounds):  NA      Nurse 1 eSignature: Electronically signed by Marina Isaac RN on 10/30/21 at 3:11 PM EDT    **SHARE this note so that the co-signing nurse is able to place an eSignature**    Nurse 2 eSignature: {Esignature:[id number]}

## 2021-10-30 NOTE — PROGRESS NOTES
Patient admitted to room 203 from ER. Patient oriented to room, call light, bed rails, phone, lights and bathroom. Patient instructed about the schedule of the day including: vital sign frequency, lab draws, possible tests, frequency of MD and staff rounds, including RN/MD rounding together at bedside, daily weights, and I &O's. Patient instructed about prescribed diet, how to use 8MENU, and television. bed alarm in place, patient aware of placement and reason. Telemetry box  in place, patient aware of placement and reason. Bed locked, in lowest position, side rails up 2/4, call light within reach. Will continue to monitor.

## 2021-10-30 NOTE — ED PROVIDER NOTES
Chief Complaint: Fall (pt woke this morning and fell; [id number]; afib on initial EKG)    History of Present Illness:  Liz Fitzpatrick is a 80y.o. year old male with a past medical history of atrial fibrillation one year ago that reverted to sinus rhythm at prior hospitalization one year ago, stopped Eliquis one month ago who presents with generalized weakness after a fall this morning. Patient is hard hearing but follows commands. Chart review reveals a previous hospital encounter 3/27/2020 where he was hospitalized after a fall at [facility] for left-sided hematoma. Family reports he has deteriorated over the last year with moderate confusion as his baseline, wheelchair-bound, and severe hearing loss. 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 Social History:  Liz Fitzpatrick  reports that he has never smoked. He has never used smokeless tobacco. He reports that he does not drink alcohol and does not use drugs. Family History:  Liz Fitzpatrick family history is not on file. No current facility-administered medications on file prior to encounter.      Current Outpatient Medications on File Prior to Encounter   Medication Sig Dispense Refill    divalproex (DEPAKOTE) 125 MG DR tablet       tamsulosin (FLOMAX) 0.4 MG capsule Take 0.4 mg by mouth daily      apixaban (ELIQUIS) 5 MG TABS tablet Take 5 mg by mouth 2 times daily      omeprazole (PRILOSEC) 20 MG delayed release capsule Take 20 mg by mouth daily      donepezil (ARICEPT) 10 MG tablet Take 10 mg by mouth nightly      cyclobenzaprine (FLEXERIL) 5 MG tablet Take 5 mg by mouth every evening      metoprolol tartrate (LOPRESSOR) 25 MG tablet Take 2 tablets by mouth 2 times daily (Patient taking differently: Take 50 mg by mouth 2 times daily 25 QAM, 50QHS) 120 tablet 0    lisinopril (PRINIVIL;ZESTRIL) 20 MG tablet Take 1 tablet by mouth daily 30 tablet 3    finasteride (PROSCAR) 5 MG tablet Take 1 tablet by mouth daily 30 tablet 3    simvastatin (ZOCOR) 80 MG tablet Take 40 mg by mouth nightly       terazosin (HYTRIN) 5 MG capsule Take 5 mg by mouth nightly. Allergies: Allergies   Allergen Reactions    Corn-Containing Products Other (See Comments)     Nasal congestion     Review of Systems   Constitutional: Negative for activity change, appetite change and fever. HENT: Negative for congestion, rhinorrhea and sore throat. Eyes: Negative for visual disturbance. Respiratory: Negative for chest tightness, shortness of breath and wheezing. Cardiovascular: Negative for chest pain. Gastrointestinal: Negative for abdominal distention, abdominal pain, constipation and diarrhea. Genitourinary: Negative for difficulty urinating. Musculoskeletal: Negative for arthralgias, myalgias and neck pain. Skin: Negative for rash. Neurological: Negative for light-headedness. Physical Exam  Vitals and nursing note reviewed. Constitutional:       Appearance: Normal appearance. HENT:      Head: Normocephalic and atraumatic. Right Ear: External ear normal.      Left Ear: External ear normal.      Nose: Nose normal.   Eyes:      Conjunctiva/sclera: Conjunctivae normal.   Cardiovascular:      Rate and Rhythm: Normal rate and regular rhythm. Heart sounds: Normal heart sounds. Pulmonary:      Effort: Pulmonary effort is normal.      Breath sounds: Normal breath sounds. Skin:     General: Skin is warm and dry.       Capillary Refill: Capillary refill takes less than 2 seconds. Neurological:      General: No focal deficit present. Mental Status: He is alert and oriented to person, place, and time. Psychiatric:         Mood and Affect: Mood normal.         Behavior: Behavior normal.         Thought Content: Thought content normal.         Judgment: Judgment normal.        Labs  Admission on 10/30/2021   Component Date Value Ref Range Status    Ventricular Rate 10/30/2021 142  BPM Preliminary    Atrial Rate 10/30/2021 159  BPM Preliminary    QRS Duration 10/30/2021 134  ms Preliminary    Q-T Interval 10/30/2021 330  ms Preliminary    QTc Calculation (Bazett) 10/30/2021 507  ms Preliminary    R Axis 10/30/2021 40  degrees Preliminary    T Axis 10/30/2021 -28  degrees Preliminary    Diagnosis 10/30/2021 Atrial fibrillation with rapid ventricular response with premature ventricular or aberrantly conducted complexesRight bundle branch blockT wave abnormality, consider inferior ischemia or digitalis effectAbnormal ECGWhen compared with ECG of 27-MAR-2020 05:08,Atrial fibrillation has replaced Sinus rhythmVent. rate has increased BY  52 BPMRight bundle branch block is now Present   Preliminary      Radiology  CT HEAD WO CONTRAST    Result Date: 10/30/2021  EXAMINATION: CT OF THE HEAD WITHOUT CONTRAST  10/30/2021 8:47 am TECHNIQUE: CT of the head was performed without the administration of intravenous contrast. Dose modulation, iterative reconstruction, and/or weight based adjustment of the mA/kV was utilized to reduce the radiation dose to as low as reasonably achievable. COMPARISON: March 27, 2020. HISTORY: ORDERING SYSTEM PROVIDED HISTORY: confusion TECHNOLOGIST PROVIDED HISTORY: Reason for exam:->confusion Has a \"code stroke\" or \"stroke alert\" been called? ->No Decision Support Exception - unselect if not a suspected or confirmed emergency medical condition->Emergency Medical Condition (MA) Reason for Exam: fall, confusion Acuity: Acute Type of Exam: Initial FINDINGS: BRAIN/VENTRICLES: The gyri and sulci have a normal appearance. There is moderate cortical and cerebellar volume loss with associated ex vacuo ventricular dilation. Moderate diffuse periventricular and subcortical deep white matter hypoattenuation noted, findings compatible with chronic small vessel ischemic disease. The gray-white matter differentiation is otherwise preserved throughout. There is no acute hemorrhage, mass, or mass effect. No evidence of acute territorial infarct. No abnormal extraaxial fluid collections. ORBITS:  The visualized portion of the orbits demonstrate no acute abnormality. SINUSES:  The mastoid air cells are normally aerated. The visualized paranasal sinuses are grossly clear. SOFT TISSUES/SKULL:  No significant abnormality of the visualized skull or soft tissues. No acute fracture. No scalp hematoma. No evidence of acute intracranial process. Moderate atrophy and chronic small vessel ischemic changes noted. XR CHEST PORTABLE    Result Date: 10/30/2021  EXAMINATION: ONE XRAY VIEW OF THE CHEST 10/30/2021 9:00 am COMPARISON: 03/27/2020 HISTORY: ORDERING SYSTEM PROVIDED HISTORY: AMS TECHNOLOGIST PROVIDED HISTORY: Reason for exam:->AMS Reason for Exam: fall, AMS Acuity: Acute Type of Exam: Initial FINDINGS: Incomplete inspiration. Heart size borderline. Pulmonary vasculature appears within normal limits. Lungs grossly clear.   Costophrenic angles sharp     No acute cardiopulmonary process demonstrated     EKG Interpretation  Interpreted by emergency department physician  Rhythm: atrial fibrillation - rapid  Rate: tachycardia  Axis: normal  Ectopy: none  Conduction: right bundle branch block (complete)  ST Segments: no acute change  T Waves: no acute change and normal  Q Waves: none  Clinical Impression: atrial fibrillation (chronic)    Results for orders placed or performed during the hospital encounter of 10/30/21   CBC auto differential   Result Value Ref Range    WBC 11.4 (H) 4.0 - 11.0 K/uL    RBC 4.54 4.20 - 5.90 M/uL    Hemoglobin 14.2 13.5 - 17.5 g/dL    Hematocrit 43.3 40.5 - 52.5 %    MCV 95.5 80.0 - 100.0 fL    MCH 31.4 26.0 - 34.0 pg    MCHC 32.9 31.0 - 36.0 g/dL    RDW 14.4 12.4 - 15.4 %    Platelets 062 727 - 128 K/uL    MPV 9.4 5.0 - 10.5 fL    Neutrophils % 84.6 %    Lymphocytes % 7.2 %    Monocytes % 7.8 %    Eosinophils % 0.0 %    Basophils % 0.4 %    Neutrophils Absolute 9.6 (H) 1.7 - 7.7 K/uL    Lymphocytes Absolute 0.8 (L) 1.0 - 5.1 K/uL    Monocytes Absolute 0.9 0.0 - 1.3 K/uL    Eosinophils Absolute 0.0 0.0 - 0.6 K/uL    Basophils Absolute 0.0 0.0 - 0.2 K/uL   Comprehensive metabolic panel   Result Value Ref Range    Sodium 137 136 - 145 mmol/L    Potassium 4.2 3.5 - 5.1 mmol/L    Chloride 103 99 - 110 mmol/L    CO2 22 21 - 32 mmol/L    Anion Gap 12 3 - 16    Glucose 171 (H) 70 - 99 mg/dL    BUN 17 7 - 20 mg/dL    CREATININE 1.3 0.8 - 1.3 mg/dL    GFR Non- 53 (A) >60    GFR African American >60 >60    Calcium 10.0 8.3 - 10.6 mg/dL    Total Protein 6.5 6.4 - 8.2 g/dL    Albumin 3.5 3.4 - 5.0 g/dL    Albumin/Globulin Ratio 1.2 1.1 - 2.2    Total Bilirubin 0.6 0.0 - 1.0 mg/dL    Alkaline Phosphatase 77 40 - 129 U/L    ALT 14 10 - 40 U/L    AST 13 (L) 15 - 37 U/L   Troponin   Result Value Ref Range    Troponin <0.01 <0.01 ng/mL   Lactic Acid, Plasma   Result Value Ref Range    Lactic Acid 2.8 (H) 0.4 - 2.0 mmol/L   Urinalysis, reflex to microscopic   Result Value Ref Range    Color, UA Yellow Straw/Yellow    Clarity, UA Clear Clear    Glucose, Ur Negative Negative mg/dL    Bilirubin Urine Negative Negative    Ketones, Urine 15 (A) Negative mg/dL    Specific Gravity, UA 1.020 1.005 - 1.030    Blood, Urine Negative Negative    pH, UA 6.5 5.0 - 8.0    Protein, UA Negative Negative mg/dL    Urobilinogen, Urine 0.2 <2.0 E.U./dL    Nitrite, Urine Negative Negative    Leukocyte Esterase, Urine Negative Negative    Microscopic Examination Not Indicated     Urine Type NotGiven    Sample possible blood bank testing   Result Value Ref Range    Specimen Status DAIANA    Lactic Acid, Plasma   Result Value Ref Range    Lactic Acid 2.6 (H) 0.4 - 2.0 mmol/L   EKG 12 Lead   Result Value Ref Range    Ventricular Rate 142 BPM    Atrial Rate 159 BPM    QRS Duration 134 ms    Q-T Interval 330 ms    QTc Calculation (Bazett) 507 ms    R Axis 40 degrees    T Axis -28 degrees    Diagnosis       Atrial fibrillation with rapid ventricular response with premature ventricular or aberrantly conducted complexesRight bundle branch blockAbnormal ECGWhen compared with ECG of 27-MAR-2020 05:08,Atrial fibrillation has replaced Sinus rhythmVent. rate has increased BY  52 BPMRight bundle branch block is now PresentConfirmed by Naomi Lowe MD, [address] ([id number]) on 10/30/2021 10:23:22 AM       I spoke with Dr. Todd Figueroa. We thoroughly discussed the history, physical exam, laboratory and imaging studies, as well as, emergency department course. Based upon that discussion, we've decided to admit Melodie Libman for further observation and evaluation of Laverne Coates's mental status change. As I have deemed necessary from their history, physical and studies, I have considered and evaluated Melodie Libman for the following diagnoses:  DIABETES, INTRACRANIAL HEMORRHAGE, MENINGITIS, SEPSIS SUBARACHNOID HEMORRHAGE, SUBDURAL HEMATOMA, & STROKE. CLINICAL IMPRESSION  1. Altered mental status, unspecified altered mental status type    2. Fall, initial encounter    3. Elevated lactic acid level    4. Ketonuria    5. Volume depletion    6. Aphasia    7. Atrial fibrillation, unspecified type ([address]        Vitals:  Blood pressure (!) 125/95, pulse 126, temperature 97.6 °F (36.4 °C), resp. rate 23, height 5' 10\" (1.778 m), weight 216 lb (98 kg), SpO2 92 %.       Madonna Dawson DO  Family Medicine Resident, PGY-1  10/30/21      Madonna Dawson DO  Resident  10/30/21 [id number]

## 2021-10-30 NOTE — ED NOTES
Attempted to call daughter, Andrez Bosworth times.  Went straight to voice mail which is full     Destin Garcia RN  10/30/21 [id number]

## 2021-10-31 NOTE — PROGRESS NOTES
Physical Therapy    Facility/Department: [facility] A2 CARD TELEMETRY  Initial Assessment/Treatment    NAME: Mitzy Jung  : 1940  MRN: [id number]    Date of Service: 10/31/2021    Discharge Recommendations:  Subacute/Skilled Nursing Facility   PT Equipment Recommendations  Equipment Needed: No  Other: defer to next level of care    Assessment   Body structures, Functions, Activity limitations: Decreased functional mobility ; Decreased balance;Decreased strength;Decreased cognition;Decreased endurance  Assessment: Pt is a 80 y.o. male admitted to [facility] secondary to fall, AMS and A-fib with RVR. Pt is unable to provide hx this date. Per chart from admission to ARU ~ 2 years ago, pt lives with wife in 2 story home and was d/c at [facility] with RW for mobility. Per chart family reports decline in mobility over last year with pt minimally ambulatory vs w/c bound currently. Pt is currently functioning below his baseline requiring aYquelin for bed mobility, Yaquelin/modA x 2 for t/f with RW with difficulty with pivot, CGA with STEDY for EOB to commode. Gait training defferred d/t weakness, difficulty following commands and intermittent agitation. Pt seen for additional session later in am per RN request d/t pt agitation, RN requesting assistance to help pt up to commode and back to bed. Pt will benefit from continued skilled PT in acute care setting to address above deficits. Recommned pt d/c to SNF d/t curent deficits. Treatment Diagnosis: Impaired functional mobility and gait  Specific instructions for Next Treatment: progress mobility as tolerated  Prognosis: Fair  Decision Making: Medium Complexity  PT Education: Goals; General Safety;PT Role;Orientation;Disease Specific Education;Plan of Care; Functional Mobility Training;Precautions; Injury Prevention;Transfer Training  Patient Education: Importance of OOB mobility, safety with functional mobility and use of AD. pt requires reinforcement secondary to cognition  Barriers to Learning: Cognition, intermittent agitation  REQUIRES PT FOLLOW UP: Yes  Activity Tolerance  Activity Tolerance: Patient limited by fatigue;Patient limited by pain; Patient limited by endurance; Patient limited by cognitive status;Treatment limited secondary to agitation       Patient Diagnosis(es): The primary encounter diagnosis was Altered mental status, unspecified altered mental status type. Diagnoses of Fall, initial encounter, Elevated lactic acid level, Ketonuria, Volume depletion, Aphasia, and Atrial fibrillation, unspecified type [facility]) were also pertinent to this visit. has a past medical history of Hyperlipemia, Hypertension, Prostate enlargement, and Unspecified cerebral artery occlusion with cerebral infarction. has a past surgical history that includes Carotid endarterectomy; Foot surgery; Skin cancer excision; Eye surgery; Facial reconstruction surgery; Tonsillectomy; Colonoscopy; Colonoscopy (2014); and Mohs surgery. Restrictions  Restrictions/Precautions  Restrictions/Precautions: Fall Risk  Position Activity Restriction  Other position/activity restrictions:  Up with assist, telemetry, 1.5 L O2 NC  Vision/Hearing  Vision: Impaired  Vision Exceptions: Wears glasses at all times  Hearing: Exceptions to [facility]  Hearing Exceptions: Hard of hearing/hearing concerns     Subjective  General  Chart Reviewed: Yes  Patient assessed for rehabilitation services?: Yes  Additional Pertinent Hx: PMH of HLD, HTN, PAF and CVA; hx T8 SCI anterior cord syndrome,  Response To Previous Treatment: Not applicable  Family / Caregiver Present: No  Referring Practitioner: CRISTI Hickey CNP  Referral Date : 10/31/21  Diagnosis: AMS, A-fib with RVR, fall  Follows Commands: Impaired ([place], agitated at times)  General Comment  Comments: RN cleared pt for session  Subjective  Subjective: Pt  resting in bed on approach, agreeable to PT evaluation, agreeable but also intermittently agitated at times  Pain Screening  Patient Currently in Pain: Denies  Vital Signs  Pulse: 67  BP: (!) 160/89  BP Location: Left Arm  Patient Position: Semi fowlers  Patient Currently in Pain: Denies  Oxygen Therapy  SpO2: 94 %  O2 Device: Nasal cannula  O2 Flow Rate (L/min): 1.5 L/min       Orientation  Orientation  Overall Orientation Status: Impaired  Orientation Level: Oriented to person;Disoriented to place; Disoriented to time;Disoriented to situation  Social/Functional History  Social/Functional History  Lives With: Spouse  Type of Home: House  Home Layout: One level (With basement)  Home Access: Stairs to enter with rails  Entrance Stairs - Number of Steps: 2 BARBY with R railing or 1 +1 from front  Entrance Stairs - Rails: Right  Bathroom Shower/Tub: Walk-in shower, Shower chair without back  Bathroom Equipment: Grab bars in shower  Home Equipment: Rolling walker, Cane, Grab bars (adjustable bed)  [employer] Help From: Other (comment) (cleaning service 1x/wk)  ADL Assistance: Needs assistance  Ambulation Assistance: Needs assistance  Transfer Assistance: Needs assistance  Additional Comments: Pt is unable to provide hx d/t confusion, no CM note in chart, hx taken from previous note ~2 years ago from ARU pt d/c at grossly S for t/f and gait and CGA for up to 12 steps, per chart family report decline in mobility over past year, only able to ambulate short distances with RW vs w/c bound currently    Objective     Observation/Palpation  Posture: Fair  Edema: slight edema to BLE    AROM RLE (degrees)  RLE AROM: WFL  RLE General AROM: Grossly WFL, slight decreased hamstring length  AROM LLE (degrees)  LLE AROM : WFL  LLE General AROM: Grossly WFL, slight decreased hamstring length     Strength RLE  Comment: Grossly 4-/5, difficulty with full MMT d/t difficulty following commands  Strength LLE  Comment: Grossly 4-/5, difficulty with full MMT d/t difficulty following commands     Tone RLE  RLE Tone: Normotonic  Tone LLE  LLE Tone: Normotonic     Sensation  Overall Sensation Status:  (pt denies N/T)     Bed mobility  Supine to Sit: Minimal assistance (HOB elevated, use of BR, increased time to complete, cues for sequence, Yaquelin at trunk)  Sit to Supine: Minimal assistance ((second session, Yaquelin for BLE, increased time to complete, cues for sequence))     Transfers  Sit to Stand: Minimal Assistance; Moderate Assistance;2 Person Assistance  Stand to sit: Minimal Assistance; Moderate Assistance;2 Person Assistance  Bed to Chair: Minimal assistance; Moderate assistance;2 Person Assistance  Comment: co-tx/eval: Yaquelin/modA x 2 SPT EOB to recliner with RW, maximal cues for sequence/safety limited by fatigue and unsteadiness; individual session: sit to/from stand multiple reps with stedy CGA, cues for hand placement and sequence from EOB and commode     Ambulation  Ambulation?: No (deferred d/t safety, pt minimally ambulatory at baseline per chart, pt reuqires Yaquelin/modA x 2 for t/f to chair with AD)  Stairs/Curb  Stairs?: No (deferred d/t safety)        Balance  Posture: Fair  Sitting - Static: Good  Sitting - Dynamic: Good  Standing - Static: Fair;-  Standing - Dynamic: Poor;+  Comments: CGA to Yaquelin sitting EOB, increased poterior lean at times, Yaquelin x 2 with RW d/t increased posterior lean to CGA x 1 with STEDY for standing balance. Pt maintains up to 2 minutes standing with STEDY with performance of joseph-care and clothing managment. Limited by faituge, cues for upright posture.         Plan   Plan  Times per week: 3-5x/wk  Times per day: Daily  Specific instructions for Next Treatment: progress mobility as tolerated  Current Treatment Recommendations: Strengthening, Neuromuscular Re-education, Home Exercise Program, ROM, Manual Therapy - Soft Tissue Mobilization, Safety Education & Training, Balance Training, Endurance Training, Patient/Caregiver Education & Training, Functional Mobility Training, Transfer Training, Gait Training, Stair training, Positioning  Safety Devices  Type of devices:  All fall risk precautions in place, Left in chair, Call light within reach, Chair alarm in place, Nurse notified, Gait belt, Patient at risk for falls, Left in bed (left in chair following eval, in bed at end of second session)      AM-PAC Score  AM-PAC Inpatient Mobility Raw Score : 12 (10/31/21 1333)  AM-PAC Inpatient T-Scale Score : 35.33 (10/31/21 1333)  Mobility Inpatient CMS 0-100% Score: 68.66 (10/31/21 1333)  Mobility Inpatient CMS G-Code Modifier : CL (10/31/21 1333)          Goals  Short term goals  Time Frame for Short term goals: 1 week 11/07/21 (unless otherwise specified)  Short term goal 1: Pt will complete supine to/from sit with S  Short term goal 2: Pt will complete sit to/from stand with LRAD with CGA  Short term goal 3: Pt will ambulate 48' with RW with CGA without LOB  Short term goal 4: Pt will ascend/descend curb step with RW with Yaquelin without LOB  Short term goal 5: 11/05/21: Pt will participate in 12-15 reps BLE exercises to increase strength and increase I with funtional mobility and gait  Patient Goals   Patient goals : Pt is unable to state d/t cognition       Therapy Time   Individual Concurrent Group Co-treatment   Time In 0751         Time Out 0825         Minutes 34         Timed Code Treatment Minutes: 24 Minutes (10 minutes for eval)       Second Session Therapy Time:   Individual Concurrent Group Co-treatment   Time In 0955         Time Out 1015         Minutes 20           Timed Code Treatment Minutes:  20 minutes    Total Treatment Minutes:  54 minutes    Gracy Fuentes PT, DPT

## 2021-10-31 NOTE — PROGRESS NOTES
Occupational Therapy   Occupational Therapy Initial Assessment/Treatment  Date: 10/31/2021   Patient Name: Trace Hagan  MRN: [id number]     : 1940    Date of Service: 10/31/2021    Discharge Recommendations:  [address]  OT Equipment Recommendations  Other: defer    Assessment   Performance deficits / Impairments: Decreased functional mobility ; Decreased ADL status; Decreased strength;Decreased safe awareness;Decreased cognition;Decreased high-level IADLs;Decreased balance    Assessment: Pt is a 73yo male with deficits in the areas listed above with AMS and a fall. Pt is a questionable historian but per chart pt uses a w/c at baseline and shows some confusion. Pt requiring min-mod Ax2 with a RW for functional stand-pivot t/f to the chair. Pt performing seated ADLS of drinking and brushing teeth with set up, supervision and max vc's for initiation and sequencing and problem solving. Pt would continue to benefit from skilled OT services to increase safety and independence with ADLs and functional activities. Recommend SNF at d/c. Prognosis: Good  Decision Making: Medium Complexity  OT Education: OT Role;Plan of Care;ADL Adaptive Strategies;Transfer Training;Orientation  Patient Education: disease specific: safe t/fs, safe mobility, general safety during hospitalization, fall prevention in hospital, seated ADL modification. Pt verbalized understanding but will need reinforcement. Barriers to Learning: cognition, Torres Martinez  REQUIRES OT FOLLOW UP: Yes  Activity Tolerance  Activity Tolerance: Patient Tolerated treatment well  Activity Tolerance: /89, HR 67, O2 94% on 1.5 L via nc  Safety Devices  Safety Devices in place: Yes  Type of devices: Left in chair;Call light within reach; Chair alarm in place;Gait belt;Nurse notified           Patient Diagnosis(es): The primary encounter diagnosis was Altered mental status, unspecified altered mental status type.  Diagnoses of Fall, initial encounter, Elevated lactic acid level, Ketonuria, Volume depletion, Aphasia, and Atrial fibrillation, unspecified type [facility]) were also pertinent to this visit. has a past medical history of Hyperlipemia, Hypertension, Prostate enlargement, and Unspecified cerebral artery occlusion with cerebral infarction. has a past surgical history that includes Carotid endarterectomy; Foot surgery; Skin cancer excision; Eye surgery; Facial reconstruction surgery; Tonsillectomy; Colonoscopy; Colonoscopy (2014); and Mohs surgery. Restrictions  Restrictions/Precautions  Restrictions/Precautions: Fall Risk  Position Activity Restriction  Other position/activity restrictions: Up with assist, telemetry, 1.5 L O2 NC    Subjective   General  Chart Reviewed: Yes  Patient assessed for rehabilitation services?: Yes  Additional Pertinent Hx: Per CORDELL Gregory, \"50 y.o. male with a PMH of HLD, HTN, PAF and CVA who presented to [facility] d/t confusion and fall this morning. Family not present in room upon evaluation and patient unable to provide history. He is alert, oriented to self, following commands. Per record, Family reports he has deteriorated over the last year with moderate confusion as his baseline, wheelchair-bound, and severe hearing loss. Chart review revealed a previous visit on 3/27/2020 that required transfer to  due to a large retroperitoneal and perinephric and subcapsular hematoma after a fall. Patient reportedly stopped eliquis one month ago. ECG afib RVR, WBC 11.4, LA 2.8, afebrile, Trop <0.01, glucose 171, UA unremarkable, CT head no acute abnormality, csvd, CXR stable. \"  Response to previous treatment: Patient with no complaints from previous session  Family / Caregiver Present: No  Referring Practitioner: CRISTI Gregory CNP  Diagnosis: AMS  Subjective  Subjective: Pt in bed and agreeable to therapy. General Comment  Comments: RN approved therapy.     Social/Functional History  Social/Functional History  Lives With: Spouse  Type of Home: House  Home Layout: One level (With basement)  Home Access: Stairs to enter with rails  Entrance Stairs - Number of Steps: 2 BARBY with R railing or 1 +1 from front  Entrance Stairs - Rails: Right  Bathroom Shower/Tub: Walk-in shower, Shower chair without back  Bathroom Equipment: Grab bars in shower  Home Equipment: Rolling walker, Cane, Grab bars (adjustable bed)  [employer] Help From: Other (comment) (cleaning service 1x/wk)  ADL Assistance: Needs assistance  Ambulation Assistance: Needs assistance  Transfer Assistance: Needs assistance  Additional Comments: Pt is unable to provide hx d/t confusion, no CM note in chart, hx taken from previous note ~2 years ago from ARU pt d/c at grossly S for t/f and gait and CGA for up to 12 steps, per chart family report decline in mobility over past year, only able to ambulate short distances with RW vs w/c bound currently       Objective   Vision: Impaired  Vision Exceptions: Wears glasses at all times  Hearing: Exceptions to [facility]  Hearing Exceptions: Hard of hearing/hearing concerns    Orientation  Overall Orientation Status: Impaired  Orientation Level: Disoriented to place; Disoriented to time;Disoriented to situation;Oriented to person  Observation/Palpation  Posture: Fair  Balance  Sitting Balance: Stand by assistance  Standing Balance: Dependent/Total (mod Ax2)  Standing Balance  Time: ~30s  Activity: stand pivot t/f from EOB to chair. Comment: RW used. max vc's for positioning and safety. Functional Mobility  Functional - Mobility Device: Rolling Walker  Activity: Other (stand pivot t/f from EOB to chair.)  Assist Level: Dependent/Total (mod Ax2)  Functional Mobility Comments: max vc's for positioning and safety  ADL  Feeding: Setup; Beverage management (pt coughing after. Pt drinking educated on tucking chin while swallowing.)  Grooming: Setup;Verbal cueing;Supervision (to brush teeth seated in chair. verbal cues for initiation and sequencing.)  Tone RUE  RUE Tone: Normotonic  Tone LUE  LUE Tone: Normotonic  Coordination  Movements Are Fluid And Coordinated: Yes     Bed mobility  Supine to Sit: Minimal assistance (HOB elevated.)  Sit to Supine: Unable to assess (Pt ended sessions seated in recliner.)  Scooting: Minimal assistance (to scoot to EOB.)  Transfers  Stand Pivot Transfers: Moderate assistance;2 Person assistance  Sit to stand: Minimal assistance;2 Person assistance  Stand to sit: Minimal assistance;2 Person assistance     Cognition  Overall Cognitive Status: Exceptions  Arousal/Alertness: Appropriate responses to stimuli  Following Commands: Follows one step commands with increased time; Follows one step commands with repetition  Attention Span: Attends with cues to redirect; Difficulty dividing attention  Memory: Decreased recall of biographical Information;Decreased recall of recent events;Decreased short term memory  Safety Judgement: Decreased awareness of need for assistance;Decreased awareness of need for safety  Problem Solving: Assistance required to identify errors made;Decreased awareness of errors  Insights: Decreased awareness of deficits  Initiation: Requires cues for some (required cues for ADLS. )  Sequencing: Requires cues for some  Cognition Comment: Pt also is [place].         Sensation  Overall Sensation Status: WFL (per pt report.)        LUE AROM (degrees)  LUE AROM : WFL  Left Hand AROM (degrees)  Left Hand AROM: WFL  RUE AROM (degrees)  RUE AROM : WFL  Right Hand AROM (degrees)  Right Hand AROM: WFL  LUE Strength  Gross LUE Strength: WFL  L Hand General: 4/5  LUE Strength Comment: 4-/5 grossly  RUE Strength  Gross RUE Strength: WFL  R Hand General: 4/5  RUE Strength Comment: 4-/5 grossly                   Plan   Plan  Times per week: 3-5x/week  Current Treatment Recommendations: Strengthening, Balance Training, Functional Mobility Training, Wheelchair Mobility Training, Safety Education & Training, Self-Care / ADL, Cognitive Reorientation, Patient/Caregiver Education & Training    AM-PAC Score        AM-PAC Inpatient Daily Activity Raw Score: 11 (10/31/21 1035)  AM-PAC Inpatient ADL T-Scale Score : 29.04 (10/31/21 1035)  ADL Inpatient CMS 0-100% Score: 70.42 (10/31/21 1035)  ADL Inpatient CMS G-Code Modifier : CL (10/31/21 1035)    Goals  Short term goals  Time Frame for Short term goals: 1 week (11/6) unless stated otherwise. Short term goal 1: Pt will perform functional/w/c t/fs with mod Ax1 and AD PRN. Short term goal 2: Pt will UB dress with min A. Short term goal 3: Pt will perform BUE ther ex x15 to increase strength/endurance for functional activities. (11/4)  Patient Goals   Patient goals : Pt stated that he would like to brush his teeth. Therapy Time   Individual Concurrent Group Co-treatment   Time In 0751         Time Out 0825         Minutes 34         Timed Code Treatment Minutes: 24 Minutes (10min eval)       Lena Dalton OTR/L  If pt discharges prior to next session, this note will serve as discharge summary. See case management note for discharge disposition.

## 2021-10-31 NOTE — PROGRESS NOTES
Hospitalist Progress Note      PCP: Dejan Cazares MD    Date of Admission: 10/30/2021    Chief Complaint: confusion/weakness    Hospital Course: 80 y.o. male with a PMH of HLD, HTN, PAF and CVA who presented to [facility] d/t confusion and fall this morning. Family not present in room upon evaluation and patient unable to provide history. He is alert, oriented to self, following commands. Per record, Family reports he has deteriorated over the last year with moderate confusion as his baseline, wheelchair-bound, and severe hearing loss. Chart review revealed a previous visit on 3/27/2020 that required transfer to  due to a large retroperitoneal and perinephric and subcapsular hematoma after a fall. Patient reportedly stopped eliquis one month ago. ECG afib RVR, WBC 11.4, LA 2.8, afebrile, Trop <0.01, glucose 171, UA unremarkable, CT head no acute abnormality, csvd, CXR stable. Subjective: no complaints this am      Medications:  Reviewed    Infusion Medications    sodium chloride       Scheduled Medications    sodium chloride flush  5-40 mL IntraVENous 2 times per day    tamsulosin  0.4 mg Oral Daily    atorvastatin  20 mg Oral Daily    donepezil  10 mg Oral Nightly    finasteride  5 mg Oral Daily    metoprolol tartrate  25 mg Oral BID    enoxaparin  40 mg SubCUTAneous Daily     PRN Meds: sodium chloride flush, sodium chloride, polyethylene glycol, acetaminophen **OR** acetaminophen      Intake/Output Summary (Last 24 hours) at 10/31/2021 1212  Last data filed at 10/31/2021 0956  Gross per 24 hour   Intake 540 ml   Output 200 ml   Net 340 ml       Physical Exam Performed:    BP (!) 160/89   Pulse 67   Temp 97.9 °F (36.6 °C) (Axillary)   Resp 16   Ht 5' 10\" (1.778 m)   Wt 216 lb (98 kg)   SpO2 94%   BMI 30.99 kg/m²     General appearance: A/Ox2, [place]  HEENT:  Normal cephalic, atraumatic without obvious deformity. Pupils equal, round, and reactive to light.   Extra ocular muscles   -CXR stable  -UA unremarkable  -LA 2.8->2.6 poss 2/2 dehydration/fall  -Trop <0.01  -wbc 11.4->7.6, covid neg, procal 0.08, afebrile     Afib RVR  -BB  -EP consult  -not on ac, eliquis reportedly stopped one mo ago      Dementia  -Aricept     HTN  -Lisinopril     BPH  -Proscar/Flomax    MARCE  -nightly CPAP    DVT Prophylaxis: lovenox  Diet: ADULT DIET;  Regular  Code Status: Full Code    PT/OT Eval Status: consulted    Dispo - pending course    Sushily Files, APRN - CNP

## 2021-11-01 NOTE — PLAN OF CARE
Problem: Nutrition  Intervention: Swallowing evaluation  Note: Bedside swallow evaluation completed this date. Clara Yang M.S. Anita [place]field  Speech-language pathologist  [id number]      Intervention: Aspiration precautions  Note: Bedside swallow evaluation completed this date.     Clara Yang M.S. Anita Declan  Speech-language pathologist  [id number]

## 2021-11-01 NOTE — PROGRESS NOTES
Secure message to Dr. Newby Loop: Pt here for a-fib and just had a fall. He slipped out of bed and landed on his buttocks. He did not hit his head and there are no visible injuries or c/o pain. Head CT and neuro checks every 2hrs for 5 occurrences per Dr. Virginia French. Head CT completed showing no intracranial abnormalities. Pt currently on waitlist for [facility]. Unable to speak directly with pt's wife but did leave a message on her cell phone and requested her to call RN for update.

## 2021-11-01 NOTE — PLAN OF CARE
Problem: Falls - Risk of:  Goal: Will remain free from falls  Description: Will remain free from falls  11/1/2021 1220 by Lisa Fowler RN  Outcome: Ongoing     Problem: Falls - Risk of:  Goal: Absence of physical injury  Description: Absence of physical injury  Outcome: Ongoing

## 2021-11-01 NOTE — PLAN OF CARE
Problem: Falls - Risk of:  Goal: Will remain free from falls  Description: Will remain free from falls  11/1/2021 0810 by Jas Francisco RN  Outcome: Ongoing

## 2021-11-01 NOTE — PROGRESS NOTES
to a large retroperitoneal and perinephric and subcapsular hematoma after a fall. Patient reportedly stopped eliquis one month ago. ECG afib RVR, WBC 11.4, LA 2.8, afebrile, Trop <0.01, glucose 171, UA unremarkable, CT head no acute abnormality, csvd, CXR stable\". Pain:  Pain Assessment  Pain Assessment: 0-10  Pain Level: 0    Reason for Referral  Natasha Trujillo was referred for a bedside swallow evaluation to assess the efficiency of his swallow function, identify signs and symptoms of aspiration and make recommendations regarding safe dietary consistencies, effective compensatory strategies, and safe eating environment. Impression  Dysphagia Diagnosis: Swallow function appears grossly intact  Dysphagia Outcome Severity Scale: Level 6: Within functional limits/Modified independence   Pt seen upright in bed, alert and agreeable to evaluation, pleasantly confused (oriented to self and place, \"hospital\" only). Pt unable to provide correct year given f2, min cues for month (*of note, pt has dementia at baseline per chart). He was able to follow basic commands throughout evaluation, increased processing time required at times. No instances of paraphasias, nonsensical speech, dysarthria, etc noted during evaluation -- will complete to monitor and complete formal speech/lang/cog evaluation in the future as indicated.         Medical record review/interview:   Predisposing dysphagia risk factors: Hx of CVA, Cognitive Deficit and Age  Clinical signs of possible chronic dysphagia: N/A  Precipitating dysphagia risk factors: N/A    Vitals/labs:   Temp: n/a  SpO2: 92%  RR: 16/min  BP: 135/75  HR: 57  WBC: 7.6     Laryngeal function exam:   Secretions: n/a  Vocal quality: adequate  MPT: DNT  S/Z ratio: DNT  Pitch range: DNT  Cough: perceptually strong    Oral Care Status: adequate    PO trials:   IDDSI 0 (thin):   - Cup/straw: no anterior bolus loss , swallow timing subjectively appears timely, dry vocal quality and vitals stable. x1 immediate cough post-swallow with TL via cup. No further coughing, no throat clearing, no wet vocal quality, no change in O2 / RR throughout remainder of TL trials. IDDSI 4 (puree): Pt declined  IDDSI 7 (regular): no anterior bolus loss , suspect functional A-P bolus transit, grossly functional mastication, oral clearance grossly WFL, no clinical s/s of aspiration and vitals stable    Swallow prognosis is good. Instrumental swallow study is not indicated. Given tolerance to PO at bedside, lack of clinical s/s of aspiration at bedside, stable respiratory status, clear chest radiography, willingness to participate in therapy and caregiver support, pt is safe for oral diet at this time      Treatment Plan  Requires SLP Intervention: Yes  Duration/Frequency of Treatment: 1-2x/week for LOS  D/C Recommendations: To be determined (per PT/OT recs)  Referral To: Speech Evaluation (Continue to monitor; per chart pt has dementia at baseline)    Recommended Diet and Intervention  Diet Solids Recommendation: Regular  Liquid Consistency Recommendation: Thin  Recommended Form of Meds: PO  Recommendations: Dysphagia treatment  Therapeutic Interventions: Diet tolerance monitoring;Patient/Family education    Compensatory Swallowing Strategies  Compensatory Swallowing Strategies: Alternate solids and liquids;Eat/Feed slowly;Upright as possible for all oral intake;Remain upright for 30-45 minutes after meals;Small bites/sips    Treatment/Goals  Short-term Goals  Timeframe for Short-term Goals: 2 days (11/3/21)  Long-term Goals  Timeframe for Long-term Goals: 3 days (11/4/21)  Goal 1: The pt will tolerate safest and least restrictive diet without clinical s/s of aspiration or change in respiratory status. Dysphagia Goals: The patient will tolerate recommended diet without observed clinical signs of aspiration; The patient/caregiver will demonstrate understanding of compensatory strategies for improved swallowing safety. General  Chart Reviewed: Yes  Comments: Pt admitted d/t AMS, aphasia, fall. Pt has PMHx of CVA (2000) per chart and HTN. Behavior/Cognition: Alert; Cooperative;Pleasant mood;Confused  Respiratory Status: Room air  O2 Device: None (Room air)  Communication Observation: Functional (Pleasantly confused)  Follows Directions: Simple  Dentition: Adequate  Patient Positioning: Upright in bed  Baseline Vocal Quality: Normal  Prior Dysphagia History: Pt seen by ST in [facility] ARU in May 2019 and was on a Regular solid diet with thin liquids. Pt seen by ST for cognitive tx only (mod-severe cog deficits at that time). Consistencies Administered: Reg solid; Thin - cup; Thin - straw    Vision/Hearing  Vision  Vision: Impaired  Vision Exceptions: Wears glasses at all times  Hearing  Hearing: Exceptions to [facility]  Hearing Exceptions: Hard of hearing/hearing concerns    Oral Motor Deficits  Oral/Motor  Oral Motor: Within functional limits (Delayed responses for command following at times)    Oral Phase Dysfunction  Oral Phase  Oral Phase: WFL     Indicators of Pharyngeal Phase Dysfunction   Pharyngeal Phase  Pharyngeal Phase: Exceptions  Indicators of Pharyngeal Phase Dysfunction  Cough - Immediate: Thin - cup (x1)    Prognosis  Prognosis  Prognosis for safe diet advancement: good  Barriers to reach goals: age;cognitive deficits  Individuals consulted  Consulted and agree with results and recommendations: Patient;RN    Education  Patient Education:Pt educated on reason for referral, role of ST, assessment results and recommendations. Patient Education Response: Verbalizes understanding;Needs reinforcement; No evidence of learning  Safety Devices in place: Yes  Type of devices: Bed alarm in place;Call light within reach;Nurse notified; Left in bed       Therapy Time  SLP Individual Minutes  Time In: 8180  Time Out: 1130  Minutes: 16; dysphagia maria l Can M.S. Jessica Veliz  Speech-language pathologist  [id number]  Speech Desk Phone: [phone]

## 2021-11-01 NOTE — PROGRESS NOTES
Hospitalist Progress Note      PCP: Gabriela Riddle MD    Date of Admission: 10/30/2021    Chief Complaint: confusion/weakness    Hospital Course: 80 y.o. male with a PMH of HLD, HTN, PAF and CVA who presented to [facility] d/t confusion and fall this morning. Family not present in room upon evaluation and patient unable to provide history. He is alert, oriented to self, following commands. Per record, Family reports he has deteriorated over the last year with moderate confusion as his baseline, wheelchair-bound, and severe hearing loss. Chart review revealed a previous visit on 3/27/2020 that required transfer to  due to a large retroperitoneal and perinephric and subcapsular hematoma after a fall. Patient reportedly stopped eliquis one month ago. ECG afib RVR, WBC 11.4, LA 2.8, afebrile, Trop <0.01, glucose 171, UA unremarkable, CT head no acute abnormality, csvd, CXR stable. Subjective: no complaints this am, fell overnight Head CT no acute abnormality      Medications:  Reviewed    Infusion Medications    sodium chloride       Scheduled Medications    sodium chloride flush  5-40 mL IntraVENous 2 times per day    tamsulosin  0.4 mg Oral Daily    atorvastatin  20 mg Oral Daily    donepezil  10 mg Oral Nightly    finasteride  5 mg Oral Daily    metoprolol tartrate  25 mg Oral BID    enoxaparin  40 mg SubCUTAneous Daily     PRN Meds: sodium chloride flush, sodium chloride, polyethylene glycol, acetaminophen **OR** acetaminophen      Intake/Output Summary (Last 24 hours) at 11/1/2021 1658  Last data filed at 11/1/2021 1430  Gross per 24 hour   Intake 840 ml   Output 750 ml   Net 90 ml       Physical Exam Performed:    BP (!) 159/77   Pulse 67   Temp 98.3 °F (36.8 °C) (Oral)   Resp 18   Ht 5' 10\" (1.778 m)   Wt 216 lb (98 kg)   SpO2 94%   BMI 30.99 kg/m²     General appearance: A/Ox2, [place]  HEENT:  Normal cephalic, atraumatic without obvious deformity.  Pupils equal, round, and reactive to light. Extra ocular muscles intact. Conjunctivae/corneas clear. Neck: Supple, with full range of motion. No jugular venous distention. Trachea midline. Respiratory:  Normal respiratory effort. Diminished bases  Cardiovascular:  Irregular rate and rhythm  Abdomen: Soft, non-tender, non-distended with normal bowel sounds. Musculoskeletal:  No clubbing, cyanosis or edema bilaterally. THOMAS  Skin: Skin color-pale, texture, turgor normal.  No rashes or lesions. Neurologic:  No obvious focal deficits, speech clear, no droop, THOMAS, gen weak  Psychiatric:  Alert and oriented x2, poor insight  Capillary Refill: Brisk,3 seconds, normal  Peripheral Pulses: +2 palpable, equal bilaterally          Labs:   Recent Labs     10/30/21  0934 10/31/21  0620   WBC 11.4* 7.6   HGB 14.2 13.0*   HCT 43.3 39.1*    150     Recent Labs     10/30/21  0934 10/31/21  0620    140   K 4.2 4.2    109   CO2 22 26   BUN 17 15   CREATININE 1.3 1.1   CALCIUM 10.0 9.8     Recent Labs     10/30/21  0934 10/31/21  0620   AST 13* 13*   ALT 14 11   BILITOT 0.6 0.6   ALKPHOS 77 64     No results for input(s): INR in the last 72 hours. Recent Labs     10/30/21  0934   TROPONINI <0.01       Urinalysis:      Lab Results   Component Value Date    NITRU Negative 10/30/2021    WBCUA see below 03/27/2020    BACTERIA 3+ 03/27/2020    RBCUA >100 03/27/2020    BLOODU Negative 10/30/2021    SPECGRAV 1.020 10/30/2021    GLUCOSEU Negative 10/30/2021       Radiology:  CT HEAD WO CONTRAST   Final Result   No acute intracranial abnormality. CT HEAD WO CONTRAST   Final Result   No evidence of acute intracranial process. Moderate atrophy and chronic   small vessel ischemic changes noted.          XR CHEST PORTABLE   Final Result   No acute cardiopulmonary process demonstrated                 Assessment/Plan:    Active Hospital Problems    Diagnosis     Atrial fibrillation with RVR ([facility]) [I48.91]      AMS, likely 2/2 Dementia progression, less likely infectious etiology  -CT Brain: no acute abnormality, mod atrophy,   -CXR stable  -UA unremarkable  -LA 2.8->2.6 poss 2/2 dehydration/fall  -Trop <0.01  -wbc 11.4->7.6, covid neg, procal 0.08, afebrile  -Fall, repeat CT Brain No acute intracranial abnormality.   -MRI Brain in am    Afib RVR  -BB  -not on ac, eliquis reportedly stopped one mo ago, not a candidate d/t freq falls     Dementia  -Aricept     HTN  -Lisinopril     BPH  -Proscar/Flomax    MARCE  -nightly CPAP    DVT Prophylaxis: lovenox  Diet: ADULT DIET;  Regular  Code Status: Full Code    PT/OT Eval Status: consulted, rec SNF    Dispo - pending course    CRISTI Meléndez - CNP

## 2021-11-02 NOTE — PLAN OF CARE
Problem: Falls - Risk of:  Goal: Will remain free from falls  Description: Will remain free from falls  11/2/2021 1141 by Diego Guillory RN  Outcome: Ongoing     Problem: Skin Integrity:  Goal: Will show no infection signs and symptoms  Description: Will show no infection signs and symptoms  Outcome: Ongoing

## 2021-11-02 NOTE — PLAN OF CARE
Problem: Falls - Risk of:  Goal: Will remain free from falls  Description: Will remain free from falls  11/1/2021 2229 by Contreras Stapleton RN  Outcome: Ongoing   Pt will remain free from falls throughout hospital stay. Fall precautions in place, bed alarm on, bed in lowest position with wheels locked and side rails 2/4 up. Room door open and hourly rounding completed. Will continue to monitor throughout shift. Call light and bedside table within pt's reach.

## 2021-11-02 NOTE — PROGRESS NOTES
Physical Therapy  Facility/Department: [facility] A2 CARD TELEMETRY  Daily Treatment Note  NAME: Donnie Gaucher  : 1940  MRN: [id number]    Date of Service: 2021    Discharge Recommendations:  Subacute/Skilled Nursing Facility   PT Equipment Recommendations  Equipment Needed: No  Other: defer to next level of care    Assessment   Body structures, Functions, Activity limitations: Decreased functional mobility ; Decreased balance;Decreased strength;Decreased cognition;Decreased endurance;Decreased safe awareness  Assessment: Pt participated fairly well with PT this session; seen in conjunction with OT as a co-treat session given high assist requirements with gait in order to maximize rehab potential.  Pt still requiring Yaquelin x 1-2 with gait activities. LE's noted to fatigue and become shaky/unstable with amb >5-10 feet. ModA x 1-2 needed with transfers. Pt still quite confused and requiring frequent cueing for safety with functional mobility tasks. Continue to recommend SNF at D/C in light of current deficits. Treatment Diagnosis: Impaired functional mobility and gait  Specific instructions for Next Treatment: Progress ther ex and mobility as tolerated  Prognosis: Fair  Decision Making: Medium Complexity  PT Education: Goals; General Safety;PT Role;Orientation;Disease Specific Education;Plan of Care; Functional Mobility Training;Precautions; Injury Prevention;Transfer Training;Gait Training;Equipment  Patient Education: Disease-specific education: Importance of OOB mobility, safety with functional mobility and use of AD. Pt requires reinforcement secondary to impairee cognition, verbalizes some understanding. Barriers to Learning: Cognition, intermittent agitation  REQUIRES PT FOLLOW UP: Yes  Activity Tolerance: Patient Tolerated treatment well;Patient limited by endurance; Patient limited by cognitive status  Activity Tolerance: Treatment somewhat limited by pt's impaired cognition and increasing irritability at times. Pt at one point told therapist to \"Just stop talking and LISTEN to me! \"  Vitals at rest: BP = 190/95, HR = 76 bpm, O2 sat = 93% on room air. Post-amb:  BP = 169/91, HR = 82 bpm, O2 sat = 96%. Patient Diagnosis(es): The primary encounter diagnosis was Altered mental status, unspecified altered mental status type. Diagnoses of Fall, initial encounter, Elevated lactic acid level, Ketonuria, Volume depletion, Aphasia, and Atrial fibrillation, unspecified type [facility]) were also pertinent to this visit. has a past medical history of Hyperlipemia, Hypertension, Prostate enlargement, and Unspecified cerebral artery occlusion with cerebral infarction. has a past surgical history that includes Carotid endarterectomy; Foot surgery; Skin cancer excision; Eye surgery; Facial reconstruction surgery; Tonsillectomy; Colonoscopy; Colonoscopy (2014); and Mohs surgery. Restrictions  Restrictions/Precautions  Restrictions/Precautions: Fall Risk, General Precautions  Position Activity Restriction  Other position/activity restrictions: Up with assist, telemetry, high fall risk per nursing assessment, Miguelina-sys monitor in room     Subjective   General  Chart Reviewed: Yes  Additional Pertinent Hx: PMH of HLD, HTN, PAF and CVA; hx T8 SCI anterior cord syndrome,  Response To Previous Treatment: Patient with no complaints from previous session. Family / Caregiver Present: No  Referring Practitioner: CRISTI Francois CNP  Subjective  Subjective: Pt agreeable to work with PT this afternoon although very confused and intermittently irritable. Pt speaking nonsensically at times and very tangential.  General Comment  Comments: Pt resting in bed upon entry of PT, RN cleared pt for session  Pain Screening  Patient Currently in Pain: Denies       Orientation  Orientation  Overall Orientation Status: Impaired  Orientation Level: Oriented to person;Disoriented to place; Disoriented to time;Disoriented to situation     Objective   Bed mobility  Supine to Sit: Moderate assistance (moving toward L side, HOB elevated ~30 degrees, modA to lift trunk from [facility])  Scooting: Minimal assistance (to scoot forward to EOB and backward in chair)     Transfers  Sit to Stand: 2 Person Assistance (modA x 1-2)  Stand to sit: Moderate Assistance  Bed to Chair: 2 Person Assistance (modA x 1-2 using RW, moving from bed>toilet>chair)     Ambulation  Surface: level tile  Device: Rolling Walker  Assistance: 2 Person assistance (Yaquelin x 1 increasing to Yaquelin x 2 as pt fatigued)  Quality of Gait: Pt amb with slow ian with decreasing knee strength and stability noted as amb progressed, ultimately requiring assist x 2 during final ~3-4 feet of amb. Mod verbal/tactile cues needed for direction and safety -- eber. when turning/changing direction with RW.  Gait Deviations: Slow Ian;Decreased step length;Decreased step height;Shuffles  Distance: 2 x 10 feet     Balance  Posture: Fair  Sitting - Static: Good  Sitting - Dynamic: Good  Standing - Static: Fair  Standing - Dynamic: Fair;- (using RW)     Exercises  Comments: Deferred exercise this session due to increasing pt irritability.      AM-PAC Score  AM-PAC Inpatient Mobility Raw Score : 12 (11/02/21 1746)  AM-PAC Inpatient T-Scale Score : 35.33 (11/02/21 1746)  Mobility Inpatient CMS 0-100% Score: 68.66 (11/02/21 1746)  Mobility Inpatient CMS G-Code Modifier : CL (11/02/21 1746)    Goals  Short term goals  Time Frame for Short term goals: 1 week 11/07/21 (unless otherwise specified)  Short term goal 1: Pt will complete supine to/from sit with S  Short term goal 2: Pt will complete sit to/from stand with LRAD with CGA  Short term goal 3: Pt will ambulate 48' with RW with CGA without LOB  Short term goal 4: Pt will ascend/descend curb step with RW with Yaquelin without LOB  Short term goal 5: 11/05/21: Pt will participate in 12-15 reps BLE exercises to increase strength and increase I with

## 2021-11-02 NOTE — PROGRESS NOTES
Occupational Therapy  Facility/Department: [facility] A2 CARD TELEMETRY  Daily Treatment Note  NAME: Natasha Trujillo  : 1940  MRN: [id number]    Date of Service: 2021    Discharge Recommendations:  Subacute/Skilled Nursing Facility       Assessment   Performance deficits / Impairments: Decreased functional mobility ; Decreased ADL status; Decreased strength;Decreased safe awareness;Decreased cognition;Decreased high-level IADLs;Decreased balance  Assessment: Patient pleasantlly confused though able to follow commands for session. Patient sat EOB and donned long pants with Tawanna Rummer of 2 for toilet transfers, min of 1-2 pending cognition/safety with RW for mobility in/out of bathroom. Co-tx collaboration this date to safely meet goals and will have better occupational performance outcomes with in a co-treatment than 1:1 session. Pt would benefit from continued skilled occupational therapy to address ADLs, functional mobility, and safety while in acute care. OT Education: OT Role;Plan of Care;ADL Adaptive Strategies;Transfer Training;Orientation  Patient Education: disease specific: safe t/fs, safe mobility, general safety during hospitalization, fall prevention in hospital, seated ADL modification. Pt verbalized understanding but will need reinforcement. REQUIRES OT FOLLOW UP: Yes  Activity Tolerance  Activity Tolerance: Patient Tolerated treatment well  Activity Tolerance: Vitals: O2 97%, HR 84 bpm Bp161/96  Safety Devices  Safety Devices in place: Yes  Type of devices: Left in chair;Call light within reach; Chair alarm in place;Gait belt;Nurse notified         Patient Diagnosis(es): The primary encounter diagnosis was Altered mental status, unspecified altered mental status type. Diagnoses of Fall, initial encounter, Elevated lactic acid level, Ketonuria, Volume depletion, Aphasia, and Atrial fibrillation, unspecified type [facility]) were also pertinent to this visit.       has a past medical history of Hyperlipemia, Hypertension, Prostate enlargement, and Unspecified cerebral artery occlusion with cerebral infarction. has a past surgical history that includes Carotid endarterectomy; Foot surgery; Skin cancer excision; Eye surgery; Facial reconstruction surgery; Tonsillectomy; Colonoscopy; Colonoscopy (2014); and Mohs surgery. Restrictions  Restrictions/Precautions  Restrictions/Precautions: Fall Risk  Position Activity Restriction  Other position/activity restrictions: Up with assist, telemetry, 1.5 L O2 NC  Subjective   General  Chart Reviewed: Yes  Patient assessed for rehabilitation services?: Yes  Additional Pertinent Hx: Per CORDELL Taylor, \"28 y.o. male with a PMH of HLD, HTN, PAF and CVA who presented to [facility] d/t confusion and fall this morning. Family not present in room upon evaluation and patient unable to provide history. He is alert, oriented to self, following commands. Per record, Family reports he has deteriorated over the last year with moderate confusion as his baseline, wheelchair-bound, and severe hearing loss. Chart review revealed a previous visit on 3/27/2020 that required transfer to  due to a large retroperitoneal and perinephric and subcapsular hematoma after a fall. Patient reportedly stopped eliquis one month ago. ECG afib RVR, WBC 11.4, LA 2.8, afebrile, Trop <0.01, glucose 171, UA unremarkable, CT head no acute abnormality, csvd, CXR stable. \"  Response to previous treatment: Patient with no complaints from previous session  Family / Caregiver Present: No  Referring Practitioner: CRISTI Taylor CNP  Diagnosis: AMS  Subjective  Subjective: Pt in bed and agreeable to therapy. General Comment  Comments: RN approved therapy. Orientation  Orientation  Overall Orientation Status: Impaired  Orientation Level: Disoriented to place; Disoriented to time;Disoriented to situation;Oriented to person  Objective    ADL  LE Dressing:  Moderate assistance (long pants)  Toileting: Maximum assistance (for LE clothing mgmt)        Toilet Transfers  Toilet - Technique: Ambulating (with RW)  Equipment Used: Standard toilet  Toilet Transfer: 2 Person assistance; Moderate assistance     Transfers  Sit to stand: Moderate assistance;2 Person assistance  Stand to sit: Moderate assistance;2 Person assistance      Cognition  Overall Cognitive Status: Exceptions  Arousal/Alertness: Delayed responses to stimuli  Following Commands: Follows one step commands with increased time; Follows one step commands with repetition  Attention Span: Attends with cues to redirect; Difficulty dividing attention  Memory: Decreased recall of biographical Information;Decreased recall of recent events;Decreased short term memory  Safety Judgement: Decreased awareness of need for assistance;Decreased awareness of need for safety  Problem Solving: Assistance required to identify errors made;Decreased awareness of errors  Insights: Decreased awareness of deficits  Initiation: Requires cues for some  Sequencing: Requires cues for some         Plan   Plan  Times per week: 3-5x/week  Current Treatment Recommendations: Strengthening, Balance Training, Functional Mobility Training, Wheelchair Mobility Training, Safety Education & Training, Self-Care / ADL, Cognitive Reorientation, Patient/Caregiver Education & Training           AM-PAC Score        -[facility] Inpatient Daily Activity Raw Score: 16 (11/02/21 1727)  AM-PAC Inpatient ADL T-Scale Score : 35.96 (11/02/21 1727)  ADL Inpatient CMS 0-100% Score: 53.32 (11/02/21 1727)  ADL Inpatient CMS G-Code Modifier : CK (11/02/21 1727)    Goals  Short term goals  Time Frame for Short term goals: 1 week (11/6) unless stated otherwise. Short term goal 1: Pt will perform functional/w/c t/fs with mod Ax1 and AD PRN. -ongoing  Short term goal 2: Pt will UB dress with min A.-ongoing  Short term goal 3: Pt will perform BUE ther ex x15 to increase strength/endurance for functional activities. -ongoing  Patient Goals   Patient goals : Pt stated that he would like to brush his teeth. Therapy Time   Individual Concurrent Group Co-treatment   Time In       0226   Time Out       1655   Minutes       40    Total Treatment time: 40 mins       Elaina Swan OTR/L  If pt is unable to be seen after this session, please let this note serve as discharge summary. Please see case management note for discharge disposition. Thank you.

## 2021-11-02 NOTE — PROGRESS NOTES
Hospitalist Progress Note      PCP: Brian Hernandez MD    Date of Admission: 10/30/2021    Chief Complaint: confusion/weakness    Hospital Course: 80 y.o. male with a PMH of HLD, HTN, PAF and CVA who presented to [facility] d/t confusion and fall 10/30/21. He was unable to provide history. He was alert, oriented to self, following commands. Per record, Family reports he has deteriorated over the last year with moderate confusion as his baseline, wheelchair-bound, and severe hearing loss. Chart review revealed a previous visit on 3/27/2020 that required transfer to  due to a large retroperitoneal and perinephric and subcapsular hematoma after a fall. Patient reportedly stopped eliquis one month ago. ECG afib RVR, WBC 11.4, LA 2.8, afebrile, Trop <0.01, glucose 171, UA unremarkable, CT head no acute abnormality, csvd, CXR stable. Subjective:   no new complaints this am  Denies headaches, nausea or vomiting. No focal weakness or numbness. Normal speech. Medications:  Reviewed    Infusion Medications    sodium chloride       Scheduled Medications    sodium chloride flush  5-40 mL IntraVENous 2 times per day    tamsulosin  0.4 mg Oral Daily    atorvastatin  20 mg Oral Daily    donepezil  10 mg Oral Nightly    finasteride  5 mg Oral Daily    metoprolol tartrate  25 mg Oral BID    enoxaparin  40 mg SubCUTAneous Daily     PRN Meds: hydrALAZINE, sodium chloride flush, sodium chloride, polyethylene glycol, acetaminophen **OR** acetaminophen      Intake/Output Summary (Last 24 hours) at 11/2/2021 1133  Last data filed at 11/2/2021 0951  Gross per 24 hour   Intake 885 ml   Output 100 ml   Net 785 ml       Physical Exam Performed:    BP (!) 151/86   Pulse 79   Temp 98.2 °F (36.8 °C) (Oral)   Resp 20   Ht 5' 10\" (1.778 m)   Wt 216 lb (98 kg)   SpO2 94%   BMI 30.99 kg/m²     General appearance: A/Ox2, [place]  HEENT:  Normal cephalic, atraumatic without obvious deformity.  Pupils equal, round, and reactive to light. Extra ocular muscles intact. Conjunctivae/corneas clear. Neck: Supple, with full range of motion. No jugular venous distention. Trachea midline. Respiratory:  Normal respiratory effort. Diminished bases  Cardiovascular:  Irregular rate and rhythm  Abdomen: Soft, non-tender, non-distended with normal bowel sounds. Musculoskeletal:  No clubbing, cyanosis or edema bilaterally. THOMAS  Skin: Skin color-pale, texture, turgor normal.  No rashes or lesions. Neurologic:  No obvious focal deficits, speech clear, no droop, THOMAS, gen weak  Psychiatric:  Alert and oriented x2, poor insight  Capillary Refill: Brisk,3 seconds, normal  Peripheral Pulses: +2 palpable, equal bilaterally          Labs:   Recent Labs     10/31/21  0620 11/01/21  1740 11/02/21  0656   WBC 7.6 6.6 6.7   HGB 13.0* 13.5 14.1   HCT 39.1* 40.9 42.2    165 176     Recent Labs     10/31/21  0620 11/01/21  1740 11/02/21  0656    140 142   K 4.2 4.3 3.8    106 108   CO2 26 25 23   BUN 15 19 19   CREATININE 1.1 1.0 1.0   CALCIUM 9.8 9.6 10.0     Recent Labs     10/31/21  0620   AST 13*   ALT 11   BILITOT 0.6   ALKPHOS 64     No results for input(s): INR in the last 72 hours. No results for input(s): Yanira Comber in the last 72 hours. Urinalysis:      Lab Results   Component Value Date    NITRU Negative 10/30/2021    WBCUA see below 03/27/2020    BACTERIA 3+ 03/27/2020    RBCUA >100 03/27/2020    BLOODU Negative 10/30/2021    SPECGRAV 1.020 10/30/2021    GLUCOSEU Negative 10/30/2021       Radiology:  CT HEAD WO CONTRAST   Final Result   No acute intracranial abnormality. CT HEAD WO CONTRAST   Final Result   No evidence of acute intracranial process. Moderate atrophy and chronic   small vessel ischemic changes noted.          XR CHEST PORTABLE   Final Result   No acute cardiopulmonary process demonstrated         MRI BRAIN WO CONTRAST    (Results Pending)           Assessment/Plan:    Active Hospital Problems    Diagnosis     Atrial fibrillation with RVR (HCC) [I48.91]      AMS, likely 2/2 Dementia progression, less likely infectious etiology  -CT Brain: no acute abnormality, mod atrophy,   -CXR stable  -UA unremarkable  -LA 2.8->2.6 poss 2/2 dehydration/fall  -Trop <0.01  -wbc 11.4->7.6, covid neg, procal 0.08, afebrile      Acute ischemic infarct on cerebral MRI  Punctate acute ischemic infarct in the posterior right thalamus. Scattered foci of susceptibility artifact representing remote microhemorrhages in a distribution consistent with chronic hypertensive microangiopathy. Amyloid angiopathy in the differential  -Started on CVA pathway  -Will ask Neuro to see in am  -PT/OT/ social serv for DC planning      -Fall, likely mechanical or related to stroke . Afib RVR- still uncontrolled  -Start cardizem gtt- keep QE<767, systolic BP >86EFGM  -BB  -not on ac, eliquis reportedly stopped one mo ago, not a candidate d/t freq falls     Dementia  -Aricept     Hypertensive urgency  -On Lopressor 25mg po q12  -Add Lisinopril 10mg po qd (on allergies list BUT Pt was taking it at home)  -PRN Hydralazine     BPH  -Proscar/Flomax    MARCE  -nightly CPAP    DVT Prophylaxis: lovenox  Diet: ADULT DIET;  Regular  Code Status: Full Code    PT/OT Eval Status: consulted, rec SNF    Dispo - pending course    Keyla Byrne MD

## 2021-11-03 NOTE — CARE COORDINATION
Pt accepted to the [facility], now w/additional neuro testing. CM will follow and will assist as needed.  Richard Baca RN

## 2021-11-03 NOTE — PROGRESS NOTES
NP paged. \"Pt was in a-fib RVR. With one bolus dose of Cardizem pt's pulse now at 106 and below (as low as 70). Are we able to get another bolus instead of starting him on the Cardizem drip at this time? Thank you! \"

## 2021-11-03 NOTE — PROGRESS NOTES
Hospitalist Progress Note      PCP: Mica Rojas MD    Date of Admission: 10/30/2021    Chief Complaint: confusion/weakness    Hospital Course: 80 y.o. male with a PMH of HLD, HTN, PAF and CVA who presented to [facility] d/t confusion and fall 10/30/21. He was unable to provide history. He was alert, oriented to self, following commands. Per record, Family reports he has deteriorated over the last year with moderate confusion as his baseline, wheelchair-bound, and severe hearing loss. Chart review revealed a previous visit on 3/27/2020 that required transfer to  due to a large retroperitoneal and perinephric and subcapsular hematoma after a fall. Patient reportedly stopped eliquis one month ago. ECG afib RVR, WBC 11.4, LA 2.8, afebrile, Trop <0.01, glucose 171, UA unremarkable, CT head no acute abnormality, csvd, CXR stable. Subjective:   no new complaints this am  Denies headaches, nausea or vomiting. No focal weakness or numbness. Normal speech.       Medications:  Reviewed    Infusion Medications    dilTIAZem Stopped (11/03/21 0136)    sodium chloride       Scheduled Medications    lisinopril  10 mg Oral Daily    aspirin  81 mg Oral Daily    Or    aspirin  300 mg Rectal Daily    atorvastatin  80 mg Oral Nightly    sodium chloride flush  5-40 mL IntraVENous 2 times per day    tamsulosin  0.4 mg Oral Daily    donepezil  10 mg Oral Nightly    finasteride  5 mg Oral Daily    metoprolol tartrate  25 mg Oral BID    enoxaparin  40 mg SubCUTAneous Daily     PRN Meds: hydrALAZINE, ondansetron **OR** ondansetron, polyethylene glycol, sodium chloride flush, sodium chloride, acetaminophen **OR** acetaminophen      Intake/Output Summary (Last 24 hours) at 11/3/2021 1104  Last data filed at 11/3/2021 0938  Gross per 24 hour   Intake 360 ml   Output 300 ml   Net 60 ml       Physical Exam Performed:    BP (!) 155/90   Pulse 77   Temp 97.9 °F (36.6 °C) (Oral)   Resp 18   Ht 5' 10\" (1.778 m) Wt 216 lb (98 kg)   SpO2 93%   BMI 30.99 kg/m²     General appearance: A/Ox2, [place]  HEENT:  Normal cephalic, atraumatic without obvious deformity. Pupils equal, round, and reactive to light. Extra ocular muscles intact. Conjunctivae/corneas clear. Neck: Supple, with full range of motion. No jugular venous distention. Trachea midline. Respiratory:  Normal respiratory effort. Diminished bases  Cardiovascular:  Irregular rate and rhythm  Abdomen: Soft, non-tender, non-distended with normal bowel sounds. Musculoskeletal:  No clubbing, cyanosis or edema bilaterally. THOMAS  Skin: Skin color-pale, texture, turgor normal.  No rashes or lesions. Neurologic:  No obvious focal deficits, speech clear, no droop, THOMAS, gen weak  Psychiatric:  Alert and oriented x2, poor insight  Capillary Refill: Brisk,3 seconds, normal  Peripheral Pulses: +2 palpable, equal bilaterally          Labs:   Recent Labs     11/01/21  1740 11/02/21  0656 11/03/21  0817   WBC 6.6 6.7 6.4   HGB 13.5 14.1 13.3*   HCT 40.9 42.2 40.2*    176 185     Recent Labs     11/01/21  1740 11/02/21  0656    142   K 4.3 3.8    108   CO2 25 23   BUN 19 19   CREATININE 1.0 1.0   CALCIUM 9.6 10.0     No results for input(s): AST, ALT, BILIDIR, BILITOT, ALKPHOS in the last 72 hours. No results for input(s): INR in the last 72 hours. No results for input(s): Leticia Dross in the last 72 hours. Urinalysis:      Lab Results   Component Value Date    NITRU Negative 10/30/2021    WBCUA see below 03/27/2020    BACTERIA 3+ 03/27/2020    RBCUA >100 03/27/2020    BLOODU Negative 10/30/2021    SPECGRAV 1.020 10/30/2021    GLUCOSEU Negative 10/30/2021       Radiology:  MRI BRAIN WO CONTRAST   Final Result   1. Punctate acute ischemic infarct in the posterior right thalamus. 2. No acute intracranial hemorrhage or mass effect.    3. Scattered foci of susceptibility artifact representing remote   microhemorrhages in a distribution most consistent with chronic hypertensive   microangiopathy. Amyloid angiopathy is in the differential diagnosis but   thought less likely. These have increased in number since 2018. CT HEAD WO CONTRAST   Final Result   No acute intracranial abnormality. CT HEAD WO CONTRAST   Final Result   No evidence of acute intracranial process. Moderate atrophy and chronic   small vessel ischemic changes noted. XR CHEST PORTABLE   Final Result   No acute cardiopulmonary process demonstrated                 Assessment/Plan:  AMS, likely 2/2 Dementia progression, less likely infectious etiology  -CT Brain: no acute abnormality, mod atrophy,   -CXR stable  -UA unremarkable  -LA 2.8->2.6 poss 2/2 dehydration/fall  -Trop <0.01  -wbc 11.4->7.6, covid neg, procal 0.08, afebrile      Acute ischemic infarct on cerebral MRI  Punctate acute ischemic infarct in the posterior right thalamus. Scattered foci of susceptibility artifact representing remote microhemorrhages in a distribution consistent with chronic hypertensive microangiopathy. Amyloid angiopathy in the differential  -Started on CVA pathway  -Consulted  Neuro- awaiting neurovascular workup  -PT/OT/ social serv for DC planning      -Fall, likely mechanical or related to stroke . Afib RVR- still uncontrolled  -Start cardizem gtt- keep WK<326, systolic BP >19HGCA  -BB  -not on ac, eliquis reportedly stopped one mo ago, not a candidate d/t freq falls     Dementia  -Aricept  -check TSH/Ammonia/LFTs    Hypertensive urgency  -On Lopressor 25mg po q12  -increase Lisinopril to 20mg qo  -PRN Hydralazine     BPH  -Proscar/Flomax    MARCE  -nightly CPAP    DVT Prophylaxis: lovenox  Diet: ADULT DIET; Regular  Code Status: Full Code    PT/OT Eval Status: consulted, rec SNF.  Can be DCd in am.    Dispo - pending course    Sabra Gray MD Was A Bandage Applied: Yes

## 2021-11-03 NOTE — CONSULTS
Consult placed    Who: Dr. August Many added to treatment team  Date:11/3/2021,  Time:11:13 AM        Electronically signed by Josh Mcallister on 11/3/2021 at 11:13 AM

## 2021-11-03 NOTE — PROGRESS NOTES
RN placed call to pt's wife, Nidia Olivares, to inform her about discharge being pushed back to tomorrow to allow for further testing.

## 2021-11-03 NOTE — PROGRESS NOTES
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  DATE ONSET: 10/30/21    Date of Eval: 11/3/2021   Evaluating Therapist: MARIANO Reed    RECENT RESULTS MRI of the Brain (21): Impression   1. Punctate acute ischemic infarct in the posterior right thalamus. 2. No acute intracranial hemorrhage or mass effect. 3. Scattered foci of susceptibility artifact representing remote   microhemorrhages in a distribution most consistent with chronic hypertensive   microangiopathy.  Amyloid angiopathy is in the differential diagnosis but   thought less likely.  These have increased in number since 2018.         Primary Complaint: n/a    Pain:  Pain Assessment  Pain Assessment: 0-10  Pain Level: 0    Assessment:  Aphasia Diagnosis: The pt demonstrates moderate expressive Aphasia marked by poor verbal initiation, impaired thought formulation and limited verbal expression output. The pt has frequent hesitations when speaking due to word-finding difficulty and impaired thought organization. The pt struggled with structure verbal expression as well including divergent naming.  When asked to name as many animals as he could in 60 seconds he was only able label 2 animals, one of which was mentioned earlier during the session by the therapist during a different task, Moderate receptive Aphasia is also present with significantly slow and reduced auditory processing of conversation, basic commands and basic questions. The pt often looks at the other speaker without responding partially due to poor processing and partially due to poor verbal initiation. Reading and writing reflect his Aphasia. See the rest of this report for more details. Cognitive Diagnosis:    The pt demonstrates significant cognitive deficits. He was oriented x self only. Noted significant difficulty with attention and memory. The pt struggled to recall new information 5 minutes later, even with category cues and multiple choice. Flat affect was noted throughout. The pt's Aphasia likely impacts the pt's performance toward cognitive tasks to an extent. Recommendations:  Requires SLP Intervention: Yes  Duration/Frequency of Treatment: 2-4x/week for LOS  D/C Recommendations: To be determined     Plan:   Goals:  Short-term Goals  Timeframe for Short-term Goals: 14 days (11/17/21)  Goal 1: The pt will complete basic verbal description tasks with 90% accuracy, mod cues  Goal 2: The pt will complete basic divergent and convergent naming tasks with 89% accuracy, mod cues  Goal 3: The pt will recall 4 pieces of new information after a 5 minute delay, max cues to encode and no cues to recall  Goal 4: The pt will complete graded attention based tasks with 80% accuracy, min-mod cues  Long-term Goals  Timeframe for Long-term Goals: 14 days (11/17/21)  Goal 1: The pt will improve his functional language, communication and cognitive abilities to baseline level of function   Patient/family involved in developing goals and treatment plan: yes; the pt    Subjective:   Previous level of function and limitations: No reported Aphasia or degree of cognitive deficits prior to CVA and admission  General  Chart Reviewed: Yes  Patient assessed for rehabilitation services?: Yes  Family / Caregiver Present: No  Subjective  Subjective: The nurse approved for the therapist to see this pt this AM for today's evaluation.  The pt had a flat affect throughout and was delayed in response     Vision  Vision: Impaired  Vision Exceptions: Wears glasses at all times  Hearing  Hearing: Exceptions to [facility]  Hearing Exceptions: Hard of hearing/hearing concerns     Objective:  Oral/Motor  Oral Motor: Within functional limits    Auditory Comprehension  Comprehension: Exceptions  Basic Questions: Mild  Complex Questions: Moderate  One Step Basic Commands: Mild  Two Step Basic Commands: Moderate  Multistep Basic Commands: Severe  Complex/Abstract Commands: Severe  Conversation: Moderate  Interfering Components: Hearing; Working memory;Processing speed  Effective Techniques: Extra processing time;Repetition    Reading Comprehension  Reading Status: Exceptions to [facility] (The pt struggled to read single words this date; with prolonged hesitations before attempting to read)    Expression  Primary Mode of Expression: Verbal    Verbal Expression  Verbal Expression: Exceptions to functional limits  Initiation: Severe  Convergent: Mild  Divergent: Severe  Responsive: Moderate  Conversation: Moderate  Interfering Components: Attention; Impaired thought organization  Effective Techniques: Provide extra time; Word retrived strategies    Written Expression  Dominant Hand: Right  Written Expression: Exceptions to [facility]  Legibility Impairment Severity: Moderate  Dictation Impairment Severity: Phrase    Motor Speech  Motor Speech: Within Functional Limits    Pragmatics/Social Functioning  Pragmatics: Exceptions to [facility]  Affect: Severe  Eye Contact: Mild    Cognition:   Orientation  Overall Orientation Status: Impaired  Orientation Level: Disoriented to place; Disoriented to time;Disoriented to situation;Oriented to person  Attention  Attention: Exceptions to [facility]  Selective Attention: Moderate  Sustained Attention: Moderate  Memory  Memory: Exceptions to [facility]  Short-term Memory: Severe  Working Memory: Severe  Problem Solving  Problem Solving: Exceptions to [facility]  Complex Functional Tasks: Severe  Simple Functional Tasks: Moderate  Verbal Reasoning Skills: Moderate  Numeric Reasoning  Numeric Reasoning: Unable to assess  Abstract Reasoning  Abstract Reasoning: Unable to assess  Safety/Judgement  Safety/Judgement: Unable to assess    Prognosis:  Speech Therapy Prognosis  Prognosis: Guarded  Prognosis Considerations: Age; Co-Morbidities; Medical Prognosis; Severity of Impairments  Individuals consulted  Consulted and agree with results and recommendations: Patient;RN    Education:  Patient Education: Education given re: results, recommendations and POC  Patient Education Response: Verbalizes understanding;Needs reinforcement; No evidence of learning  Safety Devices in place: Yes  Type of devices: Bed alarm in place;Call light within reach; Left in bed    Therapy Time:   Individual Concurrent Group Co-treatment   Time In [address]         Time Out 1151         Minutes Ashley [facility]Saint [facility]  [id number]  Speech-Language Pathologist  Phone: [phone]  Speech Desk: [phone]     11/3/2021 2:52 PM

## 2021-11-03 NOTE — PROGRESS NOTES
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 Trials: Thin   x ~4 oz of water via cup and straw   Nectar       Honey       Puree       Solid   x 1/2 package of ricardo crackers     Impressions: The pt was seen this afternoon for dysphagia treatment and a speech-language evaluation. See the attached Speech-Language evaluation report. The pt's MRI of the brain is noted. The pt continues to demonstrate tolerance of thin liquids and regular solids. Mastication and bolus prep was [facility]. There were no s/s of aspiration. RR remained around 16 throughout. Vocal quality remained dry and consistent. Recommendations:  Continue a regular texture diet with thin liquids    Patient/Family/Caregiver Education:  Education was given re: results and recommendations    Treatment/Goals  Dysphagia Goals  Short-term Goals:  1.  The patient will tolerate recommended diet without observed clinical signs of aspiration: 11/3: Progressing  The patient/caregiver will demonstrate understanding of compensatory strategies for improved swallowing safety: 11/3: Ongoing; Difficulty recalling 2/2 Aphasia and cognitive deficits    Timeframe for Short-term Goals: 2 days (11/3/21)  Long-term Goals  Timeframe for Long-term Goals: 3 days (11/4/21)  Goal 1: The pt will tolerate safest and least restrictive diet without clinical s/s of aspiration or change in respiratory status: 11/3: Progressing      Short-term Goals  Timeframe for Short-term Goals: 14 days (11/17/21)  Goal 1: The pt will complete basic verbal description tasks with 90% accuracy, mod cues  Goal 2: The pt will complete basic divergent and convergent naming tasks with 89% accuracy, mod cues  Goal 3: The pt will recall 4 pieces of new information after a 5 minute delay, max cues to encode and no cues to recall  Goal 4: The pt will complete graded attention based tasks with 80% accuracy, min-mod cues  Long-term Goals  Timeframe for Long-term Goals: 14 days (11/17/21)  Goal 1: The pt will improve his functional language, communication and cognitive abilities to baseline level of function   Patient/family involved in developing goals and treatment plan: yes; the pt    Compensatory Strategies:  Compensatory Swallowing Strategies: Alternate solids and liquids;Eat/Feed slowly;Upright as possible for all oral intake;Remain upright for 30-45 minutes after meals;Small bites/sips    Plan:  Continued daily Dysphagia treatment with goals per current plan of care. If pt is discharged prior to next follow-up, this treatment note will serve as discharge summary.      Total Treatment Time / Charges     Time in Time out Total Time / units   Cognitive Tx         Speech Tx      Dysphagia Tx 1140 1151 11 min / 1 unit   Eval Speech Sound Comp 1122 1140 18 min / 1 unit         Wilfrido Orta, [facility]  Speech-Language Pathologist  Phone: [phone]  Speech Desk: [phone]

## 2021-11-03 NOTE — CONSULTS
In patient Neurology consult        [facility] Neurology      MD Gege El  1940    Date of Service: 11/3/2021    Referring Physician: Mohit Norman MD      Reason for the consult and CC: Acute CVA     HPI:   The patient is a 80 y.o.  male, with a PMH of dementia, HTN, PAF, and HLD, who presented to [facility] following a fall. He was noted to be in A. Fib with RVR on arrival to the hospital.  He was previously on Eliquis as an outpatient, but stopped it one month ago due to frequent falls. A MRI of the brain was completed and revealed a punctate infarct in the right thalamus. We were consulted for further recommendations. The patient is confused and unable to provide any history. History reviewed. No pertinent family history.   Past Surgical History:   Procedure Laterality Date    CAROTID ENDARTERECTOMY      right    COLONOSCOPY      COLONOSCOPY  2014    polyps    EYE SURGERY      FACIAL RECONSTRUCTION SURGERY      metal plate under left eye    FOOT SURGERY      nerve right    MOHS SURGERY      SKIN CANCER EXCISION      face x several    TONSILLECTOMY          Past Medical History:   Diagnosis Date    Hyperlipemia     Hypertension     Prostate enlargement     Unspecified cerebral artery occlusion with cerebral infarction 2000     Social History     Tobacco Use    Smoking status: Never Smoker    Smokeless tobacco: Never Used   Vaping Use    Vaping Use: Never used   Substance Use Topics    Alcohol use: No    Drug use: No     Allergies   Allergen Reactions    Corn-Containing Products Other (See Comments)     Nasal congestion     Current Facility-Administered Medications   Medication Dose Route Frequency Provider Last Rate Last Admin    [START ON 11/4/2021] lisinopril (PRINIVIL;ZESTRIL) tablet 20 mg  20 mg Oral Daily Mohit Norman MD        hydrALAZINE (APRESOLINE) injection 5 mg  5 mg IntraVENous Q6H PRN Mohit Norman MD   5 mg at 11/02/21 9603 ondansetron (ZOFRAN-ODT) disintegrating tablet 4 mg  4 mg Oral Q8H PRN Chandana Valenzuela MD        Or    [facility] injection 4 mg  4 mg IntraVENous Q6H PRN Chandana Valenzuela MD        polyethylene glycol (GLYCOLAX) packet 17 g  17 g Oral Daily PRN Chandana Valenzuela MD        aspirin EC tablet 81 mg  81 mg Oral Daily Chandana Valenzuela MD   81 mg at 11/03/21 0920    Or    aspirin suppository 300 mg  300 mg Rectal Daily Chandana Valenzuela MD        atorvastatin (LIPITOR) tablet 80 mg  80 mg Oral Nightly Chandana Valenzuela MD        dilTIAZem 125 mg in dextrose 5 % 125 mL infusion  5-15 mg/hr IntraVENous Continuous Verónica Bonilla MD   Held at 11/03/21 0136    sodium chloride flush 0.9 % injection 5-40 mL  5-40 mL IntraVENous 2 times per day Liliana Large, APRN - CNP   10 mL at 11/03/21 0920    sodium chloride flush 0.9 % injection 5-40 mL  5-40 mL IntraVENous PRN Liliana Large, APRN - CNP        0.9 % sodium chloride infusion  25 mL IntraVENous PRN Liliana Large, APRN - CNP        acetaminophen (TYLENOL) tablet 650 mg  650 mg Oral Q6H PRN Liliana Large, APRN - CNP   650 mg at 10/30/21 2145    Or    acetaminophen (TYLENOL) suppository 650 mg  650 mg Rectal Q6H PRN Liliana Large, APRN - CNP        tamsulosin (FLOMAX) capsule 0.4 mg  0.4 mg Oral Daily Liliana Large, APRN - CNP   0.4 mg at 11/03/21 0920    donepezil (ARICEPT) tablet 10 mg  10 mg Oral Nightly Liliana Large, APRN - CNP   10 mg at 11/02/21 2103    finasteride (PROSCAR) tablet 5 mg  5 mg Oral Daily Liliana Large, APRN - CNP   5 mg at 11/03/21 0920    metoprolol tartrate (LOPRESSOR) tablet 25 mg  25 mg Oral BID Liliana Large, APRN - CNP   25 mg at 11/03/21 0920    enoxaparin (LOVENOX) injection 40 mg  40 mg SubCUTAneous Daily Liliana Large, APRN - CNP   40 mg at 11/03/21 0920       ROS : A 10-14 system review of constitutional, cardiovascular, respiratory, eyes, musculoskeletal, endocrine, GI, ENT, skin, hematological, genitourinary, psychiatric and neurologic systems was obtained and updated today and is unremarkable except as mentioned in my HPI      Exam:     Constitutional:   Vitals:    11/02/21 2358 11/03/21 0529 11/03/21 0830 11/03/21 1227   BP: 130/84 (!) 162/89 (!) 155/90 (!) 159/83   Pulse: 70 74 77 66   Resp: 18 18 18 16   Temp: 97.6 °F (36.4 °C) 98.1 °F (36.7 °C) 97.9 °F (36.6 °C) 97.7 °F (36.5 °C)   TempSrc: Oral Oral Oral Oral   SpO2: 91% 93% 93% 92%   Weight:       Height:           General appearance and observation: Normal development and appear in no acute distress. Neck: supple  Cardiovascular: No lower leg edema with good pulsation. Mental Status:   Oriented to person only. Poor memory. Poor attention span and concentration. Language: intact naming, repeating and fluency   Poor fund of Knowledge. Cranial Nerves:   II: Visual fields: Full. Pupils: equal, round, reactive to light  III,IV,VI: Extra Ocular Movements are intact. No nystagmus  V: Facial sensation is intact  VII: Facial strength and movements: intact and symmetric  VIII: Hearing: Intact  IX: Palate elevation is symmetric  XI: Shoulder shrug is intact  XII: Tongue movements are normal  Musculoskeletal: 5/5 in all 4 extremities. Tone: Normal tone. Reflexes: Symmetric 2+ in the arms and 2+ in the legs   Planters: flexor bilaterally. Coordination: no pronator drift, no dysmetria with FNF in upper extremities. Normal REM. Sensation: normal to all modalities in both arms and legs.   Gait/Posture: did not test gait    Data:  LABS:   Lab Results   Component Value Date     11/02/2021    K 3.8 11/02/2021     11/02/2021    CO2 23 11/02/2021    BUN 19 11/02/2021    CREATININE 1.0 11/02/2021    GFRAA >60 11/02/2021    GFRAA >60 06/14/2013    LABGLOM >60 11/02/2021    GLUCOSE 138 11/02/2021    MG 2.30 07/20/2019    CALCIUM 10.0 11/02/2021     Lab Results   Component Value Date    WBC 6.4 11/03/2021    RBC 4.24 11/03/2021    HGB 13.3 11/03/2021    HCT 40.2 11/03/2021 MCV 94.8 11/03/2021    RDW 14.7 11/03/2021     11/03/2021     Lab Results   Component Value Date    INR 1.24 (H) 03/27/2020    PROTIME 14.4 (H) 03/27/2020       Neuroimaging was independently reviewed by myself and discussed results with the patient and/or family  Reviewed notes from different physicians  Reviewed lab and blood testing    Impression:    Acute, punctate, right thalamic CVA - likely ischemic. A. Fib with RVR  HTN, uncontrolled. HLD, controlled. LDL 53  Dementia    Recommendation:    Recommend ECHO and carotid US. Monitor on tele. Continue home statin. Continue home BP meds. F/u A1c. Start ASA 81 mg daily. Was deemed to be a poor candidate for [facility] given history of dementia and frequent falls. PT/OT  Continue aricept. Supportive care for dementia. High risk for hospital acquired delirium. Thank you for referring such patient. If you have any questions regarding my consult note, please don't hesitate to call me. Carlos Curran, RUSS    Attending Supervising Physician's [address] Statement   I reviewed and agree with the findings and plan as documented in NP consult note   The patient was admitted for recurrent falling. Baseline dementia. Further work-up with MRI showed acute thalamic stroke on the right side. History of A. fib used to be on Eliquis which was discontinued due to high risk for falling and dementia. Continue aspirin for now  Need to address the benefit/risk ratio with caregiver regarding use of anticoagulation giving recent stroke. PT OT  Aspiration precaution  DC planning when medically stable. Electronically signed by Eric Soares MD on 11/3/21 at 6:17 PM EDT     This dictation was generated by voice recognition computer software.  Although all attempts are made to edit the dictation for accuracy, there may be errors in the  transcription that are not intended

## 2021-11-04 NOTE — PROGRESS NOTES
Physical Therapy  Facility/Department: [facility] A2 CARD TELEMETRY  Daily Treatment Note  NAME: Anoop Amaya  : 1940  MRN: [id number]    Date of Service: 2021    Discharge Recommendations:  Subacute/Skilled Nursing Facility   PT Equipment Recommendations  Equipment Needed: No  Other: defer to next level of care    Assessment   Body structures, Functions, Activity limitations: Decreased functional mobility ; Decreased balance;Decreased strength;Decreased cognition;Decreased endurance;Decreased safe awareness  Assessment: Pt participated fairly well with PT this session; seen in conjunction with OT as a co-treat session given high assist requirements with gait in order to maximize rehab potential.  Pt still requiring Yaquelin x 1-2 with gait activities. LE's noted to fatigue and become shaky/unstable with amb >5-10 feet. ModA x 1-2 needed with transfers. Pt still quite confused and requiring frequent cueing for safety with functional mobility tasks. Continue to recommend SNF at D/C in light of current deficits. Treatment Diagnosis: Impaired functional mobility and gait  Specific instructions for Next Treatment: Progress ther ex and mobility as tolerated  Prognosis: Fair  Decision Making: Medium Complexity  PT Education: Goals; General Safety;PT Role;Orientation;Disease Specific Education;Plan of Care; Functional Mobility Training;Precautions; Injury Prevention;Transfer Training;Gait Training;Equipment  Patient Education: Disease-specific education: Importance of OOB mobility, safety with functional mobility and use of AD. Pt requires reinforcement secondary to impaired cognition, verbalizes some understanding. Barriers to Learning: Cognition, intermittent agitation/irritability  REQUIRES PT FOLLOW UP: Yes  Activity Tolerance: Patient Tolerated treatment well;Patient limited by endurance; Patient limited by cognitive status  Activity Tolerance: Treatment somewhat limited by pt's impaired cognition and increasing irritability at times. Patient Diagnosis(es): The primary encounter diagnosis was Altered mental status, unspecified altered mental status type. Diagnoses of Fall, initial encounter, Elevated lactic acid level, Ketonuria, Volume depletion, Aphasia, and Atrial fibrillation, unspecified type [facility]) were also pertinent to this visit. has a past medical history of Hyperlipemia, Hypertension, Prostate enlargement, and Unspecified cerebral artery occlusion with cerebral infarction. has a past surgical history that includes Carotid endarterectomy; Foot surgery; Skin cancer excision; Eye surgery; Facial reconstruction surgery; Tonsillectomy; Colonoscopy; Colonoscopy (2014); and Mohs surgery. Restrictions  Restrictions/Precautions  Restrictions/Precautions: Fall Risk, General Precautions  Position Activity Restriction  Other position/activity restrictions: Up with assist, telemetry, high fall risk per nursing assessment, Miguelina-sys monitor in room  Subjective   General  Chart Reviewed: Yes  Additional Pertinent Hx: PMH of HLD, HTN, PAF and CVA; hx T8 SCI anterior cord syndrome,  Response To Previous Treatment: Patient with no complaints from previous session. Family / Caregiver Present: No  Referring Practitioner: CRISTI Mixon CNP  Subjective  Subjective: Pt agreeable to work with PT this afternoon although very confused and intermittently irritable.   General Comment  Comments: Pt resting in bed upon entry of PT, RN cleared pt for session  Pain Screening  Patient Currently in Pain: Denies     Vital Signs  Pulse: 64  Heart Rate Source: Monitor  BP: (!) 188/91  BP Location: Right lower arm  Patient Position: Semi fowlers  Patient Currently in Pain: Denies  Oxygen Therapy  SpO2: 94 %  Pulse Oximeter Device Mode: Intermittent  Pulse Oximeter Device Location: Finger;Left  O2 Device: None (Room air)       Orientation  Orientation  Overall Orientation Status: Impaired  Orientation Level: Oriented to person;Disoriented to place; Disoriented to time;Disoriented to situation    Objective   Bed mobility  Supine to Sit: Stand by assistance (moving toward L side, HOB elevated ~30 degrees)  Scooting: Stand by assistance     Transfers  Sit to Stand: 2 Person Assistance (modA x 1-2 depending on seat height surface and level of confusion)  Stand to sit: Moderate Assistance  Bed to Chair: 2 Person Assistance (Yaquelin x 1-2 using RW, moving from bed>toilet>chair, mod-max cues needed for safety)     Ambulation  Surface: level tile  Device: Rolling Walker  Assistance: 2 Person assistance (Yaquelin x 1-2)  Quality of Gait: Pt amb with slow kerri with decreasing knee strength and stability noted as amb progressed, ultimately requiring assist x 2 during final ~3-4 feet of amb. Mod verbal/tactile cues needed for direction and safety -- eber. when turning/changing direction with RW.  Gait Deviations: Slow Kerri;Decreased step length;Decreased step height;Shuffles  Distance: 2 x 10 feet     Balance  Posture: Fair  Sitting - Static: Good  Sitting - Dynamic: Good  Standing - Static: Fair  Standing - Dynamic: Fair;- (using RW)     Other Activities: Other (see comment)  Comment: Pt assisted into/out of bathroom midway through therapy session using RW with Yaquelin x 1-2. Max cues needed for safe technique when turning with RW. Pt required significant assist with joseph care and clothing management after having BM (see OT note for details).     AM-PAC Score  AM-PAC Inpatient Mobility Raw Score : 12 (11/04/21 1257)  AM-PAC Inpatient T-Scale Score : 35.33 (11/04/21 1257)  Mobility Inpatient CMS 0-100% Score: 68.66 (11/04/21 1257)  Mobility Inpatient CMS G-Code Modifier : CL (11/04/21 1257)    Goals  Short term goals  Time Frame for Short term goals: 1 week 11/07/21 (unless otherwise specified)  Short term goal 1: Pt will complete supine to/from sit with S  Short term goal 2: Pt will complete sit to/from stand with LRAD with CGA  Short term goal 3: Pt will ambulate 48' with RW with CGA without LOB  Short term goal 4: Pt will ascend/descend curb step with RW with Yaquelin without LOB  Short term goal 5: 11/05/21: Pt will participate in 12-15 reps BLE exercises to increase strength and increase I with funtional mobility and gait  Patient Goals   Patient goals : Pt is unable to state d/t cognition    Plan    Times per week: 3-5x/week in acute care  Times per day: Daily  Specific instructions for Next Treatment: Progress ther ex and mobility as tolerated  Current Treatment Recommendations: Strengthening, Home Exercise Program, ROM, Safety Education & Training, Balance Training, Endurance Training, Patient/Caregiver Education & Training, Functional Mobility Training, Transfer Training, Gait Training, Positioning, Equipment Evaluation, Education, & procurement  Safety Devices: All fall risk precautions in place, Left in chair, Call light within reach, Chair alarm in place, Nurse notified, Gait belt, Patient at risk for falls, Telesitter in use     Therapy Time   Individual Concurrent Group Co-treatment   Time In 1225         Time Out 1255         Minutes 30         Timed Code Treatment Minutes: [address] [place], [place], DPT #[id number]    If pt is unable to be seen after this session, please let this note serve as discharge summary. Please see case management note for discharge disposition. Thank you.

## 2021-11-04 NOTE — PROGRESS NOTES
Physician Progress Note      PATIENT:               Julia Ludwig  CSN #:                  [id number]  :                       1940  ADMIT DATE:       10/30/2021 8:56 AM  DISCH DATE:  RESPONDING  PROVIDER #:        Chele Haider MD          QUERY TEXT:    After study Patient admitted with  \"AMS, likely 2/2 Dementia progression -   noted to have \"Acute ischemic infarct on cerebral MRI \"  and chronic atrial   fibrillation. Neuro PN- \" Dr. Chuck Phillip recommended we resume Eliquis at a   lower dose of 2.5 mg BID. \"   If possible, please document in progress notes   and?discharge?summary if you are evaluating and/or treating any of the   following: The medical record reflects the following:?  ? Risk Factors: previous cva w/ aphasia , HTN, Dementia, uncontrolled afib  ? Clinical Indicators: Neuro consult- \" He was previously on Eliquis as an   outpatient, but stopped it one month ago due to frequent falls-Acute,   punctate, right thalamic CVA - likely ischemic this admission  Treatment: Neuro consult, CT, MRI, Recommend ECHO and carotid US. Monitor on   tele. Continue home statin. Continue home BP meds. F/u A1c. Start ASA 81 mg   daily. Was deemed to be a poor candidate for [facility] [place] given history of dementia and   frequent falls. PT/OT Continue aricept. Supportive care for dementia. Thank-You, Imani Arango RN, BSN, CCDS  Options provided:  -- Secondary hypercoagulable state in a patient with atrial fibrillation  -- Other - I will add my own diagnosis  -- Disagree - Not applicable / Not valid  -- Disagree - Clinically unable to determine / Unknown  -- Refer to Clinical Documentation Reviewer    PROVIDER RESPONSE TEXT:    This patient has secondary hypercoagulable state related to atrial   fibrillation.     Query created by: Nehemias Barrientos on 2021 2:10 PM      Electronically signed by:  Chele Haider MD 2021 4:04 PM

## 2021-11-04 NOTE — PROGRESS NOTES
Occupational Therapy  Facility/Department: [facility] A2 CARD TELEMETRY  Daily Treatment Note  NAME: Camilo Ribera  : 1940  MRN: [id number]    Date of Service: 2021    Discharge Recommendations:  Subacute/Skilled Nursing Facility       Assessment   Performance deficits / Impairments: Decreased functional mobility ; Decreased ADL status; Decreased strength;Decreased safe awareness;Decreased cognition;Decreased high-level IADLs;Decreased balance  Assessment: Patient oriented to self this date, mod of 2 for toilet transfers with min of 1-2 for mobility with RW in/out bathroom, decreased safety awareness letting go of RW prematurely at times. Patient continues to function below baseline and would  benefit from SNF at discharge to continue skilled OT services. OT Education: OT Role;Plan of Care;ADL Adaptive Strategies;Transfer Training;Orientation;Equipment  Patient Education: disease specific: safe t/fs, safe mobility, general safety during hospitalization, fall prevention in hospital, seated ADL modification. Pt verbalized understanding but will need reinforcement. REQUIRES OT FOLLOW UP: Yes  Activity Tolerance  Activity Tolerance: Patient Tolerated treatment well  Safety Devices  Safety Devices in place: Yes  Type of devices: Left in chair;Call light within reach; Chair alarm in place;Gait belt;Nurse notified         Patient Diagnosis(es): The primary encounter diagnosis was Altered mental status, unspecified altered mental status type. Diagnoses of Fall, initial encounter, Elevated lactic acid level, Ketonuria, Volume depletion, Aphasia, and Atrial fibrillation, unspecified type [facility]) were also pertinent to this visit. has a past medical history of Hyperlipemia, Hypertension, Prostate enlargement, and Unspecified cerebral artery occlusion with cerebral infarction. has a past surgical history that includes Carotid endarterectomy; Foot surgery; Skin cancer excision; Eye surgery;  Facial reconstruction surgery; Tonsillectomy; Colonoscopy; Colonoscopy (2014); and Mohs surgery. Restrictions  Restrictions/Precautions  Restrictions/Precautions: Fall Risk, General Precautions  Position Activity Restriction  Other position/activity restrictions: Up with assist, telemetry, high fall risk per nursing assessment, Miguelina-sys monitor in room  Subjective   General  Chart Reviewed: Yes  Patient assessed for rehabilitation services?: Yes  Additional Pertinent Hx: Per CORDELL Self, \"22 y.o. male with a PMH of HLD, HTN, PAF and CVA who presented to [facility] d/t confusion and fall this morning. Family not present in room upon evaluation and patient unable to provide history. He is alert, oriented to self, following commands. Per record, Family reports he has deteriorated over the last year with moderate confusion as his baseline, wheelchair-bound, and severe hearing loss. Chart review revealed a previous visit on 3/27/2020 that required transfer to  due to a large retroperitoneal and perinephric and subcapsular hematoma after a fall. Patient reportedly stopped eliquis one month ago. ECG afib RVR, WBC 11.4, LA 2.8, afebrile, Trop <0.01, glucose 171, UA unremarkable, CT head no acute abnormality, csvd, CXR stable. \"  Response to previous treatment: Patient with no complaints from previous session  Family / Caregiver Present: No  Referring Practitioner: CRISTI Self CNP  Diagnosis: AMS  Subjective  Subjective: Pt in bed and agreeable to therapy states needs to use the bathroom. General Comment  Comments: RN approved therapy. Pain: denies  Orientation   oriented to self only  Objective    ADL  Feeding: Setup  LE Dressing: Dependent/Total (donning briefs)  Toileting: Dependent/Total (after BM and clothing mgmt)        Toilet Transfers  Toilet - Technique: Ambulating (with RW)  Equipment Used: Standard toilet  Toilet Transfer: 2 Person assistance; Moderate assistance  Toilet Transfers Comments: cues for safety as pt letting go of walker prematurely     Bed mobility  Supine to Sit: Stand by assistance (with increased time to L with HOB elevated and use of L bed rail)  Sit to Supine: Unable to assess (up to chair at end of session)     Transfers  Sit to stand: Minimal assistance;2 Person assistance (from EOB with cues for safe hand placement up to RW)  Stand to sit: Moderate assistance;2 Person assistance      Cognition  Overall Cognitive Status: Exceptions  Arousal/Alertness: Delayed responses to stimuli  Following Commands: Follows one step commands with increased time; Follows one step commands with repetition  Attention Span: Attends with cues to redirect; Difficulty dividing attention  Memory: Decreased recall of biographical Information;Decreased recall of recent events;Decreased short term memory  Safety Judgement: Decreased awareness of need for assistance;Decreased awareness of need for safety  Problem Solving: Assistance required to identify errors made;Decreased awareness of errors  Insights: Decreased awareness of deficits  Initiation: Requires cues for some  Sequencing: Requires cues for some  Cognition Comment: Pt also is [place]. Plan   Plan  Times per week: 3-5x/week  Current Treatment Recommendations: Strengthening, Balance Training, Functional Mobility Training, Wheelchair Mobility Training, Safety Education & Training, Self-Care / ADL, Cognitive Reorientation, Patient/Caregiver Education & Training                        AM-PAC Score        AM-Located within [facility] Inpatient Daily Activity Raw Score: 15 (11/04/21 1243)  AM-PAC Inpatient ADL T-Scale Score : 34.69 (11/04/21 1243)  ADL Inpatient CMS 0-100% Score: 56.46 (11/04/21 1243)  ADL Inpatient CMS G-Code Modifier : CK (11/04/21 1243)    Goals  Short term goals  Time Frame for Short term goals: 1 week (11/6) unless stated otherwise. Short term goal 1: Pt will perform functional/w/c t/fs with mod Ax1 and AD PRN. -ongoing  Short term goal 2: Pt will UB dress with min A.-ongoing  Short term goal 3: Pt will perform BUE ther ex x15 to increase strength/endurance for functional activities. -ongoing  Patient Goals   Patient goals : Pt stated that he would like to brush his teeth. Therapy Time   Individual Concurrent Group Co-treatment   Time In 1225         Time Out 1255         Minutes 30         Timed Code Treatment Minutes: 30 Minutes       Nancie Bautista OTR/L  If pt is unable to be seen after this session, please let this note serve as discharge summary. Please see case management note for discharge disposition. Thank you.

## 2021-11-04 NOTE — DISCHARGE SUMMARY
Hospital Medicine Discharge Summary    Patient: Sahara Crespo     Gender: male  : 1940   Age: 80 y.o. MRN: [id number]    Admitting Physician: Leah Bello MD  Discharge Physician: Keyla Byrne MD     Code Status: Prior     Admit Date: 10/30/2021   Discharge Date:   21    Disposition:  DCd to a SNF- [facility]. Discharge Diagnoses:  1. AMS, likely 2/2 Dementia progression. Negative neurovascular workup. No infectious cause found. 2. Acute ischemic infarct on cerebral MRI- Punctate acute ischemic infarct in the posterior right thalamus. 3. Fall, likely mechanical or related to stroke   4. Afib RVR- controlled  5. Dementia-unchanged. 6. Hypertensive urgency  7. BPH  8. MARCE- nightly CPAP      Follow-up appointments:  2-3 weeks with PCP/his cardiologist.    Outpatient to do list: none    Condition at Discharge:  [facility] [place] [facility]:   80 y. o. male with a PMH of HLD, HTN, PAF and CVA who presented to [facility] d/t confusion and fall 10/30/21. He  was alert, oriented to self, following commands. Per family he has deteriorated over the last year with moderate confusion as his baseline, wheelchair-bound, and severe hearing loss. Adm on 3/27/2020 to  due to a fall and large retroperitoneal and perinephric and subcapsular hematoma after a fall.  Eliquis then was DCd. He had Afib with RVR on admission with no features of infection. He was managed per protocol with cardizem. Neurovascular workup showed an acute right thalamic infarct. Carotid dopplers and echocardiogram were within normal limits. He was restarted on low dose eliquis and DCd to a SNF.     Discharge Medications:   Discharge Medication List as of 2021  1:45 PM      START taking these medications    Details   ondansetron (ZOFRAN-ODT) 4 MG disintegrating tablet Take 1 tablet by mouth every 8 hours as needed for Nausea or Vomiting, Disp-10 tablet, R-2Normal           Discharge Medication List as of 11/4/2021  1:45 PM      CONTINUE these medications which have CHANGED    Details   terazosin (HYTRIN) 5 MG capsule Take 1 capsule by mouth nightly, Disp-30 capsule, R-3Normal      cyclobenzaprine (FLEXERIL) 5 MG tablet Take 1 tablet by mouth 2 times daily as needed for Muscle spasms, Disp-60 tablet, R-3Normal      !! vitamin D3 (CHOLECALCIFEROL) 10 MCG (400 UNIT) TABS tablet Take 1 tablet by mouth daily, Disp-30 tablet, R-0Normal      apixaban (ELIQUIS) 2.5 MG TABS tablet Take 1 tablet by mouth 2 times daily, Disp-60 tablet, R-3Normal       !! - Potential duplicate medications found. Please discuss with provider. Discharge Medication List as of 11/4/2021  1:45 PM      CONTINUE these medications which have NOT CHANGED    Details   !! VITAMIN D PO Take by mouthHistorical Med      Multiple Vitamins-Minerals (OCUVITE PO) Take by mouthHistorical Med      Calcium Carbonate Antacid (ANTACID E-X PO) Take by mouthHistorical Med      esomeprazole (NEXIUM) 20 MG delayed release capsule Take 20 mg by mouth every morning (before breakfast)Historical Med      divalproex (DEPAKOTE) 125 MG DR tablet 250 mg Historical Med      tamsulosin (FLOMAX) 0.4 MG capsule Take 0.4 mg by mouth dailyHistorical Med      donepezil (ARICEPT) 10 MG tablet Take 10 mg by mouth nightlyHistorical Med      metoprolol tartrate (LOPRESSOR) 25 MG tablet Take 2 tablets by mouth 2 times daily, Disp-120 tablet, R-0Print      lisinopril (PRINIVIL;ZESTRIL) 20 MG tablet Take 1 tablet by mouth daily, Disp-30 tablet, R-3Normal      simvastatin (ZOCOR) 80 MG tablet Take 40 mg by mouth nightly Historical Med       !! - Potential duplicate medications found. Please discuss with provider.         Discharge Medication List as of 11/4/2021  1:45 PM      STOP taking these medications       omeprazole (PRILOSEC) 20 MG delayed release capsule Comments:   Reason for Stopping:         finasteride (PROSCAR) 5 MG tablet Comments:   Reason for Stopping: Name       Doroteo Auguste YESICA   Date of Study      11/04/2021         Gender               Male   Patient Number     [id number]         Date of Birth        1940   Visit Number       [id number]          Age                  80 year(s)   Accession Number   [id number]         Room Number          0207   Corporate ID       [id number]           More Green, RT   Ordering Physician Adam Buck,  Interpreting         [address] [place] [address].                     CNP                Physician            Jitendra Cisneros MD  Procedure Type of Study   TTE procedure:ECHOCARDIOGRAM COMPLETE 2D W DOPPLER W COLOR. Procedure Date Date: 11/04/2021 Start: 09:11 AM Study Location: [address] [facility] - Echo Lab Technical Quality: Adequate visualization Indications:CVA. Patient Status: Routine Contrast Medium: Bubble Study. Height: 70 inches Weight: 216 pounds BSA: 2.16 m2 BMI: 30.99 kg/m2 HR: 67 bpm BP: 136/83 mmHg  Conclusions   Summary  Technically difficult study due to body surface area and poor acoustic  window. Normal left ventricular size with mild concentric left ventricular  hypertrophy. The left ventricular systolic function is normal with visually estimated  LVEF of 55%. No regional wall motion abnormalities. Grade I diastolic dysfunction with normal filling pressure. The right ventricle is normal in size and function. Compared to previous study from 11-8-2018 no changes noted in left  ventricular function. Mild mitral and aortic regurgitation. A bubble study was performed. The study was of good quality and fails to  show evidence of shunting. Systolic pulmonic artery pressure (SPAP) is normal estimated at 30 mmHg  (Right atrial pressure of 3 mmHg). Compared to the prior study performed 11/8/2018, No significant changes are  noted.    Signature   ------------------------------------------------------------------  Electronically signed by Jitendra Cisneros MD (Interpreting  physician) on 11/04/2021 at 12:59 PM  ------------------------------------------------------------------   Findings   Left Ventricle  Normal left ventricular size with mild concentric left ventricular  hypertrophy. The left ventricular systolic function is normal with visually estimated  LVEF of 55%. No regional wall motion abnormalities. Grade I diastolic dysfunction with normal filling pressure. Mitral Valve  Mild thickening of leaflets of mitral valve. Mild-moderate mitral annular calcification. No mitral stenosis. Mild mitral regurgitation. Left Atrium  Left atrium is of normal size. A bubble study was performed and fails to show evidence of shunting. Aortic Valve  Trileaflet aortic valve appears sclerotic but opens adequately. Mild aortic regurgitation. Aorta  The aortic root is normal in size. Right Ventricle  The right ventricle is normal in size and function. TAPSE is measured at 24 mm. S'prime velocity is measured at 16 cm/s. Tricuspid Valve  Tricuspid valve is structurally normal.  Trivial tricuspid regurgitation. Systolic pulmonic artery pressure (SPAP) is normal estimated at 30 mmHg  (Right atrial pressure of 3 mmHg). Right Atrium  The right atrium is normal in size at 13 cm2. Pulmonic Valve  The pulmonic valve is not well visualized. Mild pulmonic regurgitation present. Pericardial Effusion  There is a trivial localized near right ventricle pericardial effusion  noted. No tamponade physiology. Pleural Effusion  No pleural effusion. Miscellaneous  IVC size is normal (<2.1cm) and collapses > 50% with respiration consistent  with normal RA pressure (3mmHg). Technically difficult study due to body  surface area and poor acoustic window.   M-Mode/2D Measurements (cm)   LV Diastolic Dimension: 8.87 cm LV Systolic Dimension: 5.82 cm  LV Septum Diastolic: 1.3 cm  LV PW Diastolic: 4.44 cm        AO Root Dimension: 2.8 cm                                  AV Cusp Separation: 1.9 cm LA Dimension: 3.4 cm                                  LA Area: 10.7 cm2                                  LA volume/Index: 24.1 ml /11 ml/m2  Doppler Measurements   AV Peak Velocity: 151 cm/s     MV Peak E-Wave: 59.6 cm/s  AV Peak Gradient: 9.12 mmHg    MV Peak A-Wave: 87.8 cm/s                                 MV E/A Ratio: 0.68   TR Velocity:261 cm/s  TR Gradient:27.25 mmHg  Estimated RAP:3 mmHg  Estimated RVSP: 30 mmHg  E' Septal Velocity: 3.92 cm/s  E' Lateral Velocity: 4.79 cm/s  E/E' ratio: 13.8  PV Peak Velocity: 121 cm/s  PV Peak Gradient: 5.86 mmHg   Aortic Valve   Peak Velocity: 151 cm/s  Peak Gradient: 9.12 mmHg   AI P1/2t: 504 msec  Cusp Separation: 1.9 cm  Aorta   Aortic Root: 2.8 cm      CT HEAD WO CONTRAST    Result Date: 11/1/2021  EXAMINATION: CT OF THE HEAD WITHOUT CONTRAST  11/1/2021 4:30 am TECHNIQUE: CT of the head was performed without the administration of intravenous contrast. Dose modulation, iterative reconstruction, and/or weight based adjustment of the mA/kV was utilized to reduce the radiation dose to as low as reasonably achievable. COMPARISON: 10/30/2021 CT HISTORY: ORDERING SYSTEM PROVIDED HISTORY: Fall TECHNOLOGIST PROVIDED HISTORY: Reason for exam:->Fall Has a \"code stroke\" or \"stroke alert\" been called? ->No Reason for Exam: fall with possible head injury Acuity: Acute Type of Exam: Initial FINDINGS: BRAIN/VENTRICLES: The ventricles and sulci are prominent. Pattern is consistent with age-related atrophy. No extra-axial fluid collections, and no sign of recent intracranial hemorrhage. Decreased attenuation is noted within the periventricular white matter. Pattern is consistent with chronic small vessel ischemic change. No acute edema or mass effect. No mass lesions are detected. ORBITS: The visualized portion of the orbits demonstrate no acute abnormality. SINUSES: Mild mucosal thickening is partially observed in the ethmoid air cells and maxillary sinuses.  SOFT TISSUES/SKULL: No acute abnormality of the visualized skull or soft tissues. No acute intracranial abnormality. CT HEAD WO CONTRAST    Result Date: 10/30/2021  EXAMINATION: CT OF THE HEAD WITHOUT CONTRAST  10/30/2021 8:47 am TECHNIQUE: CT of the head was performed without the administration of intravenous contrast. Dose modulation, iterative reconstruction, and/or weight based adjustment of the mA/kV was utilized to reduce the radiation dose to as low as reasonably achievable. COMPARISON: March 27, 2020. HISTORY: ORDERING SYSTEM PROVIDED HISTORY: confusion TECHNOLOGIST PROVIDED HISTORY: Reason for exam:->confusion Has a \"code stroke\" or \"stroke alert\" been called? ->No Decision Support Exception - unselect if not a suspected or confirmed emergency medical condition->Emergency Medical Condition (MA) Reason for Exam: fall, confusion Acuity: Acute Type of Exam: Initial FINDINGS: BRAIN/VENTRICLES: The gyri and sulci have a normal appearance. There is moderate cortical and cerebellar volume loss with associated ex vacuo ventricular dilation. Moderate diffuse periventricular and subcortical deep white matter hypoattenuation noted, findings compatible with chronic small vessel ischemic disease. The gray-white matter differentiation is otherwise preserved throughout. There is no acute hemorrhage, mass, or mass effect. No evidence of acute territorial infarct. No abnormal extraaxial fluid collections. ORBITS:  The visualized portion of the orbits demonstrate no acute abnormality. SINUSES:  The mastoid air cells are normally aerated. The visualized paranasal sinuses are grossly clear. SOFT TISSUES/SKULL:  No significant abnormality of the visualized skull or soft tissues. No acute fracture. No scalp hematoma. No evidence of acute intracranial process. Moderate atrophy and chronic small vessel ischemic changes noted.      XR CHEST PORTABLE    Result Date: 10/30/2021  EXAMINATION: ONE XRAY VIEW OF THE CHEST 10/30/2021 9:00 am COMPARISON: 03/27/2020 HISTORY: ORDERING SYSTEM PROVIDED HISTORY: AMS TECHNOLOGIST PROVIDED HISTORY: Reason for exam:->AMS Reason for Exam: fall, AMS Acuity: Acute Type of Exam: Initial FINDINGS: Incomplete inspiration. Heart size borderline. Pulmonary vasculature appears within normal limits. Lungs grossly clear. Costophrenic angles sharp     No acute cardiopulmonary process demonstrated     VL DUP CAROTID BILATERAL    Result Date: 11/4/2021  Vascular Carotid Procedure -- PRELIMINARY SONOGRAPHER REPORT --   Demographics   Patient Name       Gregorio Duvale   Date of Study      11/04/2021         Gender              Male   Patient Number     [id number]         Date of Birth       1940   Visit Number       [id number]          Age                 80 year(s)   Accession Number   [id number]         Room Number         0207   Corporate ID       [id number]           Sonographer         Gagan Goss PADILLA   Ordering Physician Kimberly Mcnair,  Interpreting        [facility]                     CNP                Physician           Vascular  Procedure Type of Study:   Cerebral:Carotid, VL CAROTID DUPLEX BILATERAL. Tech Comments Right The right internal carotid artery reveals a <50% diameter reducing stenosis. The right vertebral artery demonstrates normal antegrade flow. The right subclavian artery is visualized and demonstrates turbulent flow. Left The left internal carotid artery reveals a <50% diameter reducing stenosis. The left vertebral artery demonstrates normal antegrade flow. The left subclavian artery is visualized and demonstrates multiphasic flow. Previous exam on 03/06/2019, <50% stenosis in bilateral internal carotid arteries. Velocities are measured in cm/s ; Diameters are measured in mm Carotid Right Measurements +---------------+----+----+-----+----+ ! Location       ! PSV ! EDV ! Angle! RI  ! +---------------+----+----+-----+----+ ! Prox CCA       !80.9!13. 6! 60   !0.83! +---------------+----+----+-----+----+ ! Mid CCA        !72. 7!16. 3!60   !0.78! +---------------+----+----+-----+----+ ! Dist CCA       !86. 3!15. 6!60   !0.82! +---------------+----+----+-----+----+ ! Prox ICA       !98. 6!20. 6! 60   !0.79! +---------------+----+----+-----+----+ ! Mid ICA        !104 !30. 9!60   !0.7 ! +---------------+----+----+-----+----+ ! Dist ICA       !114 !29. 2! 60   !0.74! +---------------+----+----+-----+----+ ! Prox ECA       !104 !17. 1! 60   !0.84! +---------------+----+----+-----+----+ ! Vertebral      !64. 8!17. 4!60   !0.73! +---------------+----+----+-----+----+ ! Prox Subclavian! 107 !0   !60   !1   ! +---------------+----+----+-----+----+   - There is antegrade vertebral flow noted on the right side. - Additional Measurements:ICAPSV/CCAPSV 1.57. ICAEDV/CCAEDV 2.27. Carotid Left Measurements +---------------+----+----+-----+----+ ! Location       ! PSV ! EDV ! Angle! RI  ! +---------------+----+----+-----+----+ ! Prox CCA       !91. 6!56. 9!60   !0.81! +---------------+----+----+-----+----+ ! Mid CCA        !90.4!19. 2! 60   !0.79! +---------------+----+----+-----+----+ ! Dist CCA       !58. 4!66  !60   !0.78! +---------------+----+----+-----+----+ ! Prox ICA       !66. 6!40. 8! 60   !0.39! +---------------+----+----+-----+----+ ! Mid ICA        !67. 3!15. 6!60   !0.77! +---------------+----+----+-----+----+ ! Dist ICA       !84. 3!24. 5!60   !0.71! +---------------+----+----+-----+----+ ! Prox ECA       !93.1!12. 2!60   !0.87! +---------------+----+----+-----+----+ ! Vertebral      !58. 2!49. 3!60   !0.81! +---------------+----+----+-----+----+ ! Prox Subclavian! 93.5!0   !60   !1   ! +---------------+----+----+-----+----+   - There is antegrade vertebral flow noted on the left side. - Additional Measurements:ICAPSV/CCAPSV 0.93. ICAEDV/CCAEDV 2.41.     MRI BRAIN WO CONTRAST    Result Date: 11/2/2021  EXAMINATION: MRI OF THE BRAIN WITHOUT CONTRAST 11/2/2021 3:32 pm TECHNIQUE: Multiplanar multisequence MRI of the brain was performed without the administration of intravenous contrast. COMPARISON: 08/01/2018 HISTORY: ORDERING SYSTEM PROVIDED HISTORY: AMS TECHNOLOGIST PROVIDED HISTORY: Reason for exam:->AMS FINDINGS: INTRACRANIAL STRUCTURES/VENTRICLES: Single punctate focus of diffusion restriction in the posterior right thalamus. No intracranial mass. Diffuse parenchymal volume loss with severe chronic white matter microvascular ischemic changes. Increased small foci of susceptibility artifact in the cerebellum, brainstem, basal ganglia and thalami, and to a lesser extent over the cerebral convexities. No mass effect or midline shift. No evidence of an acute intracranial hemorrhage. The ventricles and sulci are normal in size and configuration. The sellar/suprasellar regions appear unremarkable. The normal signal voids within the major intracranial vessels appear maintained. ORBITS: The visualized portion of the orbits demonstrate no acute abnormality. SINUSES: Mild scattered paranasal sinus mucoperiosteal thickening. No mastoid effusion. BONES/SOFT TISSUES: The bone marrow signal intensity appears normal. The soft tissues demonstrate no acute abnormality. 1. Punctate acute ischemic infarct in the posterior right thalamus. 2. No acute intracranial hemorrhage or mass effect. 3. 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 Amyloid angiopathy is in the differential diagnosis but thought less likely. These have increased in number since 08/01/2018. EKG     Rhythm: atrial fibrillation - controlled  Rate: normal  Clinical Impression: no acute changes        The patient was seen and examined on day of discharge and this discharge summary is in conjunction with any daily progress note from day of discharge. Time Spent on discharge is 30

## 2021-11-04 NOTE — PROGRESS NOTES
Natasha Cassandra  Neurology Follow-up  [facility] Neurology    Date of Service: 11/4/2021    Subjective:   CC: Follow up today regarding: Acute CVA    Events noted. Chart and lab reviewed. No new complaints. ROS : A 10-12 system review obtained and updated today and is unremarkable except as mentioned  in my interval history. family history is not on file.     Past Medical History:   Diagnosis Date    Hyperlipemia     Hypertension     Prostate enlargement     Unspecified cerebral artery occlusion with cerebral infarction 2000     Current Facility-Administered Medications   Medication Dose Route Frequency Provider Last Rate Last Admin    apixaban (ELIQUIS) tablet 5 mg  5 mg Oral BID Graylon Diamond, APRN - CNP        lisinopril (PRINIVIL;ZESTRIL) tablet 20 mg  20 mg Oral Daily Chandana Valenzuela MD        perflutren lipid microspheres (DEFINITY) injection 1.65 mg  1.5 mL IntraVENous ONCE PRN Graylon Diamond, APRN - CNP        hydrALAZINE (APRESOLINE) injection 5 mg  5 mg IntraVENous Q6H PRN Chandana Valenzuela MD   5 mg at 11/02/21 1631    ondansetron (ZOFRAN-ODT) disintegrating tablet 4 mg  4 mg Oral Q8H PRN Chandana Valenzuela MD        Or    [facility]ron [facility] injection 4 mg  4 mg IntraVENous Q6H PRN Chandana Valenzuela MD        polyethylene glycol (GLYCOLAX) packet 17 g  17 g Oral Daily PRN Chandana Valenzuela MD        atorvastatin (LIPITOR) tablet 80 mg  80 mg Oral Nightly Chandana Valenzuela MD   80 mg at 11/03/21 2009    dilTIAZem 125 mg in dextrose 5 % 125 mL infusion  5-15 mg/hr IntraVENous Continuous Verónica Bonilla MD   Held at 11/03/21 0136    sodium chloride flush 0.9 % injection 5-40 mL  5-40 mL IntraVENous 2 times per day Liliana Aj, APRN - CNP   10 mL at 11/03/21 2009    sodium chloride flush 0.9 % injection 5-40 mL  5-40 mL IntraVENous PRN Liliana Aj, APRN - CNP        0.9 % sodium chloride infusion  25 mL IntraVENous PRN Lilianamart Aj, APRN - CNP  acetaminophen (TYLENOL) tablet 650 mg  650 mg Oral Q6H PRN Jeff Alfred, APRN - CNP   650 mg at 10/30/21 2145    Or    acetaminophen (TYLENOL) suppository 650 mg  650 mg Rectal Q6H PRN Jeff Alfred, APRN - CNP        tamsulosin (FLOMAX) capsule 0.4 mg  0.4 mg Oral Daily Jeff Jonathan, APRN - CNP   0.4 mg at 11/03/21 0920    donepezil (ARICEPT) tablet 10 mg  10 mg Oral Nightly Jeff Alfred, APRN - CNP   10 mg at 11/03/21 2009    finasteride (PROSCAR) tablet 5 mg  5 mg Oral Daily Jeff Jonathan, APRN - CNP   5 mg at 11/03/21 0920    metoprolol tartrate (LOPRESSOR) tablet 25 mg  25 mg Oral BID Jeff Jonathan, APRN - CNP   25 mg at 11/03/21 2009     Allergies   Allergen Reactions    Corn-Containing Products Other (See Comments)     Nasal congestion      reports that he has never smoked. He has never used smokeless tobacco. He reports that he does not drink alcohol and does not use drugs. Objective:  Exam:   Constitutional:   Vitals:    11/03/21 2307 11/04/21 0221 11/04/21 0537 11/04/21 0745   BP: (!) 179/78  136/83 (!) 169/78   Pulse: 73  66 74   Resp: 18  18 16   Temp: 97.7 °F (36.5 °C)  97.5 °F (36.4 °C) 97.9 °F (36.6 °C)   TempSrc: Oral  Oral Oral   SpO2: 92%  91% 96%   Weight:  198 lb 3.2 oz (89.9 kg)     Height:         General appearance:  Normal development and appear in no acute distress. Mental Status:   Oriented to person. Poor memory. Poor attention span and concentration. Language: intact naming, repeating and fluency   Poor fund of Knowledge. Cranial Nerves:   II: Visual fields: Full. Pupils: equal, round, reactive to light  III,IV,VI: Extra Ocular Movements are intact. No nystagmus  V: Facial sensation is intact  VII: Facial strength and movements: intact and symmetric  IX: Palate elevation is symmetric  XI: Shoulder shrug is intact  XII: Tongue movements are normal  Musculoskeletal: 5/5 in all 4 extremities. Tone: Normal tone. Reflexes: Symmetric 2+ in both arms and legs.   Coordination: no pronator drift, no dysmetria with FNF  Sensation: normal to all modalities in both arms and legs. Gait/Posture: did not test gait. Data:  LABS:   Lab Results   Component Value Date     11/02/2021    K 3.8 11/02/2021     11/02/2021    CO2 23 11/02/2021    BUN 19 11/02/2021    CREATININE 1.0 11/02/2021    GFRAA >60 11/02/2021    GFRAA >60 06/14/2013    LABGLOM >60 11/02/2021    GLUCOSE 138 11/02/2021    MG 2.30 07/20/2019    CALCIUM 10.0 11/02/2021     Lab Results   Component Value Date    WBC 6.4 11/03/2021    RBC 4.24 11/03/2021    HGB 13.3 11/03/2021    HCT 40.2 11/03/2021    MCV 94.8 11/03/2021    RDW 14.7 11/03/2021     11/03/2021     Lab Results   Component Value Date    INR 1.24 (H) 03/27/2020    PROTIME 14.4 (H) 03/27/2020       Neuroimaging was independently reviewed by me and discussed results with the patient  I reviewed blood testing and other test results and discussed results with the patient      Impression:    Acute, punctate, right thalamic CVA - likely ischemic. A. Fib with RVR  HTN, uncontrolled. HLD, controlled. LDL 53  Dementia       Recommendation    Recommend ECHO and carotid US prior to dc. Monitor on tele. Continue home statin. Continue home BP meds. A1c 5.7  PT/OT  Continue aricept. Supportive care for dementia. High risk for hospital acquired delirium.      I called and spoke with patient's PCP, Dr. Livia Conroy regarding why patient's Eliquis was stopped. The patient had a fall in 3/2020 with a retroperitoneal bleed, therefore Eliquis was stopped at that time. I informed Dr. Rohan Monteiro that the patient had a new, tiny stroke. We discussed the patient's risk for falling and risk of recurrent stroke. Dr. Rohan Monteiro recommended we resume Eliquis at a lower dose of 2.5 mg BID. I also discussed this with his wife, Topher Vides, who agrees to proceed and is aware of his risk of catastrophic bleeding.  The plan will be to see how he does with therapy at the [facility] and then can re-evaluate whether or not patient is a candidate for full dose Eliquis. May be discharged from a neurological perspective if above testing is negative and patient is medically stable. Neno Farmer CNP      This dictation was generated by voice recognition computer software. Although all attempts are made to edit the dictation for accuracy, there may be errors in the transcription that are not intended.

## 2021-11-04 NOTE — CARE COORDINATION
CASE MANAGEMENT DISCHARGE SUMMARY      Discharge to: [facility]    Precertification completed: yes  Hospital Exemption Notification (HENS) completed: yes    IMM given: (date) 11/04/2021    New Durable Medical Equipment ordered/agency: SNF    Transportation: ambulance  Agency used: [address] [place] [address] Road up time: 5:45 pm   Ambulance form completed: Yes    Confirmed discharge plan with:     Patient: yes     Family:  yes   Name: Rayo Wright number:  [phone]   Facility/Agency, name:  [facility] faxed   Phone number for report to facility:      RN, name: Kaleb Aragon RN    Note: Discharging nurse to complete [facility], reconcile AVS, and place final copy with patient's discharge packet. RN to ensure that written prescriptions for  Level II medications are sent with patient to the facility as per protocol. Carotids have come back. CM confirmed w/Neuro and IM that pt is ready to d/c.      Gricel Aaron RN

## 2021-11-24 NOTE — CARE COORDINATION
Patient accepted at [facility]. Insurance precert obtained. RN to call report. Family notified and agreeable and to transport. PASARR completed. Paperwork completed and facility to obtain from [address] [place] [address] Rd. MD updated. No other needs at this time.  Electronically signed by VAHE Mckee, CORINA on 11/24/2021 at 5:12 PM

## 2021-11-24 NOTE — ED NOTES
Bed: 12  Expected date:   Expected time:   Means of arrival:   Comments:  ATP M10     Harvey Aguirre RN  11/24/21 9303

## 2021-11-24 NOTE — PROGRESS NOTES
Physical Therapy    Facility/Department: [facility]  ED  Initial Assessment    NAME: Anoop Amaya  : 1940  MRN: [id number]    Date of Service: 2021    Discharge Recommendations:  Subacute/Skilled Nursing Facility   PT Equipment Recommendations  Equipment Needed: No  Other: defer  If pt is unable to be seen after this session, please let this note serve as discharge summary. Please see case management note for discharge disposition. Thank you. Assessment   Body structures, Functions, Activity limitations: Decreased functional mobility ; Decreased strength; Decreased safe awareness; Decreased endurance; Decreased balance  Assessment: Pt seen in [facility] ED after having 2 falls this morning at home. Pt recently admitted for falls and DC'ed to SNF, recently went home and then fell again this morning. Pt with h/o of dementia and moderately confused during evaluation. Pt also [place]. Decreased safety insight and dismisive of balance deficits. Pt required min A for transfers and ambulation. Poor gait putting pt at high risk of falling due to decreased reaction time and stepping response. Recommend return to SNF for improved gait and balance upon DC. Treatment Diagnosis: decreased mobility and balance  Prognosis: Good  Decision Making: Medium Complexity  PT Education: Goals; General Safety; Gait Training; PT Role; Orientation; Plan of Care; Family Education; Precautions  Patient Education: Pt expressed understanding on use of a RW for improved balance wtih ambulation. Will need reinforcement  Barriers to Learning: [place], cognition  REQUIRES PT FOLLOW UP: Yes  Activity Tolerance  Activity Tolerance: Patient limited by fatigue; Patient limited by endurance; Patient limited by cognitive status  Activity Tolerance: 97% on RA, 93 HR, 160/104       Patient Diagnosis(es): There were no encounter diagnoses.      has a past medical history of Hyperlipemia, Hypertension, Prostate enlargement, and Unspecified cerebral artery occlusion with cerebral infarction. has a past surgical history that includes Carotid endarterectomy; Foot surgery; Skin cancer excision; Eye surgery; Facial reconstruction surgery; Tonsillectomy; Colonoscopy; Colonoscopy (2014); and Mohs surgery. Restrictions  Restrictions/Precautions  Restrictions/Precautions: Fall Risk  Vision/Hearing  Vision: Impaired  Hearing: Exceptions to [facility]  Hearing Exceptions: Hard of hearing/hearing concerns; Bilateral hearing aid     Subjective  General  Chart Reviewed: Yes  Patient assessed for rehabilitation services?: Yes  Response To Previous Treatment: Not applicable  Family / Caregiver Present: Yes  Referring Practitioner: RADHA Velasquez  Referral Date : 11/24/21  Diagnosis: fall  Follows Commands: Within Functional Limits  General Comment  Comments: cleared by nursing  Subjective  Subjective: pt resting in bed. Denies pain          Orientation  Orientation  Overall Orientation Status: Impaired  Orientation Level: Disoriented to time; Oriented to place; Oriented to situation; Oriented to person  Social/Functional History  Social/Functional History  Lives With: Spouse  Type of Home: Apartment  Home Layout: One level  Home Access: Level entry  Bathroom Shower/Tub: Walk-in shower  Bathroom Toilet: Standard  Bathroom Equipment: Grab bars in shower, Shower chair, Grab bars around toilet  Home Equipment: Cane, Wheelchair-manual, Rolling walker  ADL Assistance: Needs assistance  Homemaking Responsibilities: No  Active : No  Occupation: Retired  Additional Comments: Pt is a poor historian. Difficult describing when he uses the cane vs using WC. Daughter arrived midway through evaluation. States it is just pt and wife at home, daughter does not live with them but close by. She also states pt does not use an AD and a WC only for dr appointments.   Cognition        Objective          PROM RLE (degrees)  RLE PROM: WFL  AROM RLE (degrees)  RLE AROM: WFL  PROM LLE (degrees)  LLE PROM: WFL  Strength RLE  Strength RLE: WFL  Comment: 3/5 wtih 2/5 hip abductors  Strength LLE  Strength LLE: WFL  Comment: 3+/5  Tone RLE  RLE Tone: Normotonic  Tone LLE  LLE Tone: Normotonic  Motor Control  Gross Motor?: WFL  Sensation  Overall Sensation Status: WFL  Bed mobility  Supine to Sit: Minimal assistance  Sit to Supine: Moderate assistance  Transfers  Sit to Stand: Minimal Assistance  Stand to sit: Minimal Assistance  Ambulation  Ambulation?: Yes  More Ambulation?: No  Ambulation 1  Surface: level tile  Device: Rolling Walker  Assistance: Minimal assistance; Moderate assistance  Quality of Gait: narrow RYAN wtih step to pattern leading with the L. Trendelenburg gait, R.  Gait Deviations: Shuffles; Decreased step length; Decreased step height; Decreased arm swing; Slow Kerri  Distance: 10'     Balance  Posture: Fair  Sitting - Static: Poor  Sitting - Dynamic: Poor  Standing - Static: Poor  Standing - Dynamic: Poor  Comments: Posterior LOB with sitting marching. Poor stepping response to LOB with ambulation using RW        Plan   Plan  Times per week: 3-5x/week  Current Treatment Recommendations: Strengthening, Balance Training, Endurance Training, Functional Mobility Training, Transfer Training, Gait Training, Equipment Evaluation, Education, & procurement, Patient/Caregiver Education & Training, Home Exercise Program, Safety Education & Training  Safety Devices  Type of devices:  All fall risk precautions in place, Bed alarm in place, Call light within reach, Gait belt, Patient at risk for falls, Left in bed, Nurse notified  Restraints  Initially in place: No      AM-PAC Score  AM-PAC Inpatient Mobility Raw Score : 14 (11/24/21 1301)  AM-PAC Inpatient T-Scale Score : 38.1 (11/24/21 1301)  Mobility Inpatient CMS 0-100% Score: 61.29 (11/24/21 1301)  Mobility Inpatient CMS G-Code Modifier : CL (11/24/21 1301)          Goals  Short term goals  Time Frame for Short term goals: 11/29  Short term goal 1: Pt will complete bed mobility with Mod I  Short term goal 2: Pt will ambualte x 50' with RW with SBA  Short term goal 3: Pt will complete B LE standing exercises to improve balance for mobility by 11/26  Patient Goals   Patient goals : none stated due to agitation.  Wants to go sebastian       Therapy Time   Individual Concurrent Group Co-treatment   Time In 1147         Time Out 1220         Minutes 33         Timed Code Treatment Minutes: 100 Alvarez Ryan, PT

## 2021-11-24 NOTE — ED NOTES
Repot given to karol at [facility], [address] [place] [address]  11/24/21 4409

## 2021-11-24 NOTE — ED PROVIDER NOTES
I independently examined and evaluated Kevin Gavin. In brief, Kevin Gavin is a 80 y.o. male with a past medical history of TIA, hypertension, hyperlipidemia, and check, and atrial fibrillation, who presents to the ED complaining of fall. Patient had a mechanical fall, he slipped in the shower and hit his head/neck on shower chair. He arrived in a c-collar. He states that he does not think he lost consciousness. He denies being on a blood thinner, however chart review suggests he is on Eliquis. He does complain some of some pain in his right shoulder, constant since the fall, 5/10, nonradiating. He denies any numbness, weakness, tingling. He denies any chest pain, shortness of breath, abdominal pain, pelvic pain. He states that he has walked since this fall. Patient did have recent admission 10/30 for altered mental status and fall. Found to have CVA at that time. REVIEW OF SYSTEMS  All systems reviewed, pertinent positives per HPI otherwise noted to be negative. Focused exam revealed   PHYSICAL EXAM  BP (!) 166/90   Pulse 100   Temp 97.7 °F (36.5 °C) (Oral)   Resp 18   Ht 5' 10\" (1.778 m)   Wt 170 lb (77.1 kg)   SpO2 97%   BMI 24.39 kg/m²    GENERAL APPEARANCE: Awake and alert. Cooperative. no distress. HEENT: Normocephalic, atraumatic. No Russs sign or Raccoon Eyes. No depressed skull fractures. Extraocular muscles are intact. Pupils equal round and reactive to light. Conjunctivas are pink. Negative scleral icterus. No epistaxis. No septal hematomas. No hemotympanum. Mucous membranes are moist. Tongue in the midline. Pharynx without erythema or exudates. No uvular deviation. NECK: Nontender and supple. No stridor or meningismus. Trachea in the midine. \"Collar had been cleared by the time I evaluated the patient. Cervical spine is non-tender to palpation in the midline. No abrasions. CHEST: Nontender to palpation. Clear to auscultation bilaterally.  No rales, rhonchi, or wheezing. No abrasions. HEART: Regular rate, irregular rhythm. No murmurs, gallops or rubs. Strong and equal pulses in the upper and lower extremities. ABDOMEN: Soft, nontender, nondistended, positive bowel sounds, no palpable pulsatile masses. No abrasions. MUSCULOSKELETAL: Cervical, thoracic, and lumbosacral spines are non-tender to palpation. Theres no edema, bruising, or step-offs. Right shoulder mildly tender to palpation, upper and lower extremities have no deformities and they are non-tender to palpation. Right upper extremity is neurovascularly intact with intact cardinal movements of the hand and intact sensation over the radial, ulnar, and medial nerve distributions. The calves are nontender to palpation. Active range of motion. Pelvis is stable and nontender. NEUROLOGICAL: Awake, alert and oriented x 3. Power intact in the upper and lower extremities. Sensation is intact to light touch in the upper and lower extremities. GCS 15. When we attempted to ambulate the patient, patient had significant instability on his feet  IMMUNOLOGICAL: No palpable lymphadenopathy or lymphatic streaking. DERMATOLOGIC: No petechiae, rashes, or ecchymoses. No lacerations or abrasions. ED course / MDM:   Overall well appearing patient, in no acute distress, presenting for mechanical fall. Physical exam remarkable for right shoulder tenderness to palpation. Differential diagnosis includes but is not limited to: Fracture, dislocation, cervical spine fracture, intracranial trauma, mechanical fall      Workup showed:    XR SHOULDER RIGHT (MIN 2 VIEWS)   Final Result   No acute bone or joint abnormality. CT HEAD WO CONTRAST   Final Result   No acute intracranial abnormality. CT CERVICAL SPINE WO CONTRAST   Final Result   No acute abnormality of the cervical spine. Traumatic imaging negative. Patient does not have any other somatic complaints.   This was a mechanical fall, patient slipped in the shower. He denies any other symptoms. When we attempted to ambulate the patient, he was unsteady on his feet. PT/OT consulted. They recommended skilled nursing facility. Case management consulted, they were able to find the patient placement in a skilled nursing facility. Covid swab negative. Work-up overall reassuring. At this time, feel the patient is appropriate for discharge to follow-up with a primary care doctor. Patient feels comfortable with discharge at this time. Return precautions given. Encouraged PCP follow-up on 11/29. Patient discharged in stable condition. CLINICAL IMPRESSION  1. Fall, initial encounter    2. Unsteady gait        Blood pressure (!) 166/90, pulse 100, temperature 97.7 °F (36.5 °C), temperature source Oral, resp. rate 18, height 5' 10\" (1.778 m), weight 170 lb (77.1 kg), SpO2 97 %. DISPOSITION  Nasreen Marie was discharged to Skilled nursing facility in stable condition. All diagnostic, treatment, and disposition decisions were made by myself in conjunction with the advanced practice provider. For all further details of the patient's emergency department visit, please see the advanced practice provider's documentation. Comment: Please note this report has been produced using speech recognition software and may contain errors related to that system including errors in grammar, punctuation, and spelling, as well as words and phrases that may be inappropriate. If there are any questions or concerns please feel free to contact the dictating provider for clarification.         John Coyle MD  11/26/21 0388

## 2021-11-24 NOTE — ED NOTES
Called case management and left a VM @ 1242  [place] in ED .         Contreras Fat  11/24/21 1311       Contreras Fat  11/24/21 1311

## 2021-11-24 NOTE — CARE COORDINATION
Referred to patient for d/c planning. Patient just d/c'd from [facility] SNF. Patient with several falls at home. Per PT recommend SNF on d/c. Referral made to [facility]. Spoke to wife, she is agreeable. States she is not able to manage patient at home and provide level of assistance patient requires. Patient is current with [facility]. Patient is currently a min assist.  Spoke to daughter at bedside. She prefers for patient to return home on d/c. Long discussion of SNF vs. Home with private duty services. Discussed options of private duty and what Medicare covers. Discussed hospice services. Daughter to discuss with wife. [facility] to submit for precert. MD updated.   Electronically signed by Edouard Manriquez, MSW, EMILY-S on 11/24/2021 at 2:23 PM

## 2021-11-24 NOTE — PROGRESS NOTES
Occupational Therapy   Occupational Therapy Initial Assessment/Treatment  Date: 2021   Patient Name: Pablo Rojo  MRN: [id number]     : 1940    Date of Service: 2021    Discharge Recommendations:  [address]  OT Equipment Recommendations  Other: defer    Assessment   Performance deficits / Impairments: Decreased functional mobility ; Decreased ADL status; Decreased cognition; Decreased safe awareness; Decreased balance; Decreased high-level IADLs    Assessment: Pt is a 71yo male with deficits in the areas listed above after 2 falls this morning. Pt has a history of dementia and reports (I) at baseline. Daughter reports that he probable needs more assistance at home. Today pt performed functional mobility with RW and Yaquelin and vc's for safety. Pt performed functional/toilet t/fs with min-mod A with RW. Pt toileting and performing UB ADLS with Yaquelin. Pt would continue to benefit from skilled OT services to increase strength, safety and independence in functional mobility and ADLS. Prognosis: Good  Decision Making: Medium Complexity  OT Education: Plan of Care; OT Role; ADL Adaptive Strategies; Transfer Training  Patient Education: disease specific: safe t/fs, safe mobility, use of RW during mobility and ADLs, general safety during hospitalization. Pt verbalized understanding but will need reinforcement. Barriers to Learning: cognition. REQUIRES OT FOLLOW UP: Yes  Activity Tolerance  Activity Tolerance: Patient Tolerated treatment well  Activity Tolerance: /90, , O2 97%. Safety Devices  Safety Devices in place: Yes  Type of devices: Gait belt; Bed alarm in place; Call light within reach; Left in bed; Nurse notified           Patient Diagnosis(es): There were no encounter diagnoses. has a past medical history of Hyperlipemia, Hypertension, Prostate enlargement, and Unspecified cerebral artery occlusion with cerebral infarction.    has a past surgical history that includes Carotid endarterectomy; Foot surgery; Skin cancer excision; Eye surgery; Facial reconstruction surgery; Tonsillectomy; Colonoscopy; Colonoscopy (2014); and Mohs surgery. Restrictions  Restrictions/Precautions  Restrictions/Precautions: Fall Risk    Subjective   General  Chart Reviewed: Yes  Patient assessed for rehabilitation services?: Yes  Response to previous treatment: Patient with no complaints from previous session  Family / Caregiver Present: Yes (daughter)  Diagnosis: falls  Patient Currently in Pain: Yes  Pain Assessment  Pain Level: 1 (R shoulder pain.)  Vital Signs  Pulse: 100  Heart Rate Source: Monitor  Resp: 18  BP: (!) 166/90  BP Location: Right upper arm  Patient Position: Semi fowlers  Level of Consciousness: Responds to Voice (1)  Patient Currently in Pain: Yes  Oxygen Therapy  SpO2: 97 %  Pulse Oximeter Device Mode: Intermittent  O2 Flow Rate (L/min): 97 L/min  Social/Functional History  Social/Functional History  Lives With: Spouse  Type of Home: House  Home Layout: One level  Home Access: Stairs to enter with rails  Entrance Stairs - Number of Steps: 2  Entrance Stairs - Rails: Both  Bathroom Shower/Tub: Walk-in shower  Bathroom Toilet: Handicap height  Bathroom Equipment: Grab bars in shower, Shower chair, Grab bars around toilet  Home Equipment: Cane, Wheelchair-manual, Rolling walker, Grab bars  ADL Assistance: Needs assistance (Daughter unsure of assistance provided at home at baseline. Pt reports (I) with ADLS. )  Bath: Modified independent  Dressing: Modified independent  Toileting: Independent (sometimes does not make it to the toilet in time per daughter.)  Homemaking Responsibilities: No  Ambulation Assistance: Needs assistance (Assistance PRN with supervision.  Pt uses cane/walker at home and walker/w/c for longer distances)  Transfer Assistance: Needs assistance (Daughter states that he does need assistance but does not always have it.)  Active : No  Occupation: Retired  Additional Comments: Pt is a poor historian. Difficult describing when he uses the cane vs using WC. Daughter providing most of the history. States it is just pt and wife at home, daughter does not live with them but close by. She also states pt does not use an AD and a WC only for dr appointments. Pt had 2 falls this morning. Daughter reports leg weakness with greater weakness in R leg. Objective   Vision: Impaired  Vision Exceptions: Wears glasses at all times  Hearing: Exceptions to [facility]  Hearing Exceptions: Hard of hearing/hearing concerns; Bilateral hearing aid    Orientation  Overall Orientation Status: Impaired  Orientation Level: Oriented to person; Disoriented to place; Disoriented to time  Observation/Palpation  Posture: Fair  Balance  Sitting Balance: Supervision  Standing Balance: Moderate assistance (Primarily min A but mod A for balance during toileting.)  Standing Balance  Time: 1x~4min, 1x~5min  Activity: to [place], in [place] functional mobility to prepare for household distances, standing ADLS. Comment: RW used. Vc's for RW use  Functional Mobility  Functional - Mobility Device: Rolling Walker  Activity: Other; To/from bathroom  Assist Level: Minimal assistance  Toilet Transfers  Toilet - Technique: Ambulating  Equipment Used: Grab bars (on R.)  Toilet Transfer: Moderate assistance  Toilet Transfers Comments: RW used, vc's for hand placement. ADL  Grooming: Minimal assistance (balance in stance at sink for hand hygiene)  LE Dressing: Moderate assistance (to adjust socks.)  Toileting: Minimal assistance (standard toilet. Min A for balance and LB clothing management.)  Additional Comments: RW used. Vc's for safety throughout session.   Tone RUE  RUE Tone: Normotonic  Tone LUE  LUE Tone: Normotonic  Coordination  Movements Are Fluid And Coordinated: Yes     Bed mobility  Supine to Sit: Stand by assistance  Sit to Supine: Minimal assistance  Scootin Person assistance; Maximal assistance (to scoot up in bed.)  Comment: HOB elevated. Transfers  Sit to stand: Contact guard assistance  Stand to sit: Minimal assistance  Transfer Comments: RW used and vc's for hand placement. Cognition  Overall Cognitive Status: Exceptions  Arousal/Alertness: Appropriate responses to stimuli  Following Commands: Follows one step commands with repetition; Follows one step commands with increased time  Attention Span: Attends with cues to redirect  Memory: Decreased short term memory; Decreased recall of biographical Information  Safety Judgement: Decreased awareness of need for assistance; Decreased awareness of need for safety  Problem Solving: Decreased awareness of errors; Assistance required to identify errors made  Insights: Decreased awareness of deficits  Initiation: Requires cues for some  Sequencing: Requires cues for some        Sensation  Overall Sensation Status: WFL        LUE AROM (degrees)  LUE AROM : WFL  Left Hand AROM (degrees)  Left Hand AROM: WFL  RUE AROM (degrees)  RUE AROM : WFL  Right Hand AROM (degrees)  Right Hand AROM: WFL  LUE Strength  Gross LUE Strength: WFL  RUE Strength  Gross RUE Strength: WFL                   Plan   Plan  Times per week: 3-5x/week  Current Treatment Recommendations: Strengthening, Balance Training, Functional Mobility Training, Safety Education & Training, Patient/Caregiver Education & Training, Self-Care / ADL    AM-[facility] Score        AM-[facility] Inpatient Daily Activity Raw Score: 18 (11/24/21 1503)  AM-PAC Inpatient ADL T-Scale Score : 38.66 (11/24/21 1503)  ADL Inpatient CMS 0-100% Score: 46.65 (11/24/21 1503)  ADL Inpatient CMS G-Code Modifier : CK (11/24/21 1503)    Goals  Short term goals  Time Frame for Short term goals: 1 week (11/30) unless stated otherwise. Short term goal 1: Pt will toilet with SBA and AD PRN. Short term goal 2: Pt will LB dress with min A and AD PRN.   Short term goal 3: Pt will perform functional/toilet t/fs with SBA and AD PRN. Short term goal 4: Pt will perform perform at 2-3 standing ADLS with supervision and AD PRN (11/28)  Patient Goals   Patient goals : \"I need to use the restroom\" -MET       Therapy Time   Individual Concurrent Group Co-treatment   Time In 1347         Time Out 1430         Minutes 43         Timed Code Treatment Minutes: 33 Minutes (10min eval)       Dory Lagunas OTR/L  If pt discharges prior to next session, this note will serve as discharge summary. See case management note for discharge disposition.

## 2021-11-24 NOTE — DISCHARGE INSTR - COC
Continuity of Care Form    Patient Name: Guyann Lesches   :  1940  MRN:  [id number]    Admit date:  2021  Discharge date:  21    Code Status Order: Prior   Advance Directives:      Admitting Physician:  No admitting provider for patient encounter. PCP: Georgette Gordon MD    Discharging Nurse: [address] Unit/Room#:   Discharging Unit Phone Number: [phone]    Emergency Contact:   Extended Emergency Contact Information  Primary Emergency Contact: Madeline Coates  Address: [address] [place] [address]           [facility], Augustin Kunz [address] Phone: [phone]  Mobile Phone: [phone]  Relation: Spouse   needed? No  Secondary Emergency Contact: Genie Lyles   [address] Phone: [phone]  Relation: Child   needed?  No    Past Surgical History:  Past Surgical History:   Procedure Laterality Date    CAROTID ENDARTERECTOMY      right    COLONOSCOPY      COLONOSCOPY      polyps    EYE SURGERY      FACIAL RECONSTRUCTION SURGERY      metal plate under left eye    FOOT SURGERY      nerve right    MOHS SURGERY      SKIN CANCER EXCISION      face x several    TONSILLECTOMY         Immunization History:   Immunization History   Administered Date(s) Administered    COVID-19, Odalys Divine, Primary or Immunocompromised, PF, 100mcg/0.5mL 2021, 2021, 2021       Active Problems:  Patient Active Problem List   Diagnosis Code    TIA (transient ischemic attack) G45.9    HTN (hypertension) I10    Hyperlipidemia E78.5    PVD (peripheral vascular disease) (HCC) I73.9    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 ([address] S24.133A    Basal cell carcinoma of left ear C44.219    Visit for wound check Z51.89    Atrial fibrillation with RVR ([address] I48.91       Isolation/Infection:   Isolation            No Isolation          Patient Infection Status Infection Onset Added Last Indicated Last Indicated By Review Planned Expiration Resolved Resolved By    None active    Resolved    COVID-19 (Rule Out) 10/30/21 10/30/21 10/30/21 COVID-19, Rapid (Ordered)   10/30/21 Rule-Out Test Resulted            Nurse Assessment:  Last Vital Signs: BP (!) 166/90   Pulse 100   Temp 97.7 °F (36.5 °C) (Oral)   Resp 18   Ht 5' 10\" (1.778 m)   Wt 170 lb (77.1 kg)   SpO2 97%   BMI 24.39 kg/m²     Last documented pain score (0-10 scale): Pain Level: 1 (R shoulder pain.)  Last Weight:   Wt Readings from Last 1 Encounters:   11/24/21 170 lb (77.1 kg)     Mental Status:  oriented and alert    IV Access:  - None    Nursing Mobility/ADLs:  Walking   Assisted  Transfer  Assisted  Bathing  Assisted  Dressing  Assisted  Toileting  Assisted  Feeding  Assisted  Med Admin  Assisted  Med Delivery   whole    Wound Care Documentation and Therapy:  Puncture Back Lateral;Left;Upper (Active)   Number of days:         Elimination:  Continence: Bowel: Yes  Bladder: Yes  Urinary Catheter: None   Colostomy/Ileostomy/Ileal Conduit: No       Date of Last BM: ***  No intake or output data in the 24 hours ending 11/24/21 1458  No intake/output data recorded. Safety Concerns:     History of Falls (last 30 days) and At Risk for Falls    Impairments/Disabilities:      Hearing    Nutrition Therapy:  Current Nutrition Therapy:   - Oral Diet:  General    Routes of Feeding: Oral  Liquids: No Restrictions  Daily Fluid Restriction: no  Last Modified Barium Swallow with Video (Video Swallowing Test): not done    Treatments at the Time of Hospital Discharge:   Respiratory Treatments: ***  Oxygen Therapy:  is not on home oxygen therapy.   Ventilator:    - No ventilator support    Rehab Therapies: Physical Therapy, Occupational Therapy, and Speech/Language Therapy  Weight Bearing Status/Restrictions: No weight bearing restirctions  Other Medical Equipment (for information only, NOT a DME order):  walker  Other Treatments: ***    Patient's personal belongings (please select all that are sent with patient):  None    RN SIGNATURE:  {Esignature:[id number]}    CASE MANAGEMENT/SOCIAL WORK SECTION    Inpatient Status Date: ER    Readmission Risk Assessment Score:  Readmission Risk              Risk of Unplanned Readmission:  0           Discharging to Facility/ Agency   Name: [facility] [address] [place] [address]  Phone:[phone]  Fax:    Dialysis Facility (if applicable)   Name:  Address:  Dialysis Schedule:  Phone:  Fax:    / signature: Electronically signed by VAHE Mcgarry on 11/24/21 at 4:32 PM EST    PHYSICIAN SECTION    Prognosis: Fair    Condition at Discharge: Stable    Rehab Potential (if transferring to Rehab): Good    Recommended Labs or Other Treatments After Discharge: PT/OT, SLP    Physician Certification: I certify the above information and transfer of Melodie Libman  is necessary for the continuing treatment of the diagnosis listed and that he requires [place] for less 30 days.      Update Admission H&P: No change in H&P    PHYSICIAN SIGNATURE:  Electronically signed by Renée Noel MD on 11/24/21 at 2:58 PM EST

## 2021-11-24 NOTE — ED NOTES
Pt repositioned In bed at this time     Sage Gillespie, [facility]  11/24/21 5621

## 2021-12-25 PROBLEM — R47.01 APHASIA: Status: ACTIVE | Noted: 2021-01-01

## 2021-12-25 NOTE — CONSULTS
Pharmacy Consult    The pharmacist will review this patient's medication profile to evaluate IV medications and change all base solutions to 0.9% sodium chloride if possible based on compatibility and product availability. This profile review will include an assessment of total IV fluids being administered to the patient. If a current order exists for continuous infusion of non-hypertonic plain IV fluid, the pharmacist will contact the prescriber to recommend the discontinuation of the IV fluid order.     Med reviewed, currently not on a fluid  Thank you for the consult  Kelsi Last PharmD  12/25/2021 at 8:13 AM

## 2021-12-25 NOTE — CONSULTS
In patient Neurology consult        [facility] Neurology      MD Susu Hernandez [facility]  1940    Date of Service: 12/25/2021    Referring Physician: Cori Stone MD      Reason for the consult and CC: acute aphasia    History was obtained from chart reviewing. The patient is not a good historian today. He is currently by himself. HPI:   The patient is a 80y.o.  years old male with history of hypertension and prior stroke who was admitted to the hospital today with speech impairment, aphasia and possible new stroke. Symptoms started hours prior to admission. Description was sudden speech difficulties and difficulties with word findings. Other associated symptoms generalized weakness. Degree was severe. Duration was persistent. No other relieving or aggravating factors. No clear triggers or other associated symptoms. He came to the ED for evaluation. Initial work-up with neuroimaging showed no acute stroke and no large vessel occlusion. He did have some bilateral vertebral stenosis. He was admitted to the hospital.  Currently he is under droplet isolation given concern for Covid exposure few days ago. Other review of system was unremarkable.     Family history: Noncontributory      Past Surgical History:   Procedure Laterality Date    CAROTID ENDARTERECTOMY      right    COLONOSCOPY      COLONOSCOPY  2014    polyps    EYE SURGERY      FACIAL RECONSTRUCTION SURGERY      metal plate under left eye    FOOT SURGERY      nerve right    MOHS SURGERY      SKIN CANCER EXCISION      face x several    TONSILLECTOMY          Past Medical History:   Diagnosis Date    Hyperlipemia     Hypertension     Prostate enlargement     Unspecified cerebral artery occlusion with cerebral infarction 2000     Social History     Tobacco Use    Smoking status: Never Smoker    Smokeless tobacco: Never Used   Vaping Use    Vaping Use: Never used   Substance Use Topics    Alcohol use: No    Drug use: No     Allergies   Allergen Reactions    Corn-Containing Products Other (See Comments)     Nasal congestion     Current Facility-Administered Medications   Medication Dose Route Frequency Provider Last Rate Last Admin    apixaban (ELIQUIS) tablet 2.5 mg  2.5 mg Oral BID Lien Wilkins MD        divalproex (DEPAKOTE SPRINKLE) capsule 125 mg  125 mg Oral 2 times per day Lien Wilkins MD        donepezil (ARICEPT) tablet 10 mg  10 mg Oral Nightly Lien Wilkins MD        [START ON 12/26/2021] pantoprazole (PROTONIX) tablet 40 mg  40 mg Oral QAM AC Lien Wilkins MD        atorvastatin (LIPITOR) tablet 20 mg  20 mg Oral Daily Lien Wilkins MD        [facility]) capsule 0.4 mg  0.4 mg Oral Daily Lien Wilkins MD        vitamin D3 (CHOLECALCIFEROL) tablet 400 Units  400 Units Oral Daily Lien Wilkins MD        sodium chloride flush 0.9 % injection 5-40 mL  5-40 mL IntraVENous 2 times per day Lien Wilkins MD        sodium chloride flush 0.9 % injection 5-40 mL  5-40 mL IntraVENous PRN Lien Wilkins MD        0.9 % sodium chloride infusion  25 mL IntraVENous PRN Lien Wilkins MD        ondansetron (ZOFRAN-ODT) disintegrating tablet 4 mg  4 mg Oral Q8H PRN Lien Wilkins MD        Or    ondansetron [facility]) injection 4 mg  4 mg IntraVENous Q6H PRN Lien Wilkins MD        polyethylene glycol [facility]) packet 17 g  17 g Oral Daily PRN Lien Wilkins MD        acetaminophen (TYLENOL) tablet 650 mg  650 mg Oral Q6H PRN Lien Wilkins MD        Or    acetaminophen (TYLENOL) suppository 650 mg  650 mg Rectal Q6H PRN Lien Wilkins MD        [START ON 12/26/2021] aspirin EC tablet 81 mg  81 mg Oral Daily Lien Wilkins MD        metoprolol tartrate (LOPRESSOR) tablet 25 mg  25 mg Oral BID Lien Wilkins MD           ROS : A 10-14 system review of constitutional, cardiovascular, respiratory, eyes, musculoskeletal, endocrine, GI, ENT, skin, hematological, genitourinary, psychiatric and neurologic systems was obtained and updated today and is unremarkable except as mentioned in my HPI      Exam:     Constitutional:   Vitals:    12/25/21 0936 12/25/21 1101 12/25/21 1213 12/25/21 1330   BP: (!) 140/106 (!) 158/96 (!) 158/96 (!) 173/104   Pulse: 63 70 75 98   Resp: 16 16 16 16   Temp:    99.4 °F (37.4 °C)   TempSrc:    Axillary   SpO2: 96% 96% 97% 93%   Weight:       Height:           General appearance:  Normal development and appear in no acute distress. Eye:  Fundus of the eye: Funduscopic examination cannot be performed due to COVID19 restrictions and precautions. Neck: supple  Cardiovascular: No lower leg edema with good pulsation. Mental Status:   Oriented to person, place, but not to problem or time  Poor immediate recall  Intact with memory  Good attention but easily distracted  Language is nonfluent today  Unable to assess fund of knowledge  Cranial Nerves:   II: Visual fields: Full. Pupils: equal, round, reactive to light  III,IV,VI: Extra Ocular Movements are intact. No nystagmus  V: Facial sensation is intact   VII: Facial strength and movements: intact and symmetric  VIII: Hearing: Intact  IX: Palate elevation is symmetric  XI: Shoulder shrug is intact  XII: Tongue movements are normal  Musculoskeletal: He can move all 4 extremities. Tone: Normal tone. Reflexes: Symmetric 2+ throughout  Planters: flexor bilaterally. Coordination: No tremors or dysmetria  Sensation: Grossly unremarkable. .  Gait/Posture: Not tested with recent COVID-19 and restriction  Data:  LABS:   Lab Results   Component Value Date     12/25/2021    K 4.3 12/25/2021     12/25/2021    CO2 24 12/25/2021    BUN 18 12/25/2021    CREATININE 1.0 12/25/2021    GFRAA >60 12/25/2021    GFRAA >60 06/14/2013    LABGLOM >60 12/25/2021    GLUCOSE 100 12/25/2021    MG 2.30 07/20/2019    CALCIUM 10.0 12/25/2021     Lab Results   Component Value Date    WBC 5.3 12/25/2021    RBC 4.24 12/25/2021    HGB 13.5 12/25/2021    HCT 40.6 12/25/2021    MCV 95.6 12/25/2021    RDW 15.4 12/25/2021     12/25/2021     Lab Results   Component Value Date    INR 1.07 12/25/2021    PROTIME 12.1 12/25/2021       Neuroimaging were independently reviewed by me  Reviewed notes from different physicians  Reviewed lab and blood testing    Impression:  Acute confusion and aphasia. Severe. Possible acute metabolic encephalopathy from COVID-19 versus new ischemic stroke. Thromboembolic versus cardioembolic  [id number] positive  Hypertension  History of A. fib      Recommendation:  Continue with Eliquis giving isolated aphasia. I think the risk outweigh the benefit giving his COVID-19 and possible further thromboembolic complications. Speech evaluation  PT OT  MRI brain  Echo   Hydration  Respiratory support  COVID-19 treatment  Blood sugar monitoring  Continue current blood pressure medications and avoid hypotension  Hydration  Follow electrolytes  Statin  Will follow Monday      Thank you for referring such patient. If you have any questions regarding my consult note, please don't hesitate to call me. Ravin Whiting MD  [phone]    This dictation was generated by voice recognition computer software.  Although all attempts are made to edit the dictation for accuracy, there may be errors in the  transcription that are not intended

## 2021-12-25 NOTE — PROGRESS NOTES
4 Eyes Skin Assessment     The patient is being assess for   Admission    I agree that 2 RN's have performed a thorough Head to Toe Skin Assessment on the patient. ALL assessment sites listed below have been assessed. Areas assessed for pressure by both nurses:   [x]   Head, Face, and Ears   [x]   Shoulders, Back, and Chest, Abdomen  [x]   Arms, Elbows, and Hands   [x]   Coccyx, Sacrum, and Ischium  [x]   Legs, Feet, and Heels        Skin Assessed Under all Medical Devices by both nurses:  NA              All Mepilex Borders were peeled back and area peeked at by both nurses:  No: NA  Please list where Mepilex Borders are located:           Fissure and redness on sacrum blanchable. Scattered bruising and abrasions, heels red but blanchable. **SHARE this note so that the co-signing nurse is able to place an eSignature**    Co-signer eSignature: Onel Norton RN     Does the Patient have Skin Breakdown related to pressure?   [place] Prevention initiated:  Yes   Wound Care Orders initiated:  NA      [facility] nurse consulted for Pressure Injury (Stage 3,4, Unstageable, DTI, NWPT, Complex wounds)and New or Established Ostomies:  NA      Primary Nurse eSignature: Electronically signed by Angel Alvarez RN on 12/25/21 at 2:31 PM EST

## 2021-12-25 NOTE — ED NOTES
Bed: 08  Expected date:   Expected time:   Means of arrival:   Comments:  Ilia Hillman RN  12/25/21 7773

## 2021-12-25 NOTE — PLAN OF CARE
Received PS from ED regarding Possible Admission , Forwarded Message to  Dr. Travis Adams  , Admitting Hospitalist     Romaine Herrera, MD  Hospitalist Physician

## 2021-12-25 NOTE — H&P
Department of Internal Medicine  General Internal Medicine   History and Physical      CHIEF COMPLAINT: generalized weakness     Reason for Admission:  aphasia     HISTORY OF PRESENT ILLNESS:    80 y.o. male with a PMH of HLD, HTN, PAF and CVA who presented to [facility] d/t confusion and fall this morning. Family reports generalized weakness and some speech difficulty. No stoke alert was called. Pt had covid exposure earlier this week. Pt is only oriented to self and place. Denies HA, chest pain ,sob, abdominal pain, nausea, vomiting or diarrhea. On room air.        Past Medical History:        Diagnosis Date    Hyperlipemia     Hypertension     Prostate enlargement     Unspecified cerebral artery occlusion with cerebral infarction 2000     Past Surgical History:        Procedure Laterality Date    CAROTID ENDARTERECTOMY      right    COLONOSCOPY      COLONOSCOPY  2014    polyps    EYE SURGERY      FACIAL RECONSTRUCTION SURGERY      metal plate under left eye    FOOT SURGERY      nerve right    MOHS SURGERY      SKIN CANCER EXCISION      face x several    TONSILLECTOMY         Medications Prior to Admission:    Medications Prior to Admission: terazosin (HYTRIN) 5 MG capsule, Take 1 capsule by mouth nightly  cyclobenzaprine (FLEXERIL) 5 MG tablet, Take 1 tablet by mouth 2 times daily as needed for Muscle spasms  vitamin D3 (CHOLECALCIFEROL) 10 MCG (400 UNIT) TABS tablet, Take 1 tablet by mouth daily  ondansetron (ZOFRAN-ODT) 4 MG disintegrating tablet, Take 1 tablet by mouth every 8 hours as needed for Nausea or Vomiting  apixaban (ELIQUIS) 2.5 MG TABS tablet, Take 1 tablet by mouth 2 times daily  VITAMIN D PO, Take by mouth  Multiple Vitamins-Minerals (OCUVITE PO), Take by mouth  Calcium Carbonate Antacid (ANTACID E-X PO), Take by mouth  esomeprazole (NEXIUM) 20 MG delayed release capsule, Take 20 mg by mouth every morning (before breakfast)  divalproex (DEPAKOTE) 250 MG DR tablet, Take 250 mg by mouth 2 times daily   tamsulosin (FLOMAX) 0.4 MG capsule, Take 0.4 mg by mouth daily  donepezil (ARICEPT) 10 MG tablet, Take 10 mg by mouth nightly  metoprolol tartrate (LOPRESSOR) 25 MG tablet, Take 2 tablets by mouth 2 times daily (Patient taking differently: Take 50 mg by mouth 2 times daily 25 QAM, 50QHS)  lisinopril (PRINIVIL;ZESTRIL) 20 MG tablet, Take 1 tablet by mouth daily  simvastatin (ZOCOR) 80 MG tablet, Take 40 mg by mouth nightly     Allergies:  Corn-containing products    Social History:   The patient currently lives at home     TOBACCO:   reports that he has never smoked. He has never used smokeless tobacco.  ETOH:   reports no history of alcohol use. E-Cigarettes/Vaping Use      Questions Responses     E-Cigarette/Vaping Use Never User     Start Date       Passive Exposure       Quit Date       Counseling Given       Comments          Family History:   History reviewed. No pertinent family history. REVIEW OF SYSTEMS:  Pertinent positives as noted in the HPI. All other systems reviewed and negative. PHYSICAL EXAM:    Vitals:  BP (!) 173/104   Pulse 98   Temp 99.4 °F (37.4 °C) (Axillary)   Resp 16   Ht 5' 8\" (1.727 m)   Wt 170 lb (77.1 kg)   SpO2 93%   BMI 25.85 kg/m²   General appearance: A/Ox1   HEENT:  Normal cephalic, atraumatic without obvious deformity. Pupils equal, round, and reactive to light. Extra ocular muscles intact. Conjunctivae/corneas clear. Neck: Supple, with full range of motion. No jugular venous distention. Trachea midline. Respiratory:  Normal respiratory effort. Diminished bases  Cardiovascular:  Irregular rate and rhythm  Abdomen: Soft, non-tender, non-distended with normal bowel sounds. Musculoskeletal:  No clubbing, cyanosis or edema bilaterally. Full range of motion without deformity. Skin: Skin color-pale, texture, turgor normal.  No rashes or lesions.   Neurologic:  No obvious focal deficits, speech clear, no droop, THOMAS, gen weak  Psychiatric: Alert and oriented x1, poor insight  Capillary Refill: Brisk,3 seconds, normal  Peripheral Pulses: +2 palpable, equal bilaterally     DATA:  CTA Head/neck:  1. Atherosclerosis contributes to severe stenosis at the origin of both  vertebral arteries. 2. Atherosclerosis of the proximal cervical ICAs bilaterally with less than  50% stenosis by NASCET criteria. 3. No significant stenosis of the [id number]. 4. Atherosclerosis of the V4 segments of the vertebral arteries bilaterally  contribute to areas of minimal stenosis. CT Head:  Impression  No acute intracranial abnormality.     Extensive senescent changes with parenchymal volume loss and chronic small  vessel ischemic changes.     RECOMMENDATIONS:  If patient's symptoms are persistent or worsen, a follow-up head CT or MRI of  the brain may be obtained. ASSESSMENT AND PLAN:      AMS/aphasia   -Likely 2/2 dementia progression vs. TIA  -Speech is almost clear now   -CT Brain: no acute abnormality. Extensive senescent changes with parenchymal volume loss and chronic small  vessel ischemic changes. -CTA with atherosclerosis of the proximal cervical ICAs bilaterally with less than 50% stenosis by NASCET criteria.   -CXR stable  -UA unremarkable  -Cont asa and stain   -Tele monitoring  -Swallow evaluation   -Neurology consulted   -PT/OT evaluation     H/o Afib  - Cont BB and apixaban       Dementia  - Cont Aricept     HTN  - Cont home metoprolol     Recent covid exposure  - On room air     DVT Prophylaxis: apixaban   Diet: Regular diet if pass swallow test   Code Status: Full code      PT/OT Eval Status: consulted

## 2021-12-25 NOTE — ED NOTES
Inserted straight cath to obtain a sterile urine sample. Sterile technique used, urine returned immediately upon insertion. 150 mL of urine emptied from bladder, sample collected and sent to lab. RN aware.       Shweta Dodd  12/25/21 8870

## 2021-12-25 NOTE — PLAN OF CARE
Problem: Falls - Risk of:  Goal: Will remain free from falls  Description: Will remain free from falls  Outcome: Ongoing     Problem: Airway Clearance - Ineffective  Goal: Achieve or maintain patent airway  Outcome: Ongoing     Problem: HEMODYNAMIC STATUS  Goal: Patient has stable vital signs and fluid balance  Outcome: Ongoing     Problem: ACTIVITY INTOLERANCE/IMPAIRED MOBILITY  Goal: Mobility/activity is maintained at optimum level for patient  Outcome: Ongoing

## 2021-12-25 NOTE — ED PROVIDER NOTES
Emergency Physician Note        Note Open Time: 11:07 AM EST    Chief Complaint  Fall (pt weaker over last couple days. Elaina Rondon pt fell out of bed around 3 am.. family made pt comfy on floor and called squad this am.. pt having difficulty with words. Elaina Rondon )       History of Present Illness  Camilo Meyer is a 80 y.o. male who presents to the ED for fall. EMS was called for patient that was on the ground. Apparently he slid out of bed around 3 AM.  No definite injury. The family members apparently made him comfortable with pillows on the floor and called EMS this morning. EMS reports patient was having trouble with his words. To me patient is entirely aphasic and cannot assist with the history at all. EMS also gives history of the family reports he was not well yesterday during their family Lindsay dinner and in fact he has been declining over the course of a few days. History and review of systems limited by patient's aphasia    I have reviewed the following from the nursing documentation:      Prior to Admission medications    Medication Sig Start Date End Date Taking?  Authorizing Provider   terazosin (HYTRIN) 5 MG capsule Take 1 capsule by mouth nightly 11/4/21   Nida Lamb MD   cyclobenzaprine (FLEXERIL) 5 MG tablet Take 1 tablet by mouth 2 times daily as needed for Muscle spasms 11/4/21   Nida Lamb MD   vitamin D3 (CHOLECALCIFEROL) 10 MCG (400 UNIT) TABS tablet Take 1 tablet by mouth daily 11/4/21   Nida Lamb MD   ondansetron (ZOFRAN-ODT) 4 MG disintegrating tablet Take 1 tablet by mouth every 8 hours as needed for Nausea or Vomiting 11/4/21   Nida Lamb MD   [facility]) 2.5 MG TABS tablet Take 1 tablet by mouth 2 times daily 11/4/21   Nida Lamb MD   VITAMIN D PO Take by mouth    Historical Provider, MD   Multiple Vitamins-Minerals (OCUVITE PO) Take by mouth    Historical Provider, MD   Calcium Carbonate Antacid (ANTACID E-X PO) Take by mouth    Historical Provider, MD   esomeprazole (NEXIUM) 20 MG delayed release capsule Take 20 mg by mouth every morning (before breakfast)    Historical Provider, MD   divalproex (DEPAKOTE) 125 MG DR tablet 250 mg  4/22/21   Historical Provider, MD   tamsulosin (FLOMAX) 0.4 MG capsule Take 0.4 mg by mouth daily    Historical Provider, MD   donepezil (ARICEPT) 10 MG tablet Take 10 mg by mouth nightly    Historical Provider, MD   metoprolol tartrate (LOPRESSOR) 25 MG tablet Take 2 tablets by mouth 2 times daily  Patient taking differently: Take 50 mg by mouth 2 times daily 25 QAM, 50QHS 7/20/19 10/30/21  CRISTI Moyer - CNP   lisinopril (PRINIVIL;ZESTRIL) 20 MG tablet Take 1 tablet by mouth daily 5/27/19   Lizzette Norris MD   simvastatin (ZOCOR) 80 MG tablet Take 40 mg by mouth nightly     Historical Provider, MD       Allergies as of 12/25/2021 - Fully Reviewed 12/25/2021   Allergen Reaction Noted    Corn-containing products Other (See Comments) 12/19/2010       Past Medical History:   Diagnosis Date    Hyperlipemia     Hypertension     Prostate enlargement     Unspecified cerebral artery occlusion with cerebral infarction 2000        Surgical History:   Past Surgical History:   Procedure Laterality Date    CAROTID ENDARTERECTOMY      right    COLONOSCOPY      COLONOSCOPY  2014    polyps    EYE SURGERY      FACIAL RECONSTRUCTION SURGERY      metal plate under left eye    FOOT SURGERY      nerve right    MOHS SURGERY      SKIN CANCER EXCISION      face x several    TONSILLECTOMY          Family History:  History reviewed. No pertinent family history.     Social History     Socioeconomic History    Marital status:      Spouse name: Not on file    Number of children: Not on file    Years of education: Not on file    Highest education level: Not on file   Occupational History    Not on file   Tobacco Use    Smoking status: Never Smoker    Smokeless tobacco: Never Used   Vaping Use    Vaping Use: Never used   Substance and Sexual Activity    Alcohol use: No    Drug use: No    Sexual activity: Yes     Partners: Female   Other Topics Concern    Not on file   Social History Narrative    Not on file     Social Determinants of Health     Financial Resource Strain:     Difficulty of Paying Living Expenses: Not on file   Food Insecurity:     Worried About Running Out of Food in the Last Year: Not on file    [place] of Food in the Last Year: Not on file   Transportation Needs:     Lack of Transportation (Medical): Not on file    Lack of Transportation (Non-Medical): Not on file   Physical Activity:     Days of Exercise per Week: Not on file    Minutes of Exercise per Session: Not on file   Stress:     Feeling of Stress : Not on file   Social Connections:     Frequency of Communication with Friends and Family: Not on file    Frequency of Social Gatherings with Friends and Family: Not on file    Attends Rastafarian Services: Not on file    Active Member of [address] [place] [address] or Organizations: Not on file    Attends Club or Organization Meetings: Not on file    Marital Status: Not on file   Intimate Partner Violence:     Fear of Current or Ex-Partner: Not on file    Emotionally Abused: Not on file    Physically Abused: Not on file    Sexually Abused: Not on file   Housing Stability:     Unable to Pay for Housing in the Last Year: Not on file    Number of [place] in the Last Year: Not on file    Unstable Housing in the Last Year: Not on file       Nursing notes reviewed. ED Triage Vitals [12/25/21 0753]   Enc Vitals Group      BP (!) 176/104      Pulse 63      Resp 18      Temp 98.7 °F (37.1 °C)      Temp Source Oral      SpO2 94 %      Weight 170 lb (77.1 kg)      Height 5' 8\" (1.727 m)      Head Circumference       Peak Flow       Pain Score       Pain Loc       Pain Edu? Excl. in [address]? GENERAL:  Awake, alert. Well developed, well nourished with no apparent distress.    HENT: RECOMMENDATIONS:   If patient's symptoms are persistent or worsen, a follow-up head CT or MRI of   the brain may be obtained. Unavailable         XR CHEST PORTABLE   Final Result   Mild cardiomegaly with minimal central pulmonary vascular prominence. Haziness in the left lung base. Atelectasis/small left pleural effusion or   infiltrate.               LABS  Labs Reviewed   COMPREHENSIVE METABOLIC PANEL W/ REFLEX TO MG FOR LOW K - Abnormal; Notable for the following components:       Result Value    Glucose 100 (*)     Total Protein 6.2 (*)     All other components within normal limits    Narrative:     Performed at:  [address], [employer]   Phone [phone] - Abnormal; Notable for the following components:    Pro-BNP 1,233 (*)     All other components within normal limits    Narrative:     Performed at:  [address], [facility]   Phone [phone]   VALPROIC ACID LEVEL, TOTAL - Abnormal; Notable for the following components:    Valproic Acid Lvl 37.8 (*)     All other components within normal limits    Narrative:     Performed at:  [address], [facility]   Phone [phone]   URINE RT REFLEX TO CULTURE - Abnormal; Notable for the following components:    Ketones, Urine 15 (*)     All other components within normal limits    Narrative:     Performed at:  [address], [facility]   Phone [phone]   POCT GLUCOSE - Abnormal; Notable for the following components:    POC Glucose 101 (*)     All other components within normal limits    Narrative:     Performed at:  [address], [employer]   Phone [phone], RAPID   CBC WITH AUTO DIFFERENTIAL    Narrative: Performed at:  [address], [facility] [address]   Phone [phone]   TROPONIN    Narrative:     Performed at:  [address], [address]   Phone [phone]   PROTIME-INR    Narrative:     Performed at:  [address], [facility]   Phone [phone]   ETHANOL    Narrative:     Performed at:  [address], [facility]   Phone [phone]   POCT GLUCOSE   POCT GLUCOSE   TYPE AND SCREEN    Narrative:     Performed at:  [address], [address] [employer]   Phone [phone]        Patient remained aphasic through his time in the ER. CT and CTA were unrevealing. Patient is positive for Covid however. Readmitting him to the hospital for further treatment evaluation both of his aphasia, possible stroke syndrome and Covid. I spoke with the hospitalist. We thoroughly discussed the history, physical exam, laboratory and imaging studies, as well as, emergency department course. Based upon that discussion, we've decided to admit Keysha Cardenas for further observation and evaluation of Alvaro Coates's mental status change. As I have deemed necessary from their history, physical and studies, I have considered and evaluated Keysha Cardenas for the following diagnoses:  DIABETES, INTRACRANIAL HEMORRHAGE, MENINGITIS, SEPSIS SUBARACHNOID HEMORRHAGE, SUBDURAL HEMATOMA, & STROKE. FINAL IMPRESSION  1. Aphasia    2. General weakness    3. Syncope and collapse    4. Elevated brain natriuretic peptide (BNP) level    5. COVID-19        Vitals:  Blood pressure (!) 173/104, pulse 98, temperature 99.4 °F (37.4 °C), temperature source Axillary, resp.  rate 16, height 5' 8\" (1.727 m), weight 170 lb (77.1 kg), SpO2 93 %. Disposition  Pt is in stable condition upon Admit to telemetry. This chart was generated using the [employer] dictation system. I created this record but it may contain dictation errors.           Corinna Porras MD  12/25/21 9289

## 2021-12-26 NOTE — PROGRESS NOTES
Comprehensive Nutrition Assessment    Type and Reason for Visit:  Initial,Positive Nutrition Screen    Nutrition Recommendations/Plan:  1. Recommend General diet order, free of therapeutic restrictions, to promote adequate nutrient intake  2. RD to add Isak Krabbe TID for additional calories and protein (allergy friendly)  3. Encourage intake of high protein foods due to wt loss x 2 months  4. Monitor nutrition adequacy, pertinent labs, bowel habits, wt changes, and clinical progress    Nutrition Assessment:  Positive nutrition screen for wt loss and poor intake/appetite: 80 y.o male PMH of HLD, HTN, PAF and CVA admitted d/t confusion, fall and aphasia. Positive for COVID-19. In droplet plus precautions, unable to reach pt on phone today. On low sodium diet, no PO intakes recorded yet. Will liberlize diet to promote PO intakes. Significant wt loss noted, pt was 198 lb on 10/30/21 and currently 170 lb (-14% wt loss x 2 months). Pt is allergic to corn and many supplements contain corn- will add Isak Krabbe supplement which is corn free. Will continue to monitor. Malnutrition Assessment:  Malnutrition Status:  Insufficient data (droplet plus precautions, did not answer phone)    Context:  Acute Illness     Findings of the 6 clinical characteristics of malnutrition:  Weight Loss:  No significant weight loss (-14% wt loss x 2 months)       Estimated Daily Nutrient Needs:  Energy (kcal):  5648-1107 kcals/day; Weight Used for Energy Requirements:  Ideal (70 kg)     Protein (g):  70-98 g/day; Weight Used for Protein Requirements:  Ideal (1-1.4 g/kg)        Method Used for Fluid Requirements:  1 ml/kcal      Nutrition Related Findings:  Labs reviewed. No BM since admission, may need bowel regimen. Wounds:  None       Current Nutrition Therapies:    ADULT DIET; Regular  ADULT ORAL NUTRITION SUPPLEMENT; Breakfast, Lunch, Dinner;  Other Oral Supplement; Isak Krabbe ONS    Anthropometric Measures:  · Height: 5' 8\" (172.7 cm)  · Current Body Weight: 170 lb (77.1 kg)    · Usual Body Weight: 198 lb (89.8 kg) (on 10/30/21)     · Ideal Body Weight: 154 lbs; % Ideal Body Weight 110.4 %   · BMI: 25.9  · BMI Categories: Overweight (BMI 25.0-29. 9)       Nutrition Diagnosis:   · Inadequate oral intake related to early satiety,cognitive or neurological impairment as evidenced by poor intake prior to admission,intake 0-25%,weight loss    Nutrition Interventions:   Food and/or Nutrient Delivery:  Modify Current Diet,Start Oral Nutrition Supplement  Nutrition Education/Counseling:  Education not indicated   Coordination of Nutrition Care:  Continue to monitor while inpatient    Goals:  Consume 50% or greater of 3 meals per day and ONS during this admission       Nutrition Monitoring and Evaluation:   Behavioral-Environmental Outcomes:  None Identified   Food/Nutrient Intake Outcomes:  Supplement Intake,Food and Nutrient Intake  Physical Signs/Symptoms Outcomes:  Biochemical Data,Constipation,Weight,Nutrition Focused Physical Findings     Discharge Planning:    Continue current diet,Continue Oral Nutrition Supplement     Electronically signed by Verner Peng, MS, RD, LD on 12/26/21 at 3:41 PM EST    Contact: Office: [phone]; [address]: [phone]

## 2021-12-26 NOTE — PLAN OF CARE
Nutrition Problem #1: Inadequate oral intake  Intervention: Food and/or Nutrient Delivery: Modify Current Diet,Start Oral Nutrition Supplement  Nutritional Goals: Consume 50% or greater of 3 meals per day and ONS during this admission

## 2021-12-26 NOTE — PROGRESS NOTES
Awake and alert, having expressive aphasia. Will answer simple questions with yes or no. Impulsive and at times will not follow commands. Started to participate in [address] scale, soon turned onto side and wouldn't participate. When asked if he was okay he said \"yes\". Able to grab glass off table on own to take a drink of water and then pulled his blankets up around his shoulders and closed his eyes. Call light in reach, bed alarm remains activated and [employer] monitor in use. I have personally seen and evaluated the device findings. Interrogation reviewed and I agree with assessment.     Elizabeth Naiton

## 2021-12-26 NOTE — PROGRESS NOTES
Occupational Therapy   Occupational Therapy Initial Assessment/Treatment  Date: 2021   Patient Name: Trevor Nava  MRN: [id number]     : 1940    Date of Service: 2021    Discharge Recommendations:  [address]  OT Equipment Recommendations  Other: defer    Assessment   Performance deficits / Impairments: Decreased functional mobility ; Decreased ADL status; Decreased safe awareness;Decreased cognition;Decreased balance;Decreased endurance;Decreased high-level IADLs;Decreased fine motor control;Decreased coordination;Decreased posture    Assessment: Pt is a 71yo male with deficits in the areas listed above. Pt is a questionable historian so PLOF is unknown this date. Today, pt performed simple ADL seated EOB with set up/SBA and max A-CGA for postural stability seated EOB. Pt performed functional t/f and mobility with mod A x2 and vc's for safety and sequencing. Pt prequiring Ax2 for bed mobiltiy and vc's for sequencing. Pt would continue to benefit from skilled OT services to increase independence and safety in ADLS and functional mobility. Prognosis: Good  Decision Making: Medium Complexity  OT Education: OT Role;Plan of Care;Transfer Training;Precautions; ADL Adaptive Strategies  Patient Education: disease specific: safe t/fs, general safety during hospitalization, postural awareness, use of call light. Pt verbalized understanding but will need reinforcement. Barriers to Learning: cognition. REQUIRES OT FOLLOW UP: Yes  Activity Tolerance  Activity Tolerance: Treatment limited secondary to decreased cognition;Patient limited by fatigue  Activity Tolerance: /91, HR 82, O2 93%  Safety Devices  Safety Devices in place: Yes  Type of devices: Nurse notified; Bed alarm in place;Gait belt;Call light within reach; Left in bed  Restraints  Initially in place: Yes (4 bed rails up initially)           Patient Diagnosis(es): The primary encounter diagnosis was Aphasia. Diagnoses of General weakness, Syncope and collapse, Elevated brain natriuretic peptide (BNP) level, and COVID-19 were also pertinent to this visit. has a past medical history of Hyperlipemia, Hypertension, Prostate enlargement, and Unspecified cerebral artery occlusion with cerebral infarction. has a past surgical history that includes Carotid endarterectomy; Foot surgery; Skin cancer excision; Eye surgery; Facial reconstruction surgery; Tonsillectomy; Colonoscopy; Colonoscopy (2014); and Mohs surgery. Restrictions  Restrictions/Precautions  Restrictions/Precautions: Up as Tolerated,Isolation,Contact Precautions  Position Activity Restriction  Other position/activity restrictions: Covid +, Droplet plus    Subjective   General  Chart Reviewed: Yes  Patient assessed for rehabilitation services?: Yes  Additional Pertinent Hx: Per chart, \"The patient is a 80y.o.  years old male with history of hypertension and prior stroke who was admitted to the hospital today with speech impairment, aphasia and possible new stroke. Symptoms started hours prior to admission. Description was sudden speech difficulties and difficulties with word findings. Other associated symptoms generalized weakness. Degree was severe. Duration was persistent. No other relieving or aggravating factors. No clear triggers or other associated symptoms. He came to the ED for evaluation. Initial work-up with neuroimaging showed no acute stroke and no large vessel occlusion. He did have some bilateral vertebral stenosis. He was admitted to the hospital.  Currently he is under droplet isolation given concern for Covid exposure few days ago. Other review of system was unremarkable. \"  Response to previous treatment: Patient with no complaints from previous session  Family / Caregiver Present: No  Referring Practitioner: Rabon Heimlich, MD  Diagnosis: Acute confusion and aphasia  Subjective  Subjective: Pt in bed and agreeable to And Coordinated: No  Coordination and Movement description: Fine motor impairments;Tremors (difficulty assessing d/t decreased understanding.)     Bed mobility  Rolling to Right: Minimal assistance  Supine to Sit: Maximum assistance;2 Person assistance;Dependent/Total (HOB elevated.)  Sit to Supine: Minimal assistance;2 Person assistance;Dependent/Total (HOB elevated.)  Scootin Person assistance; Moderate assistance;Dependent/Total  Transfers  Sit to stand: 2 Person assistance; Moderate assistance;Dependent/Total  Stand to sit: Contact guard assistance;2 Person assistance     Cognition  Overall Cognitive Status: Exceptions  Arousal/Alertness: Delayed responses to stimuli  Following Commands: Follows one step commands with repetition; Inconsistently follows commands; Follows one step commands with increased time (difficulty understanding directs though pt will attempt to follow.)  Attention Span: Attends with cues to redirect; Difficulty attending to directions  Memory: Decreased recall of recent events;Decreased short term memory;Decreased recall of biographical Information  Safety Judgement: Decreased awareness of need for safety;Decreased awareness of need for assistance  Problem Solving: Decreased awareness of errors;Assistance required to identify errors made;Assistance required to correct errors made;Assistance required to generate solutions;Assistance required to implement solutions  Insights: Decreased awareness of deficits  Initiation: Requires cues for some  Sequencing: Requires cues for some        Sensation  Overall Sensation Status: WFL (denies N/T)        LUE AROM (degrees)  LUE AROM : WFL  Left Hand AROM (degrees)  Left Hand AROM: WFL  RUE AROM (degrees)  RUE AROM : WFL  Right Hand AROM (degrees)  Right Hand AROM: WFL  LUE Strength  Gross LUE Strength: WFL  L Hand General: 4+/5  LUE Strength Comment: 4+/5 grossly  RUE Strength  Gross RUE Strength: Exceptions to [facility]  R Shoulder Flex: 3+/5  R Elbow Flex: 4+/5  R Hand General: 4+/5  RUE Strength Comment: Pt stating that he has difficulties with his R shoulder. Plan   Plan  Times per week: 2-3 x/week  Current Treatment Recommendations: Strengthening,Functional Mobility Training,Balance Training,Endurance Training,Cognitive Reorientation,Patient/Caregiver Education & Training,Equipment Evaluation, Education, & procurement,Self-Care / ADL,Safety Education & Training    AM-PAC Score        AM-PAC Inpatient Daily Activity Raw Score: 14 (12/26/21 1043)  AM-PAC Inpatient ADL T-Scale Score : 33.39 (12/26/21 1043)  ADL Inpatient CMS 0-100% Score: 59.67 (12/26/21 1043)  ADL Inpatient CMS G-Code Modifier : CK (12/26/21 1043)    Goals  Short term goals  Time Frame for Short term goals: 1 week (1/2) unless stated otherwise. Short term goal 1: Pt will LB dress with mod A and AD/AE PRN. Short term goal 2: Pt will perform at least 3min of standing functional activities with Yaquelin and AD PRN. Short term goal 3: Pt will UB dress with Yaquelin. Short term goal 4: Pt will perform BUE ther ex x20 to increase strength/endurance for functional activities (12/31). Short term goal 5: Pt will toilet with mod A and AD PRN. Patient Goals   Patient goals : Pt unable to state goals this date d/t decreased cognition. Therapy Time   Individual Concurrent Group Co-treatment   Time In 0748         Time Out 0823         Minutes 35         Timed Code Treatment Minutes: 25 Minutes (10min eval)       Sarah Santiago OTR/L  If pt discharges prior to next session, this note will serve as discharge summary. See case management note for discharge disposition.

## 2021-12-26 NOTE — PROGRESS NOTES
Physical Therapy    Facility/Department: [facility] C5 - MED SURG/ORTHO  Initial Assessment    NAME: Tye Tejeda  : 1940  MRN: [id number]    Date of Service: 2021    Discharge Recommendations:  Subacute/Skilled Nursing Facility   PT Equipment Recommendations  Other: defer    Assessment   Body structures, Functions, Activity limitations: Decreased functional mobility ; Decreased ADL status; Decreased strength;Decreased balance;Decreased endurance;Decreased cognition;Decreased posture;Decreased high-level IADLs;Decreased coordination  Assessment: Pt is 81 y/o M who presents to [facility] after fall in home out of bed. Pt has PMH of prior stroke, is COVID +, and aphasia. Difficult to assess pt's PLOF, but he states he lives with his wife. Pt presents below baseline in the he req grossly ModAx2 for OOB mobility. With amb, he required frequent cues for proper sequencing in order to ambulate. Pt presents w/ limitations in activity tolerance, coordination, strength, endurance, and ability to follow commands. Pt will benefit from skilled PT services during acute stay. Pt will benefit from SNF upon d/c d/t high level of support needed. Treatment Diagnosis: Decreased functional mobility  Specific instructions for Next Treatment: Progress exercises/mobility  Prognosis: Fair  Decision Making: High Complexity  PT Education: Goals;PT Role;Plan of Care;Orientation;General Safety; Energy Conservation;Transfer Training; Adaptive Device Training;Gait Training;Disease Specific Education  Patient Education: Pt educated on importance of OOB mobility for current deficits, pt does not demo learning needs reinforcement  Barriers to Learning: cog, aphasia  REQUIRES PT FOLLOW UP: Yes  Activity Tolerance  Activity Tolerance: Patient limited by endurance; Patient limited by fatigue;Patient limited by cognitive status       Patient Diagnosis(es): The primary encounter diagnosis was Aphasia.  Diagnoses of General weakness, Syncope and collapse, Elevated brain natriuretic peptide (BNP) level, and COVID-19 were also pertinent to this visit. has a past medical history of Hyperlipemia, Hypertension, Prostate enlargement, and Unspecified cerebral artery occlusion with cerebral infarction. has a past surgical history that includes Carotid endarterectomy; Foot surgery; Skin cancer excision; Eye surgery; Facial reconstruction surgery; Tonsillectomy; Colonoscopy; Colonoscopy (2014); and Mohs surgery. Restrictions  Restrictions/Precautions  Restrictions/Precautions: Up as Tolerated,Isolation,Contact Precautions  Position Activity Restriction  Other position/activity restrictions: Covid +, Droplet plus    Subjective  General  Chart Reviewed: Yes  Patient assessed for rehabilitation services?: Yes  Additional Pertinent Hx: per H&P\"81 y.o. male with a PMH of HLD, HTN, PAF and CVA who presented to [facility] d/t confusion and fall this morning. Family reports generalized weakness and some speech difficulty. No stoke alert was called. Pt had covid exposure earlier this week. Pt is only oriented to self and place. Denies HA, chest pain ,sob, abdominal pain, nausea, vomiting or diarrhea. On room air.  \"  Response To Previous Treatment: Not applicable  Family / Caregiver Present: No  Referring Practitioner: Sony Adhikari MD  Referral Date : 12/26/21  Diagnosis: Syncope and collapse  Follows Commands: Within Functional Limits  General Comment  Comments: Pt cleared for PT eval via RN  Subjective  Subjective: Pt agreeable to PT eval  Pain Screening  Patient Currently in Pain: Denies  Pain Assessment  Pain Assessment: 0-10  Pain Level: 0  Patient's Stated Pain Goal: No pain  Vital Signs  Pulse: 82  BP: (!) 176/91  BP Location: Right upper arm  Patient Currently in Pain: Denies  Oxygen Therapy  SpO2: 93 %  Pulse Oximeter Device Mode: Intermittent  Pulse Oximeter Device Location: Finger  O2 Device: None (Room air)  Pre Treatment Pain Screening  Intervention List: Patient able to continue with treatment    Orientation  Orientation  Overall Orientation Status: Impaired  Orientation Level: Oriented to person;Disoriented to place; Disoriented to time;Disoriented to situation  Social/Functional History  Social/Functional History  Lives With: Spouse  Type of Home: House  Home Layout: One level  Home Access: Stairs to enter with rails  Entrance Stairs - Number of Steps: 1-2  Bathroom Equipment: Shower chair  Home Equipment: Rolling walker  ADL Assistance: Needs assistance  Bath: Moderate assistance  Additional Comments: Difficulty obtaining history from pt d/t limited understanding. Objective     Observation/Palpation  Posture: Fair    AROM RLE (degrees)  RLE AROM: WNL  AROM LLE (degrees)  LLE AROM : WNL  Strength RLE  Comment: Grossly 3/5  Strength LLE  Comment: grossly 3/5  Tone RLE  RLE Tone: Normotonic  Tone LLE  LLE Tone: Normotonic  Motor Control  Gross Motor?: WNL  Coordination  Heel to X [facility]:  (could not follow visual and verbal commands)  Sensation  Overall Sensation Status: WFL  Bed mobility  Rolling to Right: Minimal assistance  Supine to Sit: Maximum assistance;2 Person assistance  Sit to Supine: Minimal assistance  Scootin Person assistance; Moderate assistance  Transfers  Sit to Stand: Moderate Assistance;2 Person Assistance  Stand to sit: Contact guard assistance  Comment: to RW  Ambulation  Ambulation?: Yes  Ambulation 1  Surface: level tile  Device: [address]: 2 Person assistance; Moderate assistance  Quality of Gait: Pt presents with difficulty sequencing lateral steps to get up to head of bed. Pt req modAx2 for RW management, weight shifting, and assistance with moving leg.   Distance: 2 feet to head of bed  Comments: Pt required constant verbal/tactile cues     Balance  Posture: Poor  Sitting - Static: Fair  Sitting - Dynamic: Fair;-  Standing - Static: Fair;-  Standing - Dynamic: Poor;+  Comments: No overt LOB, pt req CGA-Min! for bed stability, Min-ModAx2 for staning dynamic activity  Exercises  Knee Long Arc Quad: x5 BLE  Ankle Pumps: x10 bilat  Comments: constant cues for technique and sequencing     Plan   Plan  Times per week: 2-3  Times per day: Daily  Specific instructions for Next Treatment: Progress exercises/mobility  Current Treatment Recommendations: Strengthening,ROM,Balance Training,Functional Mobility Training,Cognitive/Perceptual Training,IADL Training,ADL/Self-care Training,Transfer Training,Endurance Training,Gait Training,Neuromuscular Re-education,Pain [employer] Exercise Program,Safety Education & Training,Patient/Caregiver Education & Training  Safety Devices  Type of devices: All fall risk precautions in place,Patient at risk for falls,Left in bed,Bed alarm in place,Call light within reach,Gait belt,Nurse notified    AM-PAC Score     AM-PAC Inpatient Mobility without Stair Climbing Raw Score : 10 (12/26/21 1024)  AM-PAC Inpatient without Stair Climbing T-Scale Score : 34.07 (12/26/21 1024)  Mobility Inpatient CMS 0-100% Score: 71.66 (12/26/21 1024)  Mobility Inpatient without Stair CMS G-Code Modifier : CL (12/26/21 1024)       Goals  Short term goals  Time Frame for Short term goals: 1 week, 1/02/2022  Short term goal 1: Pt will tolerate bed mobility w/ SBA  Short term goal 2: Pt will perform sit to stand w/ MinAx1 and LRAD  Short term goal 3: Pt will ambulate 25 ft w/ LRAD and ModAx1  Short term goal 4: Pt will partake in 10-15 reps of BLE exercises to improve function towards goals by 12/29. Patient Goals   Patient goals : \"to go home\"       Therapy Time   Individual Concurrent Group Co-treatment   Time In 0671         Time Out 0823         Minutes 36         Timed Code Treatment Minutes: 26 Minutes plus 10 min maria l Cadena PT    If pt is unable to be seen after this session, please let this note serve as discharge summary. Please see case management note for discharge disposition.   Thank you.

## 2021-12-26 NOTE — PLAN OF CARE
Problem: Falls - Risk of:  Goal: Will remain free from falls  Description: Will remain free from falls  12/26/2021 0209 by Kali Basilio, RN  Note: Bed in low position, wheels locked. Side rails up x2. Call light in reach and bed alarm remains activated. [employer] monitor in use.    12/25/2021 1436 by Camila Persaud RN  Outcome: Ongoing

## 2021-12-26 NOTE — PROGRESS NOTES
Department of Internal Medicine  General Internal Medicine  Progress Note    CHIEF COMPLAINT: generalized weakness      Reason for Admission:  aphasia     SUBJECTIVE:    Pt has no active complaints. Awake and alert, having expressive aphasia. Will answer simple questions with yes or no. Impulsive and at times per RN. Not following commands    OBJECTIVE      Medications    Current Facility-Administered Medications: lisinopril (PRINIVIL;ZESTRIL) tablet 20 mg, 20 mg, Oral, Daily  hydrALAZINE (APRESOLINE) injection 10 mg, 10 mg, IntraVENous, Q6H PRN  apixaban (ELIQUIS) tablet 2.5 mg, 2.5 mg, Oral, BID  divalproex (DEPAKOTE SPRINKLE) capsule 125 mg, 125 mg, Oral, 2 times per day  donepezil (ARICEPT) tablet 10 mg, 10 mg, Oral, Nightly  pantoprazole (PROTONIX) tablet 40 mg, 40 mg, Oral, QAM AC  atorvastatin (LIPITOR) tablet 20 mg, 20 mg, Oral, Daily  tamsulosin (FLOMAX) capsule 0.4 mg, 0.4 mg, Oral, Daily  vitamin D3 (CHOLECALCIFEROL) tablet 400 Units, 400 Units, Oral, Daily  sodium chloride flush 0.9 % injection 5-40 mL, 5-40 mL, IntraVENous, 2 times per day  sodium chloride flush 0.9 % injection 5-40 mL, 5-40 mL, IntraVENous, PRN  0.9 % sodium chloride infusion, 25 mL, IntraVENous, PRN  ondansetron (ZOFRAN-ODT) disintegrating tablet 4 mg, 4 mg, Oral, Q8H PRN **OR** ondansetron (ZOFRAN) injection 4 mg, 4 mg, IntraVENous, Q6H PRN  polyethylene glycol (GLYCOLAX) packet 17 g, 17 g, Oral, Daily PRN  acetaminophen (TYLENOL) tablet 650 mg, 650 mg, Oral, Q6H PRN **OR** acetaminophen (TYLENOL) suppository 650 mg, 650 mg, Rectal, Q6H PRN  aspirin EC tablet 81 mg, 81 mg, Oral, Daily  metoprolol tartrate (LOPRESSOR) tablet 25 mg, 25 mg, Oral, BID  Physical    VITALS:  BP (!) 176/91   Pulse 82   Temp 98.7 °F (37.1 °C)   Resp 18   Ht 5' 8\" (1.727 m)   Wt 170 lb (77.1 kg)   SpO2 93%   BMI 25.85 kg/m²     General appearance: A/Ox2  HEENT:  Normal cephalic, atraumatic without obvious deformity.  Pupils equal, round, and reactive to light.  Extra ocular muscles intact. Conjunctivae/corneas clear. Neck: Supple, with full range of motion. No jugular venous distention. Trachea midline. Respiratory:  Normal respiratory effort. Diminished bases  Cardiovascular:  Irregular rate and rhythm  Abdomen: Soft, non-tender, non-distended with normal bowel sounds. Musculoskeletal:  No clubbing, cyanosis or edema bilaterally.  Full range of motion without deformity. Skin: Skin color-pale, texture, turgor normal.  No rashes or lesions. Neurologic:  No obvious focal deficits, speech clear, no droop, THOMAS, gen weak  Psychiatric:  Alert and oriented x1, poor insight  Capillary Refill: Brisk,3 seconds, normal  Peripheral Pulses: +2 palpable, equal bilaterally      Data    CTA Head/neck:  1. Atherosclerosis contributes to severe stenosis at the origin of both  vertebral arteries. 2. Atherosclerosis of the proximal cervical ICAs bilaterally with less than  50% stenosis by NASCET criteria. 3. No significant stenosis of the [id number]-hw-Vjvmkt. 4. Atherosclerosis of the V4 segments of the vertebral arteries bilaterally  contribute to areas of minimal stenosis.     CT Head:  Impression  No acute intracranial abnormality.     Extensive senescent changes with parenchymal volume loss and chronic small  vessel ischemic changes.     RECOMMENDATIONS:  If patient's symptoms are persistent or worsen, a follow-up head CT or MRI of  the brain may be obtained. ASSESSMENT AND PLAN      AMS/aphasia   -Likely acute metabolic encephalopathy from COVID-19 versus new ischemic stroke. -Speech is almost clear now   -CT Brain: no acute abnormality. Extensive senescent changes with parenchymal volume loss and chronic small  vessel ischemic changes. -CTA with atherosclerosis of the proximal cervical ICAs bilaterally with less than 50% stenosis by NASCET criteria.   -MRI richelle and echo ordered   -UA unremarkable  -Cont asa and stain   -Tele monitoring  -Passed swallow test at bedside -Neurology consulted   -PT/OT evaluation     H/o Afib  -Cont BB and apixaban       Dementia  -Cont Aricept     HTN  -Cont home metoprolol   -IV hydralazine as needed for elevated Bp      Recent covid exposure  -On room air     DVT Prophylaxis: apixaban   Diet: Regular diet   Code Status: Full code

## 2021-12-27 PROBLEM — I48.0 PAF (PAROXYSMAL ATRIAL FIBRILLATION) (HCC): Status: ACTIVE | Noted: 2021-01-01

## 2021-12-27 NOTE — PROGRESS NOTES
RN received phone call from [facility] that patient's heart rate was  In a-fib rvr, notified hospitalists (Nenita Cota), stat EKG ordered, will continue to monitor and treat. Salma Vazquez RN

## 2021-12-27 NOTE — PROGRESS NOTES
Hospitalist Progress Note      PCP: Dave Elias MD    Date of Admission: 12/25/2021    Chief Complaint: Aphasia    Subjective: no new c/o. Medications:  Reviewed    Infusion Medications    sodium chloride       Scheduled Medications    divalproex  250 mg Oral 2 times per day    lisinopril  20 mg Oral Daily    apixaban  2.5 mg Oral BID    donepezil  10 mg Oral Nightly    pantoprazole  40 mg Oral QAM AC    atorvastatin  20 mg Oral Daily    tamsulosin  0.4 mg Oral Daily    vitamin D3  400 Units Oral Daily    sodium chloride flush  5-40 mL IntraVENous 2 times per day    aspirin  81 mg Oral Daily    metoprolol tartrate  25 mg Oral BID     PRN Meds: hydrALAZINE, sodium chloride flush, sodium chloride, ondansetron **OR** ondansetron, polyethylene glycol, acetaminophen **OR** acetaminophen    No intake or output data in the 24 hours ending 12/27/21 0843    Physical Exam Performed:    /85   Pulse 66   Temp 97.6 °F (36.4 °C) (Oral)   Resp 16   Ht 5' 8\" (1.727 m)   Wt 170 lb (77.1 kg)   SpO2 92%   BMI 25.85 kg/m²     General appearance: No apparent distress, appears stated age and cooperative. HEENT: Pupils equal, round, and reactive to light. Conjunctivae/corneas clear. Neck: Supple, with full range of motion. No jugular venous distention. Trachea midline. Respiratory:  Normal respiratory effort. Clear to auscultation, bilaterally without Rales/Wheezes/Rhonchi. Cardiovascular: Regular rate and rhythm with normal S1/S2 without murmurs, rubs or gallops. Abdomen: Soft, non-tender, non-distended with normal bowel sounds. Musculoskeletal: No clubbing, cyanosis or edema bilaterally. Full range of motion without deformity. Skin: Skin color, texture, turgor normal.  No rashes or lesions. Neurologic:  Neurovascularly intact without any focal sensory/motor deficits.  Cranial nerves: II-XII intact, grossly non-focal.  Psychiatric: Alert and oriented, thought content appropriate, normal insight  Capillary Refill: Brisk,< 3 seconds   Peripheral Pulses: +2 palpable, equal bilaterally       Labs:   Recent Labs     12/25/21  0818   WBC 5.3   HGB 13.5   HCT 40.6        Recent Labs     12/25/21  0818      K 4.3      CO2 24   BUN 18   CREATININE 1.0   CALCIUM 10.0     Recent Labs     12/25/21  0818   AST 16   ALT 19   BILITOT 0.3   ALKPHOS 73     Recent Labs     12/25/21  0818   INR 1.07     Recent Labs     12/25/21  0818   TROPONINI 0.01       Urinalysis:      Lab Results   Component Value Date    NITRU Negative 12/25/2021    WBCUA see below 03/27/2020    BACTERIA 3+ 03/27/2020    RBCUA >100 03/27/2020    BLOODU Negative 12/25/2021    SPECGRAV 1.020 12/25/2021    GLUCOSEU Negative 12/25/2021       Consults:    IP CONSULT TO PHARMACY  PHARMACY TO CHANGE BASE FLUIDS  IP CONSULT TO HOSPITALIST  IP CONSULT TO NEUROLOGY      Assessment/Plan:    Active Hospital Problems    Diagnosis     Aphasia [R47.01]     Arterial ischemic stroke, ICA, left, acute ([facility] [address] [I63.232]     COVID-19 [U07.1]     HTN (hypertension), benign [I10]        COVID 19 Positive - NAAT positive on 25 Dec. W/ out overt pulmonary sxs or PNA/Acute Respiratory Failure, on no COVID specific tx. AMS/aphasia   -Likely acute metabolic encephalopathy from COVID-19 versus new ischemic stroke. -Speech is almost clear now   -CT Brain: no acute abnormality. Extensive senescent changes with parenchymal volume loss and chronic small  vessel ischemic changes. -CTA with atherosclerosis of the proximal cervical ICAs bilaterally with less than 50% stenosis by NASCET criteria. -MRI richelle and echo ordered   -U/A unremarkable  -Cont asa and stain   -Tele monitoring  -Passed swallow test at bedside   -Neurology consulted and appreciated in advance.       HTN - w/out known CAD and no evidence of active signs/sxs of ischemia/failure. Currently controlled on home meds w/ vitals reviewed and documented as above.     Afib - chronic paroxysmal of unspecified and clinically unable to determine etiology. Normally rate controlled on BBlocker - continued. Anticoagulated at baseline on home Eliquis - continued. Monitored on tele. Dementia - w/out behavioral disturbance. Controlled on home medication regimen - continued. Continue supportive care and redirection as needed. BPH - w/out acute obstructive Uropathy, controlled on home medications as ordered - continued.           DVT Prophylaxis: Eliquis    Recent Labs     12/25/21  0818        Diet: ADULT DIET; Regular  ADULT ORAL NUTRITION SUPPLEMENT; Breakfast, Lunch, Dinner; Other Oral Supplement; Liliana Plunk ONS  Code Status: Full Code      PT/OT Eval Status: ordered and pending. Dispo - Patient is likely to remain in-house at least until Tues/Wed 28/29 Dec pending clinical course, subspecialty recs and PT/OT eval/recs.        Antonio Umanzor MD

## 2021-12-27 NOTE — CARE COORDINATION
CASE MANAGEMENT INITIAL ASSESSMENT      Reviewed chart and completed assessment with patient's wife  Explained Case Management role/services. Health Care Decision Maker :   Primary Decision Maker: Siria Messer - Spouse - [phone]    Secondary Decision Maker: Kwesi Arellano - Child - [phone]    Secondary Decision Maker: Spenser Harris - Child - [phone]          Can this person be reached and be able to respond quickly, such as within a few minutes or hours? Yes      Admit date/status: 12/25/21   Diagnosis: syncope and collapse    Is this a Readmission?:  Yes      Insurance: [employer] required for SNF: Yes       3 night stay required: No    Living arrangements, Adls, care needs, prior to admission: lives in a house with his wife     Transportation: wife     Durable Medical Equipment at home:  Walker_X_Cane__RTS__ BSC__Shower Chair_X_  02__ HHN__ CPAP__  BiPap__  Hospital Bed__ W/C_X__ Other__________    Services in the home and/or outpatient, prior to admission: active with [address] (if applicable)   · Name:  · Address:  · Dialysis Schedule:  · Phone:  · Fax:    PT/OT recs: SNF     Hospital Exemption Notification (HEN): not initiated     Barriers to discharge: none    Plan/comments: Patient lives at home with his wife and wife assists him as needed with ADL's. Discussed rec's of SNF and she is agreeable. Informed her that the only facilities accepting covid patient's is [facility] [address] and [facility]. She would like a referral to be made to [facility]. Placed call to [facility] with [facility] and notified of the referral.  She states she will review and call back with whether or not they are able to accept.        ECOC on chart for MD signature

## 2021-12-27 NOTE — ACP (ADVANCE CARE PLANNING)
Advance Care Planning     Advance Care Planning Activator (Inpatient)  Conversation Note      Date of ACP Conversation: 12/27/2021     Conversation Conducted with: Patient with Decision Making Capacity    ACP Activator: Juan Santana RN      Health Care Decision Maker:     Current Designated Health Care Decision Maker:     Primary Decision Maker: Carine Peters - Spouse - [phone]    Secondary Decision Maker: Cristina Isidro Child - [phone]    Secondary Decision Maker: Nichole Quezada - Child - [phone]      Care Preferences    Ventilation: \"If you were in your present state of health and suddenly became very ill and were unable to breathe on your own, what would your preference be about the use of a ventilator (breathing machine) if it were available to you? \"      Would the patient desire the use of ventilator (breathing machine)?: no    \"If your health worsens and it becomes clear that your chance of recovery is unlikely, what would your preference be about the use of a ventilator (breathing machine) if it were available to you? \"     Would the patient desire the use of ventilator (breathing machine)?: No      Resuscitation  \"CPR works best to restart the heart when there is a sudden event, like a heart attack, in someone who is otherwise healthy. Unfortunately, CPR does not typically restart the heart for people who have serious health conditions or who are very sick. \"    \"In the event your heart stopped as a result of an underlying serious health condition, would you want attempts to be made to restart your heart (answer \"yes\" for attempt to resuscitate) or would you prefer a natural death (answer \"no\" for do not attempt to resuscitate)? \" no       [] Yes   [] No   Educated Patient / Bridger Lyles regarding differences between Advance Directives and portable DNR orders.     Length of ACP Conversation in minutes: 5      Conversation Outcomes:  [x] ACP discussion completed  [] Existing advance directive reviewed with patient; no changes to patient's previously recorded wishes  [] New Advance Directive completed  [] Portable Do Not Rescitate prepared for Provider review and signature  [] POLST/POST/MOLST/MOST prepared for Provider review and signature      Follow-up plan:    [] Schedule follow-up conversation to continue planning  [x] Referred individual to Provider for additional questions/concerns   [] Advised patient/agent/surrogate to review completed ACP document and update if needed with changes in condition, patient preferences or care setting    [] This note routed to one or more involved healthcare providers

## 2021-12-27 NOTE — PROGRESS NOTES
Physical Therapy  Facility/Department: [facility] C5 - MED SURG/ORTHO  Daily Treatment Note  NAME: Aleena Dempsey  : 1940  MRN: [id number]    Date of Service: 2021    Discharge Recommendations:  Subacute/Skilled Nursing Facility   PT Equipment Recommendations  Other: defer    Assessment   Body structures, Functions, Activity limitations: Decreased functional mobility ; Decreased ADL status; Decreased strength;Decreased balance;Decreased endurance;Decreased cognition;Decreased posture;Decreased high-level IADLs;Decreased coordination  Assessment: Pt is 79 y/o M who presents to [id number] [facility] after fall in home out of bed. Pt has PMH of prior stroke, is COVID +, and aphasia; pt continues to function well below baseline, tolerated sitting edge of bed for approx 15 mins while taking vitals, focusing on trunk control as pt with severe R lean, pt requires constant cues for therex, unsafe to transfer to chair as pt with poor balance in standing and unable to take steps to assist with transfer and with poor sitting balance; pt presents w/ limitations in activity tolerance, coordination, strength, endurance, and ability to follow commands. Pt will benefit from skilled PT services during acute stay. Pt will benefit from SNF upon d/c d/t high level of support needed. Treatment Diagnosis: Decreased functional mobility  Specific instructions for Next Treatment: Progress exercises/mobility  PT Education: Goals;PT Role;Plan of Care;Orientation;General Safety; Energy Conservation;Transfer Training; Adaptive Device Training;Gait Training;Disease Specific Education  Patient Education: Pt educated on importance of OOB mobility for current deficits, pt does not demo learning needs reinforcement  Barriers to Learning: cog, aphasia  REQUIRES PT FOLLOW UP: Yes  Activity Tolerance  Activity Tolerance: Patient limited by endurance; Patient limited by fatigue;Patient limited by cognitive status  Activity Tolerance: fair tolerance for PT treat, vitals stable, reports of dizziness with sitting eob     Patient Diagnosis(es): The primary encounter diagnosis was Aphasia. Diagnoses of General weakness, Syncope and collapse, Elevated brain natriuretic peptide (BNP) level, and COVID-19 were also pertinent to this visit. has a past medical history of Hyperlipemia, Hypertension, Prostate enlargement, and Unspecified cerebral artery occlusion with cerebral infarction. has a past surgical history that includes Carotid endarterectomy; Foot surgery; Skin cancer excision; Eye surgery; Facial reconstruction surgery; Tonsillectomy; Colonoscopy; Colonoscopy (2014); and Mohs surgery. Restrictions  Restrictions/Precautions  Restrictions/Precautions: Up as Tolerated,Isolation,Contact Precautions  Position Activity Restriction  Other position/activity restrictions: Covid +, Droplet plus  Subjective   General  Chart Reviewed: Yes  Additional Pertinent Hx: per H&P\"81 y.o. male with a PMH of HLD, HTN, PAF and CVA who presented to [facility] d/t confusion and fall this morning. Family reports generalized weakness and some speech difficulty. No stoke alert was called. Pt had covid exposure earlier this week. Pt is only oriented to self and place. Denies HA, chest pain ,sob, abdominal pain, nausea, vomiting or diarrhea. On room air. \"  Response To Previous Treatment: Patient with no complaints from previous session.   Family / Caregiver Present: No  Referring Practitioner: Chel Buenrostro MD  Subjective  Subjective: Pt agreeable to PT treat, not oriented to situation but expressed wanting to get up  General Comment  Comments: RN cleared pt for PT treat  Pain Screening  Patient Currently in Pain: Denies  Vital Signs  Pulse: 77  BP: (!) 177/86  BP Location: Left upper arm  Patient Position: Supine  Patient Currently in Pain: Denies  Oxygen Therapy  SpO2: 92 %  Pulse Oximeter Device Mode: Intermittent  O2 Device: None (Room air) Orientation  Orientation  Orientation Level: Oriented to person;Disoriented to place; Disoriented to time;Disoriented to situation  Cognition   Cognition  Overall Cognitive Status: Exceptions  Arousal/Alertness: Delayed responses to stimuli  Following Commands: Follows one step commands with repetition; Inconsistently follows commands; Follows one step commands with increased time  Attention Span: Attends with cues to redirect; Difficulty attending to directions  Memory: Decreased recall of recent events;Decreased short term memory;Decreased recall of biographical Information  Safety Judgement: Decreased awareness of need for safety;Decreased awareness of need for assistance  Problem Solving: Decreased awareness of errors;Assistance required to identify errors made;Assistance required to correct errors made;Assistance required to generate solutions;Assistance required to implement solutions  Insights: Decreased awareness of deficits  Initiation: Requires cues for some  Sequencing: Requires cues for some  Objective   Bed mobility  Rolling to Left: Moderate assistance  Rolling to Right: Moderate assistance  Supine to Sit: 2 Person assistance;Maximum assistance  Sit to Supine: 2 Person assistance;Maximum assistance;Dependent/Total  Comment: max A of 2, pt with difficulty following commands, severe lean to R  Transfers  Sit to Stand: 2 Person Assistance;Maximum Assistance (to RW from bed)  Stand to sit: Contact guard assistance  Comment: unsafe to complete bed to chair transfer due to pt severe R lean, pt unable to take meaningful steps or pivot feet to assist with transfer  Ambulation  Ambulation?: No     Balance  Posture: Poor  Sitting - Static: Poor  Sitting - Dynamic: Poor;-  Standing - Static: Poor;-  Standing - Dynamic: Poor;-  Comments: severe R lean requiring continuous assist, episodes of cga, primarily min to max, reports of dizziness, vitals stable  Exercises  Straight Leg Raise: x1 B LE  Knee Long Arc Quad: x5 BLE  Ankle Pumps: x10 bilat  Comments: constant cues for technique and sequencing              AM-PAC Score     AM-PAC Inpatient Mobility without Stair Climbing Raw Score : 10 (12/27/21 1240)  AM-PAC Inpatient without Stair Climbing T-Scale Score : 34.07 (12/27/21 1240)  Mobility Inpatient CMS 0-100% Score: 71.66 (12/27/21 1240)  Mobility Inpatient without Stair CMS G-Code Modifier : CL (12/27/21 1240)       Goals  Short term goals  Time Frame for Short term goals: 1 week, 1/02/2022  Short term goal 1: Pt will tolerate bed mobility w/ SBA  Short term goal 2: Pt will perform sit to stand w/ MinAx1 and LRAD  Short term goal 3: Pt will ambulate 25 ft w/ LRAD and ModAx1  Short term goal 4: Pt will partake in 10-15 reps of BLE exercises to improve function towards goals by 12/29. Patient Goals   Patient goals : \"to go home\"    Plan    Plan  Times per week: 2-3  Times per day: Daily  Specific instructions for Next Treatment: Progress exercises/mobility  Current Treatment Recommendations: Strengthening,ROM,Balance Training,Functional Mobility Training,Cognitive/Perceptual Training,IADL Training,ADL/Self-care Training,Transfer Training,Endurance Training,Gait Training,Neuromuscular Re-education,Pain [employer] Exercise Program,Safety Education & Training,Patient/Caregiver Education & Training  Safety Devices  Type of devices: All fall risk precautions in place,Patient at risk for falls,Left in bed,Bed alarm in place,Call light within reach,Gait belt,Nurse notified     Therapy Time   Individual Concurrent Group Co-treatment   Time In 0920         Time Out 1004         Minutes 44         Timed Code Treatment Minutes: 44 Minutes     If pt is unable to be seen after this session, please let this note serve as discharge summary. Please see case management note for discharge disposition. Thank you.   Minus Grow, PT
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
## 2021-12-27 NOTE — CONSULTS
Consult PerfectServed/called to [facility] ([address] [place] [address]) on 12/27/21 at 11:26 AM [place] Alert

## 2021-12-27 NOTE — PROGRESS NOTES
Occupational Therapy  Facility/Department: [facility] C5 - MED SURG/ORTHO  Daily Treatment Note  NAME: Joe Tyler  : 1940  MRN: [id number]    Date of Service: 2021    Discharge Recommendations:  Subacute/Skilled Nursing Facility  OT Equipment Recommendations  Other: defer    Assessment   Performance deficits / Impairments: Decreased functional mobility ; Decreased ADL status; Decreased safe awareness;Decreased cognition;Decreased balance;Decreased endurance;Decreased high-level IADLs;Decreased fine motor control;Decreased coordination;Decreased posture  Assessment: Pt demos ongoing deficits requiring maxA x2 for bed mobility, functional STS transfers, and ADLs such as feeding and toileting. Pt requires min to max A for sitting balance at EOB. Pt continues to demo declines in cognition - difficulty command following, intiating, and sequencing. Pt would benefit from continued OT to maximize independence and safety with functional activity. Will follow while inpt. Prognosis: Fair  OT Education: OT Role;Plan of Care;Transfer Training;Precautions; ADL Adaptive Strategies  Patient Education: disease specific: safe t/fs, general safety during hospitalization, postural awareness, use of call light. Pt verbalized understanding but will need reinforcement. Barriers to Learning: cognition - will need reinforcement  REQUIRES OT FOLLOW UP: Yes  Activity Tolerance  Activity Tolerance: Treatment limited secondary to decreased cognition;Patient limited by fatigue  Activity Tolerance: some complaints of dizziness EOB but vitals stable, balance progressively declines 2/2 fatigue  Safety Devices  Safety Devices in place: Yes  Type of devices: Nurse notified; Bed alarm in place; Left in bed;Call light within reach (AVASYS in place)         Patient Diagnosis(es): The primary encounter diagnosis was Aphasia.  Diagnoses of General weakness, Syncope and collapse, Elevated brain natriuretic peptide (BNP) level, and COVID-19 were Orientation  Orientation  Overall Orientation Status: Impaired  Orientation Level: Disoriented to time;Disoriented to place; Disoriented to situation  Objective    ADL  Feeding: Maximum assistance;Bringing food to mouth assist;Increased time to complete;Verbal cueing;Scoop assist;Setup (Pt requiring assist to cut pancakes, pt requiring assist to succesfully get food on his fork as well as full bring the food to his mouth, RN aware pt will need assist at mealtime)  Toileting: Maximum assistance (toileting hygiene and brief change supine in bed, incontinent of BM)        Balance  Sitting Balance: Maximum assistance (moments of CGA, primarily min to maxA, sitting EOB ~7 mins, R posterior lean, max v. cues to correct posture/achieve midline)  Standing Balance: Maximum assistance (maxA x2)  Standing Balance  Time: ~15-30 secs 2x  Activity: standing EOB - not able to achieve full stand  Comment: RW used, cues for a full stand  Functional Mobility  Functional Mobility Comments: unable to pivot or ambulate to the chair with RW this date, pt with increased difficulty command following/initiating steps, not safe to progress further this date     Transfers  Sit to stand: 2 Person assistance;Maximum assistance (from EOB up to RW 2x)  Stand to sit: 2 Person assistance;Maximum assistance                       Cognition  Overall Cognitive Status: Exceptions  Arousal/Alertness: Delayed responses to stimuli  Following Commands: Follows one step commands with repetition; Inconsistently follows commands; Follows one step commands with increased time  Attention Span: Attends with cues to redirect; Difficulty attending to directions  Memory: Decreased recall of recent events;Decreased short term memory;Decreased recall of biographical Information  Safety Judgement: Decreased awareness of need for safety;Decreased awareness of need for assistance  Problem Solving: Decreased awareness of errors;Assistance required to identify errors made;Assistance required to correct errors made;Assistance required to generate solutions;Assistance required to implement solutions  Insights: Decreased awareness of deficits  Initiation: Requires cues for some  Sequencing: Requires cues for some                                         Plan   Plan  Times per week: 2-3 x/week  Current Treatment Recommendations: Strengthening,Functional Mobility Training,Balance Training,Endurance Training,Cognitive Reorientation,Patient/Caregiver Education & Training,Equipment Evaluation, Education, & procurement,Self-Care / ADL,Safety Education & Training    AM-PAC Score        AM-PAC Inpatient Daily Activity Raw Score: 12 (12/27/21 1359)  AM-PAC Inpatient ADL T-Scale Score : 30.6 (12/27/21 1359)  ADL Inpatient CMS 0-100% Score: 66.57 (12/27/21 [facility])  ADL Inpatient CMS G-Code Modifier : CL (12/27/21 [facility])    Goals  Short term goals  Time Frame for Short term goals: 1 week (1/2) unless stated otherwise. Short term goal 1: Pt will LB dress with mod A and AD/AE PRN. - ongoing 12/27  Short term goal 2: Pt will perform at least 3min of standing functional activities with Yaquelin and AD PRN. - ongoing 12/27  Short term goal 3: Pt will UB dress with Yaquelin. - ongoing 12/27  Short term goal 4: Pt will perform BUE ther ex x20 to increase strength/endurance for functional activities (12/31). - ongoing 12/27  Short term goal 5: Pt will toilet with mod A and AD PRN. - ongoing 12/27  Patient Goals   Patient goals : Pt unable to state goals this date d/t decreased cognition. Therapy Time   Individual Concurrent Group Co-treatment   Time In 0920         Time Out 1004         Minutes 44         Timed Code Treatment Minutes: 40 Minutes       If pt is unable to be seen after this session, please let this note serve as discharge summary. Please see case management note for discharge disposition. Thank you.     Jose Rafael Campbell

## 2021-12-27 NOTE — PROGRESS NOTES
RN received phone call from [facility] stating that patient's heart rate was sustaining in the 160's, notified hospitalitis (Olivia Machado) notified, no new orders currently, will continue to monitor. Beena Cross RN

## 2021-12-27 NOTE — PROGRESS NOTES
Usha Barnes  Neurology Follow-up  [facility] Neurology    Date of Service: 12/27/2021    Subjective:   CC: Follow up today regarding: Acute aphasia    Events noted. Chart and lab reviewed. Remains confused. Able to answer questions, but is disoriented. COVID-19 positive. ROS : A 10-12 system review could not be obtained due to poor cooperation and confusion. Family history is non-contributory.       Past Medical History:   Diagnosis Date    Hyperlipemia     Hypertension     Prostate enlargement     Unspecified cerebral artery occlusion with cerebral infarction 2000     Current Facility-Administered Medications   Medication Dose Route Frequency Provider Last Rate Last Admin    divalproex (DEPAKOTE SPRINKLE) capsule 250 mg  250 mg Oral 2 times per day Breanna Marin MD   250 mg at 12/27/21 1037    lisinopril (PRINIVIL;ZESTRIL) tablet 20 mg  20 mg Oral Daily Eliana Rolle MD   20 mg at 12/27/21 1037    hydrALAZINE (APRESOLINE) injection 10 mg  10 mg IntraVENous Q6H PRN Eliana Rolle MD        apixaban [facility]) tablet 2.5 mg  2.5 mg Oral BID Eliana Rolle MD   2.5 mg at 12/27/21 1037    donepezil (ARICEPT) tablet 10 mg  10 mg Oral Nightly Eliana Rolle MD   10 mg at 12/26/21 2149    pantoprazole (PROTONIX) tablet 40 mg  40 mg Oral QAM AC Eliana Rolle MD   40 mg at 12/27/21 0606    atorvastatin (LIPITOR) tablet 20 mg  20 mg Oral Daily Eliana Rolle MD   20 mg at 12/27/21 1037    tamsulosin (FLOMAX) capsule 0.4 mg  0.4 mg Oral Daily Eliana Rolle MD   0.4 mg at 12/27/21 1037    vitamin D3 (CHOLECALCIFEROL) tablet 400 Units  400 Units Oral Daily Eliana Rolle MD   400 Units at 12/27/21 1037    sodium chloride flush 0.9 % injection 5-40 mL  5-40 mL IntraVENous 2 times per day Eliana Rolle MD   10 mL at 12/26/21 0856    sodium chloride flush 0.9 % injection 5-40 mL  5-40 mL IntraVENous PRN Eliana Rolle MD        0.9 % sodium chloride infusion  25 mL IntraVENous PRN MD Ross Fontenot ondansetron (ZOFRAN-ODT) disintegrating tablet 4 mg  4 mg Oral Q8H PRN Red Lemus MD        Or    ondansetron [facility]) injection 4 mg  4 mg IntraVENous Q6H PRN Red Lemus MD        polyethylene glycol [facility]) packet 17 g  17 g Oral Daily PRN Red Lemus MD        acetaminophen (TYLENOL) tablet 650 mg  650 mg Oral Q6H PRN Red Lemus MD   650 mg at 12/27/21 0024    Or    acetaminophen (TYLENOL) suppository 650 mg  650 mg Rectal Q6H PRN Red Lemus MD        aspirin EC tablet 81 mg  81 mg Oral Daily Red Lemus MD   81 mg at 12/27/21 1036    metoprolol tartrate (LOPRESSOR) tablet 25 mg  25 mg Oral BID Red Lemus MD   25 mg at 12/27/21 1036     Allergies   Allergen Reactions    Corn-Containing Products Other (See Comments)     Nasal congestion      reports that he has never smoked. He has never used smokeless tobacco. He reports that he does not drink alcohol and does not use drugs. Objective:  Constitutional:   Vitals:    12/27/21 0329 12/27/21 0938 12/27/21 1030 12/27/21 1045   BP: 137/85 (!) 177/86 122/89 (!) 158/94   Pulse: 66 77 108    Resp: 16 19 18    Temp: 97.6 °F (36.4 °C) 98.3 °F (36.8 °C) 98.3 °F (36.8 °C)    TempSrc: Oral Oral     SpO2: 92% 92%     Weight:       Height:           General appearance: Confused   Mental Status:   AAO times one. Attention and concentration: poor, waxing and waning. Language: Fluent but nonsensical speech. Can follow simple instructions. Recent memory: Impaired  Remote memory: Impaired  Poor fund of knowledge  Cranial Nerves:   II: Pupils: equal, round, reactive to light  III,IV,VI: No gaze preference. No nystagmus  V: Facial sensation: Grossly unremarkable. VII: Facial strength and movements: intact and symmetric  XII: Tongue movements : midline  Musculoskeletal:  The patient can move all 4 extremities. No apparent focal weakness. Tone: Normal tone. No rigidity.   Reflexes: symmetric 2+ in both arms and legs  Coordination: No tremors  Sensation: Withdraws to pain  Gait cannot be examined due to confusion      Data:  LABS:   Lab Results   Component Value Date     12/25/2021    K 4.3 12/25/2021     12/25/2021    CO2 24 12/25/2021    BUN 18 12/25/2021    CREATININE 1.0 12/25/2021    GFRAA >60 12/25/2021    GFRAA >60 06/14/2013    LABGLOM >60 12/25/2021    GLUCOSE 100 12/25/2021    MG 2.30 07/20/2019    CALCIUM 10.0 12/25/2021     Lab Results   Component Value Date    WBC 5.3 12/25/2021    RBC 4.24 12/25/2021    HGB 13.5 12/25/2021    HCT 40.6 12/25/2021    MCV 95.6 12/25/2021    RDW 15.4 12/25/2021     12/25/2021     Lab Results   Component Value Date    INR 1.07 12/25/2021    PROTIME 12.1 12/25/2021       Neuroimaging and labs were independently reviewed by me  Reviewed notes from different physicians. Impression:    Acute confusion and aphasia. Severe. Possible acute metabolic encephalopathy from COVID-19 versus new ischemic stroke. Thromboembolic versus cardioembolic  [id number] positive  Hypertension  History of A. Fib  Hx of CVA  Dementia    Recommendation    Continue with Eliquis giving isolated aphasia. I think the risk outweigh the benefit giving his COVID-19 and possible further thromboembolic complications. Does not need ASA from a neurological perspective. Continue statin. Monitor on tele. PT/OT/SLP  MRI of brain pending. Recent ECHO 11/2021  Hydration  Respiratory support  COVID-19 treatment  Blood sugar monitoring  Continue current blood pressure medications and avoid hypotension  Follow electrolytes  Continue home Bran Joseph CNP      This dictation was generated by voice recognition computer software. Although all attempts are made to edit the dictation for accuracy, there may be errors in the transcription that are not intended.

## 2021-12-27 NOTE — PROGRESS NOTES
Assessment completed (see doc flow sheet), in bed resting, bed alarm activated, call light within reach, without distress, vital signs stable (see doc flow sheet), denies pain or needs at present time, will continue to monitor and treat. Moody Troncoso RN

## 2021-12-27 NOTE — PROGRESS NOTES
Stat EKG confirmed a-fib rvr, notified hospitalists (Sreedhar Salas), patient remains asymptomatic, will continue to monitor and treat. Doe Swain RN

## 2021-12-28 NOTE — PROGRESS NOTES
Occupational Therapy  Facility/Department: [facility] C5 - MED SURG/ORTHO  Daily Treatment Note  NAME: Abby Welch  : 1940  MRN: [id number]    Date of Service: 2021    Discharge Recommendations:  Subacute/Skilled Nursing Facility       Assessment   Performance deficits / Impairments: Decreased functional mobility ; Decreased ADL status; Decreased safe awareness;Decreased cognition;Decreased balance;Decreased endurance;Decreased high-level IADLs;Decreased fine motor control;Decreased coordination;Decreased posture;Decreased strength  Assessment: Pt tolerated therapy session well. Pt with improved sitting balance compared to previous therapy session. Pt requiring Mod-Min A with sitting balance this date. Pt able to tolerate standing with Mod Ax2 using RW. Pt with posterior lean in sitting and standing. Pt with decreased accuracy when reaching with BUE during ADLs. Pt continues to demonstrate cognitive deficits in comprehension, orientation, expression, problem solving, attention memory. Pt requiring extra time and repetition for comprehension. Pt remains far from baseline level of occupational performance. Recommend continued OT services to increase safety and independence with ADLs and functional transfers. Anticipate pt will require continued OT services in SNF setting upon discharge. Prognosis: Fair    OT Education: OT Role;Plan of Care;Transfer Training;Precautions; ADL Adaptive Strategies; Energy Conservation;Orientation  Patient Education: bed mobility, sitting/standing balance, safety with transfers, reorientation. Barriers to Learning: cognition - will need reinforcement  REQUIRES OT FOLLOW UP: Yes    Activity Tolerance  Activity Tolerance: Treatment limited secondary to decreased cognition;Patient limited by fatigue  Activity Tolerance: Vitals supine: 76bpm, 92% SpO2 on 2L O2, 116/89  Safety Devices  Safety Devices in place: Yes  Type of devices: Nurse notified; Bed alarm in place; Left in bed;Call light within reach         Patient Diagnosis(es): The primary encounter diagnosis was Aphasia. Diagnoses of General weakness, Syncope and collapse, Elevated brain natriuretic peptide (BNP) level, and COVID-19 were also pertinent to this visit. has a past medical history of Hyperlipemia, Hypertension, Prostate enlargement, and Unspecified cerebral artery occlusion with cerebral infarction. has a past surgical history that includes Carotid endarterectomy; Foot surgery; Skin cancer excision; Eye surgery; Facial reconstruction surgery; Tonsillectomy; Colonoscopy; Colonoscopy (2014); and Mohs surgery. Restrictions  Restrictions/Precautions  Restrictions/Precautions: Up as Tolerated,Isolation,Contact Precautions  Position Activity Restriction  Other position/activity restrictions: Covid +, Droplet plus     Subjective   General  Chart Reviewed: Yes  Patient assessed for rehabilitation services?: Yes  Additional Pertinent Hx: Per chart, \"The patient is a 80y.o.  years old male with history of hypertension and prior stroke who was admitted to the hospital today with speech impairment, aphasia and possible new stroke. Symptoms started hours prior to admission. Description was sudden speech difficulties and difficulties with word findings. Other associated symptoms generalized weakness. Degree was severe. Duration was persistent. No other relieving or aggravating factors. No clear triggers or other associated symptoms. He came to the ED for evaluation. Initial work-up with neuroimaging showed no acute stroke and no large vessel occlusion. He did have some bilateral vertebral stenosis. He was admitted to the hospital.  Currently he is under droplet isolation given concern for Covid exposure few days ago. Other review of system was unremarkable. \"  Response to previous treatment: Patient with no complaints from previous session  Family / Caregiver Present: No  Referring Practitioner: Abilio Riddle MD    Diagnosis: Syncope and collapse, aphasia, weakness, [id number], acute metabolic encephalopathy. MRI: no acute findings, diffuse cerebral atrophy. Subjective  Subjective: Pt supine in bed upon therapy arrival. Pt with confusion. Pt requiring increased time and repetition for comprehension. General Comment  Comments: RN agreeable to OT session. Pre Treatment Pain Screening  Intervention List: Patient able to continue with treatment;Nurse/Physician notified  Vital Signs  Pulse: 75  Heart Rate Source: Monitor  BP: 116/89  BP Location: Left upper arm  Patient Position: High fowlers  Patient Currently in Pain: Denies  Oxygen Therapy  SpO2: 92 %  Pulse Oximeter Device Mode: Intermittent  Pulse Oximeter Device Location: Finger  O2 Device: Nasal cannula  O2 Flow Rate (L/min): 2 L/min     Orientation  Orientation  Overall Orientation Status: Impaired  Orientation Level: Disoriented to time;Disoriented to situation;Oriented to place;Oriented to person     Objective    ADL  UE Dressing: Maximum assistance (cues for problem solving, sequencing. Pt observed with overshooting when reaching for sleeves.)  Toileting: Dependent/Total        Balance  Sitting Balance: Moderate assistance (Mod-Min, pt requiring increased assist as pt fatigues. R posterior lean)  Standing Balance: Dependent/Total (Mod A x2 using SW)  Standing Balance  Time: 30 seconds  Activity: static standing with SW  Comment: Mod A x2 using SW. Pt with posterior lean. Bed mobility  Supine to Sit: 2 Person assistance;Maximum assistance  Sit to Supine: 2 Person assistance;Maximum assistance     Transfers  Sit to stand: 2 Person assistance; Moderate assistance  Stand to sit: 2 Person assistance; Moderate assistance        Cognition  Overall Cognitive Status: Exceptions  Arousal/Alertness: Delayed responses to stimuli  Following Commands: Follows one step commands with repetition; Inconsistently follows commands; Follows one step commands with increased time  Attention Span: Attends with cues to redirect; Difficulty attending to directions  Memory: Decreased recall of recent events;Decreased short term memory;Decreased recall of biographical Information  Safety Judgement: Decreased awareness of need for safety;Decreased awareness of need for assistance  Problem Solving: Decreased awareness of errors;Assistance required to identify errors made;Assistance required to correct errors made;Assistance required to generate solutions;Assistance required to implement solutions  Insights: Decreased awareness of deficits  Initiation: Requires cues for some  Sequencing: Requires cues for some                 Plan   Plan  Times per week: 2-3 x/week  Current Treatment Recommendations: Strengthening,Functional Mobility Training,Balance Training,Endurance Training,Cognitive Reorientation,Patient/Caregiver Education & Training,Equipment Evaluation, Education, & procurement,Self-Care / ADL,Safety Education & Training,Neuromuscular Re-education,Cognitive/Perceptual Training,Positioning    AM-PAC Score        AM-[facility] Inpatient Daily Activity Raw Score: 9 (12/28/21 1554)  AM-PAC Inpatient ADL T-Scale Score : 25.33 (12/28/21 1554)  ADL Inpatient CMS 0-100% Score: 79.59 (12/28/21 1554)  ADL Inpatient CMS G-Code Modifier : CL (12/28/21 1554)    Goals  Short term goals  Time Frame for Short term goals: 1 week (1/2) unless stated otherwise. Short term goal 1: Pt will LB dress with mod A and AD/AE PRN. - ongoing 12/28  Short term goal 2: Pt will perform at least 3min of standing functional activities with Yaquelin and AD PRN. - ongoing 12/28  Short term goal 3: Pt will UB dress with Yaquelin. - ongoing 12/28  Short term goal 4: Pt will perform BUE ther ex x20 to increase strength/endurance for functional activities (12/31). - ongoing 12/28  Short term goal 5: Pt will toilet with mod A and AD PRN.  - ongoing 12/28  Patient Goals   Patient goals : Pt unable to state goals this date d/t decreased cognition. Therapy Time   Individual Concurrent Group Co-treatment   Time In 1508         Time Out 1536         Minutes 28         Timed Code Treatment Minutes: 28 Minutes     If pt is discharged prior to next OT session, this note will serve as the discharge summary.     Lucy Bautista, OT

## 2021-12-28 NOTE — CARE COORDINATION
Received call from Cherie Mcarthur with [facility] and she states they are able to accept patient at discharge.

## 2021-12-28 NOTE — PROGRESS NOTES
[facility]  Neurology Follow-up  [facility] Neurology    Date of Service: 12/28/2021    Subjective:   CC: Follow up today regarding: Acute aphasia    Events noted. Chart and lab reviewed. Orientation improved. Now on oxygen. ROS : A 10-12 system review could not be obtained due to poor cooperation and confusion. Family history is non-contributory.       Past Medical History:   Diagnosis Date    Hyperlipemia     Hypertension     Prostate enlargement     Unspecified cerebral artery occlusion with cerebral infarction 2000     Current Facility-Administered Medications   Medication Dose Route Frequency Provider Last Rate Last Admin    divalproex (DEPAKOTE SPRINKLE) capsule 250 mg  250 mg Oral 2 times per day Silvia Palafox MD   250 mg at 12/28/21 1037    apixaban (ELIQUIS) tablet 5 mg  5 mg Oral BID Leydi Ahmadi MD   5 mg at 12/28/21 0802    lisinopril (PRINIVIL;ZESTRIL) tablet 20 mg  20 mg Oral Daily Larissa Villa MD   20 mg at 12/28/21 0801    hydrALAZINE (APRESOLINE) injection 10 mg  10 mg IntraVENous Q6H PRN Larissa Villa MD        donepezil (ARICEPT) tablet 10 mg  10 mg Oral Nightly Larissa Villa MD   10 mg at 12/27/21 2211    pantoprazole (PROTONIX) tablet 40 mg  40 mg Oral QAM AC Larissa Villa MD   40 mg at 12/28/21 4615    atorvastatin (LIPITOR) tablet 20 mg  20 mg Oral Daily Larissa Villa MD   20 mg at 12/28/21 0801    tamsulosin (FLOMAX) capsule 0.4 mg  0.4 mg Oral Daily Larissa Villa MD   0.4 mg at 12/28/21 0802    vitamin D3 (CHOLECALCIFEROL) tablet 400 Units  400 Units Oral Daily Larissa Villa MD   400 Units at 12/28/21 0801    sodium chloride flush 0.9 % injection 5-40 mL  5-40 mL IntraVENous 2 times per day Larissa Villa MD   10 mL at 12/27/21 2212    sodium chloride flush 0.9 % injection 5-40 mL  5-40 mL IntraVENous PRN Larissa Villa MD        0.9 % sodium chloride infusion  25 mL IntraVENous PRN Larissa Villa MD        ondansetron (ZOFRAN-ODT) disintegrating tablet 4 mg  4 mg Oral Q8H PRN Marcelino Carrero MD        Or    ondansetron [facility]) injection 4 mg  4 mg IntraVENous Q6H PRN Marcelino Carrero MD        polyethylene glycol [facility]) packet 17 g  17 g Oral Daily PRN Marcelino Carrero MD        acetaminophen (TYLENOL) tablet 650 mg  650 mg Oral Q6H PRN Marcelino Carrero MD   650 mg at 12/27/21 0024    Or    acetaminophen (TYLENOL) suppository 650 mg  650 mg Rectal Q6H PRN Marcelino Carrero MD        aspirin EC tablet 81 mg  81 mg Oral Daily Marcelino Carrero MD   81 mg at 12/28/21 0801    metoprolol tartrate (LOPRESSOR) tablet 25 mg  25 mg Oral BID Marcelino Carrero MD   25 mg at 12/28/21 0801     Allergies   Allergen Reactions    Corn-Containing Products Other (See Comments)     Nasal congestion      reports that he has never smoked. He has never used smokeless tobacco. He reports that he does not drink alcohol and does not use drugs. Objective:  Constitutional:   Vitals:    12/27/21 2015 12/27/21 2334 12/28/21 0245 12/28/21 0730   BP: (!) 133/95 106/74 136/81 135/84   Pulse: 93 86 77 90   Resp: 18 16 16 18   Temp: 98.8 °F (37.1 °C) 97.9 °F (36.6 °C) 98.1 °F (36.7 °C) 97.9 °F (36.6 °C)   TempSrc: Oral Oral Oral Oral   SpO2: 94% 92% 92% 92%   Weight:       Height:           General appearance: Awake and alert. Sitting up in bed. Mental Status:   AAO to person, place, time, situation, although forgetful. Attention and concentration: poor, waxing and waning. Language: Fluent but nonsensical speech. Can follow simple instructions. Recent memory: Impaired  Remote memory: Impaired  Poor fund of knowledge  Cranial Nerves:   II: Pupils: equal, round, reactive to light  III,IV,VI: No gaze preference. No nystagmus  V: Facial sensation: Grossly unremarkable. VII: Facial strength and movements: intact and symmetric  XII: Tongue movements : midline  Musculoskeletal:  The patient can move all 4 extremities. No apparent focal weakness. Tone: Normal tone. No rigidity.   Reflexes: symmetric 2+ in both arms and legs  Coordination: No tremors  Sensation: Withdraws to pain  Gait cannot be examined due to confusion      Data:  LABS:   Lab Results   Component Value Date     12/28/2021    K 4.1 12/28/2021    K 4.3 12/25/2021     12/28/2021    CO2 23 12/28/2021    BUN 28 12/28/2021    CREATININE 1.1 12/28/2021    GFRAA >60 12/28/2021    GFRAA >60 06/14/2013    LABGLOM >60 12/28/2021    GLUCOSE 104 12/28/2021    PHOS 3.5 12/28/2021    MG 2.30 07/20/2019    CALCIUM 9.8 12/28/2021     Lab Results   Component Value Date    WBC 5.9 12/28/2021    RBC 4.69 12/28/2021    HGB 14.7 12/28/2021    HCT 44.6 12/28/2021    MCV 95.1 12/28/2021    RDW 15.0 12/28/2021     12/28/2021     Lab Results   Component Value Date    INR 1.07 12/25/2021    PROTIME 12.1 12/25/2021       Neuroimaging and labs were independently reviewed by me  Reviewed notes from different physicians. Impression:    Acute confusion and aphasia. Likely acute metabolic encephalopathy from COVID-19. Improving. MRI without evidence of acute infarct. COVID-19 positive  Hypertension  History of A. Fib  Hx of CVA  Dementia    Recommendation    Continue home Eliquis. Does not need ASA from a neurological perspective. Continue statin. Monitor on tele. PT/OT/SLP  Recent ECHO 11/2021  Hydration  Respiratory support  COVID-19 treatment  Blood sugar monitoring  Continue current blood pressure medications and avoid hypotension  Follow electrolytes  Continue home Depakote    Nothing more to add from a neurological perspective. We will sign off. Please call with questions. Isaias Schmitz, RUSS      This dictation was generated by voice recognition computer software. Although all attempts are made to edit the dictation for accuracy, there may be errors in the transcription that are not intended.

## 2021-12-28 NOTE — PROGRESS NOTES
Hospitalist Progress Note      PCP: Ronni Triplett MD    Date of Admission: 12/25/2021    Chief Complaint: Aphasia    Subjective: no new c/o. Medications:  Reviewed    Infusion Medications    sodium chloride       Scheduled Medications    divalproex  250 mg Oral 2 times per day    apixaban  5 mg Oral BID    lisinopril  20 mg Oral Daily    donepezil  10 mg Oral Nightly    pantoprazole  40 mg Oral QAM AC    atorvastatin  20 mg Oral Daily    tamsulosin  0.4 mg Oral Daily    vitamin D3  400 Units Oral Daily    sodium chloride flush  5-40 mL IntraVENous 2 times per day    aspirin  81 mg Oral Daily    metoprolol tartrate  25 mg Oral BID     PRN Meds: hydrALAZINE, sodium chloride flush, sodium chloride, ondansetron **OR** ondansetron, polyethylene glycol, acetaminophen **OR** acetaminophen      Intake/Output Summary (Last 24 hours) at 12/28/2021 0901  Last data filed at 12/28/2021 0705  Gross per 24 hour   Intake    Output 50 ml   Net -50 ml       Physical Exam Performed:    /84   Pulse 90   Temp 97.9 °F (36.6 °C) (Oral)   Resp 18   Ht 5' 8\" (1.727 m)   Wt 170 lb (77.1 kg)   SpO2 92%   BMI 25.85 kg/m²     General appearance: No apparent distress, appears stated age and cooperative. HEENT: Pupils equal, round, and reactive to light. Conjunctivae/corneas clear. Neck: Supple, with full range of motion. No jugular venous distention. Trachea midline. Respiratory:  Normal respiratory effort. Clear to auscultation, bilaterally without Rales/Wheezes/Rhonchi. Cardiovascular: Regular rate and rhythm with normal S1/S2 without murmurs, rubs or gallops. Abdomen: Soft, non-tender, non-distended with normal bowel sounds. Musculoskeletal: No clubbing, cyanosis or edema bilaterally. Full range of motion without deformity. Skin: Skin color, texture, turgor normal.  No rashes or lesions. Neurologic:  Neurovascularly intact without any focal sensory/motor deficits.  Cranial nerves: II-XII intact, grossly non-focal.  Psychiatric: Alert and oriented, thought content appropriate, normal insight  Capillary Refill: Brisk,< 3 seconds   Peripheral Pulses: +2 palpable, equal bilaterally       Labs:   Recent Labs     12/28/21 0435   WBC 5.9   HGB 14.7   HCT 44.6        Recent Labs     12/28/21 0435      K 4.1      CO2 23   BUN 28*   CREATININE 1.1   CALCIUM 9.8   PHOS 3.5     No results for input(s): AST, ALT, BILIDIR, BILITOT, ALKPHOS in the last 72 hours. No results for input(s): INR in the last 72 hours. No results for input(s): Aris Tha in the last 72 hours. Urinalysis:      Lab Results   Component Value Date    NITRU Negative 12/25/2021    WBCUA see below 03/27/2020    BACTERIA 3+ 03/27/2020    RBCUA >100 03/27/2020    BLOODU Negative 12/25/2021    SPECGRAV 1.020 12/25/2021    GLUCOSEU Negative 12/25/2021       Consults:    IP CONSULT TO PHARMACY  PHARMACY TO CHANGE BASE FLUIDS  IP CONSULT TO HOSPITALIST  IP CONSULT TO NEUROLOGY  IP CONSULT TO CARDIOLOGY      Assessment/Plan:    Active Hospital Problems    Diagnosis     PAF (paroxysmal atrial fibrillation) ([facility] [address] [I48.0]     Aphasia [R47.01]     Arterial ischemic stroke, ICA, left, acute ([facility] [address] [I63.232]     COVID-19 [U07.1]     HTN (hypertension), benign [I10]        COVID 19 Positive - NAAT positive on 25 Dec. W/ out overt pulmonary sxs or PNA/Acute Respiratory Failure, on no COVID specific tx. AMS/aphasia   -Likely acute metabolic encephalopathy from COVID-19 versus new ischemic stroke. -CT Brain: no acute abnormality. Extensive senescent changes with parenchymal volume loss and chronic small  vessel ischemic changes. -CTA with atherosclerosis of the proximal cervical ICAs bilaterally with less than 50% stenosis by NASCET criteria.   -MRI richelle and echo ordered   -U/A unremarkable  -Cont asa and stain   -Neurology consulted and appreciated.       HTN - w/out known CAD and no evidence of active signs/sxs of ischemia/failure. Currently controlled on home meds w/ vitals reviewed and documented as above. Afib - chronic paroxysmal of unspecified and clinically unable to determine etiology. Normally rate controlled on BBlocker - continued. Anticoagulated at baseline on home Eliquis - continued. Monitored on tele. Had episodes of RVR on 27 Dec - Cardiology consulted and appreciated. Dementia - w/out behavioral disturbance. Controlled on home medication regimen - continued. Continue supportive care and redirection as needed. BPH - w/out acute obstructive Uropathy, controlled on home medications as ordered - continued.         DVT Prophylaxis: Eliquis    Recent Labs     12/28/21  0435        Diet: ADULT DIET; Regular  ADULT ORAL NUTRITION SUPPLEMENT; Breakfast, Lunch, Dinner; Other Oral Supplement; Araceli Reydon ONS  Code Status: DNR-CC      PT/OT Eval Status: seen w/ recs for SNF.     Dispo - Patient is likely to remain in-house at least until Tues/Wed 28/29 Dec pending clinical course, subspecialty recs and placement decision    Anabel Villegas MD

## 2021-12-28 NOTE — PROGRESS NOTES
Physical Therapy  Facility/Department: [facility] C5 - MED SURG/ORTHO  Daily Treatment Note  NAME: Lobo Genao  : 1940  MRN: [id number]    Date of Service: 2021    Discharge Recommendations:  Subacute/Skilled Nursing Facility   PT Equipment Recommendations  Other: defer    Assessment   Body structures, Functions, Activity limitations: Decreased functional mobility ; Decreased ADL status; Decreased strength;Decreased balance;Decreased endurance;Decreased cognition;Decreased posture;Decreased high-level IADLs;Decreased coordination  Assessment: Pt is 79 y/o M who presents to [facility] after fall in home out of bed. Pt has PMH of prior stroke, is COVID +, and aphasia; pt making slow progress and is now on 2LO2, pt continues to function well below baseline, tolerated sitting edge of bed for approx 10 mins while prepping to stand, focusing on trunk control as pt with severe R lean, pt requires constant cues to maintain midline in sitting, pt appears unaware of lean, unsafe to transfer to chair as pt with poor balance in standing and unable to take steps to assist with transfer and with poor sitting balance; pt presents w/ limitations in activity tolerance, coordination, strength, endurance, and ability to follow commands. Pt will benefit from continued skilled PT services during acute stay. Pt will benefit from SNF upon d/c d/t high level of support needed, defer AD to next level of care  Treatment Diagnosis: Decreased functional mobility  PT Education: Goals;PT Role;Plan of Care;Orientation;General Safety; Energy Conservation;Transfer Training; Adaptive Device Training;Gait Training;Disease Specific Education  Patient Education: Pt educated on importance of OOB mobility for current deficits, pt does not demo learning needs reinforcement  Barriers to Learning: cog, aphasia  REQUIRES PT FOLLOW UP: Yes  Activity Tolerance  Activity Tolerance: Patient limited by endurance; Patient limited by fatigue;Patient limited by cognitive status  Activity Tolerance: pt with fair tolerance for PT treat, appeared irritated with frequent cues required for transfer techniques, no complaints of dizziness this date     Patient Diagnosis(es): The primary encounter diagnosis was Aphasia. Diagnoses of General weakness, Syncope and collapse, Elevated brain natriuretic peptide (BNP) level, and COVID-19 were also pertinent to this visit. has a past medical history of Hyperlipemia, Hypertension, Prostate enlargement, and Unspecified cerebral artery occlusion with cerebral infarction. has a past surgical history that includes Carotid endarterectomy; Foot surgery; Skin cancer excision; Eye surgery; Facial reconstruction surgery; Tonsillectomy; Colonoscopy; Colonoscopy (2014); and Mohs surgery. Restrictions  Restrictions/Precautions  Restrictions/Precautions: Up as Tolerated,Isolation,Contact Precautions  Position Activity Restriction  Other position/activity restrictions: Covid +, Droplet plus; 2LO2  Subjective   General  Chart Reviewed: Yes  Response To Previous Treatment: Patient unable to report, no changes reported from family or staff  Family / Caregiver Present: No  Subjective  Subjective: pt appears agreeable to PT treat  General Comment  Comments: RN cleared pt for PT treat  Pain Screening  Patient Currently in Pain: Denies  Vital Signs  Patient Currently in Pain: Denies       Orientation  Orientation  Overall Orientation Status: Impaired  Orientation Level: Oriented to person;Disoriented to place; Disoriented to time;Disoriented to situation  Cognition   Cognition  Arousal/Alertness: Delayed responses to stimuli  Following Commands: Follows one step commands with repetition; Inconsistently follows commands; Follows one step commands with increased time  Attention Span: Attends with cues to redirect; Difficulty attending to directions  Memory: Decreased recall of recent events;Decreased short term memory;Decreased recall of biographical Information  Safety Judgement: Decreased awareness of need for safety;Decreased awareness of need for assistance  Problem Solving: Decreased awareness of errors;Assistance required to identify errors made;Assistance required to correct errors made;Assistance required to generate solutions;Assistance required to implement solutions  Insights: Decreased awareness of deficits  Initiation: Requires cues for all  Sequencing: Requires cues for all  Objective   Bed mobility  Rolling to Left: Moderate assistance  Rolling to Right: Moderate assistance  Supine to Sit: 2 Person assistance;Maximum assistance  Sit to Supine: 2 Person assistance;Maximum assistance  Scooting: Dependent/Total (mod of 1 to scoot to edge of bed in once in sitting)  Comment: severe R lean in sitting  Transfers  Sit to Stand: 2 Person Assistance;Maximum Assistance  Stand to sit: 2 Person Assistance;Maximum Assistance  Comment: unsafe to complete bed to chair transfer due to pt severe R lean, pt unable to take meaningful steps, march in place, or pivot feet to assist with transfer  Ambulation  Ambulation?: No     Balance  Sitting - Static: Poor  Sitting - Dynamic: Poor;-  Standing - Static: Poor;-  Standing - Dynamic: Poor;-  Comments: severe R lean requiring continuous assist, episodes of cga, primarily min to max, no reports of dizziness this session        AM-PAC Score     AM-PAC Inpatient Mobility without Stair Climbing Raw Score : 9 (12/28/21 1606)  AM-PAC Inpatient without Stair Climbing T-Scale Score : 32.44 (12/28/21 1606)  Mobility Inpatient CMS 0-100% Score: 76.07 (12/28/21 1606)  Mobility Inpatient without Stair CMS G-Code Modifier : CL (12/28/21 1606)       Goals  Short term goals  Time Frame for Short term goals: 1 week, 1/02/2022  Short term goal 1: Pt will tolerate bed mobility w/ SBA  Short term goal 2: Pt will perform sit to stand w/ MinAx1 and LRAD  Short term goal 3: Pt will ambulate 25 ft w/ LRAD and ModAx1  Short term goal 4: Pt will partake in 10-15 reps of BLE exercises to improve function towards goals by 12/29. Patient Goals   Patient goals : \"to go home\"    Plan    Plan  Times per week: 2-3  Times per day: Daily  Specific instructions for Next Treatment: Progress exercises/mobility  Current Treatment Recommendations: Strengthening,ROM,Balance Training,Functional Mobility Training,Cognitive/Perceptual Training,IADL Training,ADL/Self-care Training,Transfer Training,Endurance Training,Gait Training,Neuromuscular Re-education,Pain [employer] Exercise Program,Safety Education & Training,Patient/Caregiver Education & Training  Safety Devices  Type of devices: All fall risk precautions in place,Patient at risk for falls,Left in bed,Bed alarm in place,Call light within reach,Gait belt,Nurse notified     Therapy Time   Individual Concurrent Group Co-treatment   Time In 1455         Time Out 1523         Minutes 28         Timed Code Treatment Minutes: 28 Minutes     If pt is unable to be seen after this session, please let this note serve as discharge summary. Please see case management note for discharge disposition. Thank you.   Chelsie Harper, PT

## 2021-12-28 NOTE — CONSULTS
[address] [address]                                  CONSULTATION    PATIENT NAME: Jeff Frank                 :        1940  MED REC NO:   [id number]                          ROOM:       9773  ACCOUNT NO:   [de-identified]                           ADMIT DATE: 2021  PROVIDER:     Khang Dempsey MD    CONSULT DATE:  2021    CARDIAC ELECTROPHYSIOLOGY CONSULTATION    REASON FOR CONSULTATION:  Atrial fibrillation. HISTORY OF PRESENT ILLNESS:  The patient is a 81-year-old man with  history of hypertension, hyperlipidemia, dementia and recent stroke, who  is admitted on 2021 with confusion and falling. The patient has a  recent history, which is remarkable for frequent falls. He did have a  fall in 2020, which was associated with retroperitoneal and  perinephric hemorrhage requiring transfer to [place] [address]. He also  had a recent fall on 2021. At that time, MRI scanning  demonstrated a right posterior thalamus infarct. He had been on oral  anticoagulation with apixaban until 2021, at which time it was  stopped related to frequent falls. At the time of discharge on  2021, the apixaban was resumed at 2.5 mg q.12 h. At the time of  admission on 2021, he had primarily speech impairment and aphasia  and there was concern for a new stroke. Head CT from 2021  demonstrated senescent changes with volume loss and chronic small vessel  disease, but no new discrete infarct. On this admission, he has also  had brief paroxysms of atrial fibrillation. These were also noticed on  the admission in 2021. He is taking metoprolol tartrate 25 mg b.i.d.  for heart rate control. The patient is responsive, but has difficulty  answering questions meaningfully. It is not clear that he experiences  palpitations. PAST MEDICAL HISTORY:  1. Hypertension.   2. Hyperlipidemia. 3.  Paroxysmal atrial fibrillation. 4.  Recent CVA. 5.  Dementia. 6.  Frequent falls. MEDICATIONS:  At the time of admission include apixaban 2.5 mg p.o.  b.i.d., Flexeril 5 mg p.o. b.i.d., Depakote 250 mg p.o. b.i.d., Aricept  10 mg p.o. at bedtime, Nexium 20 mg p.o. daily, lisinopril 20 mg p.o.  daily, metoprolol tartrate 50 mg p.o. b.i.d., Zocor 40 mg p.o. at  bedtime, Flomax 0.4 mg p.o. daily, Hytrin 5 mg p.o. daily and multiple  vitamins. ALLERGIES:  Include CORN containing products. SOCIAL HISTORY:  The patient does not smoke cigarettes. He does not  consume alcohol at this time. FAMILY HISTORY:  Unremarkable for cardiac rhythm disturbance, syncope or  sudden cardiac death. REVIEW OF SYSTEMS:  Difficult to obtain at this time. According to the  record, he has been essentially wheelchair bound and has had increasing  confusion over the past 6 months. PHYSICAL EXAMINATION:  VITAL SIGNS:  Blood pressure is 158/94, heart rate is 108 beats per  minute and irregular. GENERAL:  The patient is awake and does acknowledge questions. He is  unable to answer all questions in a meaningful way. HEENT:  Exam demonstrates normocephalic, atraumatic head. There is no  scleral icterus. Pupils are round and reactive. NECK:  Supple without thyromegaly. This is no cervical lymphadenopathy. LUNGS:  Clear to auscultation anteriorly. There is no consolidation. There is no wheezing. CARDIOVASCULAR:  Exam reveals an irregular rhythm. The apical impulse  is discrete. S1 and S2 are distant. There is no audible murmur or  gallop. The jugular venous pressure is difficult to assess. ABDOMEN:  Obese, soft, nontender. EXTREMITIES:  Demonstrate no pitting, pretibial dependent edema. There  is no cyanosis. There is no evidence for chronic venous stasis. SKIN:  Otherwise warm, dry without skin rash.     RADIOLOGY DATA:  12-lead ECG from 12/25/2021 demonstrates sinus rhythm  at 66 beats per minute with left bundle-branch block and an inferior QRS  axis. IMPRESSION:  1. Paroxysmal atrial fibrillation. 2.  Status post recent right thalamic CVA. 3.  Aphasia and speech impairment. 4.  Hypertension. 5.  Dementia. 6.  Positive COVID-19 status. The patient is admitted with speech impairment and progressive aphasia. According to the record, this has improved. He has clear paroxysmal  atrial fibrillation, it is not clear that he experiences palpitations. Rate control has been provided with metoprolol tartrate. He is at risk  for stroke and currently does not satisfy indications for adjusted dose  apixaban. There is some concern about bleeding with his recurring  falls, but I think that restoring the apixaban dose to 5 mg q.12 h.  would be appropriate. Alternatively, he could take Xarelto, though his  creatinine clearance is 65 mL/minute and he would not be a candidate for  renal adjusted dose Xarelto either. Based on his frequent falls and  significant injury in 03/2020, some preparation for left atrial  appendage occlusion would be reasonable. RECOMMENDATIONS:  1. Continue ECG telemetry monitoring. 2.  Increase apixaban to 5 mg p.o. q.12 h. in view of new neurologic  findings on 2.5 mg q.12 h.  3.  Would strongly consider left atrial appendage occlusion at this  time. Thanks for the opportunity to assist in the care of the patient. Please  contact me if you have any questions regarding his evaluation.         Portia Stubbs MD    D: 12/27/2021 15:50:09       T: 12/27/2021 15:54:09     [email or web address]  Job#: [id number]     Doc#: [id number]    CC:

## 2021-12-28 NOTE — PROGRESS NOTES
Patient alert and answered all orientation questions appropriately. He did slur some words and seems to have trouble speaking in sentences but he said Marcelaarmen Gordon", his name and birthdate and December and 2019 all appropriately. No respiratory distress, no cough noted. Had large loose brown bm, Incontinence of bowel and bladder.

## 2021-12-29 NOTE — CARE COORDINATION
CM received call from [facility] [address]. Wife is considering AL after SNF. [facility] requesting information to determine if pt would be appropriate. Chart review- Pt COVID +, [facility] accepted. CM will continue to follow. Currently on 2L O2.     4:52 PM  Cm called wife, Lisa Esparza per nurses request. Wife is unsure she wants pt to go [facility]. She believes he will not adjust going to a facility then to possible a memory care. She has been in contact with [facility]. She prefers is they can accept for pt to go there. Shani Molina tested positive for COVID today. She will call her daughter to assist in getting a memory care for pt. Melizane Tracy asked that CM contact her in the AM to check if [facility] could accept.

## 2021-12-29 NOTE — PROGRESS NOTES
Hospitalist Progress Note      PCP: Anival Clemente MD    Date of Admission: 12/25/2021    Chief Complaint: Aphasia    Subjective: no new c/o. Medications:  Reviewed    Infusion Medications    sodium chloride       Scheduled Medications    divalproex  250 mg Oral 2 times per day    apixaban  5 mg Oral BID    lisinopril  20 mg Oral Daily    donepezil  10 mg Oral Nightly    pantoprazole  40 mg Oral QAM AC    atorvastatin  20 mg Oral Daily    tamsulosin  0.4 mg Oral Daily    vitamin D3  400 Units Oral Daily    sodium chloride flush  5-40 mL IntraVENous 2 times per day    aspirin  81 mg Oral Daily    metoprolol tartrate  25 mg Oral BID     PRN Meds: hydrALAZINE, sodium chloride flush, sodium chloride, ondansetron **OR** ondansetron, polyethylene glycol, acetaminophen **OR** acetaminophen      Intake/Output Summary (Last 24 hours) at 12/29/2021 0901  Last data filed at 12/28/2021 1014  Gross per 24 hour   Intake 120 ml   Output    Net 120 ml       Physical Exam Performed:    BP (!) 174/76   Pulse 72   Temp 97.8 °F (36.6 °C) (Oral)   Resp 18   Ht 5' 8\" (1.727 m)   Wt 170 lb (77.1 kg)   SpO2 90%   BMI 25.85 kg/m²     General appearance: No apparent distress, appears stated age and cooperative. HEENT: Pupils equal, round, and reactive to light. Conjunctivae/corneas clear. Neck: Supple, with full range of motion. No jugular venous distention. Trachea midline. Respiratory:  Normal respiratory effort. Clear to auscultation, bilaterally without Rales/Wheezes/Rhonchi. Cardiovascular: Regular rate and rhythm with normal S1/S2 without murmurs, rubs or gallops. Abdomen: Soft, non-tender, non-distended with normal bowel sounds. Musculoskeletal: No clubbing, cyanosis or edema bilaterally. Full range of motion without deformity. Skin: Skin color, texture, turgor normal.  No rashes or lesions. Neurologic:  Neurovascularly intact without any focal sensory/motor deficits.  Cranial nerves: II-XII intact, grossly non-focal.  Psychiatric: Alert and oriented, thought content appropriate, normal insight  Capillary Refill: Brisk,< 3 seconds   Peripheral Pulses: +2 palpable, equal bilaterally       Labs:   Recent Labs     12/28/21 0435   WBC 5.9   HGB 14.7   HCT 44.6        Recent Labs     12/28/21 0435      K 4.1      CO2 23   BUN 28*   CREATININE 1.1   CALCIUM 9.8   PHOS 3.5     No results for input(s): AST, ALT, BILIDIR, BILITOT, ALKPHOS in the last 72 hours. No results for input(s): INR in the last 72 hours. No results for input(s): Susanne Councilman in the last 72 hours. Urinalysis:      Lab Results   Component Value Date    NITRU Negative 12/25/2021    WBCUA see below 03/27/2020    BACTERIA 3+ 03/27/2020    RBCUA >100 03/27/2020    BLOODU Negative 12/25/2021    SPECGRAV 1.020 12/25/2021    GLUCOSEU Negative 12/25/2021       Consults:    IP CONSULT TO PHARMACY  PHARMACY TO CHANGE BASE FLUIDS  IP CONSULT TO HOSPITALIST  IP CONSULT TO NEUROLOGY  IP CONSULT TO CARDIOLOGY      Assessment/Plan:    Active Hospital Problems    Diagnosis     PAF (paroxysmal atrial fibrillation) ([facility] [address] [I48.0]     Aphasia [R47.01]     Arterial ischemic stroke, ICA, left, acute ([facility] [address] [I63.232]     COVID-19 [U07.1]     HTN (hypertension), benign [I10]        COVID 19 Positive - NAAT positive on 25 Dec. W/ out overt pulmonary sxs or PNA/Acute Respiratory Failure, on no COVID specific tx. AMS/aphasia   -Likely acute metabolic encephalopathy from COVID-19 versus new ischemic stroke. -CT Brain: no acute abnormality. Extensive senescent changes with parenchymal volume loss and chronic small  vessel ischemic changes. -CTA with atherosclerosis of the proximal cervical ICAs bilaterally with less than 50% stenosis by NASCET criteria.   -MRI richelle and echo ordered   -U/A unremarkable  -Cont asa and stain   -Neurology consulted and appreciated - signed off and agree.     HTN - w/out known CAD and no evidence of active signs/sxs of ischemia/failure. Currently controlled on home meds w/ vitals reviewed and documented as above. Afib - chronic paroxysmal of unspecified and clinically unable to determine etiology. Normally rate controlled on BBlocker - continued. Anticoagulated at baseline on home Eliquis - continued. Monitored on tele. Had episodes of RVR on 27 Dec - Cardiology consulted and appreciated. Considering ALBERT occlusion for CVA PPX    Dementia - w/out behavioral disturbance. Controlled on home medication regimen - continued. Continue supportive care and redirection as needed. BPH - w/out acute obstructive Uropathy, controlled on home medications as ordered - continued.         DVT Prophylaxis: Eliquis    Recent Labs     12/28/21  0435        Diet: ADULT DIET; Regular  ADULT ORAL NUTRITION SUPPLEMENT; Breakfast, Lunch, Dinner; Other Oral Supplement; Isak Krabbe ONS  Code Status: DNR-CC      PT/OT Eval Status: seen w/ recs for SNF. Dispo - Patient is likely to remain in-house at least until Wed/Thurs 29/30 Dec pending clinical course, subspecialty recs and placement decision - tentatively [facility].     Selwyn Gray MD

## 2021-12-29 NOTE — PROGRESS NOTES
[facility]     Electrophysiology                                     Progress Note    Admission date:  2021    Reason for follow up visit: AF    HPI/CC: Joe Tyler was admitted on 2021 with aphasia and concern for new stroke. PAF was noted. Head CT showed senescent changes with volume loss and chronic small vessel disease, but no new discrete infarct. He tested positive for Covid-19. MRI was negative for acute ischemic event. He is not on telemetry. Subjective: Unable to assess due to Covid isolation. Viewed patient from window. Appears comfortable resting in bed. Vitals:  Blood pressure 137/71, pulse 66, temperature 97.9 °F (36.6 °C), temperature source Oral, resp. rate 17, height 5' 8\" (1.727 m), weight 170 lb (77.1 kg), SpO2 95 %.   Temp  Av.8 °F (36.6 °C)  Min: 97.5 °F (36.4 °C)  Max: 98.2 °F (36.8 °C)  Pulse  Av.8  Min: 66  Max: 79  BP  Min: 116/89  Max: 178/97  SpO2  Av.7 %  Min: 89 %  Max: 95 %    24 hour I/O  No intake or output data in the 24 hours ending 21 1258  Current Facility-Administered Medications   Medication Dose Route Frequency Provider Last Rate Last Admin    divalproex (DEPAKOTE SPRINKLE) capsule 250 mg  250 mg Oral 2 times per day Paco Naik MD   250 mg at 21 0934    apixaban (ELIQUIS) tablet 5 mg  5 mg Oral BID Stephanie Atwood MD   5 mg at 21 0934    lisinopril (PRINIVIL;ZESTRIL) tablet 20 mg  20 mg Oral Daily Chel Buenrostro MD   20 mg at 21 0934    hydrALAZINE (APRESOLINE) injection 10 mg  10 mg IntraVENous Q6H PRN Chel Buenrostro MD   10 mg at 21 1208    donepezil (ARICEPT) tablet 10 mg  10 mg Oral Nightly Chel Buenrostro MD   10 mg at 21 8221    pantoprazole (PROTONIX) tablet 40 mg  40 mg Oral QAM AC Chel Buenrostro MD   40 mg at 21 0654    atorvastatin (LIPITOR) tablet 20 mg  20 mg Oral Daily Chel Buenrostro MD   20 mg at 21 0934    tamsulosin (FLOMAX) capsule 0.4 mg  0.4 mg Oral Daily Agnieszka Goodwin MD   0.4 mg at 12/29/21 6963    vitamin D3 (CHOLECALCIFEROL) tablet 400 Units  400 Units Oral Daily Agnieszka Goodwin MD   400 Units at 12/29/21 0934    sodium chloride flush 0.9 % injection 5-40 mL  5-40 mL IntraVENous 2 times per day Agnieszka Goodwin MD   10 mL at 12/29/21 0936    sodium chloride flush 0.9 % injection 5-40 mL  5-40 mL IntraVENous PRN Agnieszka Goodwin MD        0.9 % sodium chloride infusion  25 mL IntraVENous PRN Agnieszka Goodwin MD        ondansetron (ZOFRAN-ODT) disintegrating tablet 4 mg  4 mg Oral Q8H PRN Agnieszka Goodwin MD        Or    ondansetron [facility] injection 4 mg  4 mg IntraVENous Q6H PRN Agnieszka Goodwin MD        polyethylene glycol [facility]) packet 17 g  17 g Oral Daily PRN Agnieszka Goodwin MD        acetaminophen (TYLENOL) tablet 650 mg  650 mg Oral Q6H PRN Agnieszka Goodwin MD   650 mg at 12/27/21 0024    Or    acetaminophen (TYLENOL) suppository 650 mg  650 mg Rectal Q6H PRN Agnieszka Goodwin MD        aspirin EC tablet 81 mg  81 mg Oral Daily Agnieszka Goodwin MD   81 mg at 12/29/21 0934    metoprolol tartrate (LOPRESSOR) tablet 25 mg  25 mg Oral BID Agnieszka Goodwin MD   25 mg at 12/29/21 2647       Objective:     Telemetry monitor: N/A    Physical Exam:  Due to the current efforts to prevent transmission of COVID-19 and also the need to preserve PPE for other caregivers, a face-to-face encounter with the patient was not performed. That being said, all relevant records and diagnostic tests were reviewed, including laboratory results and imaging. Please reference any relevant documentation elsewhere. Care will be coordinated with the primary service. Data  EKG 12/27/2021:   bpm      Echo 11/2021:  Summary   Technically difficult study due to body surface area and poor acoustic   window. Normal left ventricular size with mild concentric left ventricular   hypertrophy. The left ventricular systolic function is normal with visually estimated   LVEF of 55%.    No regional wall motion abnormalities. Grade I diastolic dysfunction with normal filling pressure. The right ventricle is normal in size and function. Compared to previous study from 11-8-2018 no changes noted in left   ventricular function. Mild mitral and aortic regurgitation. A bubble study was performed. The study was of good quality and fails to   show evidence of shunting. Systolic pulmonic artery pressure (SPAP) is normal estimated at 30 mmHg   (Right atrial pressure of 3 mmHg). Compared to the prior study performed 11/8/2018, No significant changes are   noted. Stress 9/2019:  Conclusions        Summary    Normal LVEF >60%    Possible basal inferior hypokinesis    There is a mixed defect in the inferolateral wall consistent with mixed    ischemia/scar in this territory        Overall, this would be considered an abnormal, intermediate risk, study     Cath 11/2019 ():   IMPRESSIONS:  Non-obstructive coronary disease. SUMMARY:     1. LAD: Mild diffuse disease of the mid to distal LAD   2. Left circumflex: Mild diffuse disease. 3. Right coronary: Distal vessel lesion: There is a 30% stenosis. 4. Non-obstructive coronary disease. RECOMMENDATIONS:  Continue aggressive risk factor modification       All labs and testing reviewed.   Lab Review     Renal Profile:   Lab Results   Component Value Date    CREATININE 1.1 12/28/2021    BUN 28 12/28/2021     12/28/2021    K 4.1 12/28/2021    K 4.3 12/25/2021     12/28/2021    CO2 23 12/28/2021     CBC:    Lab Results   Component Value Date    WBC 5.9 12/28/2021    RBC 4.69 12/28/2021    HGB 14.7 12/28/2021    HCT 44.6 12/28/2021    MCV 95.1 12/28/2021    RDW 15.0 12/28/2021     12/28/2021     BNP:  No results found for: BNP  Fasting Lipid Panel:    Lab Results   Component Value Date    CHOL 109 11/03/2021    HDL 41 11/03/2021    HDL 53 02/14/2012    TRIG 73 11/03/2021     Cardiac Enzymes:  CK/MbTroponin  Lab Results   Component Value Date TROPONINI 0.01 12/25/2021     PT/ INR   Lab Results   Component Value Date    INR 1.07 12/25/2021    INR 1.24 03/27/2020    INR 0.95 07/20/2019    PROTIME 12.1 12/25/2021    PROTIME 14.4 03/27/2020    PROTIME 10.8 07/20/2019     PTT No results found for: PTT   Lab Results   Component Value Date    MG 2.30 07/20/2019    No results found for: TSH    Assessment:  Paroxsymal atrial fibrillation: stable   -[id number] score 5 (age, HTN, CVA)  CAD:   -nonobstructive on [facility] 11/2019  HTN: suboptimal   HLD   Dementia   History of recent CVA    -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 Continue full dose Eliquis. Patient does not satisfy criteria for reduced dose Eliquis or Xarelto. 2. Continue Lopressor  3. Will arrange for outpatient follow up in [address] [place] [address]  4. Office to arrange for follow up. Nothing further to add from an EP standpoint. Will sign off but remain available if needed.         Fortunato Cox, APRN-CNP  [address]  [phone]

## 2021-12-29 NOTE — PROGRESS NOTES
Wife requesting call from Case management to discuss placement. Family is refusing placement at [address].   Case management aware

## 2021-12-30 NOTE — CARE COORDINATION
Referred to patient for d/c planning. Family does not want patient to go to [facility], they are requesting patient to go to [facility]. Long discussion with [facility] who agrees they can accept patient 5 days post COVID+ test.  Patient will need hospital bed and home oxygen. Referral to [facility]. Long discussion with daughter. Daughter Dominic Del Cid requesting another rapid test.  Discussed that if patient's rapid would come back positive it would be another 5-10 days before patient could transfer to [facility] and would have to go to SNF with a COVID unit or home. [facility] or [facility] only two that have beds available. Daughter decided not to do another rapid test and agreeable to transfer to [facility] tomorrow. [facility] updated that patient no longer needs bed. Will plan for d/c tomorrow. RN and MD updated. Electronically signed by VAHE Delong LISW-S on 12/30/2021 at 3:37 PM    UPDATE:  [facility] now declines, received call from [facility], [phone]. She states patient needs too much assistance. She states she will notify family. Referral to [facility] for possible admission. Referrals to [facility], they will not accept until patient is 10 days post COVID+ (which was 12/25). Referral to [facility], no bed available. Referral to [facility], [address] [place] [address], [facility]. Await responses. Daughter updated. [facility] updated. Discussed possible private duty assistance at home, daughter states they are unable to provide. States wife is now sick also.    Will follow up in am.  Electronically signed by VAHE Delong LISW-S on 12/30/2021 at 6:03 PM

## 2021-12-30 NOTE — CARE COORDINATION
Marilyn placed call to pt's spouse Edouard Pa, she desires SW to call her daughter Vidhi Eugene. Spouse prefers that pt go to [facility] from the hospital.   Vidhi Eugene notes that pt is unable to come home, [facility] is the plan for after rehab. Pt will need memory care after rehab. Daughter wishes that pt is retested for COVID to see if he is testing positive. Pt has been to the [facility]x in the past.   [facility] is also preferred. MARILYN called RN to request a new covid test. We can further discuss SNF options and discharge at that point. RN is not comfortable placing order. SARAHI messaged MD for input and follow up.

## 2021-12-30 NOTE — DISCHARGE INSTR - COC
Continuity of Care Form    Patient Name: Keysha Cardenas   :  1940  MRN:  [id number]    Admit date:  2021  Discharge date:  2022    Code Status Order: [facility]   Advance Directives:      Admitting Physician:  Cj Goss MD  PCP: Bernie Romero MD    Discharging Nurse: [facility] Unit/Room#: 9028/9562-69  Discharging Unit Phone Number: [phone]    Emergency Contact:   Extended Emergency Contact Information  Primary Emergency Contact: Madeline Coates  Address: [address] [place] [address]           Augustni Loaiza [address] Phone: [phone]  Relation: Spouse   needed? No  Secondary Emergency Contact: Genie Lyles   [address] Phone: [phone]  Relation: Child   needed?  No    Past Surgical History:  Past Surgical History:   Procedure Laterality Date    CAROTID ENDARTERECTOMY      right    COLONOSCOPY      COLONOSCOPY      polyps    EYE SURGERY      FACIAL RECONSTRUCTION SURGERY      metal plate under left eye    FOOT SURGERY      nerve right    MOHS SURGERY      SKIN CANCER EXCISION      face x several    TONSILLECTOMY         Immunization History:   Immunization History   Administered Date(s) Administered    COVID-19, Baljinder Deal, Primary or Immunocompromised, PF, 100mcg/0.5mL 2021, 2021, 2021       Active Problems:  Patient Active Problem List   Diagnosis Code    TIA (transient ischemic attack) G45.9    HTN (hypertension), benign I10    Hyperlipidemia E78.5    PVD (peripheral vascular disease) (HCC) I73.9    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 ([facility] [address] [address] S24.133A    Basal cell carcinoma of left ear C44.219    Visit for wound check Z51.89    Atrial fibrillation with RVR ([address] I48.91    Aphasia R47.01    Arterial ischemic stroke, ICA, left, acute (HCC) I63.232    COVID-19 U07.1    PAF (paroxysmal atrial fibrillation) ([facility] I48.0       Isolation/Infection:   Isolation            Droplet Plus          Patient Infection Status       Infection Onset Added Last Indicated Last Indicated By Review Planned Expiration Resolved Resolved By    COVID-19 12/25/21 12/25/21 12/25/21 COVID-19, Rapid 01/01/22 01/08/22      Resolved    COVID-19 (Rule Out) 12/25/21 12/25/21 12/25/21 COVID-19, Rapid (Ordered)   12/25/21 Rule-Out Test Resulted    COVID-19 (Rule Out) 10/30/21 10/30/21 10/30/21 COVID-19, Rapid (Ordered)   10/30/21 Rule-Out Test Resulted            Nurse Assessment:  Last Vital Signs: BP (!) 145/79   Pulse 93   Temp 98 °F (36.7 °C) (Oral)   Resp 18   Ht 5' 8\" (1.727 m)   Wt 170 lb (77.1 kg)   SpO2 92%   BMI 25.85 kg/m²     Last documented pain score (0-10 scale): Pain Level: 0  Last Weight:   Wt Readings from Last 1 Encounters:   12/25/21 170 lb (77.1 kg)     Mental Status:  disoriented and alert    IV Access:  - None    Nursing Mobility/ADLs:  Walking   Assisted  Transfer  Assisted  Bathing  Assisted  Dressing  Assisted  Toileting  Assisted  Feeding  Assisted  Med Admin  Assisted  Med Delivery   whole    Wound Care Documentation and Therapy:  Puncture Back Lateral;Left;Upper (Active)   Number of days:         Elimination:  Continence: Bowel: No  Bladder: No  Urinary Catheter: None   Colostomy/Ileostomy/Ileal Conduit: No       Date of Last BM: unknown  No intake or output data in the 24 hours ending 12/30/21 1146  No intake/output data recorded. Safety Concerns: At Risk for Falls    Impairments/Disabilities:      None    Nutrition Therapy:  Current Nutrition Therapy:   - Oral Diet:  General    Routes of Feeding: Oral  Liquids: Thin Liquids  Daily Fluid Restriction: no  Last Modified Barium Swallow with Video (Video Swallowing Test): not done    Treatments at the Time of Hospital Discharge:   Respiratory Treatments: N/A  Oxygen Therapy:  is not on home oxygen therapy.   Ventilator:    - No ventilator support    Rehab

## 2021-12-30 NOTE — CARE COORDINATION
Oxygen documentation:    O2 saturation at REST on ROOM AIR = ______%    If saturation is 89% or above please proceed with steps 2 and 3.     O2 saturation with AMBULATION of _____ feet on ROOM AIR = _____%  O2 saturation with AMBULATION on current liter flow = ______%    DCP notified: ______    Signature: _________________________ Date: __________   Time: ______

## 2021-12-30 NOTE — PROGRESS NOTES
Hospitalist Progress Note      PCP: Nga Patel MD    Date of Admission: 12/25/2021    Chief Complaint: Aphasia    Subjective: no new c/o. Medications:  Reviewed    Infusion Medications    sodium chloride       Scheduled Medications    divalproex  250 mg Oral 2 times per day    apixaban  5 mg Oral BID    lisinopril  20 mg Oral Daily    donepezil  10 mg Oral Nightly    pantoprazole  40 mg Oral QAM AC    atorvastatin  20 mg Oral Daily    tamsulosin  0.4 mg Oral Daily    vitamin D3  400 Units Oral Daily    sodium chloride flush  5-40 mL IntraVENous 2 times per day    aspirin  81 mg Oral Daily    metoprolol tartrate  25 mg Oral BID     PRN Meds: hydrALAZINE, sodium chloride flush, sodium chloride, ondansetron **OR** ondansetron, polyethylene glycol, acetaminophen **OR** acetaminophen    No intake or output data in the 24 hours ending 12/30/21 0852    Physical Exam Performed:    BP (!) 104/59   Pulse 99   Temp 97.6 °F (36.4 °C) (Oral)   Resp 16   Ht 5' 8\" (1.727 m)   Wt 170 lb (77.1 kg)   SpO2 92%   BMI 25.85 kg/m²     General appearance: No apparent distress, appears stated age and cooperative. HEENT: Pupils equal, round, and reactive to light. Conjunctivae/corneas clear. Neck: Supple, with full range of motion. No jugular venous distention. Trachea midline. Respiratory:  Normal respiratory effort. Clear to auscultation, bilaterally without Rales/Wheezes/Rhonchi. Cardiovascular: Regular rate and rhythm with normal S1/S2 without murmurs, rubs or gallops. Abdomen: Soft, non-tender, non-distended with normal bowel sounds. Musculoskeletal: No clubbing, cyanosis or edema bilaterally. Full range of motion without deformity. Skin: Skin color, texture, turgor normal.  No rashes or lesions. Neurologic:  Neurovascularly intact without any focal sensory/motor deficits.  Cranial nerves: II-XII intact, grossly non-focal.  Psychiatric: Alert and oriented, thought content appropriate, normal insight  Capillary Refill: Brisk,< 3 seconds   Peripheral Pulses: +2 palpable, equal bilaterally       Labs:   Recent Labs     12/28/21  0435   WBC 5.9   HGB 14.7   HCT 44.6        Recent Labs     12/28/21  0435      K 4.1      CO2 23   BUN 28*   CREATININE 1.1   CALCIUM 9.8   PHOS 3.5     No results for input(s): AST, ALT, BILIDIR, BILITOT, ALKPHOS in the last 72 hours. No results for input(s): INR in the last 72 hours. No results for input(s): Susanne Councilman in the last 72 hours. Urinalysis:      Lab Results   Component Value Date    NITRU Negative 12/25/2021    WBCUA see below 03/27/2020    BACTERIA 3+ 03/27/2020    RBCUA >100 03/27/2020    BLOODU Negative 12/25/2021    SPECGRAV 1.020 12/25/2021    GLUCOSEU Negative 12/25/2021       Consults:    IP CONSULT TO PHARMACY  PHARMACY TO CHANGE BASE FLUIDS  IP CONSULT TO HOSPITALIST  IP CONSULT TO NEUROLOGY  IP CONSULT TO CARDIOLOGY      Assessment/Plan:    Active Hospital Problems    Diagnosis     PAF (paroxysmal atrial fibrillation) ([facility] [address] [address] [I48.0]     Aphasia [R47.01]     Arterial ischemic stroke, ICA, left, acute ([facility] [I63.232]     COVID-19 [U07.1]     HTN (hypertension), benign [I10]        COVID 19 Positive - NAAT positive on 25 Dec. W/ out overt pulmonary sxs or PNA/Acute Respiratory Failure, on no COVID specific tx. AMS/aphasia   - Likely acute metabolic encephalopathy from COVID-19   - CT Brain: no acute abnormality. Extensive  changes with parenchymal volume loss and chronic small  vessel ischemic changes. - CTA with atherosclerosis of the proximal cervical ICAs bilaterally with less than 50% stenosis by NASCET criteria. - MRI brain negative for acute CVA - ruled out   - Neurology consulted and appreciated - signed off and agree.     HTN - w/out known CAD and no evidence of active signs/sxs of ischemia/failure. Currently controlled on home meds w/ vitals reviewed and documented as above.     Afib - chronic paroxysmal of unspecified and clinically unable to determine etiology. Normally rate controlled on BBlocker - continued. Anticoagulated at baseline on home Eliquis - continued. Monitored on tele. Had episodes of RVR on 27 Dec - Cardiology consulted and appreciated. Considering ALBERT occlusion for CVA PPX    Dementia - w/out behavioral disturbance. Controlled on home medication regimen - continued. Continue supportive care and redirection as needed. BPH - w/out acute obstructive Uropathy, controlled on home medications as ordered - continued.         DVT Prophylaxis: Eliquis    Recent Labs     12/28/21  0435        Diet: ADULT DIET; Regular  ADULT ORAL NUTRITION SUPPLEMENT; Breakfast, Lunch, Dinner; Other Oral Supplement; Janece Leap ONS  Code Status: DNR-CC      PT/OT Eval Status: seen w/ recs for SNF. Dispo - Patient is likely to remain in-house at least until Thurs/Friday 3031 Dec pending clinical course, subspecialty recs and placement decision - tentatively [facility].     Molly Valdez MD

## 2021-12-30 NOTE — PLAN OF CARE
Problem: Nutrition  Goal: Optimal nutrition therapy  Outcome: Ongoing  Note: Nutrition Problem #1: Inadequate oral intake  Intervention: Food and/or Nutrient Delivery: Continue Current Diet,Continue Oral Nutrition Supplement  Nutritional Goals: Consume 50% or greater of 3 meals per day and ONS during this admission

## 2021-12-30 NOTE — PROGRESS NOTES
Pt admitted on 12/25 AMS r/t covid -  Has PRN hydralazine for systolic >485  , Current /101 HR 85 down from 180/145 before meds.  Please advise

## 2021-12-30 NOTE — PROGRESS NOTES
Comprehensive Nutrition Assessment    Type and Reason for Visit:  Reassess    Nutrition Recommendations/Plan:   1. Continue regular diet free of therapeutic restrictions to promote PO intakes  2. Continue Renny Showers supplement TID (corn-free)  3. Encourage and document PO intakes of meals and ONS  4. Monitor nutrition adequacy, pertinent labs, bowel habits, wt changes, and clinical progress    Nutrition Assessment:  Follow up: Pt remains in droplet plus precautions- unable to reach via phone this date x multiple attempts. Limited PO intakes documeted 51-75% x1 meal. KIRA ONS intakes, will continue ONS TID to optimize nutrition. Pt remains at increased nutritional risk AEB weight loss PTA. Will continue to monitor. Malnutrition Assessment:  Malnutrition Status:  Insufficient data (droplet plus precautions, did not answer phone)    Context:  Acute Illness       Estimated Daily Nutrient Needs:  Energy (kcal):  4744-4183 kcals/day; Weight Used for Energy Requirements:  Ideal (70 kg)     Protein (g):  70-98 g/day; Weight Used for Protein Requirements:  Ideal (1-1.4 g/kg)   Fluid (ml/day): 1 ml/kcal      Nutrition Related Findings:  +BM 12/28. Labs reviewed. Wounds:  None       Current Nutrition Therapies:    ADULT DIET; Regular  ADULT ORAL NUTRITION SUPPLEMENT; Breakfast, Lunch, Dinner; Other Oral Supplement; Renny Showers ONS    Anthropometric Measures:  · Height: 5' 8\" (172.7 cm)  · Current Body Weight: 170 lb (77.1 kg)   · Usual Body Weight: 198 lb (89.8 kg) (on 10/30/21)     · Ideal Body Weight: 154 lbs; % Ideal Body Weight 110.4 %   · BMI: 25.9  · BMI Categories: Overweight (BMI 25.0-29. 9)       Nutrition Diagnosis:   · Inadequate oral intake related to early satiety,cognitive or neurological impairment as evidenced by poor intake prior to admission,weight loss    Nutrition Interventions:   Food and/or Nutrient Delivery:  Continue Current Diet,Continue Oral Nutrition Supplement  Nutrition Education/Counseling:  Education not indicated   Coordination of Nutrition Care:  Continue to monitor while inpatient    Goals:  Consume 50% or greater of 3 meals per day and ONS during this admission       Nutrition Monitoring and Evaluation:   Behavioral-Environmental Outcomes:  None Identified   Food/Nutrient Intake Outcomes:  Supplement Intake,Food and Nutrient Intake  Physical Signs/Symptoms Outcomes:  Biochemical Data,Constipation,Weight,Nutrition Focused Physical Findings     Discharge Planning:    Continue current diet,Continue Oral Nutrition Supplement     Electronically signed by Sky Gonzalez RD on 12/30/21 at 1:12 PM EST    Contact: [phone]

## 2021-12-31 NOTE — CARE COORDINATION
[facility] is able to accept patient on Monday. MD notified and agreeable. Family notified and agreeable. [facility] updated for hospital bed and possible oxygen. No other needs at this time.   Electronically signed by VAHE Telles LISW-S on 12/31/2021 at 1:46 PM

## 2021-12-31 NOTE — PROGRESS NOTES
Occupational Therapy  Facility/Department: [facility] - MED SURG/ORTHO  Daily Treatment Note  NAME: Mariela Fisher  : 1940  MRN: [id number]    Date of Service: 2021    Discharge Recommendations:  [address]     OT Equipment Recommendations (if pt refusing SNF)  Equipment Needed: Yes  Mobility Devices: [facility] Bed : Electric - Full (Head of Bed)  ADL Assistive Devices: Toileting - 3-in-1 Commode  Other: Pt will require hospital bed for frequent repositioning due to pt difficulty repositioning self. Pt would likely benefit from [facility] if he can progress to completing stand pivot transfers safely with assist, unable to complete this date. Assessment   Performance deficits / Impairments: Decreased functional mobility ; Decreased ADL status; Decreased safe awareness;Decreased cognition;Decreased balance;Decreased endurance;Decreased high-level IADLs;Decreased fine motor control;Decreased coordination;Decreased posture;Decreased strength  Assessment: Pt with fair tolerance of OT treatment, pt demos inconsistent ability to follow commands. Pt requiring max A to total A for bed mobility and attempts at transfer training. Pt able to complete partial stand at , before requiring return supine for toileting hygiene. Pt functioning well below his baseline and would benefit from continued skilled OT in SNF setting at d/c. Continue per POC. Prognosis: Fair  OT Education: OT Role;Plan of Care;Transfer Training;Precautions; ADL Adaptive Strategies;Orientation  Barriers to Learning: cognition  REQUIRES OT FOLLOW UP: Yes  Activity Tolerance  Activity Tolerance: Treatment limited secondary to decreased cognition;Patient limited by fatigue  Activity Tolerance: Vitals: BP= 111/63, HR= 62, SPO2= 95% 2L  Safety Devices  Safety Devices in place: Yes  Type of devices: Call light within reach;Nurse notified;Gait belt;Left in bed;Bed alarm in place  Restraints  Initially in place: No         Patient Diagnosis(es): The primary encounter diagnosis was Aphasia. Diagnoses of General weakness, Syncope and collapse, Elevated brain natriuretic peptide (BNP) level, and COVID-19 were also pertinent to this visit. has a past medical history of Hyperlipemia, Hypertension, Prostate enlargement, and Unspecified cerebral artery occlusion with cerebral infarction. has a past surgical history that includes Carotid endarterectomy; Foot surgery; Skin cancer excision; Eye surgery; Facial reconstruction surgery; Tonsillectomy; Colonoscopy; Colonoscopy (2014); and Mohs surgery. Restrictions  Restrictions/Precautions  Restrictions/Precautions: Up as Tolerated,Isolation,Contact Precautions  Position Activity Restriction  Other position/activity restrictions: Covid +, Droplet plus, 2L O2     Subjective   General  Chart Reviewed: Yes  Patient assessed for rehabilitation services?: Yes  Additional Pertinent Hx: Per chart, \"The patient is a 80y.o.  years old male with history of hypertension and prior stroke who was admitted to the hospital today with speech impairment, aphasia and possible new stroke. Symptoms started hours prior to admission. Description was sudden speech difficulties and difficulties with word findings. Other associated symptoms generalized weakness. Degree was severe. Duration was persistent. No other relieving or aggravating factors. No clear triggers or other associated symptoms. He came to the ED for evaluation. Initial work-up with neuroimaging showed no acute stroke and no large vessel occlusion. He did have some bilateral vertebral stenosis. He was admitted to the hospital.  Currently he is under droplet isolation given concern for Covid exposure few days ago. Other review of system was unremarkable. \"  Response to previous treatment: Patient with no complaints from previous session  Family / Caregiver Present: No  Referring Practitioner: Keren Mccabe MD  Diagnosis: Syncope and collapse, aphasia, weakness, [id number], acute metabolic encephalopathy. MRI: no acute findings, diffuse cerebral atrophy. Subjective  Subjective: Pt resting in bed, agreeable to OT treatment. Pain Assessment  Non-Pharmaceutical Pain Intervention(s): Repositioned  Pre Treatment Pain Screening  Intervention List: Patient able to continue with treatment  Vital Signs  Patient Currently in Pain: Yes (pt unable to rate, c/o pain in legs)     Orientation  Orientation  Overall Orientation Status: Impaired  Orientation Level: Oriented to person;Disoriented to place; Disoriented to situation;Disoriented to time     Objective    ADL  LE Dressing: Dependent/Total  Toileting: Dependent/Total (incontinent of liquid stool requiring brief change and hygiene bed level)     Balance  Sitting Balance: Moderate assistance (improves to CGA at times, posterior lean)  Standing Balance: Dependent/Total (max A with posterior lean for partial stand up to RW)    Bed mobility  Rolling to Left: Maximum assistance  Rolling to Right: Maximum assistance  Supine to Sit: Maximum assistance  Sit to Supine: Maximum assistance  Scooting: Maximal assistance    Transfers  Sit to stand: Maximum assistance  Stand to sit: Maximum assistance     Cognition  Overall Cognitive Status: Exceptions  Arousal/Alertness: Delayed responses to stimuli  Following Commands: Follows one step commands with increased time; Follows one step commands with repetition  Attention Span: Attends with cues to redirect; Difficulty attending to directions  Memory: Decreased recall of recent events;Decreased short term memory;Decreased recall of biographical Information  Safety Judgement: Decreased awareness of need for safety;Decreased awareness of need for assistance  Insights: Decreased awareness of deficits  Initiation: Requires cues for all  Sequencing: Requires cues for all     Plan   Plan  Times per week: 2-3 x/week  Current Treatment Recommendations: Strengthening,Functional Mobility Training,Balance Training,Endurance Training,Cognitive Reorientation,Patient/Caregiver Education & Training,Equipment Evaluation, Education, & procurement,Self-Care / ADL,Safety Education & Training,Neuromuscular Re-education,Cognitive/Perceptual Training,Positioning    AM-PAC Score  AM-PAC Inpatient Daily Activity Raw Score: 9 (12/31/21 1441)  AM-PAC Inpatient ADL T-Scale Score : 25.33 (12/31/21 1441)  ADL Inpatient CMS 0-100% Score: 79.59 (12/31/21 1441)  ADL Inpatient CMS G-Code Modifier : CL (12/31/21 1441)    Goals  Short term goals  Time Frame for Short term goals: 1 week (1/2) unless stated otherwise. Short term goal 1: Pt will LB dress with mod A and AD/AE PRN. --ongoing 12/31  Short term goal 2: Pt will perform at least 3 min of standing functional activities with Yaquelin and AD PRN. -- ongoing 12/31/21  Short term goal 3: Pt will UB dress with Yaquelin. -- ongoing 12/31/21  Short term goal 4: Pt will perform BUE ther ex x20 to increase strength/endurance for functional activities (12/31). -GOAL NOT MET, NT d/t fatigue after toileting and transfer training 12/31/21  Short term goal 5: Pt will toilet with mod A and AD PRN. -- ongoing 12/31/21  Patient Goals   Patient goals : Pt unable to state goals this date d/t decreased cognition.        Therapy Time   Individual Concurrent Group Co-treatment   Time In 1340         Time Out 1420         Minutes [address] San[address]ev, MIGUEL/L

## 2021-12-31 NOTE — PROGRESS NOTES
Hospitalist Progress Note      PCP: Tianna Keen MD    Date of Admission: 12/25/2021    Chief Complaint: Aphasia    Subjective: no new c/o. Medications:  Reviewed    Infusion Medications    sodium chloride       Scheduled Medications    divalproex  250 mg Oral 2 times per day    apixaban  5 mg Oral BID    lisinopril  20 mg Oral Daily    donepezil  10 mg Oral Nightly    pantoprazole  40 mg Oral QAM AC    atorvastatin  20 mg Oral Daily    tamsulosin  0.4 mg Oral Daily    vitamin D3  400 Units Oral Daily    sodium chloride flush  5-40 mL IntraVENous 2 times per day    aspirin  81 mg Oral Daily    metoprolol tartrate  25 mg Oral BID     PRN Meds: hydrALAZINE, sodium chloride flush, sodium chloride, ondansetron **OR** ondansetron, polyethylene glycol, acetaminophen **OR** acetaminophen      Intake/Output Summary (Last 24 hours) at 12/31/2021 0856  Last data filed at 12/30/2021 2030  Gross per 24 hour   Intake 240 ml   Output    Net 240 ml       Physical Exam Performed:    /79   Pulse 75   Temp 97.3 °F (36.3 °C) (Axillary)   Resp 18   Ht 5' 8\" (1.727 m)   Wt 170 lb (77.1 kg)   SpO2 91%   BMI 25.85 kg/m²     General appearance: No apparent distress, appears stated age and cooperative. HEENT: Pupils equal, round, and reactive to light. Conjunctivae/corneas clear. Neck: Supple, with full range of motion. No jugular venous distention. Trachea midline. Respiratory:  Normal respiratory effort. Clear to auscultation, bilaterally without Rales/Wheezes/Rhonchi. Cardiovascular: Regular rate and rhythm with normal S1/S2 without murmurs, rubs or gallops. Abdomen: Soft, non-tender, non-distended with normal bowel sounds. Musculoskeletal: No clubbing, cyanosis or edema bilaterally. Full range of motion without deformity. Skin: Skin color, texture, turgor normal.  No rashes or lesions. Neurologic:  Neurovascularly intact without any focal sensory/motor deficits.  Cranial nerves: II-XII intact, grossly non-focal.  Psychiatric: Alert and oriented, thought content appropriate, normal insight  Capillary Refill: Brisk,< 3 seconds   Peripheral Pulses: +2 palpable, equal bilaterally       Labs:   No results for input(s): WBC, HGB, HCT, PLT in the last 72 hours. No results for input(s): NA, K, CL, CO2, BUN, CREATININE, CALCIUM, PHOS in the last 72 hours. Invalid input(s): MAGNES  No results for input(s): AST, ALT, BILIDIR, BILITOT, ALKPHOS in the last 72 hours. No results for input(s): INR in the last 72 hours. No results for input(s): Lance Snuffer in the last 72 hours. Urinalysis:      Lab Results   Component Value Date    NITRU Negative 12/25/2021    WBCUA see below 03/27/2020    BACTERIA 3+ 03/27/2020    RBCUA >100 03/27/2020    BLOODU Negative 12/25/2021    SPECGRAV 1.020 12/25/2021    GLUCOSEU Negative 12/25/2021       Consults:    IP CONSULT TO PHARMACY  PHARMACY TO CHANGE BASE FLUIDS  IP CONSULT TO HOSPITALIST  IP CONSULT TO NEUROLOGY  IP CONSULT TO CARDIOLOGY      Assessment/Plan:    Active Hospital Problems    Diagnosis     PAF (paroxysmal atrial fibrillation) ([address] [I48.0]     Aphasia [R47.01]     Arterial ischemic stroke, ICA, left, acute ([address] [I63.232]     COVID-19 [U07.1]     HTN (hypertension), benign [I10]        COVID 19 Positive - NAAT positive on 25 Dec. W/ out overt pulmonary sxs or PNA/Acute Respiratory Failure, on no COVID specific tx. Afebrile >72 hrs. No need for further isolation post-discharge per current CDC guidelines. AMS/aphasia   - Likely acute metabolic encephalopathy from COVID-19   - CT Brain: no acute abnormality. Extensive  changes with parenchymal volume loss and chronic small  vessel ischemic changes. - CTA with atherosclerosis of the proximal cervical ICAs bilaterally with less than 50% stenosis by NASCET criteria.   - MRI brain negative for acute CVA - ruled out   - Neurology consulted and appreciated - signed off and agree.     HTN - w/out known CAD and no evidence of active signs/sxs of ischemia/failure. Currently controlled on home meds w/ vitals reviewed and documented as above. Afib - chronic paroxysmal of unspecified and clinically unable to determine etiology. Normally rate controlled on BBlocker - continued. Anticoagulated at baseline on home Eliquis - continued. Monitored on tele. Had episodes of RVR on 27 Dec - Cardiology consulted and appreciated. Considering ALBERT occlusion for CVA PPX    Dementia - w/out behavioral disturbance. Controlled on home medication regimen - continued. Continue supportive care and redirection as needed. BPH - w/out acute obstructive Uropathy, controlled on home medications as ordered - continued.         DVT Prophylaxis: Eliquis    No results for input(s): PLT in the last 72 hours. Diet: ADULT DIET; Regular  ADULT ORAL NUTRITION SUPPLEMENT; Breakfast, Lunch, Dinner; Other Oral Supplement; Dominic Donovan ONS  Code Status: DNR-CC      PT/OT Eval Status: seen w/ recs for SNF. Dispo - Medically stable for discharge since 30 Dec pending placement decision - tentatively [facility].     Paco Naik MD

## 2022-01-01 ENCOUNTER — APPOINTMENT (OUTPATIENT)
Dept: CT IMAGING | Age: 82
DRG: 682 | End: 2022-01-01
Payer: MEDICARE

## 2022-01-01 ENCOUNTER — APPOINTMENT (OUTPATIENT)
Dept: GENERAL RADIOLOGY | Age: 82
DRG: 682 | End: 2022-01-01
Payer: MEDICARE

## 2022-01-01 ENCOUNTER — HOSPITAL ENCOUNTER (INPATIENT)
Age: 82
LOS: 4 days | Discharge: NURSING, REHAB OR LONG-TERM CARE | DRG: 682 | End: 2022-01-11
Attending: EMERGENCY MEDICINE | Admitting: INTERNAL MEDICINE
Payer: MEDICARE

## 2022-01-01 ENCOUNTER — TELEPHONE (OUTPATIENT)
Dept: OTHER | Facility: CLINIC | Age: 82
End: 2022-01-01

## 2022-01-01 ENCOUNTER — HOSPITAL ENCOUNTER (INPATIENT)
Age: 82
LOS: 2 days | Discharge: NURSING, REHAB OR LONG-TERM CARE | DRG: 682 | End: 2022-01-17
Attending: EMERGENCY MEDICINE | Admitting: HOSPITALIST
Payer: MEDICARE

## 2022-01-01 VITALS
HEIGHT: 68 IN | TEMPERATURE: 97.4 F | SYSTOLIC BLOOD PRESSURE: 129 MMHG | BODY MASS INDEX: 25.76 KG/M2 | OXYGEN SATURATION: 91 % | DIASTOLIC BLOOD PRESSURE: 85 MMHG | WEIGHT: 170 LBS | HEART RATE: 98 BPM | RESPIRATION RATE: 16 BRPM

## 2022-01-01 VITALS
RESPIRATION RATE: 18 BRPM | WEIGHT: 181.66 LBS | TEMPERATURE: 97.5 F | HEIGHT: 68 IN | SYSTOLIC BLOOD PRESSURE: 148 MMHG | DIASTOLIC BLOOD PRESSURE: 95 MMHG | BODY MASS INDEX: 27.53 KG/M2 | OXYGEN SATURATION: 96 % | HEART RATE: 93 BPM

## 2022-01-01 VITALS
WEIGHT: 170 LBS | BODY MASS INDEX: 25.76 KG/M2 | TEMPERATURE: 97.6 F | SYSTOLIC BLOOD PRESSURE: 132 MMHG | DIASTOLIC BLOOD PRESSURE: 96 MMHG | HEART RATE: 95 BPM | RESPIRATION RATE: 16 BRPM | OXYGEN SATURATION: 96 % | HEIGHT: 68 IN

## 2022-01-01 DIAGNOSIS — Z79.01 ANTICOAGULATED: ICD-10-CM

## 2022-01-01 DIAGNOSIS — N17.9 AKI (ACUTE KIDNEY INJURY) (HCC): ICD-10-CM

## 2022-01-01 DIAGNOSIS — J12.82 PNEUMONIA DUE TO COVID-19 VIRUS: ICD-10-CM

## 2022-01-01 DIAGNOSIS — J18.9 MULTIFOCAL PNEUMONIA: ICD-10-CM
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
## 2022-01-01 DIAGNOSIS — I48.91 ATRIAL FIBRILLATION WITH RVR (HCC): Primary | ICD-10-CM

## 2022-01-01 DIAGNOSIS — J96.01 ACUTE RESPIRATORY FAILURE WITH HYPOXIA (HCC): ICD-10-CM
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
## 2022-01-01 DIAGNOSIS — S40.219A: ICD-10-CM

## 2022-01-01 DIAGNOSIS — R77.8 ELEVATED TROPONIN: ICD-10-CM

## 2022-01-01 DIAGNOSIS — S00.81XA ABRASION OF FOREHEAD, INITIAL ENCOUNTER: ICD-10-CM

## 2022-01-01 DIAGNOSIS — U07.1 PNEUMONIA DUE TO COVID-19 VIRUS: ICD-10-CM

## 2022-01-01 LAB
A/G RATIO: 0.8 (ref 1.1–2.2)
A/G RATIO: 0.9 (ref 1.1–2.2)
ALBUMIN SERPL-MCNC: 2.4 G/DL (ref 3.4–5)
ALBUMIN SERPL-MCNC: 2.8 G/DL (ref 3.4–5)
ALBUMIN SERPL-MCNC: 3.1 G/DL (ref 3.4–5)
ALBUMIN SERPL-MCNC: 3.3 G/DL (ref 3.4–5)
ALP BLD-CCNC: 67 U/L (ref 40–129)
ALP BLD-CCNC: 76 U/L (ref 40–129)
ALP BLD-CCNC: 78 U/L (ref 40–129)
ALP BLD-CCNC: 78 U/L (ref 40–129)
ALT SERPL-CCNC: 14 U/L (ref 10–40)
ALT SERPL-CCNC: 17 U/L (ref 10–40)
ANION GAP SERPL CALCULATED.3IONS-SCNC: 10 MMOL/L (ref 3–16)
ANION GAP SERPL CALCULATED.3IONS-SCNC: 10 MMOL/L (ref 3–16)
ANION GAP SERPL CALCULATED.3IONS-SCNC: 11 MMOL/L (ref 3–16)
ANION GAP SERPL CALCULATED.3IONS-SCNC: 15 MMOL/L (ref 3–16)
ANION GAP SERPL CALCULATED.3IONS-SCNC: 16 MMOL/L (ref 3–16)
ANION GAP SERPL CALCULATED.3IONS-SCNC: 8 MMOL/L (ref 3–16)
ANION GAP SERPL CALCULATED.3IONS-SCNC: 9 MMOL/L (ref 3–16)
AST SERPL-CCNC: 18 U/L (ref 15–37)
AST SERPL-CCNC: 20 U/L (ref 15–37)
AST SERPL-CCNC: 28 U/L (ref 15–37)
AST SERPL-CCNC: 29 U/L (ref 15–37)
BACTERIA: ABNORMAL /HPF
BANDED NEUTROPHILS RELATIVE PERCENT: 9 % (ref 0–7)
BASOPHILS ABSOLUTE: 0 K/UL (ref 0–0.2)
BASOPHILS ABSOLUTE: 0.1 K/UL (ref 0–0.2)
BASOPHILS ABSOLUTE: 0.1 K/UL (ref 0–0.2)
BASOPHILS RELATIVE PERCENT: 0 %
BASOPHILS RELATIVE PERCENT: 0.3 %
BASOPHILS RELATIVE PERCENT: 0.3 %
BASOPHILS RELATIVE PERCENT: 0.4 %
BASOPHILS RELATIVE PERCENT: 0.4 %
BASOPHILS RELATIVE PERCENT: 0.7 %
BILIRUB SERPL-MCNC: 0.3 MG/DL (ref 0–1)
BILIRUB SERPL-MCNC: 0.5 MG/DL (ref 0–1)
BILIRUBIN URINE: ABNORMAL
BILIRUBIN URINE: NEGATIVE
BLOOD, URINE: ABNORMAL
BLOOD, URINE: NEGATIVE
BUN BLDV-MCNC: 16 MG/DL (ref 7–20)
BUN BLDV-MCNC: 20 MG/DL (ref 7–20)
BUN BLDV-MCNC: 22 MG/DL (ref 7–20)
BUN BLDV-MCNC: 22 MG/DL (ref 7–20)
BUN BLDV-MCNC: 27 MG/DL (ref 7–20)
BUN BLDV-MCNC: 29 MG/DL (ref 7–20)
BUN BLDV-MCNC: 29 MG/DL (ref 7–20)
BUN BLDV-MCNC: 40 MG/DL (ref 7–20)
BUN BLDV-MCNC: 48 MG/DL (ref 7–20)
CALCIUM SERPL-MCNC: 10.1 MG/DL (ref 8.3–10.6)
CALCIUM SERPL-MCNC: 10.2 MG/DL (ref 8.3–10.6)
CALCIUM SERPL-MCNC: 10.4 MG/DL (ref 8.3–10.6)
CALCIUM SERPL-MCNC: 9.4 MG/DL (ref 8.3–10.6)
CALCIUM SERPL-MCNC: 9.5 MG/DL (ref 8.3–10.6)
CALCIUM SERPL-MCNC: 9.6 MG/DL (ref 8.3–10.6)
CALCIUM SERPL-MCNC: 9.6 MG/DL (ref 8.3–10.6)
CALCIUM SERPL-MCNC: 9.7 MG/DL (ref 8.3–10.6)
CALCIUM SERPL-MCNC: 9.9 MG/DL (ref 8.3–10.6)
CHLORIDE BLD-SCNC: 104 MMOL/L (ref 99–110)
CHLORIDE BLD-SCNC: 107 MMOL/L (ref 99–110)
CHLORIDE BLD-SCNC: 108 MMOL/L (ref 99–110)
CHLORIDE BLD-SCNC: 109 MMOL/L (ref 99–110)
CHLORIDE BLD-SCNC: 110 MMOL/L (ref 99–110)
CHLORIDE BLD-SCNC: 111 MMOL/L (ref 99–110)
CHLORIDE BLD-SCNC: 112 MMOL/L (ref 99–110)
CHLORIDE BLD-SCNC: 112 MMOL/L (ref 99–110)
CHLORIDE BLD-SCNC: 116 MMOL/L (ref 99–110)
CLARITY: CLEAR
CLARITY: CLEAR
CO2: 19 MMOL/L (ref 21–32)
CO2: 21 MMOL/L (ref 21–32)
CO2: 21 MMOL/L (ref 21–32)
CO2: 22 MMOL/L (ref 21–32)
CO2: 24 MMOL/L (ref 21–32)
CO2: 24 MMOL/L (ref 21–32)
COLOR: YELLOW
COLOR: YELLOW
CREAT SERPL-MCNC: 1 MG/DL (ref 0.8–1.3)
CREAT SERPL-MCNC: 1 MG/DL (ref 0.8–1.3)
CREAT SERPL-MCNC: 1.1 MG/DL (ref 0.8–1.3)
CREAT SERPL-MCNC: 1.1 MG/DL (ref 0.8–1.3)
CREAT SERPL-MCNC: 1.2 MG/DL (ref 0.8–1.3)
CREAT SERPL-MCNC: 1.2 MG/DL (ref 0.8–1.3)
CREAT SERPL-MCNC: 1.4 MG/DL (ref 0.8–1.3)
CREAT SERPL-MCNC: 1.6 MG/DL (ref 0.8–1.3)
CREAT SERPL-MCNC: 2.4 MG/DL (ref 0.8–1.3)
EKG ATRIAL RATE: 102 BPM
EKG ATRIAL RATE: 140 BPM
EKG DIAGNOSIS: NORMAL
EKG DIAGNOSIS: NORMAL
EKG Q-T INTERVAL: 338 MS
EKG Q-T INTERVAL: 350 MS
EKG QRS DURATION: 140 MS
EKG QRS DURATION: 154 MS
EKG QTC CALCULATION (BAZETT): 431 MS
EKG QTC CALCULATION (BAZETT): 534 MS
EKG R AXIS: 16 DEGREES
EKG R AXIS: 39 DEGREES
EKG T AXIS: -25 DEGREES
EKG T AXIS: -38 DEGREES
EKG VENTRICULAR RATE: 140 BPM
EKG VENTRICULAR RATE: 98 BPM
EOSINOPHILS ABSOLUTE: 0 K/UL (ref 0–0.6)
EOSINOPHILS ABSOLUTE: 0 K/UL (ref 0–0.6)
EOSINOPHILS ABSOLUTE: 0.1 K/UL (ref 0–0.6)
EOSINOPHILS ABSOLUTE: 0.2 K/UL (ref 0–0.6)
EOSINOPHILS RELATIVE PERCENT: 0 %
EOSINOPHILS RELATIVE PERCENT: 0.1 %
EOSINOPHILS RELATIVE PERCENT: 1.1 %
EOSINOPHILS RELATIVE PERCENT: 2.2 %
EOSINOPHILS RELATIVE PERCENT: 2.2 %
EOSINOPHILS RELATIVE PERCENT: 3 %
EPITHELIAL CELLS, UA: ABNORMAL /HPF (ref 0–5)
GFR AFRICAN AMERICAN: 32
GFR AFRICAN AMERICAN: 51
GFR AFRICAN AMERICAN: 59
GFR AFRICAN AMERICAN: >60
GFR NON-AFRICAN AMERICAN: 26
GFR NON-AFRICAN AMERICAN: 42
GFR NON-AFRICAN AMERICAN: 49
GFR NON-AFRICAN AMERICAN: 58
GFR NON-AFRICAN AMERICAN: 58
GFR NON-AFRICAN AMERICAN: >60
GLUCOSE BLD-MCNC: 106 MG/DL (ref 70–99)
GLUCOSE BLD-MCNC: 139 MG/DL (ref 70–99)
GLUCOSE BLD-MCNC: 159 MG/DL (ref 70–99)
GLUCOSE BLD-MCNC: 89 MG/DL (ref 70–99)
GLUCOSE BLD-MCNC: 92 MG/DL (ref 70–99)
GLUCOSE BLD-MCNC: 92 MG/DL (ref 70–99)
GLUCOSE BLD-MCNC: 95 MG/DL (ref 70–99)
GLUCOSE URINE: NEGATIVE MG/DL
GLUCOSE URINE: NEGATIVE MG/DL
HCT VFR BLD CALC: 37.2 % (ref 40.5–52.5)
HCT VFR BLD CALC: 37.2 % (ref 40.5–52.5)
HCT VFR BLD CALC: 39.6 % (ref 40.5–52.5)
HCT VFR BLD CALC: 41.4 % (ref 40.5–52.5)
HCT VFR BLD CALC: 45.1 % (ref 40.5–52.5)
HCT VFR BLD CALC: 46.9 % (ref 40.5–52.5)
HEMOGLOBIN: 12 G/DL (ref 13.5–17.5)
HEMOGLOBIN: 12.2 G/DL (ref 13.5–17.5)
HEMOGLOBIN: 13.1 G/DL (ref 13.5–17.5)
HEMOGLOBIN: 13.4 G/DL (ref 13.5–17.5)
HEMOGLOBIN: 15 G/DL (ref 13.5–17.5)
HEMOGLOBIN: 15.1 G/DL (ref 13.5–17.5)
HYALINE CASTS: ABNORMAL /LPF (ref 0–2)
KETONES, URINE: 15 MG/DL
KETONES, URINE: NEGATIVE MG/DL
LEUKOCYTE ESTERASE, URINE: NEGATIVE
LEUKOCYTE ESTERASE, URINE: NEGATIVE
LYMPHOCYTES ABSOLUTE: 0.8 K/UL (ref 1–5.1)
LYMPHOCYTES ABSOLUTE: 1.2 K/UL (ref 1–5.1)
LYMPHOCYTES ABSOLUTE: 1.4 K/UL (ref 1–5.1)
LYMPHOCYTES ABSOLUTE: 1.4 K/UL (ref 1–5.1)
LYMPHOCYTES ABSOLUTE: 1.8 K/UL (ref 1–5.1)
LYMPHOCYTES ABSOLUTE: 1.9 K/UL (ref 1–5.1)
LYMPHOCYTES RELATIVE PERCENT: 11.5 %
LYMPHOCYTES RELATIVE PERCENT: 13.9 %
LYMPHOCYTES RELATIVE PERCENT: 17 %
LYMPHOCYTES RELATIVE PERCENT: 18.8 %
LYMPHOCYTES RELATIVE PERCENT: 20.1 %
LYMPHOCYTES RELATIVE PERCENT: 6.5 %
MCH RBC QN AUTO: 30.5 PG (ref 26–34)
MCH RBC QN AUTO: 30.7 PG (ref 26–34)
MCH RBC QN AUTO: 30.8 PG (ref 26–34)
MCH RBC QN AUTO: 30.9 PG (ref 26–34)
MCH RBC QN AUTO: 30.9 PG (ref 26–34)
MCH RBC QN AUTO: 31.1 PG (ref 26–34)
MCHC RBC AUTO-ENTMCNC: 32.1 G/DL (ref 31–36)
MCHC RBC AUTO-ENTMCNC: 32.3 G/DL (ref 31–36)
MCHC RBC AUTO-ENTMCNC: 32.3 G/DL (ref 31–36)
MCHC RBC AUTO-ENTMCNC: 32.7 G/DL (ref 31–36)
MCHC RBC AUTO-ENTMCNC: 32.9 G/DL (ref 31–36)
MCHC RBC AUTO-ENTMCNC: 33.4 G/DL (ref 31–36)
MCV RBC AUTO: 93.1 FL (ref 80–100)
MCV RBC AUTO: 93.1 FL (ref 80–100)
MCV RBC AUTO: 94.4 FL (ref 80–100)
MCV RBC AUTO: 94.6 FL (ref 80–100)
MCV RBC AUTO: 95.5 FL (ref 80–100)
MCV RBC AUTO: 96 FL (ref 80–100)
MICROSCOPIC EXAMINATION: YES
MICROSCOPIC EXAMINATION: YES
MONOCYTES ABSOLUTE: 0.3 K/UL (ref 0–1.3)
MONOCYTES ABSOLUTE: 0.8 K/UL (ref 0–1.3)
MONOCYTES ABSOLUTE: 0.9 K/UL (ref 0–1.3)
MONOCYTES ABSOLUTE: 1 K/UL (ref 0–1.3)
MONOCYTES ABSOLUTE: 1.2 K/UL (ref 0–1.3)
MONOCYTES ABSOLUTE: 1.2 K/UL (ref 0–1.3)
MONOCYTES RELATIVE PERCENT: 10.2 %
MONOCYTES RELATIVE PERCENT: 10.5 %
MONOCYTES RELATIVE PERCENT: 12.9 %
MONOCYTES RELATIVE PERCENT: 3 %
MONOCYTES RELATIVE PERCENT: 9.1 %
MONOCYTES RELATIVE PERCENT: 9.7 %
MUCUS: ABNORMAL /LPF
NEUTROPHILS ABSOLUTE: 10.9 K/UL (ref 1.7–7.7)
NEUTROPHILS ABSOLUTE: 4.1 K/UL (ref 1.7–7.7)
NEUTROPHILS ABSOLUTE: 6.1 K/UL (ref 1.7–7.7)
NEUTROPHILS ABSOLUTE: 7.3 K/UL (ref 1.7–7.7)
NEUTROPHILS ABSOLUTE: 8.9 K/UL (ref 1.7–7.7)
NEUTROPHILS ABSOLUTE: 9.1 K/UL (ref 1.7–7.7)
NEUTROPHILS RELATIVE PERCENT: 65 %
NEUTROPHILS RELATIVE PERCENT: 66.8 %
NEUTROPHILS RELATIVE PERCENT: 71 %
NEUTROPHILS RELATIVE PERCENT: 73.8 %
NEUTROPHILS RELATIVE PERCENT: 76.6 %
NEUTROPHILS RELATIVE PERCENT: 83.9 %
NITRITE, URINE: NEGATIVE
NITRITE, URINE: NEGATIVE
PDW BLD-RTO: 14.4 % (ref 12.4–15.4)
PDW BLD-RTO: 14.6 % (ref 12.4–15.4)
PDW BLD-RTO: 14.6 % (ref 12.4–15.4)
PDW BLD-RTO: 14.9 % (ref 12.4–15.4)
PDW BLD-RTO: 15 % (ref 12.4–15.4)
PDW BLD-RTO: 15.2 % (ref 12.4–15.4)
PH UA: 5.5 (ref 5–8)
PH UA: 5.5 (ref 5–8)
PLATELET # BLD: 200 K/UL (ref 135–450)
PLATELET # BLD: 203 K/UL (ref 135–450)
PLATELET # BLD: 211 K/UL (ref 135–450)
PLATELET # BLD: 230 K/UL (ref 135–450)
PLATELET # BLD: 258 K/UL (ref 135–450)
PLATELET # BLD: 303 K/UL (ref 135–450)
PLATELET SLIDE REVIEW: ADEQUATE
PMV BLD AUTO: 10.1 FL (ref 5–10.5)
PMV BLD AUTO: 10.5 FL (ref 5–10.5)
PMV BLD AUTO: 10.6 FL (ref 5–10.5)
PMV BLD AUTO: 9.6 FL (ref 5–10.5)
POTASSIUM REFLEX MAGNESIUM: 3.6 MMOL/L (ref 3.5–5.1)
POTASSIUM REFLEX MAGNESIUM: 3.7 MMOL/L (ref 3.5–5.1)
POTASSIUM REFLEX MAGNESIUM: 3.8 MMOL/L (ref 3.5–5.1)
POTASSIUM REFLEX MAGNESIUM: 4.1 MMOL/L (ref 3.5–5.1)
POTASSIUM SERPL-SCNC: 3.7 MMOL/L (ref 3.5–5.1)
POTASSIUM SERPL-SCNC: 4 MMOL/L (ref 3.5–5.1)
POTASSIUM SERPL-SCNC: 4.2 MMOL/L (ref 3.5–5.1)
POTASSIUM SERPL-SCNC: 4.3 MMOL/L (ref 3.5–5.1)
POTASSIUM SERPL-SCNC: 4.4 MMOL/L (ref 3.5–5.1)
PRO-BNP: 1390 PG/ML (ref 0–449)
PROCALCITONIN: 0.09 NG/ML (ref 0–0.15)
PROCALCITONIN: 0.09 NG/ML (ref 0–0.15)
PROTEIN UA: ABNORMAL MG/DL
PROTEIN UA: NEGATIVE MG/DL
RBC # BLD: 3.94 M/UL (ref 4.2–5.9)
RBC # BLD: 3.94 M/UL (ref 4.2–5.9)
RBC # BLD: 4.26 M/UL (ref 4.2–5.9)
RBC # BLD: 4.34 M/UL (ref 4.2–5.9)
RBC # BLD: 4.85 M/UL (ref 4.2–5.9)
RBC # BLD: 4.88 M/UL (ref 4.2–5.9)
RBC # BLD: NORMAL 10*6/UL
RBC UA: ABNORMAL /HPF (ref 0–4)
RBC UA: NORMAL /HPF (ref 0–4)
RENAL EPITHELIAL, UA: ABNORMAL /HPF (ref 0–1)
SODIUM BLD-SCNC: 139 MMOL/L (ref 136–145)
SODIUM BLD-SCNC: 140 MMOL/L (ref 136–145)
SODIUM BLD-SCNC: 142 MMOL/L (ref 136–145)
SODIUM BLD-SCNC: 143 MMOL/L (ref 136–145)
SODIUM BLD-SCNC: 144 MMOL/L (ref 136–145)
SODIUM BLD-SCNC: 145 MMOL/L (ref 136–145)
SODIUM BLD-SCNC: 148 MMOL/L (ref 136–145)
SPECIFIC GRAVITY UA: 1.02 (ref 1–1.03)
SPECIFIC GRAVITY UA: >=1.03 (ref 1–1.03)
SPECIMEN STATUS: NORMAL
TOTAL CK: 172 U/L (ref 39–308)
TOTAL CK: 456 U/L (ref 39–308)
TOTAL CK: 578 U/L (ref 39–308)
TOTAL PROTEIN: 5.5 G/DL (ref 6.4–8.2)
TOTAL PROTEIN: 6.1 G/DL (ref 6.4–8.2)
TOTAL PROTEIN: 6.9 G/DL (ref 6.4–8.2)
TOTAL PROTEIN: 7 G/DL (ref 6.4–8.2)
TROPONIN: 0.01 NG/ML
TROPONIN: 0.02 NG/ML
URINE CULTURE, ROUTINE: NORMAL
URINE REFLEX TO CULTURE: ABNORMAL
URINE TYPE: ABNORMAL
URINE TYPE: ABNORMAL
UROBILINOGEN, URINE: 0.2 E.U./DL
UROBILINOGEN, URINE: 0.2 E.U./DL
WBC # BLD: 11.1 K/UL (ref 4–11)
WBC # BLD: 11.8 K/UL (ref 4–11)
WBC # BLD: 13 K/UL (ref 4–11)
WBC # BLD: 6.3 K/UL (ref 4–11)
WBC # BLD: 9.1 K/UL (ref 4–11)
WBC # BLD: 9.9 K/UL (ref 4–11)
WBC UA: ABNORMAL /HPF (ref 0–5)
WBC UA: NORMAL /HPF (ref 0–5)

## 2022-01-01 PROCEDURE — 97530 THERAPEUTIC ACTIVITIES: CPT

## 2022-01-01 PROCEDURE — 80048 BASIC METABOLIC PNL TOTAL CA: CPT

## 2022-01-01 PROCEDURE — 2580000003 HC RX 258: Performed by: INTERNAL MEDICINE

## 2022-01-01 PROCEDURE — 6370000000 HC RX 637 (ALT 250 FOR IP): Performed by: HOSPITALIST

## 2022-01-01 PROCEDURE — 6370000000 HC RX 637 (ALT 250 FOR IP): Performed by: INTERNAL MEDICINE

## 2022-01-01 PROCEDURE — 2700000000 HC OXYGEN THERAPY PER DAY

## 2022-01-01 PROCEDURE — 92526 ORAL FUNCTION THERAPY: CPT

## 2022-01-01 PROCEDURE — 36415 COLL VENOUS BLD VENIPUNCTURE: CPT

## 2022-01-01 PROCEDURE — 2580000003 HC RX 258: Performed by: NURSE PRACTITIONER

## 2022-01-01 PROCEDURE — 94761 N-INVAS EAR/PLS OXIMETRY MLT: CPT

## 2022-01-01 PROCEDURE — 97162 PT EVAL MOD COMPLEX 30 MIN: CPT

## 2022-01-01 PROCEDURE — 97110 THERAPEUTIC EXERCISES: CPT

## 2022-01-01 PROCEDURE — 6360000002 HC RX W HCPCS: Performed by: INTERNAL MEDICINE

## 2022-01-01 PROCEDURE — 80053 COMPREHEN METABOLIC PANEL: CPT

## 2022-01-01 PROCEDURE — 97535 SELF CARE MNGMENT TRAINING: CPT

## 2022-01-01 PROCEDURE — 84145 PROCALCITONIN (PCT): CPT

## 2022-01-01 PROCEDURE — 72125 CT NECK SPINE W/O DYE: CPT

## 2022-01-01 PROCEDURE — 93010 ELECTROCARDIOGRAM REPORT: CPT | Performed by: INTERNAL MEDICINE

## 2022-01-01 PROCEDURE — 84484 ASSAY OF TROPONIN QUANT: CPT

## 2022-01-01 PROCEDURE — 85025 COMPLETE CBC W/AUTO DIFF WBC: CPT

## 2022-01-01 PROCEDURE — 2580000003 HC RX 258: Performed by: EMERGENCY MEDICINE

## 2022-01-01 PROCEDURE — 1200000000 HC SEMI PRIVATE

## 2022-01-01 PROCEDURE — 70450 CT HEAD/BRAIN W/O DYE: CPT

## 2022-01-01 PROCEDURE — 99222 1ST HOSP IP/OBS MODERATE 55: CPT | Performed by: INTERNAL MEDICINE

## 2022-01-01 PROCEDURE — 2500000003 HC RX 250 WO HCPCS: Performed by: NURSE PRACTITIONER

## 2022-01-01 PROCEDURE — 93005 ELECTROCARDIOGRAM TRACING: CPT | Performed by: EMERGENCY MEDICINE

## 2022-01-01 PROCEDURE — 97166 OT EVAL MOD COMPLEX 45 MIN: CPT

## 2022-01-01 PROCEDURE — 99233 SBSQ HOSP IP/OBS HIGH 50: CPT | Performed by: INTERNAL MEDICINE

## 2022-01-01 PROCEDURE — 83880 ASSAY OF NATRIURETIC PEPTIDE: CPT

## 2022-01-01 PROCEDURE — 2580000003 HC RX 258: Performed by: HOSPITALIST

## 2022-01-01 PROCEDURE — 99285 EMERGENCY DEPT VISIT HI MDM: CPT

## 2022-01-01 PROCEDURE — 82550 ASSAY OF CK (CPK): CPT

## 2022-01-01 PROCEDURE — 96374 THER/PROPH/DIAG INJ IV PUSH: CPT

## 2022-01-01 PROCEDURE — 74176 CT ABD & PELVIS W/O CONTRAST: CPT

## 2022-01-01 PROCEDURE — 81001 URINALYSIS AUTO W/SCOPE: CPT

## 2022-01-01 PROCEDURE — 99283 EMERGENCY DEPT VISIT LOW MDM: CPT

## 2022-01-01 PROCEDURE — 74230 X-RAY XM SWLNG FUNCJ C+: CPT

## 2022-01-01 PROCEDURE — 71045 X-RAY EXAM CHEST 1 VIEW: CPT

## 2022-01-01 PROCEDURE — 87086 URINE CULTURE/COLONY COUNT: CPT

## 2022-01-01 PROCEDURE — 92611 MOTION FLUOROSCOPY/SWALLOW: CPT

## 2022-01-01 PROCEDURE — 92610 EVALUATE SWALLOWING FUNCTION: CPT

## 2022-01-01 RX ORDER — METOPROLOL SUCCINATE 50 MG/1
100 TABLET, EXTENDED RELEASE ORAL DAILY
Status: DISCONTINUED | OUTPATIENT
Start: 2022-01-01 | End: 2022-01-01

## 2022-01-01 RX ORDER — ASPIRIN 81 MG/1
81 TABLET, CHEWABLE ORAL DAILY
Status: DISCONTINUED | OUTPATIENT
Start: 2022-01-01 | End: 2022-01-01 | Stop reason: HOSPADM

## 2022-01-01 RX ORDER — DONEPEZIL HYDROCHLORIDE 5 MG/1
10 TABLET, FILM COATED ORAL NIGHTLY
Status: DISCONTINUED | OUTPATIENT
Start: 2022-01-01 | End: 2022-01-01 | Stop reason: HOSPADM

## 2022-01-01 RX ORDER — ACETAMINOPHEN 650 MG/1
650 SUPPOSITORY RECTAL EVERY 6 HOURS PRN
Status: DISCONTINUED | OUTPATIENT
Start: 2022-01-01 | End: 2022-01-01 | Stop reason: HOSPADM

## 2022-01-01 RX ORDER — ONDANSETRON 2 MG/ML
4 INJECTION INTRAMUSCULAR; INTRAVENOUS EVERY 6 HOURS PRN
Status: DISCONTINUED | OUTPATIENT
Start: 2022-01-01 | End: 2022-01-01 | Stop reason: HOSPADM

## 2022-01-01 RX ORDER — SODIUM CHLORIDE 0.9 % (FLUSH) 0.9 %
10 SYRINGE (ML) INJECTION PRN
Status: DISCONTINUED | OUTPATIENT
Start: 2022-01-01 | End: 2022-01-01 | Stop reason: HOSPADM

## 2022-01-01 RX ORDER — PANTOPRAZOLE SODIUM 40 MG/1
40 TABLET, DELAYED RELEASE ORAL
Status: DISCONTINUED | OUTPATIENT
Start: 2022-01-01 | End: 2022-01-01 | Stop reason: HOSPADM

## 2022-01-01 RX ORDER — DIVALPROEX SODIUM 250 MG/1
250 TABLET, DELAYED RELEASE ORAL 2 TIMES DAILY
Status: DISCONTINUED | OUTPATIENT
Start: 2022-01-01 | End: 2022-01-01

## 2022-01-01 RX ORDER — DOXAZOSIN 2 MG/1
4 TABLET ORAL NIGHTLY
Status: DISCONTINUED | OUTPATIENT
Start: 2022-01-01 | End: 2022-01-01 | Stop reason: HOSPADM

## 2022-01-01 RX ORDER — TAMSULOSIN HYDROCHLORIDE 0.4 MG/1
0.4 CAPSULE ORAL DAILY
Status: DISCONTINUED | OUTPATIENT
Start: 2022-01-01 | End: 2022-01-01 | Stop reason: HOSPADM

## 2022-01-01 RX ORDER — POLYETHYLENE GLYCOL 3350 17 G/17G
17 POWDER, FOR SOLUTION ORAL DAILY PRN
Status: DISCONTINUED | OUTPATIENT
Start: 2022-01-01 | End: 2022-01-01 | Stop reason: HOSPADM

## 2022-01-01 RX ORDER — METOPROLOL SUCCINATE 100 MG/1
100 TABLET, EXTENDED RELEASE ORAL 2 TIMES DAILY
Qty: 30 TABLET | Refills: 3 | DISCHARGE
Start: 2022-01-01

## 2022-01-01 RX ORDER — ATORVASTATIN CALCIUM 40 MG/1
40 TABLET, FILM COATED ORAL DAILY
Status: DISCONTINUED | OUTPATIENT
Start: 2022-01-01 | End: 2022-01-01 | Stop reason: HOSPADM

## 2022-01-01 RX ORDER — AMLODIPINE BESYLATE 5 MG/1
5 TABLET ORAL DAILY
Qty: 30 TABLET | Refills: 3 | Status: ON HOLD | OUTPATIENT
Start: 2022-01-01 | End: 2022-01-01 | Stop reason: HOSPADM

## 2022-01-01 RX ORDER — LANOLIN ALCOHOL/MO/W.PET/CERES
3 CREAM (GRAM) TOPICAL NIGHTLY PRN
Status: DISCONTINUED | OUTPATIENT
Start: 2022-01-01 | End: 2022-01-01 | Stop reason: HOSPADM

## 2022-01-01 RX ORDER — SODIUM CHLORIDE 450 MG/100ML
INJECTION, SOLUTION INTRAVENOUS CONTINUOUS
Status: DISCONTINUED | OUTPATIENT
Start: 2022-01-01 | End: 2022-01-01

## 2022-01-01 RX ORDER — POTASSIUM CHLORIDE 7.45 MG/ML
10 INJECTION INTRAVENOUS PRN
Status: DISCONTINUED | OUTPATIENT
Start: 2022-01-01 | End: 2022-01-01 | Stop reason: HOSPADM

## 2022-01-01 RX ORDER — ONDANSETRON 4 MG/1
4 TABLET, ORALLY DISINTEGRATING ORAL EVERY 8 HOURS PRN
Status: DISCONTINUED | OUTPATIENT
Start: 2022-01-01 | End: 2022-01-01 | Stop reason: HOSPADM

## 2022-01-01 RX ORDER — POTASSIUM CHLORIDE 20 MEQ/1
40 TABLET, EXTENDED RELEASE ORAL PRN
Status: DISCONTINUED | OUTPATIENT
Start: 2022-01-01 | End: 2022-01-01 | Stop reason: HOSPADM

## 2022-01-01 RX ORDER — SIMVASTATIN 40 MG
40 TABLET ORAL NIGHTLY
DISCHARGE
Start: 2022-01-01

## 2022-01-01 RX ORDER — DILTIAZEM HYDROCHLORIDE 5 MG/ML
10 INJECTION INTRAVENOUS ONCE
Status: COMPLETED | OUTPATIENT
Start: 2022-01-01 | End: 2022-01-01

## 2022-01-01 RX ORDER — SODIUM CHLORIDE 9 MG/ML
1000 INJECTION, SOLUTION INTRAVENOUS CONTINUOUS
Status: DISCONTINUED | OUTPATIENT
Start: 2022-01-01 | End: 2022-01-01

## 2022-01-01 RX ORDER — SODIUM CHLORIDE 0.9 % (FLUSH) 0.9 %
5-40 SYRINGE (ML) INJECTION PRN
Status: DISCONTINUED | OUTPATIENT
Start: 2022-01-01 | End: 2022-01-01 | Stop reason: HOSPADM

## 2022-01-01 RX ORDER — ACETAMINOPHEN 325 MG/1
650 TABLET ORAL EVERY 6 HOURS PRN
Status: DISCONTINUED | OUTPATIENT
Start: 2022-01-01 | End: 2022-01-01 | Stop reason: HOSPADM

## 2022-01-01 RX ORDER — TERAZOSIN 5 MG/1
5 CAPSULE ORAL NIGHTLY
COMMUNITY

## 2022-01-01 RX ORDER — AMLODIPINE BESYLATE 5 MG/1
5 TABLET ORAL DAILY
Status: DISCONTINUED | OUTPATIENT
Start: 2022-01-01 | End: 2022-01-01 | Stop reason: HOSPADM

## 2022-01-01 RX ORDER — METOPROLOL SUCCINATE 50 MG/1
100 TABLET, EXTENDED RELEASE ORAL 2 TIMES DAILY
Status: DISCONTINUED | OUTPATIENT
Start: 2022-01-01 | End: 2022-01-01 | Stop reason: HOSPADM

## 2022-01-01 RX ORDER — MAGNESIUM SULFATE 1 G/100ML
1000 INJECTION INTRAVENOUS PRN
Status: DISCONTINUED | OUTPATIENT
Start: 2022-01-01 | End: 2022-01-01 | Stop reason: HOSPADM

## 2022-01-01 RX ORDER — SODIUM CHLORIDE 9 MG/ML
INJECTION, SOLUTION INTRAVENOUS CONTINUOUS
Status: DISCONTINUED | OUTPATIENT
Start: 2022-01-01 | End: 2022-01-01

## 2022-01-01 RX ORDER — DIVALPROEX SODIUM 250 MG/1
250 TABLET, DELAYED RELEASE ORAL 2 TIMES DAILY
Status: DISCONTINUED | OUTPATIENT
Start: 2022-01-01 | End: 2022-01-01 | Stop reason: HOSPADM

## 2022-01-01 RX ORDER — LISINOPRIL 20 MG/1
20 TABLET ORAL DAILY
Status: DISCONTINUED | OUTPATIENT
Start: 2022-01-01 | End: 2022-01-01 | Stop reason: HOSPADM

## 2022-01-01 RX ORDER — MAGNESIUM SULFATE 1 G/100ML
1000 INJECTION INTRAVENOUS PRN
Status: DISCONTINUED | OUTPATIENT
Start: 2022-01-01 | End: 2022-01-01 | Stop reason: ALTCHOICE

## 2022-01-01 RX ORDER — SODIUM CHLORIDE 9 MG/ML
25 INJECTION, SOLUTION INTRAVENOUS PRN
Status: DISCONTINUED | OUTPATIENT
Start: 2022-01-01 | End: 2022-01-01 | Stop reason: HOSPADM

## 2022-01-01 RX ORDER — ONDANSETRON 4 MG/1
4 TABLET, ORALLY DISINTEGRATING ORAL EVERY 8 HOURS PRN
Status: DISCONTINUED | OUTPATIENT
Start: 2022-01-01 | End: 2022-01-01

## 2022-01-01 RX ORDER — 0.9 % SODIUM CHLORIDE 0.9 %
1000 INTRAVENOUS SOLUTION INTRAVENOUS ONCE
Status: COMPLETED | OUTPATIENT
Start: 2022-01-01 | End: 2022-01-01

## 2022-01-01 RX ORDER — DOXAZOSIN 2 MG/1
4 TABLET ORAL NIGHTLY
Status: DISCONTINUED | OUTPATIENT
Start: 2022-01-01 | End: 2022-01-01 | Stop reason: CLARIF

## 2022-01-01 RX ORDER — METOPROLOL SUCCINATE 50 MG/1
50 TABLET, EXTENDED RELEASE ORAL DAILY
Status: DISCONTINUED | OUTPATIENT
Start: 2022-01-01 | End: 2022-01-01

## 2022-01-01 RX ORDER — SODIUM CHLORIDE 0.9 % (FLUSH) 0.9 %
5-40 SYRINGE (ML) INJECTION EVERY 12 HOURS SCHEDULED
Status: DISCONTINUED | OUTPATIENT
Start: 2022-01-01 | End: 2022-01-01 | Stop reason: HOSPADM

## 2022-01-01 RX ORDER — ATORVASTATIN CALCIUM 10 MG/1
20 TABLET, FILM COATED ORAL DAILY
Status: DISCONTINUED | OUTPATIENT
Start: 2022-01-01 | End: 2022-01-01

## 2022-01-01 RX ORDER — POTASSIUM CHLORIDE 7.45 MG/ML
10 INJECTION INTRAVENOUS PRN
Status: DISCONTINUED | OUTPATIENT
Start: 2022-01-01 | End: 2022-01-01 | Stop reason: ALTCHOICE

## 2022-01-01 RX ORDER — POTASSIUM CHLORIDE 20 MEQ/1
40 TABLET, EXTENDED RELEASE ORAL PRN
Status: DISCONTINUED | OUTPATIENT
Start: 2022-01-01 | End: 2022-01-01 | Stop reason: ALTCHOICE

## 2022-01-01 RX ORDER — ONDANSETRON 2 MG/ML
4 INJECTION INTRAMUSCULAR; INTRAVENOUS EVERY 6 HOURS PRN
Status: DISCONTINUED | OUTPATIENT
Start: 2022-01-01 | End: 2022-01-01

## 2022-01-01 RX ORDER — METOPROLOL TARTRATE 5 MG/5ML
5 INJECTION INTRAVENOUS EVERY 6 HOURS PRN
Status: DISCONTINUED | OUTPATIENT
Start: 2022-01-01 | End: 2022-01-01 | Stop reason: HOSPADM

## 2022-01-01 RX ORDER — DIVALPROEX SODIUM 125 MG/1
250 CAPSULE, COATED PELLETS ORAL EVERY 12 HOURS SCHEDULED
Status: DISCONTINUED | OUTPATIENT
Start: 2022-01-01 | End: 2022-01-01 | Stop reason: HOSPADM

## 2022-01-01 RX ADMIN — DONEPEZIL HYDROCHLORIDE 10 MG: 5 TABLET, FILM COATED ORAL at 20:21

## 2022-01-01 RX ADMIN — ATORVASTATIN CALCIUM 40 MG: 40 TABLET, FILM COATED ORAL at 09:04

## 2022-01-01 RX ADMIN — AMLODIPINE BESYLATE 5 MG: 5 TABLET ORAL at 09:50

## 2022-01-01 RX ADMIN — SODIUM CHLORIDE: 4.5 INJECTION, SOLUTION INTRAVENOUS at 00:37

## 2022-01-01 RX ADMIN — METOPROLOL TARTRATE 25 MG: 25 TABLET, FILM COATED ORAL at 20:11

## 2022-01-01 RX ADMIN — ASPIRIN 81 MG: 81 TABLET, COATED ORAL at 08:56

## 2022-01-01 RX ADMIN — DIVALPROEX SODIUM 250 MG: 125 CAPSULE ORAL at 09:50

## 2022-01-01 RX ADMIN — ATORVASTATIN CALCIUM 20 MG: 10 TABLET, FILM COATED ORAL at 09:55

## 2022-01-01 RX ADMIN — TAMSULOSIN HYDROCHLORIDE 0.4 MG: 0.4 CAPSULE ORAL at 09:50

## 2022-01-01 RX ADMIN — DILTIAZEM HYDROCHLORIDE 10 MG: 5 INJECTION INTRAVENOUS at 09:26

## 2022-01-01 RX ADMIN — PANTOPRAZOLE SODIUM 40 MG: 40 TABLET, DELAYED RELEASE ORAL at 06:30

## 2022-01-01 RX ADMIN — METOPROLOL TARTRATE 25 MG: 25 TABLET, FILM COATED ORAL at 21:33

## 2022-01-01 RX ADMIN — TAMSULOSIN HYDROCHLORIDE 0.4 MG: 0.4 CAPSULE ORAL at 09:02

## 2022-01-01 RX ADMIN — METOPROLOL TARTRATE 25 MG: 25 TABLET, FILM COATED ORAL at 09:02

## 2022-01-01 RX ADMIN — AMLODIPINE BESYLATE 5 MG: 5 TABLET ORAL at 08:53

## 2022-01-01 RX ADMIN — AMLODIPINE BESYLATE 5 MG: 5 TABLET ORAL at 08:17

## 2022-01-01 RX ADMIN — APIXABAN 5 MG: 5 TABLET, FILM COATED ORAL at 08:56

## 2022-01-01 RX ADMIN — CEFTRIAXONE SODIUM 1000 MG: 1 INJECTION, POWDER, FOR SOLUTION INTRAMUSCULAR; INTRAVENOUS at 06:05

## 2022-01-01 RX ADMIN — APIXABAN 2.5 MG: 5 TABLET, FILM COATED ORAL at 14:41

## 2022-01-01 RX ADMIN — DIVALPROEX SODIUM 250 MG: 125 CAPSULE ORAL at 21:04

## 2022-01-01 RX ADMIN — APIXABAN 5 MG: 5 TABLET, FILM COATED ORAL at 08:50

## 2022-01-01 RX ADMIN — METOPROLOL TARTRATE 25 MG: 25 TABLET, FILM COATED ORAL at 20:21

## 2022-01-01 RX ADMIN — DIVALPROEX SODIUM 250 MG: 250 TABLET, DELAYED RELEASE ORAL at 09:56

## 2022-01-01 RX ADMIN — DIVALPROEX SODIUM 250 MG: 125 CAPSULE ORAL at 21:55

## 2022-01-01 RX ADMIN — DIVALPROEX SODIUM 250 MG: 125 CAPSULE ORAL at 08:30

## 2022-01-01 RX ADMIN — METOPROLOL SUCCINATE 100 MG: 50 TABLET, EXTENDED RELEASE ORAL at 09:05

## 2022-01-01 RX ADMIN — TAMSULOSIN HYDROCHLORIDE 0.4 MG: 0.4 CAPSULE ORAL at 08:53

## 2022-01-01 RX ADMIN — APIXABAN 5 MG: 5 TABLET, FILM COATED ORAL at 08:30

## 2022-01-01 RX ADMIN — TAMSULOSIN HYDROCHLORIDE 0.4 MG: 0.4 CAPSULE ORAL at 08:50

## 2022-01-01 RX ADMIN — DILTIAZEM HYDROCHLORIDE 10 MG/HR: 5 INJECTION INTRAVENOUS at 10:41

## 2022-01-01 RX ADMIN — ATORVASTATIN CALCIUM 20 MG: 10 TABLET, FILM COATED ORAL at 14:41

## 2022-01-01 RX ADMIN — TAMSULOSIN HYDROCHLORIDE 0.4 MG: 0.4 CAPSULE ORAL at 09:05

## 2022-01-01 RX ADMIN — CHOLECALCIFEROL TAB 10 MCG (400 UNIT) 400 UNITS: 10 TAB at 09:02

## 2022-01-01 RX ADMIN — METOPROLOL TARTRATE 25 MG: 25 TABLET, FILM COATED ORAL at 09:50

## 2022-01-01 RX ADMIN — DIVALPROEX SODIUM 250 MG: 125 CAPSULE ORAL at 21:05

## 2022-01-01 RX ADMIN — METOPROLOL TARTRATE 25 MG: 25 TABLET, FILM COATED ORAL at 21:04

## 2022-01-01 RX ADMIN — ATORVASTATIN CALCIUM 40 MG: 40 TABLET, FILM COATED ORAL at 09:56

## 2022-01-01 RX ADMIN — ATORVASTATIN CALCIUM 40 MG: 40 TABLET, FILM COATED ORAL at 09:50

## 2022-01-01 RX ADMIN — CHOLECALCIFEROL TAB 10 MCG (400 UNIT) 400 UNITS: 10 TAB at 08:56

## 2022-01-01 RX ADMIN — APIXABAN 5 MG: 5 TABLET, FILM COATED ORAL at 09:02

## 2022-01-01 RX ADMIN — SODIUM CHLORIDE, PRESERVATIVE FREE 10 ML: 5 INJECTION INTRAVENOUS at 08:30

## 2022-01-01 RX ADMIN — LISINOPRIL 20 MG: 20 TABLET ORAL at 09:50

## 2022-01-01 RX ADMIN — ASPIRIN 81 MG: 81 TABLET, COATED ORAL at 08:50

## 2022-01-01 RX ADMIN — DONEPEZIL HYDROCHLORIDE 10 MG: 5 TABLET, FILM COATED ORAL at 20:11

## 2022-01-01 RX ADMIN — SODIUM CHLORIDE: 9 INJECTION, SOLUTION INTRAVENOUS at 19:05

## 2022-01-01 RX ADMIN — TAMSULOSIN HYDROCHLORIDE 0.4 MG: 0.4 CAPSULE ORAL at 08:30

## 2022-01-01 RX ADMIN — CHOLECALCIFEROL TAB 10 MCG (400 UNIT) 400 UNITS: 10 TAB at 08:30

## 2022-01-01 RX ADMIN — DIVALPROEX SODIUM 250 MG: 125 CAPSULE ORAL at 08:20

## 2022-01-01 RX ADMIN — METOPROLOL TARTRATE 25 MG: 25 TABLET, FILM COATED ORAL at 08:17

## 2022-01-01 RX ADMIN — METOPROLOL TARTRATE 5 MG: 5 INJECTION INTRAVENOUS at 00:02

## 2022-01-01 RX ADMIN — SODIUM CHLORIDE, PRESERVATIVE FREE 10 ML: 5 INJECTION INTRAVENOUS at 08:51

## 2022-01-01 RX ADMIN — DONEPEZIL HYDROCHLORIDE 10 MG: 5 TABLET, FILM COATED ORAL at 21:04

## 2022-01-01 RX ADMIN — PANTOPRAZOLE SODIUM 40 MG: 40 TABLET, DELAYED RELEASE ORAL at 08:53

## 2022-01-01 RX ADMIN — METOPROLOL TARTRATE 25 MG: 25 TABLET, FILM COATED ORAL at 20:43

## 2022-01-01 RX ADMIN — METOPROLOL TARTRATE 25 MG: 25 TABLET, FILM COATED ORAL at 08:54

## 2022-01-01 RX ADMIN — DIVALPROEX SODIUM 250 MG: 125 CAPSULE ORAL at 08:54

## 2022-01-01 RX ADMIN — DIVALPROEX SODIUM 250 MG: 125 CAPSULE ORAL at 20:43

## 2022-01-01 RX ADMIN — TAMSULOSIN HYDROCHLORIDE 0.4 MG: 0.4 CAPSULE ORAL at 09:56

## 2022-01-01 RX ADMIN — TAMSULOSIN HYDROCHLORIDE 0.4 MG: 0.4 CAPSULE ORAL at 08:56

## 2022-01-01 RX ADMIN — LISINOPRIL 20 MG: 20 TABLET ORAL at 08:50

## 2022-01-01 RX ADMIN — ASPIRIN 81 MG: 81 TABLET, COATED ORAL at 09:03

## 2022-01-01 RX ADMIN — METOPROLOL TARTRATE 25 MG: 25 TABLET, FILM COATED ORAL at 21:05

## 2022-01-01 RX ADMIN — SODIUM CHLORIDE, PRESERVATIVE FREE 10 ML: 5 INJECTION INTRAVENOUS at 21:56

## 2022-01-01 RX ADMIN — SODIUM CHLORIDE, PRESERVATIVE FREE 10 ML: 5 INJECTION INTRAVENOUS at 09:03

## 2022-01-01 RX ADMIN — DONEPEZIL HYDROCHLORIDE 10 MG: 5 TABLET, FILM COATED ORAL at 21:32

## 2022-01-01 RX ADMIN — APIXABAN 2.5 MG: 5 TABLET, FILM COATED ORAL at 20:43

## 2022-01-01 RX ADMIN — SODIUM CHLORIDE, PRESERVATIVE FREE 10 ML: 5 INJECTION INTRAVENOUS at 08:57

## 2022-01-01 RX ADMIN — APIXABAN 5 MG: 5 TABLET, FILM COATED ORAL at 21:33

## 2022-01-01 RX ADMIN — DIVALPROEX SODIUM 250 MG: 125 CAPSULE ORAL at 23:08

## 2022-01-01 RX ADMIN — Medication 10 ML: at 20:58

## 2022-01-01 RX ADMIN — APIXABAN 5 MG: 5 TABLET, FILM COATED ORAL at 20:11

## 2022-01-01 RX ADMIN — CEFTRIAXONE SODIUM 1000 MG: 1 INJECTION, POWDER, FOR SOLUTION INTRAMUSCULAR; INTRAVENOUS at 05:56

## 2022-01-01 RX ADMIN — DONEPEZIL HYDROCHLORIDE 10 MG: 5 TABLET, FILM COATED ORAL at 20:43

## 2022-01-01 RX ADMIN — APIXABAN 5 MG: 5 TABLET, FILM COATED ORAL at 21:55

## 2022-01-01 RX ADMIN — AMLODIPINE BESYLATE 5 MG: 5 TABLET ORAL at 18:29

## 2022-01-01 RX ADMIN — SODIUM CHLORIDE 1000 ML: 9 INJECTION, SOLUTION INTRAVENOUS at 16:03

## 2022-01-01 RX ADMIN — METOPROLOL TARTRATE 25 MG: 25 TABLET, FILM COATED ORAL at 20:55

## 2022-01-01 RX ADMIN — METOPROLOL TARTRATE 25 MG: 25 TABLET, FILM COATED ORAL at 21:55

## 2022-01-01 RX ADMIN — APIXABAN 5 MG: 5 TABLET, FILM COATED ORAL at 09:56

## 2022-01-01 RX ADMIN — PANTOPRAZOLE SODIUM 40 MG: 40 TABLET, DELAYED RELEASE ORAL at 11:22

## 2022-01-01 RX ADMIN — Medication 10 ML: at 21:06

## 2022-01-01 RX ADMIN — SODIUM CHLORIDE, PRESERVATIVE FREE 10 ML: 5 INJECTION INTRAVENOUS at 20:14

## 2022-01-01 RX ADMIN — METOPROLOL TARTRATE 25 MG: 25 TABLET, FILM COATED ORAL at 08:50

## 2022-01-01 RX ADMIN — APIXABAN 2.5 MG: 5 TABLET, FILM COATED ORAL at 09:50

## 2022-01-01 RX ADMIN — PANTOPRAZOLE SODIUM 40 MG: 40 TABLET, DELAYED RELEASE ORAL at 09:50

## 2022-01-01 RX ADMIN — DONEPEZIL HYDROCHLORIDE 10 MG: 5 TABLET, FILM COATED ORAL at 21:05

## 2022-01-01 RX ADMIN — METOPROLOL TARTRATE 25 MG: 25 TABLET, FILM COATED ORAL at 09:56

## 2022-01-01 RX ADMIN — DIVALPROEX SODIUM 250 MG: 250 TABLET, DELAYED RELEASE ORAL at 09:05

## 2022-01-01 RX ADMIN — ATORVASTATIN CALCIUM 20 MG: 10 TABLET, FILM COATED ORAL at 09:02

## 2022-01-01 RX ADMIN — Medication 10 ML: at 20:29

## 2022-01-01 RX ADMIN — Medication 10 ML: at 08:52

## 2022-01-01 RX ADMIN — LISINOPRIL 20 MG: 20 TABLET ORAL at 09:03

## 2022-01-01 RX ADMIN — PANTOPRAZOLE SODIUM 40 MG: 40 TABLET, DELAYED RELEASE ORAL at 09:55

## 2022-01-01 RX ADMIN — APIXABAN 2.5 MG: 5 TABLET, FILM COATED ORAL at 21:05

## 2022-01-01 RX ADMIN — SODIUM CHLORIDE 1000 ML: 9 INJECTION, SOLUTION INTRAVENOUS at 04:32

## 2022-01-01 RX ADMIN — ATORVASTATIN CALCIUM 20 MG: 10 TABLET, FILM COATED ORAL at 08:50

## 2022-01-01 RX ADMIN — TAMSULOSIN HYDROCHLORIDE 0.4 MG: 0.4 CAPSULE ORAL at 14:41

## 2022-01-01 RX ADMIN — CEFTRIAXONE SODIUM 1000 MG: 1 INJECTION, POWDER, FOR SOLUTION INTRAMUSCULAR; INTRAVENOUS at 05:58

## 2022-01-01 RX ADMIN — DONEPEZIL HYDROCHLORIDE 10 MG: 5 TABLET, FILM COATED ORAL at 21:56

## 2022-01-01 RX ADMIN — ACETAMINOPHEN 650 MG: 325 TABLET ORAL at 20:58

## 2022-01-01 RX ADMIN — ASPIRIN 81 MG: 81 TABLET, COATED ORAL at 08:30

## 2022-01-01 RX ADMIN — LISINOPRIL 20 MG: 20 TABLET ORAL at 08:30

## 2022-01-01 RX ADMIN — DONEPEZIL HYDROCHLORIDE 10 MG: 5 TABLET, FILM COATED ORAL at 20:55

## 2022-01-01 RX ADMIN — ATORVASTATIN CALCIUM 20 MG: 10 TABLET, FILM COATED ORAL at 08:56

## 2022-01-01 RX ADMIN — PANTOPRAZOLE SODIUM 40 MG: 40 TABLET, DELAYED RELEASE ORAL at 06:36

## 2022-01-01 RX ADMIN — METOPROLOL TARTRATE 5 MG: 5 INJECTION INTRAVENOUS at 00:32

## 2022-01-01 RX ADMIN — DILTIAZEM HYDROCHLORIDE 5 MG/HR: 5 INJECTION INTRAVENOUS at 10:49

## 2022-01-01 RX ADMIN — DILTIAZEM HYDROCHLORIDE 5 MG/HR: 5 INJECTION INTRAVENOUS at 10:04

## 2022-01-01 RX ADMIN — APIXABAN 2.5 MG: 5 TABLET, FILM COATED ORAL at 08:17

## 2022-01-01 RX ADMIN — METOPROLOL TARTRATE 25 MG: 25 TABLET, FILM COATED ORAL at 14:41

## 2022-01-01 RX ADMIN — SODIUM CHLORIDE 1000 ML: 9 INJECTION, SOLUTION INTRAVENOUS at 10:05

## 2022-01-01 RX ADMIN — ATORVASTATIN CALCIUM 20 MG: 10 TABLET, FILM COATED ORAL at 08:30

## 2022-01-01 RX ADMIN — APIXABAN 2.5 MG: 5 TABLET, FILM COATED ORAL at 20:56

## 2022-01-01 RX ADMIN — DONEPEZIL HYDROCHLORIDE 10 MG: 5 TABLET, FILM COATED ORAL at 20:28

## 2022-01-01 RX ADMIN — METOPROLOL TARTRATE 25 MG: 25 TABLET, FILM COATED ORAL at 09:55

## 2022-01-01 RX ADMIN — DIVALPROEX SODIUM 250 MG: 125 CAPSULE ORAL at 09:54

## 2022-01-01 RX ADMIN — CHOLECALCIFEROL TAB 10 MCG (400 UNIT) 400 UNITS: 10 TAB at 08:50

## 2022-01-01 RX ADMIN — DIVALPROEX SODIUM 250 MG: 250 TABLET, DELAYED RELEASE ORAL at 20:28

## 2022-01-01 RX ADMIN — SODIUM CHLORIDE: 4.5 INJECTION, SOLUTION INTRAVENOUS at 15:23

## 2022-01-01 RX ADMIN — PANTOPRAZOLE SODIUM 40 MG: 40 TABLET, DELAYED RELEASE ORAL at 09:05

## 2022-01-01 RX ADMIN — LISINOPRIL 20 MG: 20 TABLET ORAL at 08:56

## 2022-01-01 RX ADMIN — APIXABAN 5 MG: 5 TABLET, FILM COATED ORAL at 21:04

## 2022-01-01 RX ADMIN — ATORVASTATIN CALCIUM 20 MG: 10 TABLET, FILM COATED ORAL at 08:17

## 2022-01-01 RX ADMIN — ASPIRIN 81 MG 81 MG: 81 TABLET ORAL at 09:06

## 2022-01-01 RX ADMIN — TAMSULOSIN HYDROCHLORIDE 0.4 MG: 0.4 CAPSULE ORAL at 09:55

## 2022-01-01 RX ADMIN — DIVALPROEX SODIUM 250 MG: 250 TABLET, DELAYED RELEASE ORAL at 21:33

## 2022-01-01 RX ADMIN — DIVALPROEX SODIUM 250 MG: 125 CAPSULE ORAL at 09:02

## 2022-01-01 RX ADMIN — APIXABAN 2.5 MG: 5 TABLET, FILM COATED ORAL at 09:55

## 2022-01-01 RX ADMIN — APIXABAN 2.5 MG: 5 TABLET, FILM COATED ORAL at 08:54

## 2022-01-01 RX ADMIN — SODIUM CHLORIDE, PRESERVATIVE FREE 10 ML: 5 INJECTION INTRAVENOUS at 21:05

## 2022-01-01 RX ADMIN — DIVALPROEX SODIUM 250 MG: 125 CAPSULE ORAL at 19:08

## 2022-01-01 RX ADMIN — LISINOPRIL 20 MG: 20 TABLET ORAL at 08:53

## 2022-01-01 RX ADMIN — DIVALPROEX SODIUM 250 MG: 125 CAPSULE ORAL at 08:56

## 2022-01-01 RX ADMIN — Medication 10 ML: at 09:05

## 2022-01-01 RX ADMIN — ATORVASTATIN CALCIUM 40 MG: 40 TABLET, FILM COATED ORAL at 18:27

## 2022-01-01 RX ADMIN — TAMSULOSIN HYDROCHLORIDE 0.4 MG: 0.4 CAPSULE ORAL at 08:17

## 2022-01-01 RX ADMIN — METOPROLOL SUCCINATE 50 MG: 50 TABLET, EXTENDED RELEASE ORAL at 14:27

## 2022-01-01 RX ADMIN — DIVALPROEX SODIUM 250 MG: 125 CAPSULE ORAL at 20:11

## 2022-01-01 RX ADMIN — METOPROLOL TARTRATE 25 MG: 25 TABLET, FILM COATED ORAL at 08:56

## 2022-01-01 RX ADMIN — TAMSULOSIN HYDROCHLORIDE 0.4 MG: 0.4 CAPSULE ORAL at 18:27

## 2022-01-01 RX ADMIN — APIXABAN 2.5 MG: 5 TABLET, FILM COATED ORAL at 20:17

## 2022-01-01 RX ADMIN — DOXAZOSIN 4 MG: 2 TABLET ORAL at 22:11

## 2022-01-01 RX ADMIN — PANTOPRAZOLE SODIUM 40 MG: 40 TABLET, DELAYED RELEASE ORAL at 09:02

## 2022-01-01 RX ADMIN — SODIUM CHLORIDE: 4.5 INJECTION, SOLUTION INTRAVENOUS at 08:44

## 2022-01-01 RX ADMIN — ATORVASTATIN CALCIUM 40 MG: 40 TABLET, FILM COATED ORAL at 08:54

## 2022-01-01 RX ADMIN — CEFTRIAXONE SODIUM 1000 MG: 1 INJECTION, POWDER, FOR SOLUTION INTRAMUSCULAR; INTRAVENOUS at 04:53

## 2022-01-01 RX ADMIN — DIVALPROEX SODIUM 250 MG: 125 CAPSULE ORAL at 08:50

## 2022-01-01 RX ADMIN — PANTOPRAZOLE SODIUM 40 MG: 40 TABLET, DELAYED RELEASE ORAL at 08:17

## 2022-01-01 RX ADMIN — METOPROLOL TARTRATE 25 MG: 25 TABLET, FILM COATED ORAL at 08:30

## 2022-01-01 ASSESSMENT — PAIN - FUNCTIONAL ASSESSMENT: PAIN_FUNCTIONAL_ASSESSMENT: PREVENTS OR INTERFERES SOME ACTIVE ACTIVITIES AND ADLS

## 2022-01-01 ASSESSMENT — PAIN SCALES - WONG BAKER
WONGBAKER_NUMERICALRESPONSE: 0
WONGBAKER_NUMERICALRESPONSE: 4
WONGBAKER_NUMERICALRESPONSE: 0

## 2022-01-01 ASSESSMENT — PAIN SCALES - GENERAL
PAINLEVEL_OUTOF10: 0
PAINLEVEL_OUTOF10: 4
PAINLEVEL_OUTOF10: 5
PAINLEVEL_OUTOF10: 0

## 2022-01-01 ASSESSMENT — PAIN DESCRIPTION - PAIN TYPE
TYPE: ACUTE PAIN
TYPE: CHRONIC PAIN
TYPE: ACUTE PAIN
TYPE: ACUTE PAIN

## 2022-01-01 ASSESSMENT — PAIN DESCRIPTION - LOCATION
LOCATION: HIP
LOCATION: GENERALIZED
LOCATION: BACK
LOCATION: HIP

## 2022-01-01 ASSESSMENT — PAIN DESCRIPTION - ORIENTATION
ORIENTATION: RIGHT
ORIENTATION: RIGHT

## 2022-01-01 NOTE — PROGRESS NOTES
Hospitalist Progress Note      PCP: Key Thomas MD    Date of Admission: 12/25/2021    Chief Complaint: Aphasia    Subjective: no new c/o. Medications:  Reviewed    Infusion Medications    sodium chloride       Scheduled Medications    divalproex  250 mg Oral 2 times per day    apixaban  5 mg Oral BID    lisinopril  20 mg Oral Daily    donepezil  10 mg Oral Nightly    pantoprazole  40 mg Oral QAM AC    atorvastatin  20 mg Oral Daily    tamsulosin  0.4 mg Oral Daily    vitamin D3  400 Units Oral Daily    sodium chloride flush  5-40 mL IntraVENous 2 times per day    aspirin  81 mg Oral Daily    metoprolol tartrate  25 mg Oral BID     PRN Meds: hydrALAZINE, sodium chloride flush, sodium chloride, ondansetron **OR** ondansetron, polyethylene glycol, acetaminophen **OR** acetaminophen    No intake or output data in the 24 hours ending 01/01/22 0720    Physical Exam Performed:    /83   Pulse 73   Temp 97.7 °F (36.5 °C) (Oral)   Resp 18   Ht 5' 8\" (1.727 m)   Wt 170 lb (77.1 kg)   SpO2 96%   BMI 25.85 kg/m²     General appearance: No apparent distress, appears stated age and cooperative. HEENT: Pupils equal, round, and reactive to light. Conjunctivae/corneas clear. Neck: Supple, with full range of motion. No jugular venous distention. Trachea midline. Respiratory:  Normal respiratory effort. Clear to auscultation, bilaterally without Rales/Wheezes/Rhonchi. Cardiovascular: Regular rate and rhythm with normal S1/S2 without murmurs, rubs or gallops. Abdomen: Soft, non-tender, non-distended with normal bowel sounds. Musculoskeletal: No clubbing, cyanosis or edema bilaterally. Full range of motion without deformity. Skin: Skin color, texture, turgor normal.  No rashes or lesions. Neurologic:  Neurovascularly intact without any focal sensory/motor deficits.  Cranial nerves: II-XII intact, grossly non-focal.  Psychiatric: Alert and oriented, thought content appropriate, normal insight  Capillary Refill: Brisk,< 3 seconds   Peripheral Pulses: +2 palpable, equal bilaterally       Labs:   No results for input(s): WBC, HGB, HCT, PLT in the last 72 hours. No results for input(s): NA, K, CL, CO2, BUN, CREATININE, CALCIUM, PHOS in the last 72 hours. Invalid input(s): MAGNES  No results for input(s): AST, ALT, BILIDIR, BILITOT, ALKPHOS in the last 72 hours. No results for input(s): INR in the last 72 hours. No results for input(s): Jones Smith in the last 72 hours. Urinalysis:      Lab Results   Component Value Date    NITRU Negative 12/25/2021    WBCUA see below 03/27/2020    BACTERIA 3+ 03/27/2020    RBCUA >100 03/27/2020    BLOODU Negative 12/25/2021    SPECGRAV 1.020 12/25/2021    GLUCOSEU Negative 12/25/2021       Consults:    IP CONSULT TO PHARMACY  PHARMACY TO CHANGE BASE FLUIDS  IP CONSULT TO HOSPITALIST  IP CONSULT TO NEUROLOGY  IP CONSULT TO CARDIOLOGY      Assessment/Plan:    Active Hospital Problems    Diagnosis     PAF (paroxysmal atrial fibrillation) [facility] [I48.0]     Aphasia [R47.01]     Arterial ischemic stroke, ICA, left, acute ([facility] [I63.232]     COVID-19 [U07.1]     HTN (hypertension), benign [I10]        COVID 19 Positive - NAAT positive on 25 Dec. W/ out overt pulmonary sxs or PNA/Acute Respiratory Failure, on no COVID specific tx. Afebrile >72 hrs. No need for further isolation post-discharge per current CDC guidelines. AMS/aphasia   - Likely acute metabolic encephalopathy from COVID-19   - CT Brain: no acute abnormality. Extensive  changes with parenchymal volume loss and chronic small  vessel ischemic changes. - CTA with atherosclerosis of the proximal cervical ICAs bilaterally with less than 50% stenosis by NASCET criteria. - MRI brain negative for acute CVA - ruled out   - Neurology consulted and appreciated - signed off and agree.     HTN - w/out known CAD and no evidence of active signs/sxs of ischemia/failure.  Currently controlled on home meds w/ vitals reviewed and documented as above. Afib - chronic paroxysmal of unspecified and clinically unable to determine etiology. Normally rate controlled on BBlocker - continued. Anticoagulated at baseline on home Eliquis - continued. Monitored on tele. Had episodes of RVR on 27 Dec - Cardiology consulted and appreciated. Considering future ALBERT occlusion for CVA PPX    Dementia - w/out behavioral disturbance. Controlled on home medication regimen - continued. Continue supportive care and redirection as needed. BPH - w/out acute obstructive Uropathy, controlled on home medications as ordered - continued.         DVT Prophylaxis: Eliquis    No results for input(s): PLT in the last 72 hours. Diet: ADULT DIET; Regular  ADULT ORAL NUTRITION SUPPLEMENT; Breakfast, Lunch, Dinner; Other Oral Supplement; Mack Links ONS  Code Status: DNR-CC      PT/OT Eval Status: seen w/ recs for SNF.     Dispo - Medically stable for discharge since 30 Dec pending placement decision - Plan for Belen Pickens Monday 3 Rosita Menjivar MD

## 2022-01-02 NOTE — PLAN OF CARE
Problem: Skin Integrity:  Goal: Will show no infection signs and symptoms  Description: Will show no infection signs and symptoms  Outcome: Ongoing  Note: Pt is at risk for skin breakdown. Pt will have skin assessments every shift, Q2 turns, heels elevated off of the bed, and friction and shear prevented when possible. Will continue to monitor for signs of skin breakdown and enforce prevention measures.

## 2022-01-02 NOTE — PROGRESS NOTES
Hospitalist Progress Note      PCP: Tianna Keen MD    Date of Admission: 12/25/2021    Chief Complaint: Aphasia    Subjective: no new c/o. Medications:  Reviewed    Infusion Medications    sodium chloride       Scheduled Medications    divalproex  250 mg Oral 2 times per day    apixaban  5 mg Oral BID    lisinopril  20 mg Oral Daily    donepezil  10 mg Oral Nightly    pantoprazole  40 mg Oral QAM AC    atorvastatin  20 mg Oral Daily    tamsulosin  0.4 mg Oral Daily    vitamin D3  400 Units Oral Daily    sodium chloride flush  5-40 mL IntraVENous 2 times per day    aspirin  81 mg Oral Daily    metoprolol tartrate  25 mg Oral BID     PRN Meds: hydrALAZINE, sodium chloride flush, sodium chloride, ondansetron **OR** ondansetron, polyethylene glycol, acetaminophen **OR** acetaminophen      Intake/Output Summary (Last 24 hours) at 1/2/2022 0128  Last data filed at 1/1/2022 0900  Gross per 24 hour   Intake 120 ml   Output    Net 120 ml       Physical Exam Performed:    BP (!) 152/89   Pulse 78   Temp 97.6 °F (36.4 °C) (Oral)   Resp 18   Ht 5' 8\" (1.727 m)   Wt 170 lb (77.1 kg)   SpO2 92%   BMI 25.85 kg/m²     General appearance: No apparent distress, appears stated age and cooperative. HEENT: Pupils equal, round, and reactive to light. Conjunctivae/corneas clear. Neck: Supple, with full range of motion. No jugular venous distention. Trachea midline. Respiratory:  Normal respiratory effort. Clear to auscultation, bilaterally without Rales/Wheezes/Rhonchi. Cardiovascular: Regular rate and rhythm with normal S1/S2 without murmurs, rubs or gallops. Abdomen: Soft, non-tender, non-distended with normal bowel sounds. Musculoskeletal: No clubbing, cyanosis or edema bilaterally. Full range of motion without deformity. Skin: Skin color, texture, turgor normal.  No rashes or lesions. Neurologic:  Neurovascularly intact without any focal sensory/motor deficits.  Cranial nerves: II-XII intact, grossly non-focal.  Psychiatric: Alert and oriented, thought content appropriate, normal insight  Capillary Refill: Brisk,< 3 seconds   Peripheral Pulses: +2 palpable, equal bilaterally       Labs:   No results for input(s): WBC, HGB, HCT, PLT in the last 72 hours. No results for input(s): NA, K, CL, CO2, BUN, CREATININE, CALCIUM, PHOS in the last 72 hours. Invalid input(s): MAGNES  No results for input(s): AST, ALT, BILIDIR, BILITOT, ALKPHOS in the last 72 hours. No results for input(s): INR in the last 72 hours. No results for input(s): Jones Crainer in the last 72 hours. Urinalysis:      Lab Results   Component Value Date    NITRU Negative 12/25/2021    WBCUA see below 03/27/2020    BACTERIA 3+ 03/27/2020    RBCUA >100 03/27/2020    BLOODU Negative 12/25/2021    SPECGRAV 1.020 12/25/2021    GLUCOSEU Negative 12/25/2021       Consults:    IP CONSULT TO PHARMACY  PHARMACY TO CHANGE BASE FLUIDS  IP CONSULT TO HOSPITALIST  IP CONSULT TO NEUROLOGY  IP CONSULT TO CARDIOLOGY      Assessment/Plan:    Active Hospital Problems    Diagnosis     PAF (paroxysmal atrial fibrillation) ([facility] [facility] [I48.0]     Aphasia [R47.01]     Arterial ischemic stroke, ICA, left, acute ([facility] [facility] [I63.232]     COVID-19 [U07.1]     HTN (hypertension), benign [I10]        COVID 19 Positive - NAAT positive on 25 Dec. W/ out overt pulmonary sxs or PNA/Acute Respiratory Failure, on no COVID specific tx. Afebrile >72 hrs. No need for further isolation post-discharge per current CDC guidelines. AMS/aphasia   - Likely acute metabolic encephalopathy from COVID-19   - CT Brain: no acute abnormality. Extensive  changes with parenchymal volume loss and chronic small  vessel ischemic changes. - CTA with atherosclerosis of the proximal cervical ICAs bilaterally with less than 50% stenosis by NASCET criteria.   - MRI brain negative for acute CVA - ruled out   - Neurology consulted and appreciated - signed off and agree.     HTN - w/out known CAD and no evidence of active signs/sxs of ischemia/failure. Currently controlled on home meds w/ vitals reviewed and documented as above. Afib - chronic paroxysmal of unspecified and clinically unable to determine etiology. Normally rate controlled on BBlocker - continued. Anticoagulated at baseline on home Eliquis - continued. Monitored on tele. Had episodes of RVR on 27 Dec - Cardiology consulted and appreciated. Considering future ALBERT occlusion for CVA PPX    Dementia - w/out behavioral disturbance. Controlled on home medication regimen - continued. Continue supportive care and redirection as needed. BPH - w/out acute obstructive Uropathy, controlled on home medications as ordered - continued.         DVT Prophylaxis: Eliquis    No results for input(s): PLT in the last 72 hours. Diet: ADULT DIET; Regular  ADULT ORAL NUTRITION SUPPLEMENT; Breakfast, Lunch, Dinner; Other Oral Supplement; Edith Odessa ONS  Code Status: DNR-CC      PT/OT Eval Status: seen w/ recs for SNF.     Dispo - Medically stable for discharge since 30 Dec pending placement decision - Plan for Luigi Covarrubias Monday 3 Yoly Markham MD

## 2022-01-03 NOTE — CARE COORDINATION
SARAHI spoke to [facility] [phone].  with Becka HOPKINS (R). They can accept pt meet pt's needs. The [facility] did do a on sight assessment here at [facility] pt. Per Byron Charlton pt will need Hospital bed, oxygen (pt qualified today per [facility] RN, walk test noted.) updated Dr. Dariusz Frank to order Oxygen per Татьяна Hyde with [facility]. Pt will also need DME>BSC, WC and Rollator. All DME orders placed on this day and updated Percy with [employer]. Pt arranged with [facility] for 1/4/22 at 1700. Hopeful that [facility] can deliver DME in a timely manor to The [address] [place] [address] prior to dc from here. CM following-Marisa Holguin RN     The [address] [place] [address]  KKOHOK-[phone]  MSP-[phone]. [facility] [place], [facility]. agency is [facility] and they can arrange, will need to fax sandra/avs [facility], [facility]. order at dc. Spoke to wife and pt has RW, BSC and shower chair at home and will take to [facility]. Pt also has a WC at home and can take to [facility] if needs to.  SARAHI following-Marisa Holguin RN

## 2022-01-03 NOTE — PROGRESS NOTES
Physical Therapy  Facility/Department: [facility] C5 - MED SURG/ORTHO  Daily Treatment Note  NAME: Flaca White  : 1940  MRN: [id number]    Date of Service: 1/3/2022    Discharge Recommendations:  Subacute/Skilled Nursing Facility   PT Equipment Recommendations  Other: defer    Assessment   Body structures, Functions, Activity limitations: Decreased functional mobility ; Decreased ADL status; Decreased strength;Decreased balance;Decreased endurance;Decreased cognition;Decreased posture;Decreased high-level IADLs;Decreased coordination  Assessment: Pt seen for transfer with RW and mod/max A Of 2 bed to chair then needed stedy back to bed with soft BP. Pt is recommended for con't skilled PT and SNF for D/C. Treatment Diagnosis: Decreased functional mobility  Specific instructions for Next Treatment: Progress exercises/mobility  Prognosis: Fair  Decision Making: High Complexity  PT Education: Goals;PT Role;Plan of Care;Orientation;General Safety; Energy Conservation;Transfer Training; Adaptive Device Training;Gait Training;Disease Specific Education  Patient Education: Pt educated on importance of OOB mobility for current deficits, pt does not demo learning needs reinforcement  Barriers to Learning: cog, aphasia  REQUIRES PT FOLLOW UP: Yes  Activity Tolerance  Activity Tolerance: Patient limited by endurance; Patient limited by fatigue;Patient limited by cognitive status  Activity Tolerance:   Vitals:    22 1038   BP: 132/77   Pulse: 86   Resp:    Temp:    SpO2: 94%         Patient Diagnosis(es): The primary encounter diagnosis was Aphasia. Diagnoses of General weakness, Syncope and collapse, Elevated brain natriuretic peptide (BNP) level, and COVID-19 were also pertinent to this visit. has a past medical history of Hyperlipemia, Hypertension, Prostate enlargement, and Unspecified cerebral artery occlusion with cerebral infarction. has a past surgical history that includes Carotid endarterectomy;  Foot surgery; Skin cancer excision; Eye surgery; Facial reconstruction surgery; Tonsillectomy; Colonoscopy; Colonoscopy (2014); and Mohs surgery. Restrictions  Restrictions/Precautions  Restrictions/Precautions: Up as Tolerated,Isolation,Contact Precautions  Position Activity Restriction  Other position/activity restrictions: Covid +, Droplet plus, 2L O2     Subjective   General  Chart Reviewed: Yes  Additional Pertinent Hx: per H&P\"81 y.o. male with a PMH of HLD, HTN, PAF and CVA who presented to [facility] d/t confusion and fall this morning. Family reports generalized weakness and some speech difficulty. No stoke alert was called. Pt had covid exposure earlier this week. Pt is only oriented to self and place. Denies HA, chest pain ,sob, abdominal pain, nausea, vomiting or diarrhea. On room air. \"  Response To Previous Treatment: Patient with no complaints from previous session. Family / Caregiver Present: No  Referring Practitioner: Wander Terrell MD  Subjective  Subjective: agreeable to PT treat, expressive aphasia noted with responses to questions  General Comment  Comments: RN cleared pt for PT treat  Pain Screening  Patient Currently in Pain: Denies  Vital Signs  Patient Currently in Pain: Denies       Orientation  Orientation  Overall Orientation Status: Impaired  Orientation Level: Oriented to person;Disoriented to place; Disoriented to time;Disoriented to situation     Objective   Bed mobility  Supine to Sit: Dependent/Total;2 Person assistance; Moderate assistance;Maximum assistance  Sit to Supine: Dependent/Total;2 Person assistance;Minimal assistance; Moderate assistance  Transfers  Sit to Stand: Dependent/Total;2 Person Assistance; Moderate Assistance  Stand to sit: Dependent/Total;2 Person Assistance; Moderate Assistance  Bed to Chair: Dependent/Total;2 Person Assistance; Moderate assistance;Maximum assistance (using walker to chair then stedy back to bed with BP soft 106/74)  Ambulation  Ambulation?: Yes  Ambulation 1  Surface: level tile  Device: Rolling Walker  Assistance: Dependent/Total;2 Person assistance; Moderate assistance;Maximum assistance  Gait Deviations: Slow Kerri;Decreased step length;Decreased step height;Shuffles  Distance: 3-4 steps bed ot chair, pt reaching to chair rail and removing hands from walker, unable to clear bottom from chair arm and breif getting stuck on arm making transfer more difficult        Exercises  Bridging: With kicks  Ankle Pumps: x10 bilat  Comments: stood at stedy X 3 minutes for brief change and joseph care        AM-PAC Score     AM-PAC Inpatient Mobility without Stair Climbing Raw Score : 9 (01/03/22 1141)  AM-PAC Inpatient without Stair Climbing T-Scale Score : 32.44 (01/03/22 1141)  Mobility Inpatient CMS 0-100% Score: 76.07 (01/03/22 1141)  Mobility Inpatient without Stair CMS G-Code Modifier : CL (01/03/22 1141)       Goals  Short term goals  Time Frame for Short term goals: 1 week, 1/02/2022  Short term goal 1: Pt will tolerate bed mobility w/ SBA; 1/3  mod/max A of 2  Short term goal 2: Pt will perform sit to stand w/ MinAx1 and LRAD; 1/3 mod A of 2 at stedy, mod/max Aof 2 at walker  Short term goal 3: Pt will ambulate 25 ft w/ LRAD and ModAx1; 1/3 3 -4 steps with RW mod/maxA of 2  Short term goal 4: Pt will partake in 10-15 reps of BLE exercises to improve function towards goals by 12/29.; 1/3 KIRA this date  Patient Goals   Patient goals : \"to go home\"    Plan    Plan  Times per week: 2-3  Times per day: Daily  Specific instructions for Next Treatment: Progress exercises/mobility  Current Treatment Recommendations: Strengthening,ROM,Balance Training,Functional Mobility Training,Cognitive/Perceptual Training,IADL Training,ADL/Self-care Training,Transfer Training,Endurance Training,Gait Training,Neuromuscular Re-education,Pain [employer] Exercise Program,Safety Education & Training,Patient/Caregiver Education & Training  Safety Devices  Type of devices:  All fall risk precautions in place,Call light within reach,Bed alarm in place,Gait belt,Left in bed,Telesitter in use,Nurse notified     Therapy Time   Individual Concurrent Group Co-treatment   Time In 1021         Time Out 1103         Minutes [facility] [place], [place] [address]

## 2022-01-03 NOTE — PROGRESS NOTES
Hospitalist Progress Note      PCP: Izabela Glaser MD    Date of Admission: 12/25/2021    Chief Complaint: Aphasia    Subjective: no new c/o. Medications:  Reviewed    Infusion Medications    sodium chloride       Scheduled Medications    divalproex  250 mg Oral 2 times per day    apixaban  5 mg Oral BID    lisinopril  20 mg Oral Daily    donepezil  10 mg Oral Nightly    pantoprazole  40 mg Oral QAM AC    atorvastatin  20 mg Oral Daily    tamsulosin  0.4 mg Oral Daily    vitamin D3  400 Units Oral Daily    sodium chloride flush  5-40 mL IntraVENous 2 times per day    aspirin  81 mg Oral Daily    metoprolol tartrate  25 mg Oral BID     PRN Meds: hydrALAZINE, sodium chloride flush, sodium chloride, ondansetron **OR** ondansetron, polyethylene glycol, acetaminophen **OR** acetaminophen    No intake or output data in the 24 hours ending 01/03/22 0901    Physical Exam Performed:    BP (!) 167/100   Pulse 87   Temp 97.8 °F (36.6 °C) (Oral)   Resp 17   Ht 5' 8\" (1.727 m)   Wt 170 lb (77.1 kg)   SpO2 92%   BMI 25.85 kg/m²     General appearance: No apparent distress, appears stated age and cooperative. HEENT: Pupils equal, round, and reactive to light. Conjunctivae/corneas clear. Neck: Supple, with full range of motion. No jugular venous distention. Trachea midline. Respiratory:  Normal respiratory effort. Clear to auscultation, bilaterally without Rales/Wheezes/Rhonchi. Cardiovascular: Regular rate and rhythm with normal S1/S2 without murmurs, rubs or gallops. Abdomen: Soft, non-tender, non-distended with normal bowel sounds. Musculoskeletal: No clubbing, cyanosis or edema bilaterally. Full range of motion without deformity. Skin: Skin color, texture, turgor normal.  No rashes or lesions. Neurologic:  Neurovascularly intact without any focal sensory/motor deficits.  Cranial nerves: II-XII intact, grossly non-focal.  Psychiatric: Alert and oriented, thought content appropriate, normal insight  Capillary Refill: Brisk,< 3 seconds   Peripheral Pulses: +2 palpable, equal bilaterally       Labs:   Recent Labs     01/03/22  0522   WBC 6.3   HGB 15.1   HCT 45.1        Recent Labs     01/03/22  0522      K 4.3   *   CO2 22   BUN 29*   CREATININE 1.0   CALCIUM 9.9     No results for input(s): AST, ALT, BILIDIR, BILITOT, ALKPHOS in the last 72 hours. No results for input(s): INR in the last 72 hours. No results for input(s): Siria Divine in the last 72 hours. Urinalysis:      Lab Results   Component Value Date    NITRU Negative 12/25/2021    WBCUA see below 03/27/2020    BACTERIA 3+ 03/27/2020    RBCUA >100 03/27/2020    BLOODU Negative 12/25/2021    SPECGRAV 1.020 12/25/2021    GLUCOSEU Negative 12/25/2021       Consults:    IP CONSULT TO PHARMACY  PHARMACY TO CHANGE BASE FLUIDS  IP CONSULT TO HOSPITALIST  IP CONSULT TO NEUROLOGY  IP CONSULT TO CARDIOLOGY      Assessment/Plan:    Active Hospital Problems    Diagnosis     PAF (paroxysmal atrial fibrillation) ([facility] [I48.0]     Aphasia [R47.01]     Arterial ischemic stroke, ICA, left, acute ([facility] [I63.232]     COVID-19 [U07.1]     HTN (hypertension), benign [I10]        COVID 19 Positive - NAAT positive on 25 Dec. W/ out overt pulmonary sxs or PNA/Acute Respiratory Failure, on no COVID specific tx. Afebrile >72 hrs. No need for further isolation post-discharge per current CDC guidelines. AMS/aphasia   - Likely acute metabolic encephalopathy from COVID-19   - CT Brain: no acute abnormality. Extensive  changes with parenchymal volume loss and chronic small  vessel ischemic changes. - CTA with atherosclerosis of the proximal cervical ICAs bilaterally with less than 50% stenosis by NASCET criteria. - MRI brain negative for acute CVA - ruled out   - Neurology consulted and appreciated - signed off and agree.     HTN - w/out known CAD and no evidence of active signs/sxs of ischemia/failure.  Currently controlled on home meds w/ vitals reviewed and documented as above. Afib - chronic paroxysmal of unspecified and clinically unable to determine etiology. Normally rate controlled on BBlocker - continued. Anticoagulated at baseline on home Eliquis - continued. Monitored on tele. Had episodes of RVR on 27 Dec - Cardiology consulted and appreciated. Considering future ALBERT occlusion for CVA PPX    Dementia - w/out behavioral disturbance. Controlled on home medication regimen - continued. Continue supportive care and redirection as needed. BPH - w/out acute obstructive Uropathy, controlled on home medications as ordered - continued.         DVT Prophylaxis: Eliquis    Recent Labs     01/03/22  0522        Diet: ADULT DIET; Regular  ADULT ORAL NUTRITION SUPPLEMENT; Breakfast, Lunch, Dinner; Other Oral Supplement; [place] ONS  Code Status: DNR-CC      PT/OT Eval Status: seen w/ recs for SNF. Dispo - Medically stable for discharge since 30 Dec pending placement decision - Plan for [facility] Monday 3 Ivan - no need for continued isolation post-discharge. Patient was evaluated today for the diagnosis of COVID PNA. I entered a DME order for home oxygen because the diagnosis and testing requires the patient to have supplemental oxygen. Condition will improve or be benefited by oxygen use. The patient is  able to perform good mobility in a home setting and therefore does require the use of a portable oxygen system. The need for this equipment was discussed with the patient and he understands and is in agreement.       Emeli Balderas MD

## 2022-01-03 NOTE — PROGRESS NOTES
Oxygen documentation:    O2 saturation at REST on ROOM AIR = 87%  If saturation is 89% or above please proceed with steps 2 and 3. .........     O2 saturation with AMBULATION of _____ feet on ROOM AIR = _____%  O2 saturation with AMBULATION on current liter flow = ______%         Signature: Lord Orville RN     Date:1/3/2021   Time: 13:03

## 2022-01-03 NOTE — PLAN OF CARE
Nutrition Problem #1: Inadequate oral intake  Intervention: Food and/or Nutrient Delivery: Continue Current Diet,Discontinue Oral Nutrition Supplement  Nutritional Goals: Consume 50% or greater of 3 meals per day and ONS during this admission

## 2022-01-03 NOTE — PROGRESS NOTES
Occupational Therapy  Facility/Department: [facility] C5 - MED SURG/ORTHO  Daily Treatment Note  NAME: Ronald Schmidt  : 1940  MRN: [id number]    Date of Service: 1/3/2022    Discharge Recommendations:  Subacute/Skilled Nursing Facility     Assessment   Performance deficits / Impairments: Decreased functional mobility ; Decreased ADL status; Decreased safe awareness;Decreased cognition;Decreased balance;Decreased endurance;Decreased high-level IADLs;Decreased fine motor control;Decreased coordination;Decreased posture;Decreased strength  Assessment: Pt continues to require mod-max A of 2 for bed mobility and functional transfers using both SW and Myrle Rocky. Pt total A for toileting and LE dressing, after being OOB in chair a few mins and pt c/o dizziness with drop in BP. Pt returned supine for safety and RN notifed. Co-tx collaboration this date with PT staff to safely progress pt toward goals. Pt will have better occupational performance outcomes within a co-treatment than 1:1 session. Pt would benefit from continued skilled OT in SNF setting at d/c. Prognosis: Fair  OT Education: OT Role;Plan of Care;Transfer Training;Precautions; ADL Adaptive Strategies;Orientation  Barriers to Learning: cognition  REQUIRES OT FOLLOW UP: Yes  Activity Tolerance  Activity Tolerance: Treatment limited secondary to decreased cognition;Treatment limited secondary to medical complications (free text)  Activity Tolerance: Vitals: HZ=699/77 EOB, after sitting in chair a few mins pt c/o dizziness 106/76, HR= 86, SPO2= 94% 3L  Safety Devices  Safety Devices in place: Yes  Type of devices: Left in bed;Bed alarm in place;Call light within reach;Nurse notified;Gait belt         Patient Diagnosis(es): The primary encounter diagnosis was Aphasia. Diagnoses of General weakness, Syncope and collapse, Elevated brain natriuretic peptide (BNP) level, and COVID-19 were also pertinent to this visit.       has a past medical history of Hyperlipemia, Hypertension, Prostate enlargement, and Unspecified cerebral artery occlusion with cerebral infarction. has a past surgical history that includes Carotid endarterectomy; Foot surgery; Skin cancer excision; Eye surgery; Facial reconstruction surgery; Tonsillectomy; Colonoscopy; Colonoscopy (2014); and Mohs surgery. Restrictions  Restrictions/Precautions  Restrictions/Precautions: Up as Tolerated,Isolation,Contact Precautions  Position Activity Restriction  Other position/activity restrictions: Covid +, Droplet plus, 3L O2     Subjective   General  Chart Reviewed: Yes  Patient assessed for rehabilitation services?: Yes  Additional Pertinent Hx: Per chart, \"The patient is a 80y.o.  years old male with history of hypertension and prior stroke who was admitted to the hospital today with speech impairment, aphasia and possible new stroke. Symptoms started hours prior to admission. Description was sudden speech difficulties and difficulties with word findings. Other associated symptoms generalized weakness. Degree was severe. Duration was persistent. No other relieving or aggravating factors. No clear triggers or other associated symptoms. He came to the ED for evaluation. Initial work-up with neuroimaging showed no acute stroke and no large vessel occlusion. He did have some bilateral vertebral stenosis. He was admitted to the hospital.  Currently he is under droplet isolation given concern for Covid exposure few days ago. Other review of system was unremarkable. \"  Response to previous treatment: Patient with no complaints from previous session  Family / Caregiver Present: No  Referring Practitioner: Cj Goss MD  Diagnosis: Syncope and collapse, aphasia, weakness, [id number], acute metabolic encephalopathy. MRI: no acute findings, diffuse cerebral atrophy. Subjective  Subjective: Pt resting in bed, agreeable to OT treatment with encouragement.     Vital Signs  Patient Currently in Pain: Denies     Orientation  Orientation  Overall Orientation Status: Impaired  Orientation Level: Oriented to person;Disoriented to place; Disoriented to situation;Disoriented to time     Objective    ADL  LE Dressing: Dependent/Total  Toileting: Dependent/Total (incontinent of stool with transfers)     Balance  Sitting Balance: Moderate assistance (varies from CGA-mod A, pt requires use of bedrails as BUE support to maintain balance for short periods)  Standing Balance: Dependent/Total (mod-max A of 2 with both SW and Lashonda ROBLES, pt with posterior lean, difficulty achieving fully upright position)  Standing Balance  Activity: bed to chair with Raisa Royal for bowel hygiene from chair and again Harjinder Mcneal for chair to bed transfer due to low BP    Bed mobility  Supine to Sit: Dependent/Total;2 Person assistance (mod-max A of 2 to pt L)  Sit to Supine: Dependent/Total;2 Person assistance (mod A of 2)     Transfers  Sit to stand: Dependent/Total;2 Person assistance (mod-max A of 2)  Stand to sit: Dependent/Total;2 Person assistance (mod-max A of 2)     Cognition  Overall Cognitive Status: Exceptions  Arousal/Alertness: Delayed responses to stimuli  Following Commands: Follows one step commands with increased time; Follows one step commands with repetition  Attention Span: Attends with cues to redirect; Difficulty attending to directions  Memory: Decreased recall of recent events;Decreased short term memory;Decreased recall of biographical Information  Safety Judgement: Decreased awareness of need for safety;Decreased awareness of need for assistance  Insights: Decreased awareness of deficits  Initiation: Requires cues for some  Sequencing: Requires cues for some     Plan   Plan  Times per week: 2-3 x/week  Current Treatment Recommendations: Strengthening,Functional Mobility Training,Balance Training,Endurance Training,Cognitive Reorientation,Patient/Caregiver Education & Training,Equipment Evaluation, Education, & procurement,Self-Care / ADL,Safety Education & Training,Neuromuscular Re-education,Cognitive/Perceptual Training,Positioning    AM-PAC Score  AM-PAC Inpatient Daily Activity Raw Score: 9 (01/03/22 1320)  AM-PAC Inpatient ADL T-Scale Score : 25.33 (01/03/22 1320)  ADL Inpatient CMS 0-100% Score: 79.59 (01/03/22 1320)  ADL Inpatient CMS G-Code Modifier : CL (01/03/22 1320)    Goals  Short term goals  Time Frame for Short term goals: 1 week (1/2) unless stated otherwise. -- extend to 1/9 due to LOS  Short term goal 1: Pt will LB dress with mod A and AD/AE PRN. --ongoing 1/3/22  Short term goal 2: Pt will perform at least 3 min of standing functional activities with Yaquelin and AD PRN. -- ongoing 1/3/22  Short term goal 3: Pt will UB dress with Yaquelin. -- ongoing 1/3/22  Short term goal 4: Pt will perform BUE ther ex x20 to increase strength/endurance for functional activities (12/31--extend to 1/2). -GOAL NOT MET, NT d/t fatigue after toileting and transfer training 12/31/21  Short term goal 5: Pt will toilet with mod A and AD PRN. -- ongoing 1/3/22  Patient Goals   Patient goals : Pt unable to state goals this date d/t decreased cognition.        Therapy Time   Individual Concurrent Group Co-treatment   Time In 1020         Time Out 1102         [facility]

## 2022-01-03 NOTE — PROGRESS NOTES
Comprehensive Nutrition Assessment    Type and Reason for Visit:  Reassess    Nutrition Recommendations/Plan:   1. Recommend General diet order, free of therapeutic restrictions, to promote adequate nutrient intake  2. RD to discontinue Marsh & Sheree  3. Continue to encourage PO intake and protein rich food  4. RD to order new updated weight to monitor  5. Monitor nutrition adequacy, pertinent labs, bowel habits, wt changes, and clinical progress    Nutrition Assessment:  Follow up: Pt improving nutritionally AEB increased appetite and PO intake. Pt in droplet plus precautons, did not answer phone today. Spoke to RN, pt has a good appetite and consumes most of his meals. Pt does not like Marsh & Sheree drinks, RD to discontinue. Pt with wt loss over the past 2 months, will order new updated weight to monitor. Continue to encourage PO intake, will continue to monitor. Malnutrition Assessment:  Malnutrition Status: At risk for malnutrition (Comment)    Context:  Acute Illness     Findings of the 6 clinical characteristics of malnutrition:  Energy Intake:  Mild decrease in energy intake (Comment)  Weight Loss:  No significant weight loss (-14% wt loss x 2 months)       Estimated Daily Nutrient Needs:  Energy (kcal):  7469-0247 kcals/day; Weight Used for Energy Requirements:  Ideal (70 kg)     Protein (g):  70-98 g/day; Weight Used for Protein Requirements:  Ideal (1-1.4 g/kg)        Method Used for Fluid Requirements:  1 ml/kcal      Nutrition Related Findings:  + BM 1/1. Labs reviewed. Wounds:  None       Current Nutrition Therapies:    ADULT DIET; Regular  ADULT ORAL NUTRITION SUPPLEMENT; Breakfast, Lunch, Dinner;  Other Oral Supplement; Marsh & Sheree ONS    Anthropometric Measures:  · Height: 5' 8\" (172.7 cm)  · Current Body Weight: 170 lb (77.1 kg)   · Usual Body Weight: 198 lb (89.8 kg) (on 10/30/21)     · Ideal Body Weight: 154 lbs; % Ideal Body Weight 110.4 %   · BMI: 25.9  · BMI Categories: Overweight (BMI 25.0-29. 9)       Nutrition Diagnosis:   · Inadequate oral intake related to early satiety,cognitive or neurological impairment as evidenced by poor intake prior to admission,weight loss    Nutrition Interventions:   Food and/or Nutrient Delivery:  Continue Current Diet,Discontinue Oral Nutrition Supplement  Nutrition Education/Counseling:  Education not indicated   Coordination of Nutrition Care:  Continue to monitor while inpatient    Goals:  Consume 50% or greater of 3 meals per day and ONS during this admission       Nutrition Monitoring and Evaluation:   Behavioral-Environmental Outcomes:  None Identified   Food/Nutrient Intake Outcomes:  Supplement Intake,Food and Nutrient Intake  Physical Signs/Symptoms Outcomes:  Biochemical Data,Constipation,Weight,Nutrition Focused Physical Findings     Discharge Planning:    Continue current diet,Continue Oral Nutrition Supplement     Electronically signed by Braden Montelongo MS, RD, LD on 1/3/22 at 12:13 PM EST    Contact: Office: [phone]; [address]: [phone]

## 2022-01-04 NOTE — PROGRESS NOTES
Pt's IV line removed without complications. Discussed d/c instructions with patient, given opportunity to ask questions, and provided new medication education with side effects. Follow up appointment information included in d/c instructions. Pt verbalized understanding of d/c instructions. Patient was discharged to SNF via medical transport.      Amanda Salazar RN

## 2022-01-04 NOTE — CARE COORDINATION
CASE MANAGEMENT DISCHARGE SUMMARY      Discharge to: [facility] [place] [id number] [facility].. PT and OT. Precertification completed: N/A  Hospital Exemption Notification (HENS) completed: N/A    IMM given: 1/3/22    New Durable Medical Equipment ordered/agency: Hospital Bed, New Oxygen needs and WC through [facility]. Transportation:    Family/car: [employer]   Medical Transport explained to [employer]. Pt/family voice no agency preference. Agency used: [address] [place] [address] Road up time: [phone]   Ambulance form completed: Yes    Confirmed discharge plan with: LVM with Spouse today on the phone. Patient: yes     Facility/Agency, name:  [facility] faxed- yes, [phone]   Phone number for report to facility: [phone]     RN, name: Jose Tezgior    Note: Discharging nurse to complete [facility], reconcile AVS, and place final copy with patient's discharge packet. RN to ensure that written prescriptions for  Level II medications are sent with patient to the facility as per protocol.

## 2022-01-04 NOTE — PROGRESS NOTES
Hospitalist Progress Note      PCP: Emelyn Ayon MD    Date of Admission: 12/25/2021    Chief Complaint: Aphasia    Subjective: no new c/o. Medications:  Reviewed    Infusion Medications    sodium chloride       Scheduled Medications    divalproex  250 mg Oral 2 times per day    apixaban  5 mg Oral BID    lisinopril  20 mg Oral Daily    donepezil  10 mg Oral Nightly    pantoprazole  40 mg Oral QAM AC    atorvastatin  20 mg Oral Daily    tamsulosin  0.4 mg Oral Daily    vitamin D3  400 Units Oral Daily    sodium chloride flush  5-40 mL IntraVENous 2 times per day    aspirin  81 mg Oral Daily    metoprolol tartrate  25 mg Oral BID     PRN Meds: hydrALAZINE, sodium chloride flush, sodium chloride, ondansetron **OR** ondansetron, polyethylene glycol, acetaminophen **OR** acetaminophen      Intake/Output Summary (Last 24 hours) at 1/4/2022 0818  Last data filed at 1/3/2022 1142  Gross per 24 hour   Intake 240 ml   Output    Net 240 ml       Physical Exam Performed:    BP (!) 152/94   Pulse 65   Temp 98 °F (36.7 °C) (Oral)   Resp 18   Ht 5' 8\" (1.727 m)   Wt 170 lb (77.1 kg)   SpO2 93%   BMI 25.85 kg/m²     General appearance: No apparent distress, appears stated age and cooperative. HEENT: Pupils equal, round, and reactive to light. Conjunctivae/corneas clear. Neck: Supple, with full range of motion. No jugular venous distention. Trachea midline. Respiratory:  Normal respiratory effort. Clear to auscultation, bilaterally without Rales/Wheezes/Rhonchi. Cardiovascular: Regular rate and rhythm with normal S1/S2 without murmurs, rubs or gallops. Abdomen: Soft, non-tender, non-distended with normal bowel sounds. Musculoskeletal: No clubbing, cyanosis or edema bilaterally. Full range of motion without deformity. Skin: Skin color, texture, turgor normal.  No rashes or lesions. Neurologic:  Neurovascularly intact without any focal sensory/motor deficits.  Cranial nerves: II-XII intact, grossly non-focal.  Psychiatric: Alert and oriented, thought content appropriate, normal insight  Capillary Refill: Brisk,< 3 seconds   Peripheral Pulses: +2 palpable, equal bilaterally       Labs:   Recent Labs     01/03/22 0522   WBC 6.3   HGB 15.1   HCT 45.1        Recent Labs     01/03/22 0522      K 4.3   *   CO2 22   BUN 29*   CREATININE 1.0   CALCIUM 9.9     No results for input(s): AST, ALT, BILIDIR, BILITOT, ALKPHOS in the last 72 hours. No results for input(s): INR in the last 72 hours. No results for input(s): Dawson Sol in the last 72 hours. Urinalysis:      Lab Results   Component Value Date    NITRU Negative 12/25/2021    WBCUA see below 03/27/2020    BACTERIA 3+ 03/27/2020    RBCUA >100 03/27/2020    BLOODU Negative 12/25/2021    SPECGRAV 1.020 12/25/2021    GLUCOSEU Negative 12/25/2021       Consults:    IP CONSULT TO PHARMACY  PHARMACY TO CHANGE BASE FLUIDS  IP CONSULT TO HOSPITALIST  IP CONSULT TO NEUROLOGY  IP CONSULT TO CARDIOLOGY      Assessment/Plan:    Active Hospital Problems    Diagnosis     PAF (paroxysmal atrial fibrillation) ([facility] [I48.0]     Aphasia [R47.01]     [facility] [I63.232]     COVID-19 [U07.1]     HTN (hypertension), benign [I10]        COVID 19 Positive - NAAT positive on 25 Dec. W/ out overt pulmonary sxs or PNA/Acute Respiratory Failure, on no COVID specific tx. Afebrile >72 hrs. No need for further isolation post-discharge per current CDC guidelines. AMS/aphasia   - Likely acute metabolic encephalopathy from COVID-19   - CT Brain: no acute abnormality. Extensive  changes with parenchymal volume loss and chronic small  vessel ischemic changes. - CTA with atherosclerosis of the proximal cervical ICAs bilaterally with less than 50% stenosis by NASCET criteria.   - MRI brain negative for acute CVA - ruled out   - Neurology consulted and appreciated - signed off and agree.     HTN - w/out known CAD and no evidence of active signs/sxs of ischemia/failure. Currently controlled on home meds w/ vitals reviewed and documented as above. Afib - chronic paroxysmal of unspecified and clinically unable to determine etiology. Normally rate controlled on BBlocker - continued. Anticoagulated at baseline on home Eliquis - continued. Monitored on tele. Had episodes of RVR on 27 Dec - Cardiology consulted and appreciated. Considering future ALBERT occlusion for CVA PPX    Dementia - w/out behavioral disturbance. Controlled on home medication regimen - continued. Continue supportive care and redirection as needed. BPH - w/out acute obstructive Uropathy, controlled on home medications as ordered - continued.         DVT Prophylaxis: Eliquis    Recent Labs     01/03/22  0522        Diet: ADULT DIET; Regular  Code Status: DNR-CC      PT/OT Eval Status: seen w/ recs for SNF. Dispo - Medically stable for discharge since 30 Dec pending placement decision - Plan for [address] 4 Jan - no need for continued isolation post-discharge.     Anabel Villegas MD

## 2022-01-04 NOTE — PROGRESS NOTES
[facility] Left call back number for questions.

## 2022-01-04 NOTE — CARE COORDINATION
Cm spoke to [facility] with [facility]  and they are planning on being ready for pt to admit later today. Wife and [facility] bringing all DME. Will updated MD and get skilled Betzy orders as well.  CM following-Marisa Holguin RN

## 2022-01-05 NOTE — DISCHARGE SUMMARY
Hospital Medicine Discharge Summary    Patient ID: Lobo Genao      Patient's PCP: Cinthia Silva MD    Admit Date: 12/25/2021     Discharge Date: 1/4/2022      Admitting Physician: Sheree Hoskins MD     Discharge Physician: Selwyn Gray MD     Discharge Diagnoses: Active Hospital Problems    Diagnosis     PAF (paroxysmal atrial fibrillation) ([facility] [I48.0]     Aphasia [R47.01]     Arterial ischemic stroke, ICA, left, acute ([facility] [I63.232]     COVID-19 [U07.1]     HTN (hypertension), benign [I10]        The patient was seen and examined on day of discharge and this discharge summary is in conjunction with any daily progress note from day of discharge. Hospital Course:         COVID 19 Positive - NAAT positive on 25 Dec. W/ out overt pulmonary sxs or PNA/Acute Respiratory Failure, on no COVID specific tx. Afebrile >72 hrs. No need for further isolation post-discharge per current CDC guidelines.      AMS/aphasia   - Likely acute metabolic encephalopathy from COVID-19   - CT Brain: no acute abnormality. Extensive  changes with parenchymal volume loss and chronic small  vessel ischemic changes. - CTA with atherosclerosis of the proximal cervical ICAs bilaterally with less than 50% stenosis by NASCET criteria. - MRI brain negative for acute CVA - ruled out   - Neurology consulted and appreciated - signed off and agree.     HTN - w/out known CAD and no evidence of active signs/sxs of ischemia/failure. Currently controlled on home meds      Afib - chronic paroxysmal of unspecified and clinically unable to determine etiology. Normally rate controlled on BBlocker - continued. Anticoagulated at baseline on home Eliquis - continued. Monitored on tele. Had episodes of RVR on 27 Dec - Cardiology consulted and appreciated. Considering future ALBERT occlusion for CVA PPX     Dementia - w/out behavioral disturbance. Controlled on home medication regimen - continued.   Continue supportive care and redirection as needed.      BPH - w/out acute obstructive Uropathy, controlled on home medications as ordered - continued.         Labs: For convenience and continuity at follow-up the following most recent labs are provided:      CBC:    Lab Results   Component Value Date    WBC 6.3 01/03/2022    HGB 15.1 01/03/2022    HCT 45.1 01/03/2022     01/03/2022       Renal:    Lab Results   Component Value Date     01/03/2022    K 4.3 01/03/2022    K 4.3 12/25/2021     01/03/2022    CO2 22 01/03/2022    BUN 29 01/03/2022    CREATININE 1.0 01/03/2022    CALCIUM 9.9 01/03/2022    PHOS 3.5 12/28/2021         Significant Diagnostic Studies    Radiology:   MRI BRAIN W WO CONTRAST   Final Result   Diffuse cerebral atrophy. Severe chronic small vessel ischemic changes as   discussed above. No areas of restricted diffusion to suggest an acute   ischemic event. CTA HEAD NECK W CONTRAST   Final Result   Addendum 1 of 1   ADDENDUM:   3D reconstructed images were performed on a separate workstation and are    now   provided for review. There is no change in the initial report. Final   1. Atherosclerosis contributes to severe stenosis at the origin of both   vertebral arteries. 2. Atherosclerosis of the proximal cervical ICAs bilaterally with less than   50% stenosis by NASCET criteria. 3. No significant stenosis of the cyrfoa-by-Ewqluv. 4. Atherosclerosis of the V4 segments of the vertebral arteries bilaterally   contribute to areas of minimal stenosis. CT HEAD WO CONTRAST   Final Result   No acute intracranial abnormality. Extensive senescent changes with parenchymal volume loss and chronic small   vessel ischemic changes. RECOMMENDATIONS:   If patient's symptoms are persistent or worsen, a follow-up head CT or MRI of   the brain may be obtained.       Unavailable         XR CHEST PORTABLE   Final Result   Mild cardiomegaly with minimal central pulmonary vascular prominence. Haziness in the left lung base. Atelectasis/small left pleural effusion or   infiltrate. Consults:     IP CONSULT TO PHARMACY  PHARMACY TO CHANGE BASE FLUIDS  IP CONSULT TO HOSPITALIST  IP CONSULT TO NEUROLOGY  IP CONSULT TO CARDIOLOGY  IP CONSULT TO HOME CARE NEEDS    Disposition: [facility]     Condition at Discharge: Stable    Discharge Instructions/Follow-up:  w/ PCP 1-2 weeks and subspecialists as arranged. Code Status:  DNR/CC    Activity: activity as tolerated    Diet: regular diet      Discharge Medications:     Discharge Medication List as of 1/4/2022  2:07 PM           Details   apixaban (ELIQUIS) 5 MG TABS tablet Take 1 tablet by mouth 2 times daily, Disp-60 tablet, R-0Print      metoprolol tartrate (LOPRESSOR) 25 MG tablet Take 1 tablet by mouth 2 times daily, Disp-60 tablet, R-0Print              Details   terazosin (HYTRIN) 5 MG capsule Take 1 capsule by mouth nightly, Disp-30 capsule, R-3Normal      cyclobenzaprine (FLEXERIL) 5 MG tablet Take 1 tablet by mouth 2 times daily as needed for Muscle spasms, Disp-60 tablet, R-3Normal      !! vitamin D3 (CHOLECALCIFEROL) 10 MCG (400 UNIT) TABS tablet Take 1 tablet by mouth daily, Disp-30 tablet, R-0Normal      ondansetron (ZOFRAN-ODT) 4 MG disintegrating tablet Take 1 tablet by mouth every 8 hours as needed for Nausea or Vomiting, Disp-10 tablet, R-2Normal      !!  VITAMIN D PO Take by mouthHistorical Med      Multiple Vitamins-Minerals (OCUVITE PO) Take by mouthHistorical Med      Calcium Carbonate Antacid (ANTACID E-X PO) Take by mouthHistorical Med      esomeprazole (NEXIUM) 20 MG delayed release capsule Take 20 mg by mouth every morning (before breakfast)Historical Med      divalproex (DEPAKOTE) 250 MG DR tablet Take 250 mg by mouth 2 times daily Historical Med      tamsulosin (FLOMAX) 0.4 MG capsule Take 0.4 mg by mouth dailyHistorical Med      donepezil (ARICEPT) 10 MG tablet Take 10 mg by mouth nightlyHistorical Med      lisinopril (PRINIVIL;ZESTRIL) 20 MG tablet Take 1 tablet by mouth daily, Disp-30 tablet, R-3Normal      simvastatin (ZOCOR) 80 MG tablet Take 40 mg by mouth nightly Historical Med       !! - Potential duplicate medications found. Please discuss with provider. Time Spent on discharge is more than 30 minutes in the examination, evaluation, counseling and review of medications and discharge plan. Signed:    Neelima Mcdowell MD   1/5/2022      Thank you Neema Lindsey MD for the opportunity to be involved in this patient's care. If you have any questions or concerns please feel free to contact me at [phone].

## 2022-01-07 PROBLEM — N10 ACUTE PYELONEPHRITIS: Status: ACTIVE | Noted: 2022-01-01

## 2022-01-07 NOTE — PLAN OF CARE
Patient presented from his ECF after a fall. He was found on the floor. Circumstances are unclear. He has an JACKELYN. CT Ab/P w/o contrast was just completed. There is a distal left ureteral calculus without hydroureter or hydronephrosis. There is left perinephric fat stranding c/w pyelonephritis. Patient will be admitted and treated for acute calculous pyelonephritis. Code status is [facility].

## 2022-01-07 NOTE — ED PROVIDER NOTES
[facility]  ED  EMERGENCY DEPARTMENT ENCOUNTER      Pt Name: Glenna Chavez  MRN: [id number]  [place] 1940  Date of evaluation: 1/7/2022  Provider: Mansoor Shane MD    CHIEF COMPLAINT       Chief Complaint   Patient presents with    Fall     pt from the [place], pt has a fall hx,  pt had a fall this morning and was found facedown. Pt awake and at baseline per wife         HISTORY OF PRESENT ILLNESS   (Location/Symptom, Timing/Onset,Context/Setting, Quality, Duration, Modifying Factors, Severity)  Note limiting factors. Glenna Chavez is a 80 y.o. male who presents to the emergency department for fall. This was unwitnessed. Patient was found on the floor. Patient was recently diagnosed with COVID-19. Patient has dementia and is unable to provide any history however when a discussion was had with the nurse she said that the patient is at baseline if he is only oriented to himself this is how he usually is. Nursing notes were reviewed.     REVIEW OF SYSTEMS    (2-9 systems for level 4, 10 or more for level 5)     Review of Systems   Unable to perform ROS: Dementia         PAST MEDICAL HISTORY     Past Medical History:   Diagnosis Date    Hyperlipemia     Hypertension     Prostate enlargement     Unspecified cerebral artery occlusion with cerebral infarction 2000         SURGICALHISTORY       Past Surgical History:   Procedure Laterality Date    CAROTID ENDARTERECTOMY      right    COLONOSCOPY      COLONOSCOPY  2014    polyps    EYE SURGERY      FACIAL RECONSTRUCTION SURGERY      metal plate under left eye    FOOT SURGERY      nerve right    MOHS SURGERY      SKIN CANCER EXCISION      face x several    TONSILLECTOMY           CURRENT MEDICATIONS       Previous Medications    APIXABAN (ELIQUIS) 5 MG TABS TABLET    Take 1 tablet by mouth 2 times daily    CALCIUM CARBONATE ANTACID (ANTACID E-X PO)    Take by mouth    CYCLOBENZAPRINE (FLEXERIL) 5 MG TABLET    Take 1  Lack of Transportation (Medical): Not on file    Lack of Transportation (Non-Medical): Not on file   Physical Activity:     Days of Exercise per Week: Not on file    Minutes of Exercise per Session: Not on file   Stress:     Feeling of Stress : Not on file   Social Connections:     Frequency of Communication with Friends and Family: Not on file    Frequency of Social Gatherings with Friends and Family: Not on file    Attends Shinto Services: Not on file    Active Member of [address] [place] [address] or Organizations: Not on file    Attends Club or Organization Meetings: Not on file    Marital Status: Not on file   Intimate Partner Violence:     Fear of Current or Ex-Partner: Not on file    Emotionally Abused: Not on file    Physically Abused: Not on file    Sexually Abused: Not on file   Housing Stability:     Unable to Pay for Housing in the Last Year: Not on file    Number of [place] in the Last Year: Not on file    Unstable Housing in the Last Year: Not on file       SCREENINGS             PHYSICAL EXAM    (up to 7 for level 4, 8 or more for level 5)     ED Triage Vitals [01/07/22 0158]   BP Temp Temp Source Pulse Resp SpO2 Height Weight   (!) 124/93 97.6 °F (36.4 °C) Oral 86 14 98 % 5' 8\" (1.727 m) 170 lb (77.1 kg)       Physical Exam  Vitals and nursing note reviewed. Constitutional:       Appearance: Normal appearance. He is well-developed. He is not ill-appearing. HENT:      Head: Normocephalic and atraumatic. Right Ear: External ear normal.      Left Ear: External ear normal.      Nose: Nose normal.   Eyes:      General: No scleral icterus. Right eye: No discharge. Left eye: No discharge. Conjunctiva/sclera: Conjunctivae normal.   Cardiovascular:      Rate and Rhythm: Normal rate and regular rhythm. Heart sounds: Normal heart sounds. Pulmonary:      Effort: Pulmonary effort is normal. No respiratory distress. Breath sounds: Normal breath sounds.  No wheezing or rales.   Abdominal:      General: Bowel sounds are normal.   Musculoskeletal:         General: No swelling or tenderness. Cervical back: Neck supple. Skin:     Coloration: Skin is not pale. Findings: No bruising or erythema. Neurological:      Mental Status: He is alert. He is disoriented. Cranial Nerves: No cranial nerve deficit. Comments: Patient keeps repeating yes to everything that I ask him         DIAGNOSTIC RESULTS     EKG: All EKG's are interpreted by the Emergency Department Physician who either signs or Co-signs this chart in the absence of a cardiologist.    12 lead EKG shows atrial fibrillation at a rate of 98 bpm, QRS prolonged. Normal QTC. No acute ischemic findings though there is artifact. No significant changes when compared to prior EKG December 2021. RADIOLOGY:   Non-plain film images such as CT, Ultrasound and MRI are read by the radiologist. Plain radiographic images are visualized and preliminarily interpreted by the emergency physician with the below findings:        Interpretation per the Radiologist below, if available at the time of this note:    XR CHEST PORTABLE   Final Result   Patchy airspace opacities in the right lung are new in comparison to   12/25/2021, and concerning for multifocal pneumonia. Stable cardiomegaly with central pulmonary vascular distension. CT Head WO Contrast   Final Result   No acute intracranial abnormality. No acute fracture or traumatic malalignment of the cervical spine. RECOMMENDATIONS:   Unavailable         CT Cervical Spine WO Contrast   Final Result   No acute intracranial abnormality. No acute fracture or traumatic malalignment of the cervical spine.       RECOMMENDATIONS:   Unavailable         CT ABDOMEN PELVIS WO CONTRAST Additional Contrast? None    (Results Pending)         ED BEDSIDE ULTRASOUND:   Performed by ED Physician - none    LABS:  Labs Reviewed   CBC WITH AUTO DIFFERENTIAL - Abnormal; Notable for the following components:       Result Value    WBC 11.1 (*)     All other components within normal limits    Narrative:     Performed at:  [address], [facility] [employer]   Phone [phone]   COMPREHENSIVE METABOLIC PANEL W/ REFLEX TO MG FOR LOW K - Abnormal; Notable for the following components:    Glucose 159 (*)     BUN 48 (*)     CREATININE 2.4 (*)     GFR Non- 26 (*)     GFR  32 (*)     Albumin 3.3 (*)     Albumin/Globulin Ratio 0.9 (*)     All other components within normal limits    Narrative:     Performed at:  [place] [address] [employer]   Phone [phone]   URINE RT REFLEX TO CULTURE - Abnormal; Notable for the following components:    Blood, Urine SMALL (*)     All other components within normal limits    Narrative:     Performed at:  [place] [address] [employer]   Phone [phone]   TROPONIN - Abnormal; Notable for the following components:    Troponin 0.02 (*)     All other components within normal limits    Narrative:     Performed at:  [address], [facility] [employer]   Phone [phone]   MICROSCOPIC URINALYSIS - Abnormal; Notable for the following components:    Mucus, UA 3+ (*)     RBC, UA 21-50 (*)     Bacteria, UA 2+ (*)     All other components within normal limits    Narrative:     Performed at:  [place] [address] [employer]   Phone [phone] TESTING    Narrative:     Performed at:  [place] [address] [employer]   Phone [phone]       All other labs were within normal range or not returned as of this dictation.     EMERGENCY DEPARTMENT COURSE and DIFFERENTIAL DIAGNOSIS/MDM: Vitals:    Vitals:    01/07/22 0312 01/07/22 0413 01/07/22 0437 01/07/22 0454   BP:  101/60  123/80   Pulse:  100 112 91   Resp:  19 14 13   Temp:       TempSrc:       SpO2: 95% 94% 94%    Weight:       Height:           Elderly male with a history of dementia who was found on the ground. Patient has a history of COVID-19. Laboratory studies chest x-ray EKG CT scans ordered. Chest x-ray shows multifocal pneumonia. Laboratory studies show acute kidney injury. The patient also has an elevated troponin. IV fluids is given. He has hematuria a CT scan of his abdomen and pelvis is ordered especially in the setting of being found on the ground. This patient will need to be admitted to the hospital for further care. A consult is placed with the hospitalist on duty. It was placed on cardiac blood pressure and pulse oximetry. Cardiac monitor is atrial fibrillation. CRITICAL CARE TIME   Total Critical Care time was 30 minutes, excluding separately reportable procedures. There was a high probability of clinically significant/life threatening deterioration in the patient's condition which required my urgent intervention. CONSULTS:  IP CONSULT TO HOSPITALIST    PROCEDURES:       Procedures    FINAL IMPRESSION      1. Fall, initial encounter    2. JACKELYN (acute kidney injury) ([facility]    3. Elevated troponin    4. Multifocal pneumonia          DISPOSITION/PLAN   DISPOSITION Decision To Admit 01/07/2022 04:49:15 AM      PATIENT REFERREDTO:  No follow-up provider specified.     DISCHARGEMEDICATIONS:  New Prescriptions    No medications on file          (Please note that portions of this note were completed with a voice recognition program.  Efforts were made to edit the dictations but occasionally words are mis-transcribed.)    Samantha Marcos MD (electronically signed)  Attending Emergency Physician       Samantha Marcos MD  01/07/22 9775

## 2022-01-07 NOTE — PLAN OF CARE
Problem: Nutrition  Intervention: Swallowing evaluation  Note: Bedside swallow evaluation completed this date. Kala Fisher M.S. [id number] [facility]  Speech-language pathologist  [id number]      Intervention: Aspiration precautions  Note: Bedside swallow evaluation completed this date.     Kala Fisher M.S. [address] [facility]  Speech-language pathologist  [id number]

## 2022-01-07 NOTE — DISCHARGE INSTR - COC
Continuity of Care Form    Patient Name: Sharon Stiles   :  1940  MRN:  [id number]    Admit date:  2022  Discharge date:  22    Code Status Order: DNR-CC   Advance Directives:      Admitting Physician:  Bryan Huntley MD  PCP: Mason Rodriguez MD    Discharging Nurse: [facility] Unit/Room#:   Discharging Unit Phone Number: [phone]    Emergency Contact:   Extended Emergency Contact Information  Primary Emergency Contact: Madeline Coates  Address: [address] [place] [address] Tana, Augustin Kunz [address] Phone: [phone]  Relation: Spouse   needed? No  Secondary Emergency Contact: Genie Lyles   [address] Phone: [phone]  Relation: Child   needed?  No    Past Surgical History:  Past Surgical History:   Procedure Laterality Date    CAROTID ENDARTERECTOMY      right    COLONOSCOPY      COLONOSCOPY      polyps    EYE SURGERY      FACIAL RECONSTRUCTION SURGERY      metal plate under left eye    FOOT SURGERY      nerve right    MOHS SURGERY      SKIN CANCER EXCISION      face x several    TONSILLECTOMY         Immunization History:   Immunization History   Administered Date(s) Administered    COVID-19, Saad Ga, Primary or Immunocompromised, PF, 100mcg/0.5mL 2021, 2021, 2021       Active Problems:  Patient Active Problem List   Diagnosis Code    TIA (transient ischemic attack) G45.9    HTN (hypertension), benign I10    Hyperlipidemia E78.5    PVD (peripheral vascular disease) ([facility]) I73.9    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 ([facility] S24.133A    Basal cell carcinoma of left ear C44.219    Visit for wound check Z51.89    Atrial fibrillation with RVR ([facility] I48.91    Aphasia R47.01    Arterial ischemic stroke, ICA, left, acute (HCC) I63.232    COVID-19 U07.1    PAF (paroxysmal atrial fibrillation) ([facility]) I48.0 Acute pyelonephritis N10       Isolation/Infection:   Isolation            No Isolation          Patient Infection Status       Infection Onset Added Last Indicated Last Indicated By Review Planned Expiration Resolved Resolved By    COVID-19 12/25/21 12/25/21 12/25/21 COVID-19, Rapid 01/01/22 01/08/22      Resolved    COVID-19 (Rule Out) 12/25/21 12/25/21 12/25/21 COVID-19, Rapid (Ordered)   12/25/21 Rule-Out Test Resulted    COVID-19 (Rule Out) 10/30/21 10/30/21 10/30/21 COVID-19, Rapid (Ordered)   10/30/21 Rule-Out Test Resulted            Nurse Assessment:  Last Vital Signs: /67   Pulse 119   Temp 97.6 °F (36.4 °C) (Oral)   Resp 14   Ht 5' 8\" (1.727 m)   Wt 170 lb (77.1 kg)   SpO2 96%   BMI 25.85 kg/m²     Last documented pain score (0-10 scale):    Last Weight:   Wt Readings from Last 1 Encounters:   01/07/22 170 lb (77.1 kg)     Mental Status:  disoriented    IV Access:  - None    Nursing Mobility/ADLs:  Walking   Dependent  Transfer  Dependent  Bathing  Dependent  Dressing  Dependent  Toileting  Dependent  Feeding  Assisted  Med Admin  Dependent  Med Delivery   crushed    Wound Care Documentation and Therapy:  Puncture Back Lateral;Left;Upper (Active)   Number of days:         Elimination:  Continence: Bowel: No  Bladder: No  Urinary Catheter: None   Colostomy/Ileostomy/Ileal Conduit: No       Date of Last BM: 1/11/22    Intake/Output Summary (Last 24 hours) at 1/7/2022 0955  Last data filed at 1/7/2022 0750  Gross per 24 hour   Intake 1050 ml   Output --   Net 1050 ml     I/O last 3 completed shifts:   In: 1000 [IV Piggyback:1000]  Out: -     Safety Concerns:     History of Falls (last 30 days) and At Risk for Falls    Impairments/Disabilities:      Hearing    Nutrition Therapy:  Current Nutrition Therapy:   - Oral Diet:  Dysphagia 2 mechanically altered  - dysphagia diet, bite soft size, nectar thick liquid    Routes of Feeding: Oral  Liquids: Nectar Thick Liquids  Daily Fluid Restriction: no  Last Modified Barium Swallow with Video (Video Swallowing Test): not done    Treatments at the Time of Hospital Discharge:   Respiratory Treatments:   Oxygen Therapy:  is not on home oxygen therapy. Ventilator:    - No ventilator support    Rehab Therapies: Physical Therapy and Occupational Therapy  Weight Bearing Status/Restrictions: No weight bearing restirctions  Other Medical Equipment (for information only, NOT a DME order):  hospital bed  Other Treatments:     Patient's personal belongings (please select all that are sent with patient):  Glasses    RN SIGNATURE:  Electronically signed by Eder Mojica RN on 1/11/22 at 9:31 AM EST    CASE MANAGEMENT/SOCIAL WORK SECTION    Inpatient Status Date: 1/7/22    Readmission Risk Assessment Score:  Readmission Risk              Risk of Unplanned Readmission:  17           Discharging to Facility/ Agency   Name: [facility] [address], [address]  Phone: [phone]  Fax: [phone]    [address] [address] per [facility]    / signature: Electronically signed by VAHE Mcintyre on 1/11/22 at 10:29 AM EST    PHYSICIAN SECTION    Prognosis: Fair    Condition at Discharge: Stable    Rehab Potential (if transferring to Rehab): Good    Recommended Labs or Other Treatments After Discharge: BMP, CBC in one week after discharge. Follow up with PCP within 1-2 weeks. Urology recommended straining his urine and f/u in clinic in 2-3 weeks. Physician Certification: I certify the above information and transfer of Heriberto Lozano  is necessary for the continuing treatment of the diagnosis listed and that he requires Intermediate Nursing Care for less 30 days.      Update Admission H&P: No change in H&P    PHYSICIAN SIGNATURE:  Electronically signed by Jovon Real MD on 1/11/22 at 9:21 AM EST

## 2022-01-07 NOTE — CARE COORDINATION
CASE MANAGEMENT INITIAL ASSESSMENT      Reviewed chart and completed assessment with patient and daughter, Soco Zhang via phone  Explained Case Management role/services. yes    Primary contact information:wife, [facility] Decision Maker :   Primary Decision Maker: Velia Browne - Spouse - [phone]    Secondary Decision Maker: Israel Aguilar - Child - [phone]    Secondary Decision Maker: Humaira Harry - Child - [phone]          Can this person be reached and be able to respond quickly, such as within a few minutes or hours? Yes  Who would be your back-up decision maker? Name Soco Zhang, daughter  Phone Number:[phone]    Admit date/status:1/7/22  Diagnosis:JACKELYN   Is this a Readmission?:  Yes      Insurance:[employer] medicare   Precert required for SNF: waived due to Sarahy       3 night stay required: No    Living arrangements, Adls, care needs, prior to admission:from Assisted Living at the [address] [place] [address] at home:  Walker_x_Cane__RTS__ BSC_x_Shower Chair__  02_[employer]_ HHN__ CPAP__  BiPap__  Hospital Bed_x_ W/C___ Other__________    Services in the home and/or outpatient, prior to admission:Abdi Patino for RN, PT/OT    Dialysis Facility (if applicable)   · Name:  · Address:  · Dialysis Schedule:  · Phone:  · Fax:    PT/OT recs:    Hospital Exemption Notification (HEN):    Barriers to discharge:medical complications    Plan/comments:Referred to patient for d/c planning. Patient sleeping, spoke to daughter Soco Zhang via phone. Patient is a 80year old male readmitted for JACKELYN. Patient known from previous admission. Patient now resides at [facility]. Patient has [facility] care through [facility]. Per daughter, plan is to return to facility on d/c. No other needs noted at this time.  Electronically signed by VAHE Wolf, CORINA on 1/7/2022 at 10:03 AM      ECOC on chart for MD signature

## 2022-01-07 NOTE — PROGRESS NOTES
Speech Language Pathology  Facility/Department: [facility]  ED   CLINICAL BEDSIDE SWALLOW EVALUATION      Recommended Diet and Intervention  Diet Solids Recommendation: NPO  Liquid Consistency Recommendation: NPO  Recommended Form of Meds: Crushed in puree as able and *only when pt is most alert  Pt with reduced DRE this date, intermittent clinical s/s of possible aspiration with observed PO trials-- needs ongoing assessment of swallow function      NAME: Miguel Calvin  : 1940  MRN: [id number]    ADMISSION DATE: 2022  ADMITTING DIAGNOSIS: has TIA (transient ischemic attack); HTN (hypertension), benign; Hyperlipidemia; PVD (peripheral vascular disease) ([address] Dysphagia following cerebrovascular accident; 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  ONSET DATE: Pt admitted to [facility] on 22    Recent Chest Xray (22): Impression   Patchy airspace opacities in the right lung are new in comparison to   2021, and concerning for multifocal pneumonia.       Stable cardiomegaly with central pulmonary vascular distension. Date of Eval: 2022  Evaluating Therapist: MARIANO Khalil    Current Diet level:  Current Diet : NPO (Pending BSE)  Current Liquid Diet : NPO (Pending BSE)      Primary Complaint  Patient Complaint: Per MD H&P, \"71 y.o. male who presented to [facility] after an unwitnessed fall. Found on the floor at the skilled nursing facility where he was recently discharged after a hospital stay for COVID infection. Baseline confused. Poor historian due to dementia. Does not remember the episode and cannot tell if the fall was preceded or followed by any symptoms.   In the ED patient Plan  Requires SLP Intervention: Yes  Duration/Frequency of Treatment: 3-5x/week for LOS  D/C Recommendations: To be determined       Recommended Diet and Intervention  Diet Solids Recommendation: NPO  Liquid Consistency Recommendation: NPO  Recommended Form of Meds: Crushed in puree as able and *only when pt is most alert  Recommendations: NPO;Dysphagia treatment; Therapeutic feeds with SLP only  Therapeutic Interventions: Diet tolerance monitoring;Oral care; Therapeutic PO trials with SLP;Patient/Family education    Compensatory Swallowing Strategies  Compensatory Swallowing Strategies: Eat/Feed slowly;Upright as possible for all oral intake;Remain upright for 30-45 minutes after meals;Small bites/sips    Treatment/Goals  Short-term Goals  Timeframe for Short-term Goals: 5 days (1/12/22)  Long-term Goals  Timeframe for Long-term Goals: 7 days (1/14/22)  Goal 1: The pt will tolerate safest and least restrictive diet without clinical s/s of aspiration or change in respiratory status. Dysphagia Goals: The patient will tolerate recommended diet without observed clinical signs of aspiration; The patient/caregiver will demonstrate understanding of compensatory strategies for improved swallowing safety. ;The patient will tolerate repeat BSE when able. General  Chart Reviewed: Yes  Comments: Pt admitted d/t fall, JACKELYN, multifocal PNA. Pt has PMHx of dementia, CVA, and COVID-19 per chart. Behavior/Cognition: Alert; Cooperative;Confused; Requires cueing;Doesn't follow directions; Lethargic  Respiratory Status: Room air  O2 Device: None (Room air)  Communication Observation: Functional (Pleasantly declined; PMHx of dementia)  Follows Directions: Simple (~50% accuracy, given cues)  Dentition: Adequate; Some missing teeth  Patient Positioning: Upright in bed  Baseline Vocal Quality: Normal  Volitional Cough: Weak  Volitional Swallow: Delayed  Prior Dysphagia History: Pt seen for BSE on 11/3//21 with recommendations for a Regular solid diet with thin liquids. Consistencies Administered: Dysphagia Pureed (Dysphagia I); Honey - teaspoon;Nectar - teaspoon; Thin - straw; Thin - teaspoon; Ice Chips    Vision/Hearing  Vision  Vision:  (Appears WFL during BSE)  Hearing  Hearing: Exceptions to [facility]  Hearing Exceptions: Hard of hearing/hearing concerns    Oral Motor Deficits  Oral/Motor  Oral Motor: Exceptions to [facility] (Pt able to follow ~50% commands despite cues)  Labial ROM: Reduced left; Reduced right  Labial Strength: Reduced  Labial Coordination: Reduced  Lingual ROM: Reduced left; Reduced right  Lingual Strength: Reduced  Lingual Coordination: Reduced    Oral Phase Dysfunction  Oral Phase  Oral Phase: Exceptions  Oral Phase Dysfunction  Suspected Premature Bolus Loss: All     Indicators of Pharyngeal Phase Dysfunction   Pharyngeal Phase  Pharyngeal Phase: Exceptions  Indicators of Pharyngeal Phase Dysfunction  Delayed Swallow: All (Suspected)  Wet Vocal Quality: Honey - teaspoon;Nectar - teaspoon; Thin - teaspoon; Thin - straw;Ice chips  Throat Clearing - Immediate: All (Noted in only 1/10 puree trials)  Cough - Immediate: Thin - straw  Cough - Delayed: Honey - teaspoon  Change in Vital Signs: Honey - teaspoon;Nectar - teaspoon; Thin - teaspoon; Thin - straw    Prognosis  Prognosis  Prognosis for safe diet advancement: fair  Barriers to reach goals: age;cognitive deficits;inconsistent alertness;fatigue  Individuals consulted  Consulted and agree with results and recommendations: Patient;RN    Education  Patient Education:Pt educated on reason for referral, role of ST, assessment results and recommendations. Patient Education Response: Needs reinforcement; No evidence of learning;Verbalizes understanding  Safety Devices in place: Yes  Type of devices: Bed alarm in place;Call light within reach;Nurse notified; Left in bed       Therapy Time  SLP Individual Minutes  Time In: 5970  Time Out: 6270  Minutes: 26; dysphagia eval and tx    Fani Galeano M.S. CCC-SLP  Speech-language pathologist  [phone]  Speech Desk Phone: [phone]

## 2022-01-07 NOTE — ED NOTES
Report called to floor RN, C3.  Transport order placed     [facility], RN  01/07/22 7891

## 2022-01-07 NOTE — ED NOTES
Bed: 12  Expected date:   Expected time:   Means of arrival:   Comments:  Rick Ramsey RN  01/07/22 0157

## 2022-01-07 NOTE — ED NOTES
Per report from previous RN, no PO meds per MD r/t need for patient to have speech swallow eval first.      Gris Wiseman RN  01/07/22 0188

## 2022-01-07 NOTE — ED NOTES
Dr. Homero carter served, requesting alternate med for depakote DR (uncrushable). Awaiting response.       Giovani Roman RN  01/07/22 7382

## 2022-01-07 NOTE — CONSULTS
[facility] Consult Note           Reason for Consult:  Acute kidney injury  Requesting Physician:  Dr. Marlin Proctor    Chief Complaint:  Weakness, falls  History Obtained From:  patient, electronic medical record    History of Present Ilness:    80year old male with HTN, BPH, Dementia admitted with weakness, falls. We have been asked to assist in further mgmt of his JACKELYN. Recent hospital stay for altered mental status, found to be COVID+, went to SNF. Has been weak, found to have unwitnessed fall there. Started on treatment for acute pyelonephritis with ceftriaxone her based on CT imaging. SCr 2.4 on admission, previously SCr ~1.  UA small blood, negative protein, s.g. 1.025.  BP's on lower end, given 1 liter NS in ER.  +weak, no fevers, intake reduced, +abdominal pain, confused at baseline per report. Past Medical History:        Diagnosis Date    Hyperlipemia     Hypertension     Prostate enlargement     Unspecified cerebral artery occlusion with cerebral infarction 2000       Past Surgical History:        Procedure Laterality Date    CAROTID ENDARTERECTOMY      right    COLONOSCOPY      COLONOSCOPY  2014    polyps    EYE SURGERY      FACIAL RECONSTRUCTION SURGERY      metal plate under left eye    FOOT SURGERY      nerve right    MOHS SURGERY      SKIN CANCER EXCISION      face x several    TONSILLECTOMY         Home Medications:    No current facility-administered medications on file prior to encounter.      Current Outpatient Medications on File Prior to Encounter   Medication Sig Dispense Refill    apixaban (ELIQUIS) 5 MG TABS tablet Take 1 tablet by mouth 2 times daily 60 tablet 0    metoprolol tartrate (LOPRESSOR) 25 MG tablet Take 1 tablet by mouth 2 times daily 60 tablet 0    terazosin (HYTRIN) 5 MG capsule Take 1 capsule by mouth nightly 30 capsule 3    cyclobenzaprine (FLEXERIL) 5 MG tablet Take 1 tablet by mouth 2 times daily as Activity:     Days of Exercise per Week: Not on file    Minutes of Exercise per Session: Not on file   Stress:     Feeling of Stress : Not on file   Social Connections:     Frequency of Communication with Friends and Family: Not on file    Frequency of Social Gatherings with Friends and Family: Not on file    Attends Sabianism Services: Not on file    Active Member of [address] [place] [address] or Organizations: Not on file    Attends Club or Organization Meetings: Not on file    Marital Status: Not on file   Intimate Partner Violence:     Fear of Current or Ex-Partner: Not on file    Emotionally Abused: Not on file    Physically Abused: Not on file    Sexually Abused: Not on file   Housing Stability:     Unable to Pay for Housing in the Last Year: Not on file    Number of [place] in the Last Year: Not on file    Unstable Housing in the Last Year: Not on file       Family History:   Unable to obtain due to patient's confusion. Review of Systems:   Unable to obtain due to patient's confusion.     Physical exam:   Constitutional:  VITALS:  BP 92/73   Pulse 97   Temp 97.6 °F (36.4 °C) (Oral)   Resp 11   Ht 5' 8\" (1.727 m)   Wt 170 lb (77.1 kg)   SpO2 95%   BMI 25.85 kg/m²   Gen: alert, awake, ill-appearing  Skin: no rash, turgor wnl  Heent:  eomi, mmm  Neck: no bruits or jvd noted, thyroid normal  Cardiovascular:  S1, S2 without m/r/g  Respiratory: CTA B without w/r/r; respiratory effort normal  Abdomen:  +bs, soft, nt, nd, no hepatosplenomegaly  Ext: no lower extremity edema  Psychiatric: mood and affect limited; judgement and insight limited  Musculoskeletal:  Rom, muscular strength limited; digits, nails normal    Data/  CBC:   Lab Results   Component Value Date    WBC 11.1 01/07/2022    RBC 4.88 01/07/2022    HGB 15.0 01/07/2022    HCT 46.9 01/07/2022    MCV 96.0 01/07/2022    MCH 30.8 01/07/2022    MCHC 32.1 01/07/2022    RDW 15.2 01/07/2022     01/07/2022    MPV 10.1 01/07/2022     BMP:    Lab Results   Component Value Date     01/07/2022    K 4.1 01/07/2022     01/07/2022    CO2 21 01/07/2022    BUN 48 01/07/2022    LABALBU 3.3 01/07/2022    CREATININE 2.4 01/07/2022    CALCIUM 10.2 01/07/2022    GFRAA 32 01/07/2022    GFRAA >60 06/14/2013    LABGLOM 26 01/07/2022    GLUCOSE 159 01/07/2022         Assessment/    - Acute kidney injury - pre-renal in setting of sepsis/acute pyelonephritis, hypotension    - Acute pyelonephritis - on ceftriaxone    - Hypertension - running on lower end    - Atrial fibrillation      Plan/    - IVF trial with NS 75 ml/hour  - Lisinopril on hold  - Trend labs, bp's, & urine output      Thank you for the consultation. Please do not hesitate to call with questions. ____________________________________  Pranay Greer MD  [facility]  [email or web address]  Office: [phone]

## 2022-01-07 NOTE — H&P
Hospital Medicine History & Physical      PCP: Digna Roach MD    Date of Admission: 1/7/2022    Date of Service: Pt seen/examined on 01/07/22 and Admitted to Inpatient with expected LOS greater than two midnights due to medical therapy of acute renal failure. Chief Complaint:  Weakness, fall      History Of Present Illness:      80 y.o. male who presented to [facility] after an unwitnessed fall. Found on the floor at the skilled nursing facility where he was recently discharged after a hospital stay for COVID infection. Baseline confused. Poor historian due to dementia. Does not remember the episode and cannot tell if the fall was preceded or followed by any symptoms. In the ED patient was found to have acute renal failure and suspected UTI. CT abdomen was also consistent with acute pyelonephritis based on perinephric stranding  Started on IV fluids and antibiotics  No other accompanying symptoms  Nothing else that makes the patient feel better or worse      Past Medical History:          Diagnosis Date    Hyperlipemia     Hypertension     Prostate enlargement     Unspecified cerebral artery occlusion with cerebral infarction 2000       Past Surgical History:          Procedure Laterality Date    CAROTID ENDARTERECTOMY      right    COLONOSCOPY      COLONOSCOPY  2014    polyps    EYE SURGERY      FACIAL RECONSTRUCTION SURGERY      metal plate under left eye    FOOT SURGERY      nerve right    MOHS SURGERY      SKIN CANCER EXCISION      face x several    TONSILLECTOMY         Medications Prior to Admission:      Prior to Admission medications    Medication Sig Start Date End Date Taking?  Authorizing Provider   apixaban (ELIQUIS) 5 MG TABS tablet Take 1 tablet by mouth 2 times daily 1/3/22 2/2/22  Candance Raker, MD   metoprolol tartrate (LOPRESSOR) 25 MG tablet Take 1 tablet by mouth 2 times daily 1/3/22 2/2/22  Candance Raker, MD   terazosin (HYTRIN) 5 MG capsule Take 1 capsule by mouth nightly 11/4/21   Josefina Casanova MD   cyclobenzaprine (FLEXERIL) 5 MG tablet Take 1 tablet by mouth 2 times daily as needed for Muscle spasms 11/4/21   Josefina Casanova MD   vitamin D3 (CHOLECALCIFEROL) 10 MCG (400 UNIT) TABS tablet Take 1 tablet by mouth daily 11/4/21   Josefina Casanova MD   ondansetron (ZOFRAN-ODT) 4 MG disintegrating tablet Take 1 tablet by mouth every 8 hours as needed for Nausea or Vomiting 11/4/21   Josefina Casanova MD   VITAMIN D PO Take by mouth    Historical Provider, MD   Multiple Vitamins-Minerals (OCUVITE PO) Take by mouth    Historical Provider, MD   Calcium Carbonate Antacid (ANTACID E-X PO) Take by mouth    Historical Provider, MD   esomeprazole (NEXIUM) 20 MG delayed release capsule Take 20 mg by mouth every morning (before breakfast)    Historical Provider, MD   divalproex (DEPAKOTE) 250 MG DR tablet Take 250 mg by mouth 2 times daily  4/22/21   Historical Provider, MD   tamsulosin (FLOMAX) 0.4 MG capsule Take 0.4 mg by mouth daily    Historical Provider, MD   donepezil (ARICEPT) 10 MG tablet Take 10 mg by mouth nightly    Historical Provider, MD   lisinopril (PRINIVIL;ZESTRIL) 20 MG tablet Take 1 tablet by mouth daily 5/27/19   Pino Pedraza MD   simvastatin (ZOCOR) 80 MG tablet Take 40 mg by mouth nightly     Historical Provider, MD       Allergies:  Corn-containing products    Social History:      The patient currently lives at a skilled nursing facility     TOBACCO:   reports that he has never smoked. He has never used smokeless tobacco.  ETOH:   reports no history of alcohol use. E-Cigarettes/Vaping Use     Questions Responses    E-Cigarette/Vaping Use Never User    Start Date     Passive Exposure     Quit Date     Counseling Given     Comments             Family History:      Reviewed in detail and negative for DM, CAD, Cancer, CVA. Positive as follows:    History reviewed. No pertinent family history.     REVIEW OF SYSTEMS COMPLETED: Pertinent positives as noted in the HPI. Weakness, fall. All other systems reviewed and negative. PHYSICAL EXAM PERFORMED:    /70   Pulse 103   Temp 97.6 °F (36.4 °C) (Oral)   Resp 15   Ht 5' 8\" (1.727 m)   Wt 170 lb (77.1 kg)   SpO2 96%   BMI 25.85 kg/m²     General appearance:  No apparent distress, appears stated age and cooperative. HEENT:  Normal cephalic, atraumatic without obvious deformity. Pupils equal, round, and reactive to light. Extra ocular muscles intact. Conjunctivae/corneas clear. Neck: Supple, with full range of motion. No jugular venous distention. Trachea midline. Respiratory:  Normal respiratory effort. Clear to auscultation, bilaterally without Rales/Wheezes/Rhonchi. Cardiovascular:  Regular rate and rhythm with normal S1/S2 without murmurs, rubs or gallops. Abdomen: Soft, non-tender, non-distended with normal bowel sounds. Has bilateral flank tenderness. No rebound tenderness. Musculoskeletal:  No clubbing, cyanosis or edema bilaterally. Full range of motion without deformity. Skin: Skin color, texture, turgor normal.  No rashes or lesions. Neurologic:  Neurovascularly intact without any focal sensory/motor deficits. Cranial nerves: II-XII intact, grossly non-focal.  Psychiatric:  Alert pleasantly confused. Poor insight  Capillary Refill: Brisk,3 seconds, normal  Peripheral Pulses: +2 palpable, equal bilaterally       Labs:     Recent Labs     01/07/22 0157   WBC 11.1*   HGB 15.0   HCT 46.9        Recent Labs     01/07/22 0157      K 4.1      CO2 21   BUN 48*   CREATININE 2.4*   CALCIUM 10.2     Recent Labs     01/07/22 0157   AST 20   ALT 17   BILITOT 0.3   ALKPHOS 78     No results for input(s): INR in the last 72 hours.   Recent Labs     01/07/22 0157   TROPONINI 0.02*       Urinalysis:      Lab Results   Component Value Date    NITRU Negative 01/07/2022    WBCUA 3-5 01/07/2022    BACTERIA 2+ 01/07/2022    RBCUA 21-50 01/07/2022    BLOODU IV antibiotics now. If patient clinically improves, we will consider a total 10-14 course of oral cephalosporins. Acute renal failure  Baseline creatinine 1.0. Today's creatinine is 2.4  Probably poor perfusion with UTI and acute illness  Started IV fluids. Monitor BMP daily    Chronic atrial fibrillation  High risk for CVA. Continue Eliquis. May need to reassess the indications if patient has more falls. Currently in AF. Monitor on tele. Not RVR at this time    Hypertension  Hold lisinopril given acute renal failure. Continue the rest of antihypertensives and monitor BP    Abnormal CXR  Recent COVID. No fever or SOB. On room air. Doubt pneumonia  Will check procalcitonin    Elevated troponin  Unlikely ACS. I believe this is demand ischemia for acute illness  Will check two more troponin levels to establish trend. Nothing further needed if curve remains flat    D/w nursing    DVT Prophylaxis: Eliquis  Diet: NPO pending SLP. Code Status: DNR-CC    PT/OT Eval Status: Ordered    Dispo - Inpatient pending improvement of infection and renal function stabilization. 2-3 days likely       Tennie Scheuermann, MD    Thank you Kristen Alvarez MD for the opportunity to be involved in this patient's care. If you have any questions or concerns please feel free to contact me at [phone].

## 2022-01-07 NOTE — ED NOTES
Dr. Meri Posada at bedside told to hold off on oral medications until Speech therapy comes to see pt      Mary Cavazos, RN  01/07/22 1300

## 2022-01-07 NOTE — ED NOTES
Per speech therapist patient ok to take crushed PO med in applesauce.       Alli Garcia RN  01/07/22 6932

## 2022-01-08 NOTE — PROGRESS NOTES
Pt admitted to C3 in stable condition. Oriented to nurse, room, call light. Pt oriented to self only. Unable to answer questions. Pt just repeats \"yes\" after all inquiries. Will continue to monitor. Call light in reach, bed check on and active.

## 2022-01-08 NOTE — PROGRESS NOTES
[facility] Progress Note           Subjective/   80y.o. year old male who we are seeing in consultation for JACKELYN. HPI:  Renal function better with IVF's, urine output of 300 ml over last 24 hours. Intake reduced. ROS:  No shortness of breath, +confused. Objective/   GEN:  Chronically ill, BP (!) 170/80   Pulse 83   Temp 98.3 °F (36.8 °C) (Oral)   Resp 17   Ht 5' 8\" (1.727 m)   Wt 189 lb 2.5 oz (85.8 kg)   SpO2 93%   BMI 28.76 kg/m²   HEENT: non-icteric, no JVD  CV: S1, S2 without m/r/g; no LE edema  RESP: CTA B without w/r/r; breathing wnl  ABD: +bs, soft, nt, no hsm  SKIN: warm, no rashes    Data/  Recent Labs     01/07/22  0157   WBC 11.1*   HGB 15.0   HCT 46.9   MCV 96.0        Recent Labs     01/07/22  0157 01/08/22  0857    148*   K 4.1 4.4    116*   CO2 21 22   GLUCOSE 159* 89   BUN 48* 40*   CREATININE 2.4* 1.4*   LABGLOM 26* 52*   GFRAA 32* 59*       Assessment/     - Acute kidney injury - pre-renal in setting of sepsis/acute pyelonephritis, hypotension     - Acute pyelonephritis - on ceftriaxone    - Hypernatremia     - Hypertension - running on lower end on admission, now high     - Atrial fibrillation    Plan/     - Change IVF's to 1/2  ml/hour  - Add amlodipine for better BP control  - Lisinopril on hold  - Trend labs, bp's, & urine output     ____________________________________  Ramana Cabrera MD  [facility]  [email or web address]  Office: [phone]

## 2022-01-08 NOTE — PLAN OF CARE
Problem: Nutrition  Intervention: Swallowing evaluation  1/7/2022 1437 by MARIANO Thomas  Note: Bedside swallow evaluation completed this date. Dawna Buckner M.S. Michele  Speech-language pathologist  [id number]      Intervention: Aspiration precautions  1/7/2022 1437 by MARIANO Thomas  Note: Bedside swallow evaluation completed this date. CALIXTO Thomas  Speech-language pathologist  [id number]         Problem: Airway Clearance - Ineffective  Goal: Achieve or maintain patent airway  Outcome: Ongoing     Problem: Breathing Pattern - Ineffective  Goal: Ability to achieve and maintain a regular respiratory rate  Outcome: Ongoing     Problem:  Body Temperature -  Risk of, Imbalanced  Goal: Ability to maintain a body temperature within defined limits  Outcome: Ongoing  Goal: Will regain or maintain usual level of consciousness  Outcome: Ongoing  Goal: Complications related to the disease process, condition or treatment will be avoided or minimized  Outcome: Ongoing

## 2022-01-08 NOTE — PROGRESS NOTES
Speech Language Pathology  Facility/Department: 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 \"     Pain: denies    Current Diet: Diet NPO Exceptions are: Ice Chips, Sips of Water with Meds, Sips of Clear Liquids    Diet Tolerance:  Patient tolerating current diet level without signs/symptoms of penetration / aspiration. P.O. Trials: Thin   x Cup sip x 12   Nectar / Mildly Thick   x Cup sip x 10   Puree   x X 15   Solid   x X 6     Dysphagia Treatment and Impressions:  RN okays SLP entry into pt's room. Wife comes into room as SLP beginning assessment. RR 12/min prior to po trials with O2 sat of 92% on RA. Pt not following commands for oral motor assessment due to confusion. Pt has dementia, per wife. Pt has right oral motor deficits as a result of previous CVA. Suspected aspiration of thin liquids with pt exhibiting wet vocal quality and delayed wet coughing after swallow.    Pt also noted to be somewhat impulsive with thin liquids via cup, taking several sips in a row. Mild anterior loss noted with thins likely due to right labial weakness. RR increased to 16/min following thin liquid trials. No overt s/s of aspiration noted with nectar thick liquids this date. Pt exhibits no coughing post swallow and vocal quality remains dry and clear. Pt tolerates pureed and regular food textures without overt s/s of aspiration. No gross oral residuals post swallow. RR stable at 14/min following po trials with O2 sat of 93%. Given appearance CXR on 01/07/22:  Patchy airspace opacities in the right lung are new in comparison to   12/25/2021, and concerning for multifocal pneumonia. SLP would recommend completion of MBS to evaluate pharyngeal phase of swallow. Dysphagia Goals:  Timeframe for Long-term Goals: 7 days (1/14/22)  Goal 1: The pt will tolerate safest and least restrictive diet without clinical s/s of aspiration or change in respiratory status. Today, 01/08: Progressing. Ongoing. Short-term Goals  Timeframe for Short-term Goals: 5 days (1/12/22)  1. The patient will tolerate recommended diet without observed clinical signs of aspiration, Today, 01/08: Progressing. Ongoing. 2. The patient/caregiver will demonstrate understanding of compensatory strategies for improved swallowing safety. , Today, 01/08: Progressing. Ongoing. 3. The patient will tolerate repeat BSE when able. Today, 01/08: Progressing. Ongoing. Recommendations:  Solid Consistency: advance to REGULAR  Liquid Consistency: advance to NECTAR THICK  Medication: whole in puree    Recommend completion of Modified Barium swallow study    Patient/Family/Caregiver Education: reviewed recommendations, aspiration precautions, and plan of care with pt, wife, RN    Compensatory Strategies:   · Small bites/sips  · Fully upright for all po intake  · No straws  · External pacing    Plan:    Continued Dysphagia treatment with goals per plan of care.     Discharge Recommendations: defer to PT/OT    If pt discharges from hospital prior to Speech/Swallowing discharge, this note serves as tx and discharge summary. Total Treatment Time / Charges     Time in Time out Total Time / units   Cognitive Tx         Speech Tx      Dysphagia Tx  [phone]  25 min / 1 unit     Signature:    Ivette Avila [facility], [address] [facility] [id number]  Speech-Language Pathologist

## 2022-01-08 NOTE — PROGRESS NOTES
SLP completed swallowing evaluation. Recommend advancing to regular food textures with nectar thick liquids. Also recommend modified barium swallow study.   Ordering by cross cover Lilly Leo MD  Cross-Cover Hospitalist

## 2022-01-08 NOTE — PROGRESS NOTES
Patient in bed awake. A/O to self. Assessment completed and charted. Respirations even and unlabored. Denies pain. Per Dr. Ruddy Pickett patient can be out of isolation precaution. Bed in lowest position and locked. Call light in reach.

## 2022-01-08 NOTE — PROGRESS NOTES
Hospitalist Progress Note      PCP: Christelle Gloria MD    Date of Admission: 1/7/2022    Chief Complaint: Confusion    Hospital Course: Admitted with progressive confusion and acute renal failure in addition to acute pyelonephritis. Creatinine is better. Mental status is still showing confusion. Sodium is slightly higher than normal.    Subjective: Denies pain. No chest pain or shortness of breath. Confused      Medications:  Reviewed    Infusion Medications    sodium chloride      sodium chloride 75 mL/hr at 01/07/22 1905     Scheduled Medications    cefTRIAXone (ROCEPHIN) IV  1,000 mg IntraVENous Q24H    metoprolol tartrate  25 mg Oral BID    donepezil  10 mg Oral Nightly    pantoprazole  40 mg Oral QAM AC    apixaban  2.5 mg Oral BID    atorvastatin  20 mg Oral Daily    tamsulosin  0.4 mg Oral Daily    divalproex  250 mg Oral 2 times per day     PRN Meds: sodium chloride flush, sodium chloride, ondansetron **OR** ondansetron, acetaminophen **OR** acetaminophen, melatonin      Intake/Output Summary (Last 24 hours) at 1/8/2022 1335  Last data filed at 1/8/2022 1328  Gross per 24 hour   Intake 0 ml   Output 300 ml   Net -300 ml       Physical Exam Performed:    BP (!) 175/87   Pulse 70   Temp 98.1 °F (36.7 °C) (Oral)   Resp 16   Ht 5' 8\" (1.727 m)   Wt 189 lb 2.5 oz (85.8 kg)   SpO2 95%   BMI 28.76 kg/m²     General appearance: No apparent distress, appears stated age and cooperative. HEENT: Pupils equal, round, and reactive to light. Conjunctivae/corneas clear. Neck: Supple, with full range of motion. No jugular venous distention. Trachea midline. Respiratory:  Normal respiratory effort. Clear to auscultation, bilaterally without Rales/Wheezes/Rhonchi. Cardiovascular: Regular rate and rhythm with normal S1/S2 without murmurs, rubs or gallops. Abdomen: Soft, non-tender, non-distended with normal bowel sounds. Yesterday's flank tenderness is no longer detectable.   Musculoskeletal: No clubbing, cyanosis or edema bilaterally. Full range of motion without deformity. Skin: Skin color, texture, turgor normal.  No rashes or lesions. Neurologic:  Neurovascularly intact without any focal sensory/motor deficits. Cranial nerves: II-XII intact, grossly non-focal.  Psychiatric: Alert and confused   Capillary Refill: Brisk,3 seconds, normal   Peripheral Pulses: +2 palpable, equal bilaterally       Labs:   Recent Labs     01/07/22  0157   WBC 11.1*   HGB 15.0   HCT 46.9        Recent Labs     01/07/22  0157 01/08/22  0857    148*   K 4.1 4.4    116*   CO2 21 22   BUN 48* 40*   CREATININE 2.4* 1.4*   CALCIUM 10.2 10.1     Recent Labs     01/07/22  0157   AST 20   ALT 17   BILITOT 0.3   ALKPHOS 78     No results for input(s): INR in the last 72 hours. Recent Labs     01/07/22  0157 01/07/22  1003 01/07/22  1240   TROPONINI 0.02* 0.02* 0.01       Urinalysis:      Lab Results   Component Value Date    NITRU Negative 01/07/2022    WBCUA 3-5 01/07/2022    BACTERIA 2+ 01/07/2022    RBCUA 21-50 01/07/2022    BLOODU SMALL 01/07/2022    SPECGRAV 1.025 01/07/2022    GLUCOSEU Negative 01/07/2022       Radiology:  CT ABDOMEN PELVIS WO CONTRAST Additional Contrast? None   Final Result   2-3 mm stone distal left ureter. No hydronephrosis on the left. No stones   remain in the left kidney. Mild injection of the fat surrounding the bladder. Recommend correlation   with urinalysis to exclude cystitis      Patchy airspace disease at the lung bases, suspicious for pneumonia. Diverticulosis. No diverticulitis      RECOMMENDATIONS:   Unavailable         XR CHEST PORTABLE   Final Result   Patchy airspace opacities in the right lung are new in comparison to   12/25/2021, and concerning for multifocal pneumonia. Stable cardiomegaly with central pulmonary vascular distension. CT Head WO Contrast   Final Result   No acute intracranial abnormality.       No acute fracture or traumatic malalignment of the cervical spine. RECOMMENDATIONS:   Unavailable         CT Cervical Spine WO Contrast   Final Result   No acute intracranial abnormality. No acute fracture or traumatic malalignment of the cervical spine. RECOMMENDATIONS:   Unavailable                 Assessment/Plan:    Active Hospital Problems    Diagnosis     Dysphagia following cerebrovascular accident [I69.391]      Priority: High     Class: Present on Admission    Acute pyelonephritis [N10]     PAF (paroxysmal atrial fibrillation) (HCC) [I48.0]     HTN (hypertension), benign [I10]      PLAN    Acute pyelonephritis  UA and imaging making the diagnosis. Also noted bilateral flank tenderness yesterday, but none today  No sepsis  No growth on urine culture as expected. Continue IV antibiotics now. If patient clinically improves, we will consider a total 10-14 course of oral cephalosporins.     Acute renal failure  Baseline creatinine 1.0.  Peak creatinine 2.4, today's creatinine is 1.4. Probably poor perfusion with UTI and acute illness  Started IV fluids. Monitor BMP daily  Noted sodium level is elevated today, probably as the patient is n.p.o. Dysphagia  Remains n.p.o. Poor oral intake may have caused patient's acute renal failure. Continue to monitor for now with speech therapy follow-up     Chronic atrial fibrillation  High risk for CVA. Continue Eliquis. May need to reassess the indications if patient has more falls. Currently in AF. Monitor on tele. Heart rate is controlled     Hypertension  Holding lisinopril given acute renal failure. Continue the rest of antihypertensives and monitor BP. Today's blood pressure is higher than baseline, but I find it acceptable.     Abnormal CXR  Recent COVID. No fever or SOB. On room air. Doubt pneumonia  Procalcitonin is negative.     Elevated troponin  Unlikely ACS. I believe this is demand ischemia for acute illness  Troponin trend is consistent with demand ischemia. Nothing further        DVT Prophylaxis: Eliquis  Diet: Diet NPO Exceptions are: Ice Chips, Sips of Water with Meds, Sips of Clear Liquids  Code Status: DNR-CC    PT/OT Eval Status: Requested    Dispo -inpatient stay pending improvement    Laine Goldmann, MD

## 2022-01-09 NOTE — PLAN OF CARE
Pt is a fall risk. Bed in lowest position with wheels locked and side rails x2. Non slip socks and bed alarm on. Call light and bedside table within reach. Will continue to monitor.

## 2022-01-09 NOTE — PROGRESS NOTES
Patient in bed awake. A/O to self. Assessment completed and charted. Respirations even and unlabored. Denies pain. Per Dr. Toney Manifold in lowest position and locked. Call light in reach.

## 2022-01-09 NOTE — CARE COORDINATION
Call from pt dtr Deepthi Valero re: [facility] bed that was delivered to [facility] previous stay. [facility] indicates the needed bed is a bariatric low bed with no rails and indicated medically for safety. Deepthi Valero also states wheelchair that was delivered by [facility] has a brake on wheel that is too far back for pt to use alone. [facility]. Deepthi Valero has left [facility] a vm today and CM will also leave a vm regarding same. Attempt to call pt spouse per Deepthi Valero request- no answer.

## 2022-01-09 NOTE — PROGRESS NOTES
[facility] Progress Note           Subjective/   80y.o. year old male who we are seeing in consultation for JACKELYN. HPI:  Renal function better with IVF's, urine output of 300 ml over last 24 hours. Intake reduced. ROS:  No shortness of breath, +confused. Objective/   GEN:  Chronically ill, BP (!) 148/72   Pulse 85   Temp 98 °F (36.7 °C) (Oral)   Resp 19   Ht 5' 8\" (1.727 m)   Wt 191 lb 5.8 oz (86.8 kg)   SpO2 92%   BMI 29.10 kg/m²   HEENT: non-icteric, no JVD  CV: S1, S2 without m/r/g; no LE edema  RESP: CTA B without w/r/r; breathing wnl  ABD: +bs, soft, nt, no hsm  SKIN: warm, no rashes    Data/  Recent Labs     01/07/22 0157   WBC 11.1*   HGB 15.0   HCT 46.9   MCV 96.0        Recent Labs     01/07/22  0157 01/08/22  0857 01/09/22  0652    148* 143   K 4.1 4.4 4.2    116* 111*   CO2 21 22 21   GLUCOSE 159* 89 92   BUN 48* 40* 29*   CREATININE 2.4* 1.4* 1.2   LABGLOM 26* 49* 58*   GFRAA 32* 59* >60       Assessment/     - Acute kidney injury - pre-renal in setting of sepsis/acute pyelonephritis, hypotension   Improving with IVF's     - Acute pyelonephritis - on ceftriaxone    - Hypernatremia     - Hypertension - running on lower end on admission, now high     - Atrial fibrillation    Plan/     - Changed IVF's to 1/2 NS now at 50 ml/hour  - Added amlodipine for better BP control  - Lisinopril on hold  - Trend labs, bp's, & urine output     ____________________________________  Deena Opitz, MD  [facility]  [email or web address]  Office: [phone]

## 2022-01-09 NOTE — PROGRESS NOTES
Assessment complete and charted earlier in shift. Tachycardia and HTN noted requiring IV metoprolol x1. Pt remains afebrile tonight. Unable to strictly monitor I&Os d/t incontinence.

## 2022-01-10 NOTE — PROGRESS NOTES
[facility] Progress Note           Subjective/   80y.o. year old male who we are seeing in consultation for JACKELYN. HPI:  Renal function better with IVF's, urine output of 400 ml over last 24 hours. Intake better. ROS:  No shortness of breath, +weak. Objective/   GEN:  Chronically ill, BP (!) 178/77   Pulse 71   Temp 97.9 °F (36.6 °C) (Oral)   Resp 16   Ht 5' 8\" (1.727 m)   Wt 181 lb 10.5 oz (82.4 kg)   SpO2 92%   BMI 27.62 kg/m²   HEENT: non-icteric, no JVD  CV: S1, S2 without m/r/g; no LE edema  RESP: CTA B without w/r/r; breathing wnl  ABD: +bs, soft, nt, no hsm  SKIN: warm, no rashes    Data/  Recent Labs     01/10/22  0522   WBC 9.1   HGB 13.1*   HCT 39.6*   MCV 93.1        Recent Labs     01/08/22  0857 01/09/22  0652 01/10/22  0522   * 143 144   K 4.4 4.2 4.0   * 111* 112*   CO2 22 21 22   GLUCOSE 89 92 92   BUN 40* 29* 22*   CREATININE 1.4* 1.2 1.1   LABGLOM 49* 58* >60   GFRAA 59* >60 >60       Assessment/     - Acute kidney injury - pre-renal in setting of sepsis/acute pyelonephritis, hypotension   Improving with IVF's     - Acute pyelonephritis - on ceftriaxone    - Hypernatremia     - Hypertension - running on lower end on admission, now high     - Atrial fibrillation    Plan/     - Can monitor off of IVF's today with improved intake  - Added amlodipine for better BP control, lisinopril resumed  - Trend labs, bp's, & urine output    No further recommendations at this time  Will sign off for now  Call with questions  Okay for discharge  Repeat labs in one week on LIV     ____________________________________  Lety Douglas MD  [facility]  [email or web address]  Office: [phone]

## 2022-01-10 NOTE — CONSULTS
Consult placed    Lamon Alpers  Date:1/10/2022,  Time:8:05 AM        Electronically signed by Eva Parson on 1/10/2022 at 8:05 AM

## 2022-01-10 NOTE — PROCEDURES
INSTRUMENTAL SWALLOW REPORT  MODIFIED BARIUM SWALLOW      Recommended Diet:  Solid consistency: Dysphagia Soft and Bite-Sized (Dysphagia III)  Liquid consistency: Mildly Thick (Nectar) (Avoid straws)  Liquid administration via: Cup  Medication administration: Meds in puree      NAME: Patricia Wagner   : 1940  MRN: [id number]       Date of Eval: 1/10/2022     Ordering Physician: Dr. Shirley Calles  Radiologist: Dr. Linda Bowles     Referring Diagnosis(es): Referring Diagnosis: Dysphagia    Past Medical History:  has a past medical history of Hyperlipemia, Hypertension, Prostate enlargement, and Unspecified cerebral artery occlusion with cerebral infarction. Past Surgical History:  has a past surgical history that includes Carotid endarterectomy; Foot surgery; Skin cancer excision; Eye surgery; Facial reconstruction surgery; Tonsillectomy; Colonoscopy; Colonoscopy (); and Mohs surgery. Current Diet Solid Consistency: Regular  Current Diet Liquid Consistency: Mildly Thick (Nectar)    Date of Prior Study: n/a  Type of Study: Initial MBS  Results of Prior Study: n/a    Recent CXR (22): Impression   Patchy airspace opacities in the right lung are new in comparison to   2021, and concerning for multifocal pneumonia.       Stable cardiomegaly with central pulmonary vascular distension. Patient Complaints/Reason for Referral:  Patricia Wagner was referred for a MBS to assess the efficiency of his/her swallow function, assess for aspiration, and to make recommendations regarding safe dietary consistencies, effective compensatory strategies, and safe eating environment. Patient complaints: Per MD H&P, \"71 y.o. male who presented to [facility] after an unwitnessed fall. Found on the floor at the skilled nursing facility where he was recently discharged after a hospital stay for COVID infection. Baseline confused. Poor historian due to dementia.  Does not remember the episode and cannot tell if the fall was preceded or followed by any symptoms. In the ED patient was found to have acute renal failure and suspected UTI. CT abdomen was also consistent with acute pyelonephritis based on perinephric stranding  Started on IV fluids and antibiotics  No other accompanying symptoms  Nothing else that makes the patient feel better or worse\". General Comment: Pt admitted d/t fall, JACKELYN, multifocal PNA. Pt has PMHx of dementia, CVA, and COVID-19 per chart. Behavior/Cognition/Vision/Hearing:  Behavior/Cognition: Alert; Cooperative;Confused; Requires cueing  Vision: Within Functional Limits  Hearing: Exceptions to [facility]  Hearing Exceptions: Hard of hearing/hearing concerns    Impressions:  Treatment Dx: Dysphagia  Pt's oral phase deficits characterized by weak lingual manipulation and reduced bolus control & cohesion resulting in rapid premature bolus loss to the pharynx with all PO trials. Pt's pharyngeal phase deficits characterized by delayed swallow initiation, poor airway/laryngeal closure, and reduced hyolaryngeal excursion. Pt with episodes of deep and shallow penetration with thin liquid trials that partially cleared with the swallow (increased depth of penetration with straws; to level of the vocal folds that remained throughout study). Pt unable to clear episodes of penetration despite cued cough. No tia aspiration observed (*difficult to fully visualize d/t significant shoulder shadow with lateral view despite multiple attempts at repositioning). Pt completed chin tuck strategy with thin liquid with shallow, completely self-clearing penetration during the swallow, no aspiration. D/t pt's cognitive deficits with PMHx of dementia per chart, suspect pt would be unable to consistently use chin tuck strategy. Instances of shallow, completely self-clearing penetration observed with nectar (mildly thick) liquids via cup, no aspiration. No penetration/aspiration observed with solid PO trials. Consistent min residue noted along BOT and valleculae with all PO, increased to moderate with solid PO trials. Pt able to complete cued second swallow which successfully cleared majority of residue. Recommend continuing thickened liquids (nectar/mildly thick) at this time d/t pt's CXR on admission, penetration to vocal folds with thin liquid that did not clear, and PMHx of dementia. Pt may benefit from intermittent cues during meals to follow compensatory swallow strategies for improved safety/swallow function. Oral Preparation / Oral Phase  Impaired  Oral Phase - Major Contributing Deficits  Weak Lingual Manipulation: All  Lingual Pumping: Reg solid  Reduced Posterior Propulsion: Reg solid  Reduced Bolus Control: All  Decreased Bolus Cohesion: All  Premature Bolus Loss to Pharynx: All    Pharyngeal Phase  Impaired  Pharyngeal Phase - Major Contributing Deficits  Delayed Swallow Initiation: All  Premature Spillage to Valleculae: All  Premature Spillage to Pyriform: Thin straw; Thin cup;Nectar straw;Nectar cup  Reduced Pharyngeal Peristalsis: All  Delayed Epiglottic Retraction: All  Reduced Laryngeal Elevation: All  Reduced Anterior Laryngeal Movement: All  Pooling Valleculae: All  Pooling Pyriform: Thin straw  Reduced Airway/laryngeal Closure: All  Reduced Tongue Base Retraction: All  Shallow Penetration During: Thin cup;Nectar cup;Nectar straw  Complete Self-clearing (shallow): Nectar cup;Nectar straw  Parital Self-clearing (shallow): Thin cup  Deep Penetration During: Thin straw (To level of the vocal folds; remained throughout study despite cued cough)  Partial Retrieval (deep): Thin straw  Pharyngeal Residue - Valleculae: All  Pharyngeal Residue - Pyriform: All  Pharyngeal Wall - Weakness:  All    Esophageal Phase  Impaired  Upper Esophageal Screen- Major Contributing Deficits  Decreased Esophageal Peristalsis: All         Patient Position: Lateral and Patient Degrees: [facility]; significant shoulder shadow noted with lateral view despite cues/attempts at repositioning    Consistencies Administered: Reg solid; Dysphagia Pureed (Dysphagia I); Thin cup; Thin straw;Nectar cup;Nectar straw    Compensatory Swallowing Strategies Attempted: Chin tuck  Postural Changes and/or Swallow Maneuvers Trialed: Solis lopez    Dysphagia Outcome Severity Scale: Level 3: Moderate dysphagia- Total assisstance, supervision or strategies. Two or more diet consistencies restricted  Penetration-Aspiration Scale (PAS): 5 - Material enters the airway, contacts the vocal folds, and is not ejected into the airway    Recommended Diet:  Solid consistency: Dysphagia Soft and Bite-Sized (Dysphagia III)  Liquid consistency: Mildly Thick (Nectar) (Avoid straws)  Liquid administration via: Cup  Medication administration: Meds in puree    Safe Swallow Protocol:  Supervision: Distant  Compensatory Swallowing Strategies: Eat/Feed slowly;Upright as possible for all oral intake;Remain upright for 30-45 minutes after meals;Small bites/sips; No straws; Alternate solids and liquids    Recommendations/Treatment  Requires SLP Intervention: Yes  D/C Recommendations: To be determined;SNF    Recommended Exercises:    Therapeutic Interventions: Diet tolerance monitoring;Oral care; Therapeutic PO trials with SLP;Patient/Family education    Education: Images and recommendations were reviewed with pt and RN following this exam.   Patient Education: Pt educated on MBSS procedure, nature of oropharyngeal deficits, assessment results and recommendations. Patient Education Response: Needs reinforcement; No evidence of learning;Verbalizes understanding    Prognosis  Prognosis for safe diet advancement: fair  Barriers to reach goals: age;cognitive deficits;inconsistent alertness;fatigue  Duration/Frequency of Treatment  Duration/Frequency of Treatment: 3-5x/week for LOS  Safety Devices  Safety Devices in place: Not Applicable    Goals:    Long Term: Timeframe for Long-term Goals: 7 days (1/14/22)  Goal 1: The pt will tolerate safest and least restrictive diet without clinical s/s of aspiration or change in respiratory status. Short Term:  Dysphagia Goals: The patient will tolerate recommended diet without observed clinical signs of aspiration; The patient/caregiver will demonstrate understanding of compensatory strategies for improved swallowing safety. ;The patient will tolerate repeat BSE when able.  ;The patient will tolerate instrumental swallowing procedure      Pain   Patient Currently in Pain: No      Therapy Time:   Individual Concurrent Group Co-treatment   Time In 1005         Time Out 1028         Minutes 23           MBSS procedure and dysphagia tx    Joelle Thomas M.S. Lilly Levi  Speech-language pathologist  [id number]  Speech Desk Phone: [phone]

## 2022-01-10 NOTE — PROGRESS NOTES
Hospitalist Progress Note      PCP: Kristen Alvarez MD    Date of Admission: 1/7/2022    Chief Complaint: Confusion      Subjective:  He is wide awake, disoriented, has no insight. Looks comfortable, says he feels \"good. \"  Denies complaints. Medications:  Reviewed    Infusion Medications    sodium chloride 50 mL/hr at 01/09/22 1108    sodium chloride       Scheduled Medications    lisinopril  20 mg Oral Daily    atorvastatin  40 mg Oral Daily    amLODIPine  5 mg Oral Daily    metoprolol tartrate  25 mg Oral BID    donepezil  10 mg Oral Nightly    pantoprazole  40 mg Oral QAM AC    apixaban  2.5 mg Oral BID    tamsulosin  0.4 mg Oral Daily    divalproex  250 mg Oral 2 times per day     PRN Meds: magnesium sulfate, potassium chloride **OR** potassium alternative oral replacement **OR** potassium chloride, metoprolol, sodium chloride flush, sodium chloride, ondansetron **OR** ondansetron, acetaminophen **OR** acetaminophen, melatonin      Intake/Output Summary (Last 24 hours) at 1/10/2022 0756  Last data filed at 1/10/2022 0558  Gross per 24 hour   Intake 1798 ml   Output 400 ml   Net 1398 ml       Physical Exam Performed:    BP (!) 168/95 Comment: was just moving around, incontinent  Pulse 88   Temp 98 °F (36.7 °C) (Oral)   Resp 18   Ht 5' 8\" (1.727 m)   Wt 181 lb 10.5 oz (82.4 kg)   SpO2 93%   BMI 27.62 kg/m²       General appearance: No apparent distress, appears stated age and cooperative. HEENT: Pupils equal, round, and reactive to light. Conjunctivae/corneas clear. Neck: Supple, with full range of motion. No jugular venous distention. Trachea midline. Respiratory:  Normal respiratory effort. Clear to auscultation, bilaterally without Rales/Wheezes/Rhonchi. Cardiovascular:  Irregularly irregular rhythm with normal S1/S2 without murmurs, rubs or gallops. Abdomen: Soft, non-tender, non-distended with normal bowel sounds. No flank tenderness.   Musculoskeletal: No clubbing, cyanosis or edema bilaterally. Full range of motion without deformity. Skin: Skin color, texture, turgor normal.  No rashes or lesions. Neurologic:  Neurovascularly intact without any focal sensory/motor deficits. Cranial nerves: II-XII intact, grossly non-focal.  Psychiatric:  Alert, mostly disoriented, does not have insight  Capillary Refill: Brisk,3 seconds, normal   Peripheral Pulses: +2 palpable, equal bilaterally       Labs:   Recent Labs     01/10/22  0522   WBC 9.1   HGB 13.1*   HCT 39.6*        Recent Labs     01/08/22  0857 01/09/22  0652 01/10/22  0522   * 143 144   K 4.4 4.2 4.0   * 111* 112*   CO2 22 21 22   BUN 40* 29* 22*   CREATININE 1.4* 1.2 1.1   CALCIUM 10.1 9.7 9.4     Recent Labs     01/09/22  0652 01/10/22  0522   AST 29 18   ALT 17 14   BILITOT 0.3 0.3   ALKPHOS 78 67     No results for input(s): INR in the last 72 hours. Recent Labs     01/07/22  1003 01/07/22  1240   TROPONINI 0.02* 0.01       Urinalysis:      Lab Results   Component Value Date    NITRU Negative 01/07/2022    WBCUA 3-5 01/07/2022    BACTERIA 2+ 01/07/2022    RBCUA 21-50 01/07/2022    BLOODU SMALL 01/07/2022    SPECGRAV 1.025 01/07/2022    GLUCOSEU Negative 01/07/2022       Radiology:  CT ABDOMEN PELVIS WO CONTRAST Additional Contrast? None   Final Result   2-3 mm stone distal left ureter. No hydronephrosis on the left. No stones   remain in the left kidney. Mild injection of the fat surrounding the bladder. Recommend correlation   with urinalysis to exclude cystitis      Patchy airspace disease at the lung bases, suspicious for pneumonia. Diverticulosis. No diverticulitis      RECOMMENDATIONS:   Unavailable         XR CHEST PORTABLE   Final Result   Patchy airspace opacities in the right lung are new in comparison to   12/25/2021, and concerning for multifocal pneumonia. Stable cardiomegaly with central pulmonary vascular distension.          CT Head WO Contrast   Final Result   No acute intracranial abnormality. No acute fracture or traumatic malalignment of the cervical spine. RECOMMENDATIONS:   Unavailable         CT Cervical Spine WO Contrast   Final Result   No acute intracranial abnormality. No acute fracture or traumatic malalignment of the cervical spine. 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 male who presented to [facility] after an unwitnessed fall. Found on the floor at the skilled nursing facility where he was recently discharged after a hospital stay for COVID infection. Baseline confused. Poor historian due to dementia. Does not remember the episode and cannot tell if the fall was preceded or followed by any symptoms. In the ED patient was found to have acute renal failure. \"        UTI ruled out. Patient had no pyuria, no fever, only a slight, transient leukocytosis, no perinephric stranding on CT, and a negative urine culture. Stopped ceftriaxone after 4 days. Abnormal CXR due to recent COVID. Clinically no pneumonia. Procalcitonin negative. No isolation. Small 2-3 mm distal L ureteral stone, without hydronephrosis. Patient did complain of L flank pain. Seemingly did well with supportive care. Urology consulted to help decide whether any further imaging or follow up will be necessary. JACKELYN, probably from dehydration and poor PO intake related to his ureteral stone. Responded to IVFs. Acute metabolic encephalopathy, due to JACKELYN and dehydration. Underlying significant dementia. I do not suspect CVA in this instance.   Noted his reassuring carotid US and TTE a few months ago. Continue aspirin, increased statin. Dysphagia. Diet modified by SLP after MBS. Chronic Afib. Continue apixaban despite his fall risk. Very high risk of recurrent CVA. Metoprolol. HTN. Resumed lisinopril after Cr improved. Amlodipine per nephrology. DVT Prophylaxis: anticoagulation as above  Diet: ADULT DIET; Regular; Mildly Thick (Nectar); No Drinking Straws  Code Status: DNR-CC    PT/OT Eval Status: eval ordered    Dispo - likely ready 1/11, pending urology and PT/OT input. He came from SNF.       Carmelina Pinon MD

## 2022-01-10 NOTE — PROGRESS NOTES
Assessment complete and charted earlier in shift. Pt did require IV metoprolol x1 tonight for HTN. Pt with incontinence Q2H for the most part. Urine appears light yellow in brief. Pt denies s/s of pyelo currently. Pt remains afebrile. Call light within reach, will continue to monitor.

## 2022-01-10 NOTE — CARE COORDINATION
Writer received call from spouse requesting Beri bed for return to facility, has hospital bed at facility currently. Writer received in reports possible need for alternate bed. Writer placed call to [facility] to clarify per facility would like to have beri bed, yes. Hopes to prevent falls. Writer placed call to DME providers re request. Marden Rubinstein to complete re does not meet wt/ht/medical need criteria. Per [employer] would be $247.19 month to rent at private cost. Family with concerns of cost. Valentino Lauren reached out to facility re alternative options scoop mattress, bolsters etc. Not in agreement reports have worked with DME in past and successful with getting beri bed covered re fall intervention unable to report name of provider that successful would do so. After much back and forth is agreeable to return with alternative fall interventions. Writer edu facility on current level of function re max assist x2 and altered diet. Reports can meet needs at facility. Writer spoke with spouse and reviewed above. Writer provided edu on hospice/ palliative care reextra layer of support as had multiple hospitalization and has decline mentally/pshyically. Spouse thinking over. SW will ct to follow for [facility] needs. RADHA Lockhart

## 2022-01-10 NOTE — CONSULTS
Patient:  Tran Pearce  YOB: 1940   CSN:  [id number]    Referring Provider:  No ref. provider found   Primary Care Provider:  Brad iKm MD       Urology Consult Note     Reason for Consultation: 2-3 mm left distal stone     HPI:  Emanuel Kerr is a 80 y.o. male who presented with confusion, weakness after an unwitnessed fall at skilled nursing facility. Patient has a history of dementia.       Past Medical History:   Diagnosis Date    Hyperlipemia     Hypertension     Prostate enlargement     Unspecified cerebral artery occlusion with cerebral infarction 2000       Past Surgical History:   Procedure Laterality Date    CAROTID ENDARTERECTOMY      right    COLONOSCOPY      COLONOSCOPY  2014    polyps    EYE SURGERY      FACIAL RECONSTRUCTION SURGERY      metal plate under left eye    FOOT SURGERY      nerve right    MOHS SURGERY      SKIN CANCER EXCISION      face x several    TONSILLECTOMY         Medication List reviewed:     Current Facility-Administered Medications   Medication Dose Route Frequency Provider Last Rate Last Admin    lisinopril (PRINIVIL;ZESTRIL) tablet 20 mg  20 mg Oral Daily Naomia Shock, MD        atorvastatin (LIPITOR) tablet 40 mg  40 mg Oral Daily Naomia Shock, MD        magnesium sulfate 1000 mg in dextrose 5% 100 mL IVPB  1,000 mg IntraVENous PRN Naomia Shock, MD        potassium chloride (KLOR-CON M) extended release tablet 40 mEq  40 mEq Oral PRN Naomia Shock, MD        Or    potassium bicarb-citric acid (EFFER-K) effervescent tablet 40 mEq  40 mEq Oral PRN Naomia Shock, MD        Or    potassium chloride 10 mEq/100 mL IVPB (Peripheral Line)  10 mEq IntraVENous PRN Naomcarmelo Holland MD        0.45 % sodium chloride infusion   IntraVENous Continuous Ang Briones MD 50 mL/hr at 01/10/22 0844 New Bag at 01/10/22 0844    amLODIPine (NORVASC) tablet 5 mg  5 mg Oral Daily Pranay Greer MD   5 mg at 01/09/22 0817    metoprolol (LOPRESSOR) injection 5 mg  5 mg IntraVENous Q6H PRN Yolanda Jasso APRN - CNP   5 mg at 01/10/22 0002    metoprolol tartrate (LOPRESSOR) tablet 25 mg  25 mg Oral BID Joel Brantley MD   25 mg at 01/09/22 2105    donepezil (ARICEPT) tablet 10 mg  10 mg Oral Nightly Joel Brantley MD   10 mg at 01/09/22 2105    pantoprazole (PROTONIX) tablet 40 mg  40 mg Oral QAM AC Joel Brantley MD   40 mg at 01/09/22 5735    apixaban (ELIQUIS) tablet 2.5 mg  2.5 mg Oral BID Joel Brantley MD   2.5 mg at 01/09/22 2105    tamsulosin (FLOMAX) capsule 0.4 mg  0.4 mg Oral Daily Joel Brantley MD   0.4 mg at 01/09/22 0817    sodium chloride flush 0.9 % injection 10 mL  10 mL IntraVENous PRN Joel Brantley MD   10 mL at 01/09/22 2106    0.9 % sodium chloride infusion  25 mL IntraVENous PRN Joel Brantley MD        ondansetron (ZOFRAN-ODT) disintegrating tablet 4 mg  4 mg Oral Q8H PRN Joel Brantley MD        Or    ondansetron [facility]) injection 4 mg  4 mg IntraVENous Q6H PRN Joel Brantley MD        acetaminophen (TYLENOL) tablet 650 mg  650 mg Oral Q6H PRN Joel Brantley MD        Or    acetaminophen (TYLENOL) suppository 650 mg  650 mg Rectal Q6H PRN Joel Brantley MD        melatonin tablet 3 mg  3 mg Oral Nightly PRN Joel Brantley MD        divalproex (DEPAKOTE SPRINKLE) capsule 250 mg  250 mg Oral 2 times per day Debra Hassan MD   250 mg at 01/09/22 2105       Allergies   Allergen Reactions    Corn-Containing Products Other (See Comments)     Nasal congestion       History reviewed. No pertinent family history. Social History     Tobacco Use    Smoking status: Never Smoker    Smokeless tobacco: Never Used   Vaping Use    Vaping Use: Never used   Substance Use Topics    Alcohol use: No    Drug use: No         Review of Systems: A 12 point ROS was performed and was unremarkable unless listed in the history of present illness. I/O last 3 completed shifts:   In: 4472 [P.O.:750; I.V.:2406]  Out: 700 [Urine:700]    Physical Exam:  Patient Vitals for the past 8 hrs:   BP Temp Temp src Pulse Resp SpO2 Weight   01/10/22 0251 (!) 168/95 98 °F (36.7 °C) Oral 88 18 93 %    01/10/22 0246       181 lb 10.5 oz (82.4 kg)     Constitutional: pleasant female in NAD, with a nl body habitus   Eyes: pink conjunctivae, no ptosis  ENT: lips without cyanosis, normal appearance of ears and nose externally  Neck: no masses or lesions, trachea midline  Respiratory: normal respiratory movements without distress, no audible wheezes   Abdomen: soft, NTND, no masses, no rebound or guarding  Musculoskeletal: normal ROM, m.tone, no digital cyanosis, head normocephalic  Psych: normal mood and affect  : bladder not palpable, normal, no oneill catheter    Labs:    Lab Results   Component Value Date    CREATININE 1.1 01/10/2022    CREATININE 1.2 01/09/2022    CREATININE 1.4 (H) 01/08/2022     Lab Results   Component Value Date    COLORU Yellow 01/07/2022    NITRU Negative 01/07/2022    GLUCOSEU Negative 01/07/2022    KETUA Negative 01/07/2022    UROBILINOGEN 0.2 01/07/2022    BILIRUBINUR Negative 01/07/2022     Lab Results   Component Value Date    WBC 9.1 01/10/2022    HGB 13.1 (L) 01/10/2022    HCT 39.6 (L) 01/10/2022    MCV 93.1 01/10/2022     01/10/2022     Radiology: \"Imaging was independently reviewed by myself and I agree with the radiology interpretation except as noted above\"        Impression:  2-3 mm distal left ureter stone. No hydronephrosis.       Plan:  -continue flomax   -strain urine   -stable from  standpoint  -Follow up in office as outpatient            CRISTI Joe - CNP  Office: [phone]

## 2022-01-10 NOTE — PROGRESS NOTES
Physical Therapy    Facility/Department: [facility] C3 TELE/MED SURG/ONC  Initial Assessment    NAME: Ralf Oscar  : 1940  MRN: [id number]    Date of Service: 1/10/2022    Discharge Recommendations:  ECF with PT   PT Equipment Recommendations  Equipment Needed: No    Assessment   Body structures, Functions, Activity limitations: Decreased functional mobility ; Decreased balance;Decreased posture;Decreased endurance;Decreased safe awareness;Decreased cognition;Decreased coordination  Assessment: Pt is 81 yo male who presents with diagnosis of Acute Pyelonephritis. Pt with history of dementia and poor historian. Per discussion with staff, pt Ax2 for stand pivots to w/c at assisted living facility. Pt max A x 2 for sit<>stand with anabel arthur this date. Limited d/t impaired cognition, motor planning/processing, and impaired command following. Pt would benefit from continued skilled therapy to address deficits. Recommend ECF with PT at d/c. Treatment Diagnosis: impaired functional mobility  Specific instructions for Next Treatment: progress mobility as tolerated  Prognosis: Fair  Decision Making: Medium Complexity  PT Education: Goals; General Safety;PT Role;Disease Specific Education;Plan of Care; Functional Mobility Training;Transfer Training  Patient Education: Pt educated on importance of OOB mobility-- will require reinforcement d/t impaired cognition  Barriers to Learning: cognition  REQUIRES PT FOLLOW UP: Yes  Activity Tolerance  Activity Tolerance: Patient limited by fatigue;Patient limited by endurance; Patient limited by cognitive status  Activity Tolerance: Seated EOB /84, HR 73; SpO2 >90% throughout session       Patient Diagnosis(es): The primary encounter diagnosis was Fall, initial encounter. Diagnoses of JACKELNY (acute kidney injury) ([facility] [address] Elevated troponin, and Multifocal pneumonia were also pertinent to this visit.      has a past medical history of Hyperlipemia, Hypertension, Prostate enlargement, and Unspecified cerebral artery occlusion with cerebral infarction. has a past surgical history that includes Carotid endarterectomy; Foot surgery; Skin cancer excision; Eye surgery; Facial reconstruction surgery; Tonsillectomy; Colonoscopy; Colonoscopy (); and Mohs surgery. Restrictions  Restrictions/Precautions  Restrictions/Precautions: General Precautions,Fall Risk  Position Activity Restriction  Other position/activity restrictions: up in chair    Subjective  General  Chart Reviewed: Yes  Patient assessed for rehabilitation services?: Yes  Additional Pertinent Hx: dementia  Response To Previous Treatment: Not applicable  Family / Caregiver Present: No  Referring Practitioner: Dr. Ravi Ford MD  Referral Date : 01/10/22  Diagnosis: Acute Pyelonephritis  Follows Commands: Impaired  General Comment  Comments: Pt resting in bed on approach; RN cleared pt for therapy  Subjective  Subjective: pt agreeable to therapy. Limited command following  Pain Screening  Patient Currently in Pain: No    Orientation  Orientation  Overall Orientation Status: Impaired  Orientation Level:  (continuously states  when asked orientation questions)  Social/Functional History  Social/Functional History  Lives With: Alone  Type of Home: Assisted living ([facility])  Home Layout: One level  Home Access: Level entry  ADL Assistance: Needs assistance  Homemaking Assistance: Needs assistance  Homemaking Responsibilities: No  Ambulation Assistance: Needs assistance  Transfer Assistance: Needs assistance  Active : No  Additional Comments: unable to obtain PLOF from pt, he answers most questions with stating his .  Per case management, wife states pt requires assist to transfer to w/c and has two RNs at assisted living facility assist him    Objective     AROM RLE (degrees)  RLE AROM: WFL  AROM LLE (degrees)  LLE AROM : WFL  Strength RLE  Strength RLE: WFL  Comment: anticipate WFL; unable to MMT d/t impaired mod A x 2 for stand pivot bed to chair. Short term goal 3: 1/15: Pt will participate in 12-15 reps of BLE exercises to promote activity tolerance. Patient Goals   Patient goals : none stated by pt d/t impaired cognition       Therapy Time   Individual Concurrent Group Co-treatment   Time In 1430         Time Out 1504         Minutes 34         Timed Code Treatment Minutes: 24 Minutes       Cee Marie, PT, DPT  If pt is unable to be seen after this session, please let this note serve as discharge summary. Please see case management note for discharge disposition. Thank you.

## 2022-01-10 NOTE — PROGRESS NOTES
Occupational Therapy   Occupational Therapy Initial Assessment and treatment    Date: 1/10/2022   Patient Name: Abby Welch  MRN: [id number]     : 1940    Date of Service: 1/10/2022    Discharge Recommendations:  ECF with OT       Assessment   Performance deficits / Impairments: Decreased functional mobility ; Decreased ADL status; Decreased strength;Decreased safe awareness;Decreased cognition;Decreased endurance;Decreased balance  Assessment: Pt admitted with fall, confusion and acute pyelonephritis. Pt with hx of frequent falls per chart. Per chart, pt from the [facility]. Pt currently requires 2 person assist for all bed mobility and transfers, anabel arthur needed to bathroom, as pt unable to stand upright d/t flexed posture and posterior lean. Pt requires extensive assist with all ADL tasks at this time. Prognosis: Fair  OT Education: OT Role;Plan of Care;ADL Adaptive Strategies;Transfer Training  Patient Education: disease specific: transfers, safety, ADLs  Barriers to Learning: cognition  REQUIRES OT FOLLOW UP: Yes  Activity Tolerance  Activity Tolerance: Patient Tolerated treatment well  Activity Tolerance: 135/84 hr 73, o2 >90% throughout session  Safety Devices  Safety Devices in place: Yes  Type of devices: Call light within reach; Left in bed;Bed alarm in place;Gait belt           Patient Diagnosis(es): The primary encounter diagnosis was Fall, initial encounter. Diagnoses of JACKELYN (acute kidney injury) ([facility] [address] Elevated troponin, and Multifocal pneumonia were also pertinent to this visit. has a past medical history of Hyperlipemia, Hypertension, Prostate enlargement, and Unspecified cerebral artery occlusion with cerebral infarction. has a past surgical history that includes Carotid endarterectomy; Foot surgery; Skin cancer excision; Eye surgery; Facial reconstruction surgery; Tonsillectomy; Colonoscopy; Colonoscopy (); and Mohs surgery. Restrictions  Restrictions/Precautions  Restrictions/Precautions: General Precautions,Fall Risk  Position Activity Restriction  Other position/activity restrictions: up in chair    Subjective   General  Chart Reviewed: Yes  Patient assessed for rehabilitation services?: Yes  Family / Caregiver Present: No  Referring Practitioner: Ayse Etienne MD  Diagnosis: fall, acute pyelonephritis  Subjective  Subjective: Pt supine, agreeable. pt confused  General Comment  Comments: Rn clears for therapy  Patient Currently in Pain: No  Vital Signs  Temp: 97.8 °F (36.6 °C)  Temp Source: Oral  Pulse: 68  Heart Rate Source: Monitor  Resp: 18  BP: (!) 154/87  BP Location: Left lower arm  MAP (mmHg): 110  Patient Position: Semi fowlers  Level of Consciousness: Alert (0)  MEWS Score: 1  Patient Currently in Pain: No  Oxygen Therapy  SpO2: 95 %  O2 Device: None (Room air)  Social/Functional History  Social/Functional History  Lives With: Alone  Type of Home: Assisted living ([facility])  Home Layout: One level  Home Access: Level entry  ADL Assistance: Needs assistance  Homemaking Assistance: Needs assistance  Homemaking Responsibilities: No  Ambulation Assistance: Needs assistance  Transfer Assistance: Needs assistance  Active : No  Additional Comments: unable to obtain PLOF from pt, he answers most questions with stating his .  Per case management, wife states pt requires assist to transfer to w/c and has two RNs at assisted living facility assist him       Objective   Vision: Within Functional Limits (appears [facility] during assessment)  Vision Exceptions: Wears glasses at all times    Orientation  Overall Orientation Status: Impaired  Orientation Level: Oriented to person;Disoriented to situation;Disoriented to time;Disoriented to place     Balance  Sitting Balance: Supervision  Standing Balance: Dependent/Total  Standing Balance  Time: ~30 sec x multiple attempts in anabel arthur  Activity: attempting standing from EOB, standing in anabel arthur  Functional Mobility  Functional - Mobility Device: Other  Activity: To/from bathroom  Assist Level: Dependent/Total  Functional Mobility Comments: anabel arthur for EOB<>toilet  Toilet Transfers  Equipment Used: Standard toilet  Toilet Transfer: Maximum assistance;2 Person assistance  Toilet Transfers Comments: anabel arthur used for transfers  ADL  LE Dressing: Maximum assistance  Toileting: Maximum assistance  Additional Comments: pt will initiate some tasks, but requires extensive assist to complete  Tone RUE  RUE Tone: Normotonic  Tone LUE  LUE Tone: Normotonic  Coordination  Movements Are Fluid And Coordinated: Yes        Transfers  Sit to stand: 2 Person assistance;Maximum assistance  Stand to sit: 2 Person assistance;Maximum assistance  Transfer Comments: max A x 2 in anabel arthur     Cognition  Overall Cognitive Status: Exceptions  Arousal/Alertness: Delayed responses to stimuli;Inconsistent responses to stimuli  Following Commands: Follows one step commands with repetition; Follows one step commands with increased time  Attention Span: Attends with cues to redirect  Memory: Decreased short term memory;Decreased recall of recent events;Decreased recall of biographical Information  Safety Judgement: Decreased awareness of need for safety  Problem Solving: Decreased awareness of errors  Insights: Not aware of deficits  Initiation: Requires cues for all  Sequencing: Requires cues for all                 LUE AROM (degrees)  LUE AROM : WFL  Left Hand AROM (degrees)  Left Hand AROM: WFL  RUE AROM (degrees)  RUE AROM : WFL  Right Hand AROM (degrees)  Right Hand AROM: WFL  LUE Strength  Gross LUE Strength: WFL  RUE Strength  Gross RUE Strength: WFL                   Plan   Plan  Times per week: 2-3x/wk      AM-PAC Score        AM-[facility] Inpatient Daily Activity Raw Score: 13 (01/10/22 1547)  AM-PAC Inpatient ADL T-Scale Score : 32.03 (01/10/22 1547)  ADL Inpatient CMS 0-100% Score: 63.03 (01/10/22 1547)  ADL Inpatient CMS G-Code Modifier : CL (01/10/22 1547)    Goals  Short term goals  Time Frame for Short term goals: 1 week by 1/17  Short term goal 1: Pt will complete LB ADLs with mod A or less  Short term goal 2: Pt will complete simple grooming with min A  Short term goal 3: Pt will complete functional transfers with mod A or less  Short term goal 4: Pt will tolerate B UE ex x 20 reps w/o fatigue  Short term goal 5: Pt will complete toileting with mod A or less  Patient Goals   Patient goals : Pt does not state goals       Therapy Time   Individual Concurrent Group Co-treatment   Time In 1430         Time Out 1504         Minutes 34         Timed Code Treatment Minutes: 24 Minutes (10 min eval)       Steve Foote, OT

## 2022-01-10 NOTE — PROGRESS NOTES
Speech Language Pathology    MBSS completed -- recommend downgrade to Dysphagia III (soft and bite sized) diet with nectar (mildly thick) liquids/no straws, meds whole in puree. Full report to follow.     Essie Curran M.S. Guy Lopez  Speech-language pathologist  [phone]  Speech Desk Phone: [phone]

## 2022-01-11 NOTE — PROGRESS NOTES
Urology Progress Note    Subjective: Feeling OK     HPI: Patient has been admitted for altered mental status, weakness after an unwitnessed fall at skilled nursing facility. Patient has a history of dementia. Consulted for CT showing 2-3 mm distal left ureteral stone. P/E  BP (!) 146/89   Pulse 109   Temp 98.2 °F (36.8 °C) (Oral)   Resp 18   Ht 5' 8\" (1.727 m)   Wt 181 lb 10.5 oz (82.4 kg)   SpO2 94%   BMI 27.62 kg/m²   Skin: Intact  Respiratory: Breathing without difficulty, stable. GI: No acute changes, stable po intake. :  Voiding without difficulty . MSK: No acute changes, stable. Neuro: No acute changes, stable. Labs  Lab Results   Component Value Date    WBC 9.1 01/10/2022    HGB 13.1 01/10/2022    HCT 39.6 01/10/2022    MCV 93.1 01/10/2022     01/10/2022     Lab Results   Component Value Date     01/11/2022    K 3.8 01/11/2022     01/11/2022    CO2 22 01/11/2022    BUN 22 01/11/2022    CREATININE 1.2 01/11/2022    CALCIUM 9.6 01/11/2022       Assessment/Plan  2-3 mm left distal ureteral stone    1. Stable from  standpoint  2. Discharge patient with flomax to continue taking at home   3. Drink plenty of fluids   4. Send home with a urine strainer, to allow patient to strain urine and see if he passes stone on his own.    5.  Follow up in office in 2-3 weeks for follow up to see if stone has passed         Electronically signed by CRISTI Harden CNP on 1/11/2022 at 8:48 AM

## 2022-01-11 NOTE — PROGRESS NOTES
Speech Language Pathology  ATTEMPT    Nino Means  1940        SLP reviewed chart and attempted to see patient for follow-up dysphagia therapy. RN recommending therapy hold, as patient is currently awaiting transportation for discharge. Scheduled for pick-up at 1400. Per RN, patient has been tolerating Dysphagia III (Soft and Bite Sized) solids and Mildly (Nectar) thick liquids without overt s/s of aspiration.        Thanks,  Krystle Chakraborty M.A., [address] [place], [place] [id number]  Speech-Language Pathologist

## 2022-01-11 NOTE — DISCHARGE SUMMARY
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 Hospital Course:  \"81 y. o. male who presented to [facility] after an unwitnessed fall. Found on the floor at the skilled nursing facility where he was recently discharged after a hospital stay for COVID infection. Baseline confused. Poor historian due to dementia. Does not remember the episode and cannot tell if the fall was preceded or followed by any symptoms. In the ED patient was found to have acute renal failure. \"           UTI ruled out. Patient had no pyuria, no fever, only a slight, transient leukocytosis, no perinephric stranding on CT, and a negative urine culture. Stopped ceftriaxone after 4 days.     Abnormal CXR due to recent COVID. Clinically no pneumonia. Procalcitonin negative. No isolation.     Small 2-3 mm distal L ureteral stone, without hydronephrosis. Patient did complain of L flank pain. Seemingly did well with supportive care. Urology consulted, started tamsulosin, and recommended straining his urine and f/u in clinic in 2-3 weeks.      JACKELYN, probably from dehydration and poor PO intake related to his ureteral stone. Responded to IVFs.    Acute metabolic encephalopathy, due to JACKELYN and dehydration. Underlying significant dementia.   I do not suspect CVA in this instance. Noted his reassuring carotid US and TTE a few months ago. Continue aspirin, increased statin.     Dysphagia. Diet modified by SLP after MBS.       Chronic Afib. Continue apixaban despite his fall risk. Very high risk of recurrent CVA. Metoprolol.       HTN. Resumed lisinopril after Cr improved. Amlodipine per nephrology. Physical Exam Performed:     /89   Pulse 90   Temp 98.8 °F (37.1 °C) (Oral)   Resp 18   Ht 5' 8\" (1.727 m)   Wt 181 lb 10.5 oz (82.4 kg)   SpO2 92%   BMI 27.62 kg/m²       General appearance: No apparent distress, appears stated age and cooperative. HEENT: Pupils equal, round, and reactive to light. Conjunctivae/corneas clear. Neck: Supple, with full range of motion. No jugular venous distention. Trachea midline. Respiratory:  Normal respiratory effort. Clear to auscultation, bilaterally without Rales/Wheezes/Rhonchi. Cardiovascular:  Irregularly irregular rhythm with normal S1/S2 without murmurs, rubs or gallops. Abdomen: Soft, non-tender, non-distended with normal bowel sounds. No flank tenderness. Musculoskeletal: No clubbing, cyanosis or edema bilaterally. Full range of motion without deformity. Skin: Skin color, texture, turgor normal.  No rashes or lesions. Neurologic:  Neurovascularly intact without any focal sensory/motor deficits. Cranial nerves: II-XII intact, grossly non-focal.  Psychiatric:  Alert, mostly disoriented, does not have insight  Capillary Refill: Brisk,3 seconds, normal   Peripheral Pulses: +2 palpable, equal bilaterally        Labs:  For convenience and continuity at follow-up the following most recent labs are provided:      CBC:    Lab Results   Component Value Date    WBC 9.1 01/10/2022    HGB 13.1 01/10/2022    HCT 39.6 01/10/2022     01/10/2022       Renal:    Lab Results   Component Value Date     01/11/2022    K 3.8 01/11/2022     01/11/2022    CO2 22 01/11/2022    BUN 22 01/11/2022    CREATININE 1.2 01/11/2022    CALCIUM 9.6 01/11/2022    PHOS 3.5 12/28/2021         Significant Diagnostic Studies    Radiology:   FL MODIFIED BARIUM SWALLOW W VIDEO   Final Result   Penetration. No definite aspiration      Please see separate speech pathology report for full discussion of findings   and recommendations. RECOMMENDATIONS:   Unavailable         CT ABDOMEN PELVIS WO CONTRAST Additional Contrast? None   Final Result   2-3 mm stone distal left ureter. No hydronephrosis on the left. No stones   remain in the left kidney. Mild injection of the fat surrounding the bladder. Recommend correlation   with urinalysis to exclude cystitis      Patchy airspace disease at the lung bases, suspicious for pneumonia. Diverticulosis. No diverticulitis      RECOMMENDATIONS:   Unavailable         XR CHEST PORTABLE   Final Result   Patchy airspace opacities in the right lung are new in comparison to   12/25/2021, and concerning for multifocal pneumonia. Stable cardiomegaly with central pulmonary vascular distension. CT Head WO Contrast   Final Result   No acute intracranial abnormality. No acute fracture or traumatic malalignment of the cervical spine. RECOMMENDATIONS:   Unavailable         CT Cervical Spine WO Contrast   Final Result   No acute intracranial abnormality. No acute fracture or traumatic malalignment of the cervical spine. RECOMMENDATIONS:   Unavailable                Consults:     IP CONSULT TO HOSPITALIST  IP CONSULT TO NEPHROLOGY  IP CONSULT TO UROLOGY    Disposition:  ECF     Condition at Discharge: Stable    Discharge Instructions/Follow-up:  Follow up with PCP within 1-2 weeks. Urology recommended straining his urine and f/u in clinic in 2-3 weeks.      Code Status:  DNR-CC     Activity: activity as tolerated    Diet: encourage fluids      Discharge Medications:     Current Discharge Medication List           Details   amLODIPine (NORVASC) 5 MG tablet Take 1 tablet by mouth daily  Qty: 30 tablet, Refills: 3              Details   apixaban (ELIQUIS) 5 MG TABS tablet Take 1 tablet by mouth 2 times daily  Qty: 60 tablet, Refills: 0      metoprolol tartrate (LOPRESSOR) 25 MG tablet Take 1 tablet by mouth 2 times daily  Qty: 60 tablet, Refills: 0      cyclobenzaprine (FLEXERIL) 5 MG tablet Take 1 tablet by mouth 2 times daily as needed for Muscle spasms  Qty: 60 tablet, Refills: 3      !! vitamin D3 (CHOLECALCIFEROL) 10 MCG (400 UNIT) TABS tablet Take 1 tablet by mouth daily  Qty: 30 tablet, Refills: 0      ondansetron (ZOFRAN-ODT) 4 MG disintegrating tablet Take 1 tablet by mouth every 8 hours as needed for Nausea or Vomiting  Qty: 10 tablet, Refills: 2      !! VITAMIN D PO Take by mouth      Multiple Vitamins-Minerals (OCUVITE PO) Take by mouth      Calcium Carbonate Antacid (ANTACID E-X PO) Take by mouth      esomeprazole (NEXIUM) 20 MG delayed release capsule Take 20 mg by mouth every morning (before breakfast)      divalproex (DEPAKOTE) 250 MG DR tablet Take 250 mg by mouth 2 times daily       tamsulosin (FLOMAX) 0.4 MG capsule Take 0.4 mg by mouth daily      donepezil (ARICEPT) 10 MG tablet Take 10 mg by mouth nightly      lisinopril (PRINIVIL;ZESTRIL) 20 MG tablet Take 1 tablet by mouth daily  Qty: 30 tablet, Refills: 3      simvastatin (ZOCOR) 80 MG tablet Take 40 mg by mouth nightly        !! - Potential duplicate medications found. Please discuss with provider. Time Spent on discharge is more than 30 minutes in the examination, evaluation, counseling and review of medications and discharge plan. Signed:    Vonnie Emery MD   1/11/2022      Thank you Keren Méndez MD for the opportunity to be involved in this patient's care. If you have any questions or concerns please feel free to contact me at [phone].

## 2022-01-11 NOTE — PROGRESS NOTES
Report called to nurse at [address] [facility]. IV removed. telebox removed and paced at unit for . Transport arrived.

## 2022-01-11 NOTE — CARE COORDINATION
CASE MANAGEMENT DISCHARGE SUMMARY      Discharge to: [facility]. with [address] completed: [facility] Exemption Notification (HENS) completed: NA    IMM given: 1/10/22     New Durable Medical Equipment ordered/agency: NA    Transportation:    Family/car:   Medical Transport explained to pt/family. Pt/family voice no agency preference. Agency used: [facility]   time:1400   Ambulance form completed: Yes    Confirmed discharge plan with:     Patient: yes     Family:  Yes, wife     Facility/Agency, name:  [facility] faxed, to the [employer] number for report to facility: [phone]     RN, name: Barrett Garner    Note: Discharging nurse to complete [facility], reconcile [facility]S, and place final copy with patient's discharge packet. RN to ensure that written prescriptions for  Level II medications are sent with patient to the facility as per protocol.    Electronically signed by VAHE Rodriguez, Tarsha on 1/11/2022 at 10:38 AM

## 2022-01-12 NOTE — ADT AUTH CERT
Utilization Reviews         Renal Failure, Acute - Care Day 4 (1/10/2022) by Rashel Stone RN       Review Status Review Entered   Completed 1/12/2022 11:51      Criteria Review      Care Day: 4 Care Date: 1/10/2022 Level of Care: Inpatient Floor    Guideline Day 3    Clinical Status    ( ) * Mental status at baseline or improved    1/12/2022 11:51 AM EST by Alfonzo Hwang He is wide awake, disoriented, has no insight.    (X) * Respiratory status at baseline or improved    1/12/2022 11:51 AM EST by Xin Ga      resp 16-18    Routes    (X) * Oral hydration    (X) * Oral medications    (X) * Oral diet    1/12/2022 11:51 AM EST by Xin Ga      Diet: ADULT DIET; Regular; Mildly Thick (Nectar); No Drinking Straws    * Milestone   Additional Notes   1/11         Infusions Meds   · sodium chloride 50 mL/hr at 01/09/22 1108   · sodium chloride           Scheduled Medications    Scheduled Medications   · lisinopril 20 mg Oral Daily   · atorvastatin 40 mg Oral Daily   · amLODIPine 5 mg Oral Daily   · metoprolol tartrate 25 mg Oral BID   · donepezil 10 mg Oral Nightly   · pantoprazole 40 mg Oral QAM AC   · apixaban 2.5 mg Oral BID   · tamsulosin 0.4 mg Oral Daily   · divalproex 250 mg Oral 2 times per day      Po tylenol 650mg x 1   Iv lopressor 5mg x 1              Internal medicine:   UTI ruled out.  Patient had no pyuria, no fever, only a slight, transient leukocytosis, no perinephric stranding on CT, and a negative urine culture.  Stopped ceftriaxone after 4 days.       Abnormal CXR due to recent COVID.  Clinically no pneumonia.  Procalcitonin negative.  No isolation.       Small 2-3 mm distal L ureteral stone, without hydronephrosis.  Patient did complain of L flank pain.  Seemingly did well with supportive care.  Urology consulted to help decide whether any further imaging or follow up will be necessary.       JACKELYN, probably from dehydration and poor PO intake related to his ureteral stone.  Responded to IVFs.     Acute metabolic encephalopathy, due to JACKELYN and dehydration.  Underlying significant dementia.  I do not suspect CVA in this instance.  Noted his reassuring carotid US and TTE a few months ago.  Continue aspirin, increased statin.       Dysphagia.  Diet modified by SLP after MBS.         Chronic Afib.  Continue apixaban despite his fall risk.  Very high risk of recurrent CVA.  Metoprolol.         HTN.  Resumed lisinopril after Cr improved.  Amlodipine per nephrology.            DVT Prophylaxis: anticoagulation as above   Diet: ADULT DIET; Regular; Mildly Thick (Nectar); No Drinking Straws   Code Status: DNR-CC       PT/OT Eval Status: eval ordered       Dispo - likely ready 1/11, pending urology and PT/OT input. [facility] came from [facility].             Urology:   Impression:   2-3 mm distal left ureter stone.  No hydronephrosis.          Plan:   -continue flomax    -strain urine    -stable from  standpoint   -Follow up in office as outpatient               Nephro:   Assessment/       - Acute kidney injury - pre-renal in setting of sepsis/acute pyelonephritis, hypotension               Improving with IVF's       - Acute pyelonephritis - on ceftriaxone       - Hypernatremia       - Hypertension - running on lower end on admission, now high       - Atrial fibrillation       Plan/       - Can monitor off of IVF's today with improved intake   - Added amlodipine for better BP control, lisinopril resumed   - Trend labs, bp's, & urine output       No further recommendations at this time   Will sign off for now   Call with questions   Okay for discharge   Repeat labs in one week on [facility]               Renal Failure, Acute - Care Day 3 (1/9/2022) by Leona Najera RN       Review Status Review Entered   Completed 1/12/2022 11:46      Criteria Review      Care Day: 3 Care Date: 1/9/2022 Level of Care: Inpatient Floor    Guideline Day 2    Clinical Status    (X) * Electrolyte abnormalities absent or improved 1/12/2022 11:46 AM EST by Shy Anderson      Creatinine and sodium levels have improved. Na  143  Cr 1.2    (X) * Acid-base abnormalities absent or improved    (X) * Hypotension absent    1/12/2022 11:46 AM EST by Shy Anderson      temp 98.3  resp 18-20  pulse     bp 152/95    91% on RA    Routes    (X) Parenteral or oral medications    * Milestone   Additional Notes   1/9/22      Infusions Meds   · sodium chloride 100 mL/hr at 01/09/22 0037   · sodium chloride           Scheduled Medications    Scheduled Medications   · amLODIPine 5 mg Oral Daily   · cefTRIAXone (ROCEPHIN) IV 1,000 mg IntraVENous Q24H   · metoprolol tartrate 25 mg Oral BID   · donepezil 10 mg Oral Nightly   · pantoprazole 40 mg Oral QAM AC   · apixaban 2.5 mg Oral BID   · atorvastatin 20 mg Oral Daily   · tamsulosin 0.4 mg Oral Daily   · divalproex 250 mg Oral 2 times per day         Iv lopressor 5Mg x 1            Internal Medicine:   Acute pyelonephritis   UA and imaging making the diagnosis. Also noted bilateral flank tenderness on arrival that later resolved   No sepsis on arrival   No growth on urine culture as expected. Continue IV antibiotics. If patient clinically improves, we will consider a total 10-14 course of oral cephalosporins. Waiting on MBS and SLP recommendations to make the switch to po antibiotics       Acute renal failure   Baseline creatinine 1.0.  Peak creatinine 2.4, today's creatinine is down to normal 1.2   Probably poor perfusion with UTI and acute illness on arrival, now improving   Nephrology input appreciated       Dysphagia   Dysphagia diet recommended by ST and MBS is ordered for tomorrow.     No need for isolation as sufficient time has elapsed.              Nephro:   Assessment/       - Acute kidney injury - pre-renal in setting of sepsis/acute pyelonephritis, hypotension               Improving with IVF's       - Acute pyelonephritis - on ceftriaxone       - Hypernatremia       - Hypertension - running on lower end on admission, now high       - Atrial fibrillation       Plan/       - Changed IVF's to 1/2 NS now at 50 ml/hour   - Added amlodipine for better BP control   - Lisinopril on hold   - Trend labs, bp's, & urine output

## 2022-01-15 NOTE — ED PROVIDER NOTES
Evaluated by Advanced Practice Provider    [facility]  ED      CHIEF COMPLAINT  Fall (found on floor in his bedroom by staff at [facility])    [address]  Radha Garcia is a 80 y.o. male who presents to the ED complaining of fall. Report from EMS was that patient was found on the floor by his ECF staff. Patient lives at the [facility] in [address]. He has a history of dementia. Also with history of paroxysmal A. fib, recent admit for acute kidney injury, kidney stone, fall and has also had COVID with subsequent pneumonia. Patient is on Eliquis. EMS reported to nursing staff that patient was found to be hypoxic on room air in the mid 80s. He was brought in on a nonrebreather, nursing staff took him down to room air and he was again in the mid [de-identified]. Patient was sleeping upon my entering the room, woke up to name, he is alert to person, knows he is in a hospital but thought the year was 18. Does not know what happened, does not recall the fall. There is a small abrasion to the forehead and one to the left shoulder. Currently he denies pain in his neck, back, chest, abdomen. The patient is currently rating their pain as 0/10. The patient arrived to the ED via EMS transport.     PAST MEDICAL HISTORY    Past Medical History:   Diagnosis Date    Hyperlipemia     Hypertension     Prostate enlargement     Unspecified cerebral artery occlusion with cerebral infarction 2000       SURGICAL HISTORY    Past Surgical History:   Procedure Laterality Date    CAROTID ENDARTERECTOMY      right    COLONOSCOPY      COLONOSCOPY  2014    polyps    EYE SURGERY      FACIAL RECONSTRUCTION SURGERY      metal plate under left eye    FOOT SURGERY      nerve right    MOHS SURGERY      SKIN CANCER EXCISION      face x several    TONSILLECTOMY         CURRENT MEDICATIONS    Current Outpatient Rx   Medication Sig Dispense Refill    terazosin (HYTRIN) 5 MG capsule Take 5 mg by mouth nightly      amLODIPine (NORVASC) 5 MG tablet Take 1 tablet by mouth daily 30 tablet 3    apixaban (ELIQUIS) 5 MG TABS tablet Take 1 tablet by mouth 2 times daily (Patient taking differently: Take 2.5 mg by mouth 2 times daily ) 60 tablet 0    metoprolol tartrate (LOPRESSOR) 25 MG tablet Take 1 tablet by mouth 2 times daily 60 tablet 0    cyclobenzaprine (FLEXERIL) 5 MG tablet Take 1 tablet by mouth 2 times daily as needed for Muscle spasms 60 tablet 3    vitamin D3 (CHOLECALCIFEROL) 10 MCG (400 UNIT) TABS tablet Take 1 tablet by mouth daily 30 tablet 0    ondansetron (ZOFRAN-ODT) 4 MG disintegrating tablet Take 1 tablet by mouth every 8 hours as needed for Nausea or Vomiting 10 tablet 2    Multiple Vitamins-Minerals (OCUVITE PO) Take by mouth      Calcium Carbonate Antacid (ANTACID E-X PO) Take by mouth      esomeprazole (NEXIUM) 20 MG delayed release capsule Take 20 mg by mouth every morning (before breakfast)      divalproex (DEPAKOTE) 250 MG DR tablet Take 250 mg by mouth 2 times daily       tamsulosin (FLOMAX) 0.4 MG capsule Take 0.4 mg by mouth daily      donepezil (ARICEPT) 10 MG tablet Take 10 mg by mouth nightly      lisinopril (PRINIVIL;ZESTRIL) 20 MG tablet Take 1 tablet by mouth daily 30 tablet 3    simvastatin (ZOCOR) 80 MG tablet Take 80 mg by mouth nightly       VITAMIN D PO Take by mouth (Patient not taking: Reported on 1/15/2022)         ALLERGIES    Allergies   Allergen Reactions    Corn-Containing Products Other (See Comments)     Nasal congestion       FAMILY HISTORY    History reviewed. No pertinent family history.     SOCIAL HISTORY    Social History     Socioeconomic History    Marital status:      Spouse name: None    Number of children: None    Years of education: None    Highest education level: None   Occupational History    None   Tobacco Use    Smoking status: Never Smoker    Smokeless tobacco: Never Used   Vaping Use    Vaping Use: Never used Substance and Sexual Activity    Alcohol use: No    Drug use: No    Sexual activity: Yes     Partners: Female   Other Topics Concern    None   Social History Narrative    None     Social Determinants of Health     Financial Resource Strain:     Difficulty of Paying Living Expenses: Not on file   Food Insecurity:     Worried About Running Out of Food in the Last Year: Not on file    [place] of Food in the Last Year: Not on file   Transportation Needs:     Lack of Transportation (Medical): Not on file    Lack of Transportation (Non-Medical): Not on file   Physical Activity:     Days of Exercise per Week: Not on file    Minutes of Exercise per Session: Not on file   Stress:     Feeling of Stress : Not on file   Social Connections:     Frequency of Communication with Friends and Family: Not on file    Frequency of Social Gatherings with Friends and Family: Not on file    Attends Denominational Services: Not on file    Active Member of [address] [place] [address] [employer] or Organizations: Not on file    Attends Club or Organization Meetings: Not on file    Marital Status: Not on file   Intimate Partner Violence:     Fear of Current or Ex-Partner: Not on file    Emotionally Abused: Not on file    Physically Abused: Not on file    Sexually Abused: Not on file   Housing Stability:     Unable to Pay for Housing in the Last Year: Not on file    Number of [place] in the Last Year: Not on file    Unstable Housing in the Last Year: Not on file       REVIEW OF SYSTEMS    Unable to perform due to patient's current cognitive state    PHYSICAL EXAM  Vitals:    01/15/22 1225   BP:    Pulse: 96   Resp:    Temp:    SpO2:      GENERAL: Patient is elderly and chronically ill-appearing, well-nourished. Sleeping but arouses easily to verbal stimuli. Cooperative. Resting in bed. HEENT:  Normocephalic, small abrasion to the center of the forehead that appears superficial, dried serosanguineous drainage present.  Conjunctiva appear normal. Sclera is non-icteric. External ears are normal.  No facial asymmetry, smile symmetrical.  Tongue is midline. Uvula elevates appropriately. NECK: Supple with normal ROM. Trachea midline. No cervical spine midline tenderness to palpation. LUNGS: Equal and symmetric chest rise. Breathing is unlabored. Speaking comfortably in full sentences. Lungs are clear bilaterally to auscultation but diminished throughout. Without wheezing, rales, or rhonchi. CADIOVASCULAR: Irregular and tachycardic rate and rhythm. Normal S1-S2 sounds. No murmurs, rubs, or gallops. Capillary refill is brisk in all 4 extremities. Bilateral lower extremities are equal in size, there is no swelling observed. There is no tenderness to palpation. There is no erythema observed or warmth palpated. GI: Soft, nontender, nondistended with positive bowel sounds. No rebound tenderness, guarding or any peritoneal signs. No masses or hepatosplenomegaly    MUSCULOSKELETAL:  No gross deformities or trauma noted. Moving all extremities equally and appropriately. Normal ROM. SKIN: Warm/dry. Small abrasion to the center of the forehead and small abrasion to the left shoulder, both are superficial and without depth. Both limited to breakdown of skin only, small amount of serosanguineous drainage is observed, this is dried, no surrounding erythema, edema, induration, fluctuance. No rashes/lesions noted. PSYCHIATRIC: Mood and affect appropriate. Speech is clear and articulate. NEUROLOGIC: Alert and oriented to person, knows he is in a hospital, stated year was 18. CN II-XII intact, follows commands. No focal motor or sensory deficits. LABS  I have reviewed all labs for this visit.    Results for orders placed or performed during the hospital encounter of 01/15/22   Troponin   Result Value Ref Range    Troponin 0.02 (H) <0.01 ng/mL   CBC auto differential   Result Value Ref Range    WBC 13.0 (H) 4.0 - 11.0 K/uL    RBC 4.34 4.20 - 5.90 M/uL Hemoglobin 13.4 (L) 13.5 - 17.5 g/dL    Hematocrit 41.4 40.5 - 52.5 %    MCV 95.5 80.0 - 100.0 fL    MCH 30.9 26.0 - 34.0 pg    MCHC 32.3 31.0 - 36.0 g/dL    RDW 14.9 12.4 - 15.4 %    Platelets 564 332 - 455 K/uL    MPV 10.6 (H) 5.0 - 10.5 fL    Neutrophils % 83.9 %    Lymphocytes % 6.5 %    Monocytes % 9.1 %    Eosinophils % 0.1 %    Basophils % 0.4 %    Neutrophils Absolute 10.9 (H) 1.7 - 7.7 K/uL    Lymphocytes Absolute 0.8 (L) 1.0 - 5.1 K/uL    Monocytes Absolute 1.2 0.0 - 1.3 K/uL    Eosinophils Absolute 0.0 0.0 - 0.6 K/uL    Basophils Absolute 0.1 0.0 - 0.2 K/uL   Comprehensive Metabolic Panel w/ Reflex to MG   Result Value Ref Range    Sodium 139 136 - 145 mmol/L    Potassium reflex Magnesium 3.6 3.5 - 5.1 mmol/L    Chloride 104 99 - 110 mmol/L    CO2 19 (L) 21 - 32 mmol/L    Anion Gap 16 3 - 16    Glucose 139 (H) 70 - 99 mg/dL    BUN 27 (H) 7 - 20 mg/dL    CREATININE 1.6 (H) 0.8 - 1.3 mg/dL    GFR Non-African American 42 (A) >60    GFR  51 (A) >60    Calcium 10.4 8.3 - 10.6 mg/dL    Total Protein 6.9 6.4 - 8.2 g/dL    Albumin 3.1 (L) 3.4 - 5.0 g/dL    Albumin/Globulin Ratio 0.8 (L) 1.1 - 2.2    Total Bilirubin 0.5 0.0 - 1.0 mg/dL    Alkaline Phosphatase 76 40 - 129 U/L    ALT 17 10 - 40 U/L    AST 28 15 - 37 U/L   Brain Natriuretic Peptide   Result Value Ref Range    Pro-BNP 1,390 (H) 0 - 449 pg/mL   CK   Result Value Ref Range    Total  (H) 39 - 308 U/L   EKG 12 Lead   Result Value Ref Range    Ventricular Rate 140 BPM    Atrial Rate 140 BPM    QRS Duration 140 ms    Q-T Interval 350 ms    QTc Calculation (Bazett) 534 ms    R Axis 39 degrees    T Axis -25 degrees    Diagnosis       Atrial fibrillation with rapid ventricular rateRight bundle branch blockCannot rule out Inferior infarct (cited on or before 15-JEANE-2022)T wave abnormality, consider lateral ischemiaAbnormal ECGWhen compared with ECG of 2022 04:05,No significant change was foundConfirmed by Charlann Apgar MD, [place] Foot ([id number]) on 1/15/2022 11:27:28 AM       RADIOLOGY    CT Head WO Contrast    Result Date: 1/15/2022  EXAMINATION: CT OF THE HEAD WITHOUT CONTRAST  1/15/2022 9:46 am TECHNIQUE: CT of the head was performed without the administration of intravenous contrast. Dose modulation, iterative reconstruction, and/or weight based adjustment of the mA/kV was utilized to reduce the radiation dose to as low as reasonably achievable. COMPARISON: The previous study performed 01/07/2022. HISTORY: ORDERING SYSTEM PROVIDED HISTORY: found down on the floor, on eliquis TECHNOLOGIST PROVIDED HISTORY: Reason for exam:->found down on the floor, on eliquis Has a \"code stroke\" or \"stroke alert\" been called? ->No Decision Support Exception - unselect if not a suspected or confirmed emergency medical condition->Emergency Medical Condition (MA) Reason for Exam: fall. pt is on Eliquis Additional signs and symptoms: denies any head pain FINDINGS: BRAIN/VENTRICLES: There is no acute intracranial hemorrhage, mass effect or midline shift. No abnormal extra-axial fluid collection. The gray-white differentiation is maintained without evidence of an acute infarct. Moderate, diffuse, cerebral and cerebellar atrophy are again noted. There is again prominence of the ventricular system, however, it is appropriate for the amount of atrophy present. Microvascular ischemic changes are again present. No new abnormal focus of decreased attenuation is identified within the brain parenchyma. Tiny bilateral basal ganglia calcifications are again noted. Calcifications are again seen involving the cavernous carotid and vertebral arteries. ORBITS: The visualized portion of the orbits demonstrate no acute abnormality. SINUSES: Mild mucosal thickening is again seen involving the ethmoid sinus, indicating chronic sinusitis. A small air-fluid level is seen in the left sphenoid sinus, indicating acute sinusitis.  SOFT TISSUES/SKULL:  No acute abnormality of the visualized skull or soft tissues. 1.  Moderate, diffuse, cerebral and cerebellar atrophy again identified, when compared to the previous study performed 01/07/2022. 2.  Microvascular ischemic changes again appreciated. 3.  Sinus disease, as described above. CT CERVICAL SPINE WO CONTRAST    Addendum Date: 1/15/2022    ADDENDUM: It should be noted that there are partially seen ground-glass opacities involving the visualized upper lobes of the lungs. The findings are nonspecific and may represent areas of pneumonitis/pneumonia, including atypical/viral pneumonia. Noncontrast CT scan of the chest can be performed for further evaluation. Result Date: 1/15/2022  EXAMINATION: CT OF THE CERVICAL SPINE WITHOUT CONTRAST 1/15/2022 9:46 am TECHNIQUE: CT of the cervical spine was performed without the administration of intravenous contrast. Multiplanar reformatted images are provided for review. Dose modulation, iterative reconstruction, and/or weight based adjustment of the mA/kV was utilized to reduce the radiation dose to as low as reasonably achievable. COMPARISON: The previous study performed 01/07/2022. HISTORY: ORDERING SYSTEM PROVIDED HISTORY: fall, confused TECHNOLOGIST PROVIDED HISTORY: Reason for exam:->fall, confused Decision Support Exception - unselect if not a suspected or confirmed emergency medical condition->Emergency Medical Condition (MA) Reason for Exam: fall,found on floor in bathroom FINDINGS: BONES/ALIGNMENT: There is preservation of the normal lordotic curve. There is no evidence of fracture. Again seen is mild anterior subluxation of C4 over C5. Or DEGENERATIVE CHANGES: Again noted are diffuse osteo-degenerative changes throughout the cervical spine, without interval appreciable change. Discogenic disc disease is again present, without significant interval change. The remainder of the visualized cervical and thoracic cord and thecal sac are unremarkable.   Multilevel spinal canal stenosis is again identified. SOFT TISSUES: There is no prevertebral soft tissue swelling. 1.  No acute abnormality of the cervical spine. 2.  No significant interval change, when compared to the previous study performed 01/07/2022. XR CHEST PORTABLE    Result Date: 1/15/2022  EXAMINATION: ONE XRAY VIEW OF THE CHEST 1/15/2022 9:18 am COMPARISON: The previous study performed 01/07/2022. HISTORY: ORDERING SYSTEM PROVIDED HISTORY: fall TECHNOLOGIST PROVIDED HISTORY: Reason for exam:->fall Reason for Exam: fall today. Confused FINDINGS: There has been a decrease in the right upper lobe pneumonia. Cardiomegaly is again identified. There are bilateral perihilar opacities, more prominent on the left than on the right. Question CHF versus bilateral pneumonia. A small left pleural effusion cannot be excluded. The mediastinal structures are without significant interval change. Interval decrease in the right upper lobe pneumonia, when compared to the prior study performed 01/07/2022. Rule out CHF versus bilateral pneumonia, as described above. Rule out small left pleural effusion. ED COURSE/MDM  Patient seen and evaluated. Old records reviewed. Diagnostic testing reviewed and results discussed. I have seen and evaluated this patient with supervising physician: Grisel Tobar MD. We thoroughly discussed the history, physical exam, diagnostic testing, emergency department course, plan and disposition. Elan Barr presented to the ED today with above noted complaints. Arrival vital signs: Afebrile, tachycardic, A. fib with RVR on bedside heart monitor and EKG, BP normotensive. Hypoxic on room air, will saturated on 2 L oxygen. Physical exam performed at 0919: Patient is resting in bed, eyes closed and sleeping when I initially entered the room, arouses easily to verbal stimuli. History of dementia, oriented to name and place. Disoriented to time and situation.   Does have small abrasions to the center of the forehead and to the left shoulder. Neither of these require repair. Patient was found to be hypoxic by EMS, saturating well on 2 L oxygen via nasal cannula. Blood work: There is a leukocytosis is WBC elevated at 13, absolute neutrophils elevated at 10.9, absolute lymphocytes low at 0.8. No further differential shift. Hemoglobin low at 13.4 but stable. Hematocrit normal.  No electrolyte abnormality. BUN and creatinine are elevated at 27/1.6. This is elevated from most recent on 111 of 1.2. This is consistent with an JACKELYN. No transaminitis present. Troponin is evaluated at 0.02. This is elevated when compared to most recent but I feel most likely due to demand from the A. fib RVR. CK total elevated at 456. This is not significant and not consistent with rhabdomyolysis. EKG: Consistent with A. fib with RVR in the 140s  UA: Ordered and pending. Imaging: Chest x-ray shows interval decrease in the right upper lobe pneumonia when compared to prior study on 1/7/2022. CT of the head shows moderate diffuse cerebral and cerebellar atrophy again. Microvascular ischemic changes. Sinus disease. CT of the cervical spine obtained and shows no acute findings. No significant interval change. Radiology did call me to notify that there are partially seen groundglass opacities in the upper lobes of the lungs. These are nonspecific, can be due to atypical/viral pneumonia. Patient does have a recent history of COVID and COVID-pneumonia. Patient has been ordered to be started on a diltiazem bolus and drip due to the A. fib RVR. Patient is being given 1 L IV fluids to help with the JACKELYN.     Medications given in the ED:   Medications   dilTIAZem injection 10 mg (10 mg IntraVENous Given 1/15/22 7292)     Followed by   dilTIAZem 125 mg in dextrose 5 % 125 mL infusion (5 mg/hr IntraVENous New Bag 1/15/22 3878)   0.9 % sodium chloride infusion (1,000 mLs IntraVENous New Bag 1/15/22 1005)      Patient will require admission for his multiple medical conditions to include A. fib RVR, JACKELYN, acute respiratory failure with hypoxia. Anaya Lancaster will be admitted for further observation and evaluation of Marquis Coates's A. fib RVR, fall, JACKELYN, acute respiratory failure with hypoxia. As I have deemed necessary from their history, physical and studies, I have considered and evaluated Anaya Lancaster for the following diagnoses:  INTRACRANIAL HEMORRHAGE, MENINGITIS, SUBARACHNOID HEMORRHAGE, SUBDURAL HEMATOMA, STROKE, pneumonia, sepsis, infection, kidney injury, liver dysfunction, electrolyte abnormality, cardiac arrhythmia    The total critical care time I independently spent while evaluating and treating this patient was 43 minutes. This excludes time spent doing separately billable procedures. This includes time at the bedside, data interpretation, medication management, obtaining critical history from collateral sources if the patient is unable to provide it directly, and physician consultation. Specifics of interventions taken and potentially life-threatening diagnostic considerations are listed above in the medical decision making. If this was a shared visit with an MONIQUE, the time in this attestation is non-concurrent critical care time out of the total shared critical care time provided by the MONIQUE and myself. CLINICAL IMPRESSION    1. Atrial fibrillation with RVR ([address]    2. JACKELYN (acute kidney injury) ([address]    3. Acute respiratory failure with hypoxia (HCC)    4. Fall, initial encounter    5. Anticoagulated    6. Abrasion of forehead, initial encounter    7. Abrasion, shoulder without infection    8. Pneumonia due to COVID-19 virus       DISPOSITION  Admit. Comment: Please note this report has been produced using speech recognition software and may contain errors related to that system including errors in grammar, punctuation, and spelling, as well as words and phrases that may be inappropriate.  If there are any questions or concerns please feel free to contact the dictating provider for clarification.         CRISTI Garcia - RUSS  01/15/22 1817

## 2022-01-15 NOTE — CONSULTS
Consult placed    Who: Geovanny  Date:1/15/2022,  Time:3:27 PM        Electronically signed by Allie Gray on 1/15/2022 at 3:27 PM

## 2022-01-15 NOTE — ED NOTES
Bed: 03  Expected date:   Expected time:   Means of arrival:   Comments:  Shanta [address] Dhiraj Patino, RN  01/15/22 4885

## 2022-01-15 NOTE — ED NOTES
Patient identified as a positive fall risk on the ED triage fall screening. Patient placed in fall precautions which includes:  yellow fall risk bracelet on wrist and yellow socks on feet. Patient instructed on importance of not getting out of bed or ambulating without assistance for safety. Pt verbalized understanding.        Cookie Og RN  01/15/22 1887

## 2022-01-15 NOTE — ED PROVIDER NOTES
I independently performed a history and physical on Khai Evangelista. All diagnostic, treatment, and disposition decisions were made by myself in conjunction with the advanced practice provider. EKG: Atrial fibrillation with RVR, occasional PVC, right bundle branch block, rate 140, normal axis, QTC prolonged at 534, no acute ST or T wave abnormalities compared with prior from 1/7/2022    Patient found down on his bedroom floor, unclear how long he spent there. Upon arrival found to be in atrial fibrillation with RVR. Questionable pneumonia on imaging although this may be residual from recent COVID-19 infection. Labs demonstrate mild JACKELYN. Patient currently on a diltiazem infusion and will be admitted to the hospitalist service. For further details of Nadia Coatse's emergency department encounter, please see Bryan GARCIA's documentation.              Ren Walter MD  01/15/22 0802

## 2022-01-15 NOTE — ED NOTES
NP aware of pt vitals after bolus will start drip when pt back from xray.      Kymberly Owens RN  01/15/22 7880

## 2022-01-15 NOTE — H&P
Hospital Medicine History & Physical      PCP: Lisandra Javier MD    Date of Admission: 1/15/2022    Date of Service: Pt seen/examined on  and Admitted to Inpatient with expected LOS greater than two midnights due to medical therapy. Chief Complaint:  Patient found down on his bedroom floor, unclear how long he spent there      History Of Present Illness:      80 y.o. male history of paroxysmal A. Fib, dementia, frequent falls, hypertension, CVA--- recently admitted to the hospital 12/5-1/4/22 with encephalopathy and COVID-19 infection, readmitted 1/7-1/11/22 with fall at the nursing home, JACKELYN and altered mental status. This morning again he was found down on his bedroom floor, unclear how long he spent there. In the ED, he found to be in atrial fibrillation with RVR, heart rate up to 140s, /63, temperature  Consult, oxygen saturation of 93%. .. His oxygenation then dropped to 85% on room air and was placed on 2 L nasal cannula. .. Chest x-ray showed improving lung infiltrates from recent COVID-pneumonia. .  CT head, cervical spine showed no acute issues. Riky Connolly UA showed no evidence of infection. Riky Connolly He was noted to have mild JACKELYN with creatinine of 1.6, CK was 455. ... He was started on Cardizem bolus and fluids and given IV fluids    Past Medical History:          Diagnosis Date    Hyperlipemia     Hypertension     Prostate enlargement     Unspecified cerebral artery occlusion with cerebral infarction 2000       Past Surgical History:          Procedure Laterality Date    CAROTID ENDARTERECTOMY      right    COLONOSCOPY      COLONOSCOPY  2014    polyps    EYE SURGERY      FACIAL RECONSTRUCTION SURGERY      metal plate under left eye    FOOT SURGERY      nerve right    MOHS SURGERY      SKIN CANCER EXCISION      face x several    TONSILLECTOMY         Medications Prior to Admission:      Prior to Admission medications    Medication Sig Start Date End Date Taking?  Authorizing Provider terazosin (HYTRIN) 5 MG capsule Take 5 mg by mouth nightly   Yes Historical Provider, MD   amLODIPine (NORVASC) 5 MG tablet Take 1 tablet by mouth daily 1/11/22  Yes Ayse Velez MD   apixaban (ELIQUIS) 5 MG TABS tablet Take 1 tablet by mouth 2 times daily  Patient taking differently: Take 2.5 mg by mouth 2 times daily  1/3/22 2/2/22 Yes Sunny Urban MD   metoprolol tartrate (LOPRESSOR) 25 MG tablet Take 1 tablet by mouth 2 times daily 1/3/22 2/2/22 Yes Sunny Urban MD   cyclobenzaprine (FLEXERIL) 5 MG tablet Take 1 tablet by mouth 2 times daily as needed for Muscle spasms 11/4/21  Yes Ziggy Neri MD   vitamin D3 (CHOLECALCIFEROL) 10 MCG (400 UNIT) TABS tablet Take 1 tablet by mouth daily 11/4/21  Yes Ziggy Neri MD   ondansetron (ZOFRAN-ODT) 4 MG disintegrating tablet Take 1 tablet by mouth every 8 hours as needed for Nausea or Vomiting 11/4/21  Yes Ziggy Neri MD   Multiple Vitamins-Minerals (OCUVITE PO) Take by mouth   Yes Historical Provider, MD   Calcium Carbonate Antacid (ANTACID E-X PO) Take by mouth   Yes Historical Provider, MD   esomeprazole (NEXIUM) 20 MG delayed release capsule Take 20 mg by mouth every morning (before breakfast)   Yes Historical Provider, MD   divalproex (DEPAKOTE) 250 MG DR tablet Take 250 mg by mouth 2 times daily  4/22/21  Yes Historical Provider, MD   tamsulosin (FLOMAX) 0.4 MG capsule Take 0.4 mg by mouth daily   Yes Historical Provider, MD   donepezil (ARICEPT) 10 MG tablet Take 10 mg by mouth nightly   Yes Historical Provider, MD   lisinopril (PRINIVIL;ZESTRIL) 20 MG tablet Take 1 tablet by mouth daily 5/27/19  Yes Chuck Israel MD   simvastatin (ZOCOR) 80 MG tablet Take 80 mg by mouth nightly    Yes Historical Provider, MD   VITAMIN D PO Take by mouth  Patient not taking: Reported on 1/15/2022    Historical Provider, MD       Allergies:  Corn-containing products    Social History:      The patient currently lives     TOBACCO: reports that he has never smoked. He has never used smokeless tobacco.  ETOH:   reports no history of alcohol use. Family History:       Reviewed in detail and negative for DM, CAD, Cancer, CVA. Positive as follows:    History reviewed. No pertinent family history. REVIEW OF SYSTEMS:   Pertinent positives as noted in the HPI. All other systems reviewed and negative. PHYSICAL EXAM:    BP (!) 153/80   Pulse 115   Temp 98.1 °F (36.7 °C) (Axillary)   Resp 20   Ht 5' 8\" (1.727 m)   Wt 170 lb (77.1 kg)   SpO2 94%   BMI 25.85 kg/m²     General appearance:  No apparent distress, appears stated age and cooperative. HEENT:  Normal cephalic, atraumatic without obvious deformity. Pupils equal, round, and reactive to light. Extra ocular muscles intact. Conjunctivae/corneas clear. Neck: Supple, with full range of motion. No jugular venous distention. Trachea midline. Respiratory:  Normal respiratory effort. Clear to auscultation, bilaterally without Rales/Wheezes/Rhonchi. Cardiovascular:  Regular rate and rhythm with normal S1/S2 without murmurs, rubs or gallops. Abdomen: Soft, non-tender, non-distended with normal bowel sounds. Musculoskeletal:  No clubbing, cyanosis or edema bilaterally. Full range of motion without deformity. Skin: Skin color, texture, turgor normal.  No rashes or lesions. Neurologic:  Neurovascularly intact without any focal sensory/motor deficits.  Cranial nerves: II-XII intact, grossly non-focal.  Psychiatric:  Alert and oriented to self  Capillary Refill: Brisk,< 3 seconds   Peripheral Pulses: +2 palpable, equal bilaterally       CXR:  I have reviewed the CXR with the following interpretation:   EKG:  I have reviewed the EKG with the following interpretation:     Labs:     Recent Labs     01/15/22  0854   WBC 13.0*   HGB 13.4*   HCT 41.4        Recent Labs     01/15/22  0854      K 3.6      CO2 19*   BUN 27*   CREATININE 1.6*   CALCIUM 10.4     Recent Labs 01/15/22  0854   AST 28   ALT 17   BILITOT 0.5   ALKPHOS 76     No results for input(s): INR in the last 72 hours. Recent Labs     01/15/22  0854   CKTOTAL 456*   TROPONINI 0.02*       Urinalysis:      Lab Results   Component Value Date    NITRU Negative 01/15/2022    WBCUA None seen 01/15/2022    BACTERIA 2+ 01/07/2022    RBCUA 3-4 01/15/2022    BLOODU Negative 01/15/2022    SPECGRAV >=1.030 01/15/2022    GLUCOSEU Negative 01/15/2022         ASSESSMENT:      -Paroxysmal A. fib with RVR-on Eliquis and metoprolol  -Recurrent falls  -Advanced dementia-on Aricept  -Acute kidney injury--creatinine 1.6, baseline 1.1-1 point  -Recent COVID-pneumonia--- improving  -Acute respiratory failure hypoxia, oxygen saturation 85% on room air--currently on 2-3 L nasal cannula  -Elevated CK. .  Early traumatic rhabdo status post fall  -Oropharyngeal dysphagia due to dementia on a modified diet  -Essential hypertension -stable-on amlodipine, metoprolol and lisinopril  -History of CVA--no residual focal deficits  -Essential tremors  -Reactive leukocytosis    PLAN:    -Continue IV fluids  -Hold lisinopril due to JACKELYN and closely monitor renal function  -Trend CK level  -Continue Cardizem drip, resume metoprolol and Eliquis for A. Fib  -Consult cardiology. .. Discussed risks versus benefits for anticoagulation with continued falls  -Maintain fall precautions  -Consulted PT/OT and case management    DVT Prophylaxis: Eliquis  Diet: ADULT DIET; Dysphagia - Soft and Bite Sized; Mildly Thick (Nectar); No Drinking Straws  Code Status: DNR-CC           Bisi Barnes MD    Thank you Christelle Gloria MD for the opportunity to be involved in this patient's care. If you have any questions or concerns please feel free to contact me at [phone].

## 2022-01-15 NOTE — PROGRESS NOTES
27 Althea Negro  called requesting patient update due to a referral from [facility]. Updated. Will reach back out tomorrow for DC plans. 01/16/22 1002 Updated 27 Althea Negro.

## 2022-01-15 NOTE — TELEPHONE ENCOUNTER
Writer contacted Dr. Mallory Disla to inform of 30 day readmission risk. Dr. Mallory Disla informed Janes Railes of readmission.

## 2022-01-15 NOTE — ED NOTES
Pt is from [place] [address] after being found down on the floor. Per squad unknown time of fall. Pt came in with non re breather mask in place. Mask taken off oxygen and sats 85% on RA. Pt place on 2 lit NC and sats above 90%. Pt with HX of fall 01/07/22 and dx with JACKELYN, elevated trop and multifocal pneumonia. On 12/25 pt dx with COVID pt admitted with generalize weakness, syncope with fall. Pt only oriented to self. Pt is awake alert. Pt with tremors all over. Pt dose have hx of AFIB and on Eliquis. Pt with monitors in place.       Renee Franco, RN  01/15/22 7432

## 2022-01-16 NOTE — PROGRESS NOTES
Physical Therapy    Facility/Department: [facility] B3 - MED SURG  Initial Assessment    NAME: Nino Means  : 1940  MRN: [id number]    Date of Service: 2022    Discharge Recommendations:  Subacute/Skilled Nursing Facility   PT Equipment Recommendations  Equipment Needed: No    Assessment   Body structures, Functions, Activity limitations: Decreased functional mobility ; Decreased balance;Decreased posture;Decreased safe awareness;Decreased cognition;Decreased endurance;Decreased coordination  Assessment: Pt is 79 yo male who presents with diagnosis of shoulder abrasion s/p fall at [facility]. Pt found down for unknown period of time. Max A x 2 for bed mobility and standing. Limited d/t impaired cognition. Pt unable to provide social history d/t history of dementia. Pt would benefit from continued skilled therapy to address deficits. Recommend SNF at d/c due to current deficits and Ax2 for mobility. Treatment Diagnosis: impaired functional mobility  Specific instructions for Next Treatment: progress mobility as tolerated  Prognosis: Fair  Decision Making: Medium Complexity  PT Education: Goals; General Safety;PT Role;Disease Specific Education;Plan of Care; Functional Mobility Training  Patient Education: Pt educated on safe mobility and importance of OOB mobility -- pt will require reinforcement d/t impaired cognition  Barriers to Learning: cognition  REQUIRES PT FOLLOW UP: Yes  Activity Tolerance  Activity Tolerance: Patient limited by fatigue;Patient limited by endurance; Patient limited by cognitive status; Patient Tolerated treatment well  Activity Tolerance: /86, ; SpO2 93% on 2L       Patient Diagnosis(es): The primary encounter diagnosis was Atrial fibrillation with RVR ([address]  Diagnoses of JACKELYN (acute kidney injury) ([address] Acute respiratory failure with hypoxia ([address] Fall, initial encounter, Anticoagulated, Abrasion of forehead, initial encounter, Abrasion, shoulder without infection, and Appears at least 3/5 based on mobility observed; unable to fully MMT d/t impaired cognition        Bed mobility  Rolling to Left: 2 Person assistance;Maximum assistance  Rolling to Right: Maximum assistance;2 Person assistance  Supine to Sit: 2 Person assistance;Maximum assistance  Sit to Supine: Maximum assistance;2 Person assistance  Comment: Max A x2 for all bed mobility. Pt rolling R/L multiple reps for pericare and to change brief     Transfers  Sit to Stand: Maximum Assistance;2 Person Assistance  Stand to sit: 2 Person Assistance;Maximum Assistance  Comment: Max A x 2 to stand from EOB. Significant posterior lean with no right reactions noted  Ambulation  Ambulation?: No (unsafe this date)     Balance  Sitting - Static: Fair  Sitting - Dynamic: Fair  Standing - Static: Poor  Standing - Dynamic: Poor        Plan   Plan  Times per week: 2-3x/wk  Times per day: Daily  Specific instructions for Next Treatment: progress mobility as tolerated  Current Treatment Recommendations: Strengthening,Neuromuscular Re-education,Home Exercise Program,Safety Education & Training,Balance Training,Endurance Training,Functional Mobility Training,Transfer Training,Gait Training,Equipment Evaluation, Education, & procurement,Patient/Caregiver Education & Training  Safety Devices  Type of devices: All fall risk precautions in place,Call light within reach,Gait belt,Bed alarm in place,Patient at risk for falls,Nurse notified,Left in bed                        AM-PAC Score     AM-PAC Inpatient Mobility without Stair Climbing Raw Score : 8 (01/16/22 0952)  AM-PAC Inpatient without Stair Climbing T-Scale Score : 30.65 (01/16/22 0952)  Mobility Inpatient CMS 0-100% Score: 80.91 (01/16/22 9658)  Mobility Inpatient without Stair CMS G-Code Modifier : CM (01/16/22 4699)       Goals  Short term goals  Time Frame for Short term goals: 1 week (1/23) unless otherwise specified  Short term goal 1: Pt will be mod A for supine<>sit.   Short term goal 2: Pt will be mod A x 2 for transfers with LRAD. Short term goal 3: 1/20: Pt will participate in 12-15 reps of BLE exercises to promote strength and activity tolerance. Patient Goals   Patient goals : none stated by pt d/t impaired cognition       Therapy Time   Individual Concurrent Group Co-treatment   Time In 0812         Time Out 0847         Minutes 35         Timed Code Treatment Minutes: 72 Gale Travis, PT, DPT  If pt is unable to be seen after this session, please let this note serve as discharge summary. Please see case management note for discharge disposition. Thank you.

## 2022-01-16 NOTE — PROGRESS NOTES
Occupational Therapy   Occupational Therapy Initial Assessment  Date: 2022   Patient Name: Keysha Cardenas  MRN: [id number]     : 1940    Date of Service: 2022    Discharge Recommendations:  Subacute/Skilled Nursing Facility       Assessment   Performance deficits / Impairments: Decreased functional mobility ; Decreased safe awareness;Decreased balance;Decreased ADL status; Decreased cognition;Decreased strength  Assessment: per chart review pt assist of 2 for functional transfers; pt with multiple falls in ALU; pt cooperative to benefit from skilled OT services  OT Education: OT Role;Plan of Care;Precautions  Patient Education: disease specific: importance of use RED/nurse call light for Assist with ADL/toileting needs & repositioning  Barriers to Learning: cognition  REQUIRES OT FOLLOW UP: Yes  Activity Tolerance  Activity Tolerance: Patient Tolerated treatment well  Safety Devices  Safety Devices in place: Yes  Type of devices: Bed alarm in place;Call light within reach; Left in bed;Nurse notified           Patient Diagnosis(es): The primary encounter diagnosis was Atrial fibrillation with RVR ([facility] [address] Diagnoses of JACKELYN (acute kidney injury) ([facility] [address] Acute respiratory failure with hypoxia ([facility] [address] Fall, initial encounter, Anticoagulated, Abrasion of forehead, initial encounter, Abrasion, shoulder without infection, and Pneumonia due to COVID-19 virus were also pertinent to this visit. has a past medical history of Hyperlipemia, Hypertension, Prostate enlargement, and Unspecified cerebral artery occlusion with cerebral infarction. has a past surgical history that includes Carotid endarterectomy; Foot surgery; Skin cancer excision; Eye surgery; Facial reconstruction surgery; Tonsillectomy; Colonoscopy; Colonoscopy (); and Mohs surgery.            Restrictions  Restrictions/Precautions  Restrictions/Precautions: General Precautions,Fall Risk  Position Activity Restriction  Other position/activity restrictions: telemetry, IV, 2 L O 2    Subjective   General  Chart Reviewed: Yes  Patient assessed for rehabilitation services?: Yes  Family / Caregiver Present: No  Referring Practitioner: Dr. Scar Aj  Diagnosis: fall, found down at ALU  Subjective  Subjective: \"I don't want to get out of bed, I am afraid of falling\"  General Comment  Comments: RN cleared pt for OT Eval; pt resting in bed,easily awakens sitting up on EOB  Patient Currently in Pain: Denies  Pain Assessment  Pain Assessment: Advanced Dementia  Pain Type: Chronic pain  Pain Location: Back  Functional Pain Assessment: Prevents or interferes some active activities and ADLs  Non-Pharmaceutical Pain Intervention(s): Therapeutic presence;Repositioned  Pre Treatment Pain Screening  Intervention List: Patient able to continue with treatment  Vital Signs  Patient Currently in Pain: Denies  Social/Functional History  Social/Functional History  Type of Home: Facility ([facility])  ADL Assistance: Needs assistance  Bath: Maximum assistance  Dressing: Maximum assistance  Toileting: Needs assistance  Transfer Assistance: Needs assistance  Additional Comments: Per chart, pt transferring to w/c with assist from 2 staff members       Objective   Hearing: Exceptions to [facility]  Hearing Exceptions: Hard of hearing/hearing concerns    Orientation  Overall Orientation Status: Impaired  Orientation Level: Oriented to person  Observation/Palpation  Observation: multiple abrasions, arms & legs  Balance  Sitting Balance: Minimal assistance (progressed to SBA sitting EOB ~ 5 minutes)  Standing Balance: Dependent/Total (mod/max assist of 2 with gait belt)  Standing Balance  Time: 15 seconds  Activity: sit<-->stand assessment  ADL  Grooming: Setup (seated EOB to wash face)  Toileting: Dependent/Total (incontinent of urine)    RUE Tone: Normotonic  LUE Tone: Normotonic  Coordination  Movements Are Fluid And Coordinated: Yes     Bed mobility  Rolling to Left: 2 Person

## 2022-01-16 NOTE — PROGRESS NOTES
Hospitalist Progress Note      PCP: Brad Kim MD    Date of Admission: 1/15/2022        Hospital Course:       80 y.o. male history of paroxysmal A. Fib, dementia, frequent falls, hypertension, CVA--- recently admitted to the hospital 12/5-1/4/22 with encephalopathy and COVID-19 infection, readmitted 1/7-1/11/22 with fall at the nursing home, JACKELYN and altered mental status. This morning again he was found down on his bedroom floor, unclear how long he spent there.        In the ED, he found to be in atrial fibrillation with RVR, heart rate up to 140s, /63, temperature  Consult, oxygen saturation of 93%. .. His oxygenation then dropped to 85% on room air and was placed on 2 L nasal cannula. .. Chest x-ray showed improving lung infiltrates from recent COVID-pneumonia. .  CT head, cervical spine showed no acute issues. Anayeli Beebe UA showed no evidence of infection. Anayeli Beebe He was noted to have mild JACKELYN with creatinine of 1.6, CK was 455. ... He was started on Cardizem bolus and fluids and given IV fluids    Subjective:       Asleep this morning. No acute events reported overnight.   Heart rate improving with Cardizem drip      Medications:  Reviewed    Infusion Medications    dilTIAZem 2.5 mg/hr (01/15/22 5297)    sodium chloride 100 mL/hr at 01/16/22 1011    sodium chloride       Scheduled Medications    donepezil  10 mg Oral Nightly    divalproex  250 mg Oral BID    metoprolol tartrate  25 mg Oral BID    pantoprazole  40 mg Oral QAM AC    atorvastatin  40 mg Oral Daily    tamsulosin  0.4 mg Oral Daily    apixaban  5 mg Oral BID    sodium chloride flush  5-40 mL IntraVENous 2 times per day    doxazosin  4 mg Oral Nightly     PRN Meds: sodium chloride flush, sodium chloride, polyethylene glycol, acetaminophen **OR** acetaminophen      Intake/Output Summary (Last 24 hours) at 1/16/2022 1249  Last data filed at 1/16/2022 1003  Gross per 24 hour   Intake 150 ml   Output    Net 150 ml       Physical Exam Performed:    BP (!) 149/90   Pulse 83   Temp 98.2 °F (36.8 °C) (Oral)   Resp 19   Ht 5' 8\" (1.727 m)   Wt 170 lb (77.1 kg)   SpO2 95%   BMI 25.85 kg/m²     General appearance: No apparent distress, appears stated age and cooperative. HEENT: Pupils equal, round, and reactive to light. Conjunctivae/corneas clear. Neck: Supple, with full range of motion. No jugular venous distention. Trachea midline. Respiratory:  Normal respiratory effort. Clear to auscultation, bilaterally without Rales/Wheezes/Rhonchi. Cardiovascular: Regular rate and rhythm with normal S1/S2 without murmurs, rubs or gallops. Abdomen: Soft, non-tender, non-distended with normal bowel sounds. Musculoskeletal: No clubbing, cyanosis or edema bilaterally. Full range of motion without deformity. Skin: Skin color, texture, turgor normal.  No rashes or lesions. Neurologic:  Neurovascularly intact without any focal sensory/motor deficits. Cranial nerves: II-XII intact, grossly non-focal.  Psychiatric: Alert and oriented, thought content appropriate, normal insight  Capillary Refill: Brisk,3 seconds, normal   Peripheral Pulses: +2 palpable, equal bilaterally       Labs:   Recent Labs     01/15/22  0854 01/16/22  0736   WBC 13.0* 11.8*   HGB 13.4* 12.0*   HCT 41.4 37.2*    200     Recent Labs     01/15/22  0854 01/16/22  0736    140   K 3.6 3.7    108   CO2 19* 24   BUN 27* 20   CREATININE 1.6* 1.1   CALCIUM 10.4 9.5     Recent Labs     01/15/22  0854   AST 28   ALT 17   BILITOT 0.5   ALKPHOS 76     No results for input(s): INR in the last 72 hours.   Recent Labs     01/15/22  0854 01/15/22  1453 01/15/22  2020 01/16/22  0736   CKTOTAL 456*  --   --  578*   TROPONINI 0.02* 0.02* 0.02*  --        Urinalysis:      Lab Results   Component Value Date    NITRU Negative 01/15/2022    WBCUA None seen 01/15/2022    BACTERIA 2+ 01/07/2022    RBCUA 3-4 01/15/2022    BLOODU Negative 01/15/2022    SPECGRAV >=1.030 01/15/2022    GLUCOSEU Negative 01/15/2022       Radiology:  CT CERVICAL SPINE WO CONTRAST   Final Result   Addendum 1 of 1   ADDENDUM:   It should be noted that there are partially seen ground-glass opacities   involving the visualized upper lobes of the lungs. The findings are   nonspecific and may represent areas of pneumonitis/pneumonia, including   atypical/viral pneumonia. Noncontrast CT scan of the chest can be    performed   for further evaluation. Final   1. No acute abnormality of the cervical spine. 2.  No significant interval change, when compared to the previous study   performed 01/07/2022. CT Head WO Contrast   Final Result   1. Moderate, diffuse, cerebral and cerebellar atrophy again identified, when   compared to the previous study performed 01/07/2022.      2.  Microvascular ischemic changes again appreciated. 3.  Sinus disease, as described above. XR CHEST PORTABLE   Final Result   Interval decrease in the right upper lobe pneumonia, when compared to the   prior study performed 01/07/2022. Rule out CHF versus bilateral pneumonia,   as described above. Rule out small left pleural effusion. Assessment/Plan:    -Paroxysmal A. fib with RVR-on Eliquis and metoprolol  -Recurrent falls  -Advanced dementia-on Aricept  -Acute kidney injury--creatinine 1.6, baseline 1.1--resolved with IV fluids  -Recent COVID-pneumonia--- improving based on chest x-ray and symptoms  -Acute respiratory failure hypoxia, oxygen saturation 85% on room air--required 2 to 3 L on presentation--weaned down to 1.5--continue to wean as tolerated  -Elevated CK. .  Early traumatic rhabdo status post fall  -Oropharyngeal dysphagia due to dementia on a modified diet  -Essential hypertension -stable-on  Metoprolol. .  Lisinopril and amlodipine held on admission  -History of CVA--no residual focal deficits  -Essential tremors  -Reactive leukocytosis--improving    Plan  -Wean Cardizem drip, cardiology to optimize oral regimen  -Eliquis--cardioloy to review risks benefits given very frequent falls-family decision to pursue hospice care  -Continue supplemental oxygen wean as tolerated  -Trend CK monitor creatinine  -PT and OT    DVT Prophylaxis: Eliquis  Diet: ADULT DIET; Dysphagia - Soft and Bite Sized; Mildly Thick (Nectar); No Drinking Straws  Code Status: DNR-CC    Disposition. .  To SNF with ?   Hospice within 24 hours --family has apparently been talking to hospice before admission      Addie Escobar MD

## 2022-01-16 NOTE — CONSULTS
[address] [facility]  [phone]      Attending Physician: Juan Garcia MD  Reason for Consultation/Chief Complaint: Atrial fibrillation with RVR    Subjective   History of Present Illness:  Rimma Anthony is a 80 y.o. male with a history of advanced dementia, CVA, atrial fibrillation, carotid artery disease, mild non-obstructive coronary artery disease, recurrent falls, MARCE, essential hypertension, and hyperlipidemia admitted after a fall in his bedroom. The patient has had multiple admissions for recurrent falls over the last several months. He has dementia and is currently only oriented to self and not able to provide any additional history. On arrival to the ED, initial ECG showed atrial fibrillation with RVR in the 140s and old RBBB. Troponin T trended 0.02-->0.02-->0. 02.  Creatinine was elevated at 1.6 from previous baseline of ~1.1-1.2. He was bolused with 10 mg IV diltiazem and started on an IV diltiazem infusion which is currently at 2.5 mg/hr. Ventricular rate is currently in the 110s. Past Medical History:   has a past medical history of Hyperlipemia, Hypertension, Prostate enlargement, and Unspecified cerebral artery occlusion with cerebral infarction. Surgical History:   has a past surgical history that includes Carotid endarterectomy; Foot surgery; Skin cancer excision; Eye surgery; Facial reconstruction surgery; Tonsillectomy; Colonoscopy; Colonoscopy (2014); and Mohs surgery. Social History:   reports that he has never smoked. He has never used smokeless tobacco. He reports that he does not drink alcohol and does not use drugs. Family History:  Unobtainable due to patient's dementia. Home Medications:  Were reviewed and are listed in nursing record and/or below  Prior to Admission medications    Medication Sig Start Date End Date Taking?  Authorizing Provider   terazosin (HYTRIN) 5 MG capsule Take 5 mg by mouth nightly   Yes Historical Provider, MD   amLODIPine (NORVASC) 5 MG tablet Take 1 tablet by mouth daily 1/11/22  Yes Marlin Sullivan MD   apixaban (ELIQUIS) 5 MG TABS tablet Take 1 tablet by mouth 2 times daily  Patient taking differently: Take 2.5 mg by mouth 2 times daily  1/3/22 2/2/22 Yes Sara Phillip MD   metoprolol tartrate (LOPRESSOR) 25 MG tablet Take 1 tablet by mouth 2 times daily 1/3/22 2/2/22 Yes Sara Phillip MD   cyclobenzaprine (FLEXERIL) 5 MG tablet Take 1 tablet by mouth 2 times daily as needed for Muscle spasms 11/4/21  Yes Gage Saeed MD   vitamin D3 (CHOLECALCIFEROL) 10 MCG (400 UNIT) TABS tablet Take 1 tablet by mouth daily 11/4/21  Yes Gage Saeed MD   VITAMIN D PO Take by mouth    Yes Historical Provider, MD   Multiple Vitamins-Minerals (OCUVITE PO) Take by mouth   Yes Historical Provider, MD   Calcium Carbonate Antacid (ANTACID E-X PO) Take by mouth   Yes Historical Provider, MD   esomeprazole (NEXIUM) 20 MG delayed release capsule Take 20 mg by mouth every morning (before breakfast)   Yes Historical Provider, MD   divalproex (DEPAKOTE) 250 MG DR tablet Take 250 mg by mouth 2 times daily  4/22/21  Yes Historical Provider, MD   tamsulosin (FLOMAX) 0.4 MG capsule Take 0.4 mg by mouth daily   Yes Historical Provider, MD   donepezil (ARICEPT) 10 MG tablet Take 10 mg by mouth nightly   Yes Historical Provider, MD   lisinopril (PRINIVIL;ZESTRIL) 20 MG tablet Take 1 tablet by mouth daily 5/27/19  Yes Ghassan Lamb MD   simvastatin (ZOCOR) 80 MG tablet Take 80 mg by mouth nightly    Yes Historical Provider, MD   ondansetron (ZOFRAN-ODT) 4 MG disintegrating tablet Take 1 tablet by mouth every 8 hours as needed for Nausea or Vomiting 11/4/21   Gage Saeed MD        CURRENT Medications:  metoprolol succinate (TOPROL XL) extended release tablet 50 mg, Daily  dilTIAZem 125 mg in dextrose 5 % 125 mL infusion, Continuous  0.9 % sodium chloride infusion, Continuous  donepezil (ARICEPT) tablet 10 mg, Nightly  divalproex (DEPAKOTE) DR tablet 250 mg, BID  pantoprazole (PROTONIX) tablet 40 mg, QAM AC  atorvastatin (LIPITOR) tablet 40 mg, Daily  tamsulosin (FLOMAX) capsule 0.4 mg, Daily  sodium chloride flush 0.9 % injection 5-40 mL, 2 times per day  sodium chloride flush 0.9 % injection 5-40 mL, PRN  0.9 % sodium chloride infusion, PRN  polyethylene glycol (GLYCOLAX) packet 17 g, Daily PRN  acetaminophen (TYLENOL) tablet 650 mg, Q6H PRN   Or  acetaminophen (TYLENOL) suppository 650 mg, Q6H PRN  doxazosin (CARDURA) tablet 4 mg, Nightly        Allergies:  Corn-containing products     Review of Systems:   Unobtainable due to patient's dementia. Objective   PHYSICAL EXAM:    Vitals:    01/16/22 1415   BP: 110/69   Pulse: 102   Resp: 20   Temp: 98.5 °F (36.9 °C)   SpO2: 92%    Weight: 170 lb (77.1 kg)       General: Elderly male lying in bed in no acute distress. Advanced dementia. HEENT: Normocephalic, atraumatic, non-icteric, hearing intact, nares normal, mucous membranes moist.  Neck: Supple, trachea midline. No adenopathy. No thyromegaly. No JVD. Heart: Irregularly irregular rate and rhythm. Normal S1 and S2. No murmurs, gallops or rubs. Lungs: Normal respiratory effort. Clear to auscultation bilaterally. No wheezes, rales, or rhonchi. Abdomen: Soft, non-tender. Normoactive bowel sounds. No masses or organomegaly. Skin: No rashes, wounds, or lesions. Pulses: 2+ and symmetric. Extremities: No clubbing, cyanosis, or edema. Neuro: Alert to self only. Dementia.       Labs   CBC:   Lab Results   Component Value Date    WBC 11.8 01/16/2022    RBC 3.94 01/16/2022    HGB 12.0 01/16/2022    HCT 37.2 01/16/2022    MCV 94.4 01/16/2022    RDW 14.6 01/16/2022     01/16/2022     CMP:  Lab Results   Component Value Date     01/16/2022    K 3.7 01/16/2022     01/16/2022    CO2 24 01/16/2022    BUN 20 01/16/2022    CREATININE 1.1 01/16/2022    GFRAA >60 01/16/2022    GFRAA >60 06/14/2013 coronary disease. Other Studies:   Chest X-ray 1/15/22:  Interval decrease in the right upper lobe pneumonia, when compared to the   prior study performed 01/07/2022.  Rule out CHF versus bilateral pneumonia,   as described above.  Rule out small left pleural effusion.         Non-contrast head CT 1/15/22:  1.  Moderate, diffuse, cerebral and cerebellar atrophy again identified, when   compared to the previous study performed 01/07/2022.       2.  Microvascular ischemic changes again appreciated.       3.  Sinus disease, as described above. Assessment and Plan      1. Atrial fibrillation with RVR - Ventricular rates were in the 140s on admission. Patient was bolused with 10 mg IV diltiazem and started on an IV diltiazem infusion at 2.5 mg/hr. Ventricular rate is now currently in the 110s. -Recommend transitioning to metoprolol succinate and increasing dose to 50 mg BID and weaning off IV diltiazem infusion  -Can continue to titrate beta-blocker dose as tolerated to achieve goal heart rate <100 bpm  -Although patient has elevated [id number] and history of CVA, given dementia and frequent falls, the risks of continued anticoagulation likely outweigh benefits, and would recommend discontinuing Eliquis  -Given other co-morbidities and that family is considering transitioning to hospice care, Watchman device placement would likely not be consistent with goals of care  -Can start aspirin 81 mg daily  -Continue to monitor on telemetry while remains inpatient  -Replete electrolytes as needed to maintain goal K>4 and Mg>2  -Avoid large fluid shifts as able    2. Mild troponin T elevation - Likely secondary to combination of JACKELYN and demand ischemia from tachycardia. Overall presentation and troponin trend do not appear to be consistent with ACS. -Can stop trending troponins  -Management of atrial fibrillation as above    3.  Mild non-obstructive coronary artery disease  -Recommend starting aspirin 81 mg

## 2022-01-16 NOTE — CARE COORDINATION
CASE MANAGEMENT INITIAL ASSESSMENT      Reviewed chart and completed assessment with patient:   Explained Case Management role/services. Primary contact information:    Health Care Decision Maker :   Primary Decision Maker: Sybil Glasgow - Spouse - [phone]    Secondary Decision Maker: Rocio North - Child - [phone]    Secondary Decision Maker: [place] - [phone]          Can this person be reached and be able to respond quickly, such as within a few minutes or hours? Yes      Admit date/status: 1/15/22 IP  Diagnosis: Fall, Afib RVR  Is this a Readmission?:  Yes  Fall x 2 admissions    Insurance:[employer]   Precert required for SNF: No       3 night stay required: No    Living arrangements, Adls, care needs, prior to admission: Lives in The [address] [place] [address]. Transportation: medical    Durable Medical Equipment at home:  Walker_x_Cane__RTS__ BSC__Shower Chair_x_  02 2 L [employer]__ HHN__ CPAP__  BiPap__  Hospital Bed_x_ W/C_x__ Other__________    Services in the home and/or outpatient, prior to admission: [address] (if applicable) N/A    PT/OT recs: SNF    Hospital Exemption Notification (HEN): not initiated    Barriers to discharge: none    Plan/comments: Spoke to pt saniya Tate and to RADHA EAGLE AT [place] (). Both are en route to hospital to meet and possibly sign consents. Plan is to return to The [address] [place] [address] with hospice following. Neither [facility] or writer able to reach any admin at [facility] to review plan, will need to d/w them tomorrow. DCP following.      ECOC on chart for MD signature

## 2022-01-17 NOTE — PROGRESS NOTES
Physical Therapy  Facility/Department: [facility] B3 - MED SURG  Daily Treatment Note  NAME: Inderjit Nieto  : 1940  MRN: [id number]    Date of Service: 2022    Discharge Recommendations:  Subacute/Skilled Nursing Facility   PT Equipment Recommendations  Equipment Needed: No    Assessment   Body structures, Functions, Activity limitations: Decreased functional mobility ; Decreased balance;Decreased posture;Decreased safe awareness;Decreased cognition;Decreased endurance;Decreased coordination  Assessment: Pt was seen as cotx with OT due to high level of assistance required for mobility in order to maximize functional outcomes. Pt required max A for rolling left and right, max A x2 for supine<>sit and to bridge up to [facility]. Pt was able to sit EOB with grossly CGA for ther ex and ADL's x 25 min. Transfers defierred due to lethargy and elevated BP/HR. Cont per POC to address deficits. Treatment Diagnosis: impaired functional mobility  Specific instructions for Next Treatment: progress mobility as tolerated  Prognosis: Fair  Decision Making: Medium Complexity  PT Education: Goals; General Safety;PT Role;Disease Specific Education;Plan of Care; Functional Mobility Training  Patient Education: Pt educated on safe mobility and importance of OOB mobility -- pt will require reinforcement d/t impaired cognition  Barriers to Learning: cognition  REQUIRES PT FOLLOW UP: Yes  Activity Tolerance  Activity Tolerance: Patient limited by fatigue;Patient limited by endurance; Patient limited by cognitive status; Patient Tolerated treatment well;Treatment limited secondary to medical complications (free text)  Activity Tolerance: BP on entry 157/104, during session 150/100 and 159/99, with HR ranging from 91 to 122 BPM, did not attempt standing or transfer to chair due to vitals and pt's lethargy; SpO2 on 1.5L 96%     Patient Diagnosis(es): The primary encounter diagnosis was Atrial fibrillation with RVR ([facility] [address]  Diagnoses of JACKELYN (acute kidney injury) ([facility] [address] Acute respiratory failure with hypoxia ([facility] [address] Fall, initial encounter, Anticoagulated, Abrasion of forehead, initial encounter, Abrasion, shoulder without infection, and Pneumonia due to COVID-19 virus were also pertinent to this visit. has a past medical history of Hyperlipemia, Hypertension, Prostate enlargement, and Unspecified cerebral artery occlusion with cerebral infarction. has a past surgical history that includes Carotid endarterectomy; Foot surgery; Skin cancer excision; Eye surgery; Facial reconstruction surgery; Tonsillectomy; Colonoscopy; Colonoscopy (2014); and Mohs surgery. Restrictions  Restrictions/Precautions  Restrictions/Precautions: General Precautions,Fall Risk  Position Activity Restriction  Other position/activity restrictions: telemetry, IV, 1.5 L O 2  Subjective   General  Chart Reviewed: Yes  Response To Previous Treatment: Patient with no complaints from previous session. Family / Caregiver Present: No  Referring Practitioner: Dr. April Tyler MD  Subjective  Subjective: pt agreeable to therapy  General Comment  Comments: Pt resting in bed on approach; RN cleared pt for therapy  Pain Screening  Patient Currently in Pain: Yes  Pain Assessment  Pain Assessment: Faces  Frias-Baker Pain Rating: Hurts little more  Pain Type: Acute pain  Pain Location: Hip  Pain Orientation: Right  Non-Pharmaceutical Pain Intervention(s): Repositioned; Therapeutic presence  Vital Signs  Patient Currently in Pain: Yes       Orientation  Orientation  Overall Orientation Status: Impaired  Orientation Level: Oriented to person;Disoriented to place; Disoriented to time;Disoriented to situation       Objective   Bed mobility  Rolling to Left: Maximum assistance  Rolling to Right: Maximum assistance  Supine to Sit: 2 Person assistance;Maximum assistance  Sit to Supine: Maximum assistance;2 Person assistance  Scooting: Maximal assistance;2 Person assistance (pt assisted some with bridging technique)  Transfers  Sit to Stand: Unable to assess  Stand to sit: Unable to assess  Comment: Pt very lethargic today with elevated BP, did not attempt standing  Ambulation  Ambulation?: No (unsafe this date)     Balance  Posture: Fair  Sitting - Static: Fair  Sitting - Dynamic: Fair  Comment: Pt sat EOB with grossly CGA this date for approx 25 min for ADL's and there ex  Exercises  Quad Sets: x 10 B  Heelslides: x 10 B  Hip Flexion: x 10 B  Hip Abduction: x 10 B clamshells seated EOB  Knee Long Arc Quad: x 10 B  Ankle Pumps: x 20 B HR/ TR  Core Strengthening: seated core stabilization with trunk rotation and alternating reach x 10 B        AM-PAC Score     AM-PAC Inpatient Mobility without Stair Climbing Raw Score : 8 (01/17/22 1438)  AM-PAC Inpatient without Stair Climbing T-Scale Score : 30.65 (01/17/22 1438)  Mobility Inpatient CMS 0-100% Score: 80.91 (01/17/22 1438)  Mobility Inpatient without Stair CMS G-Code Modifier : CM (01/17/22 1438)       Goals  Short term goals  Time Frame for Short term goals: 1 week (1/23) unless otherwise specified  Short term goal 1: Pt will be mod A for supine<>sit. ; 1/17 max A x2  Short term goal 2: Pt will be mod A x 2 for transfers with LRAD.; 1/17 NT  Short term goal 3: 1/20: Pt will participate in 12-15 reps of BLE exercises to promote strength and activity tolerance. ; 1/17, initiated 10 reps  Patient Goals   Patient goals : none stated by pt d/t impaired cognition    Plan    Plan  Times per week: 2-3x/wk  Times per day: Daily  Specific instructions for Next Treatment: progress mobility as tolerated  Current Treatment Recommendations: Strengthening,Neuromuscular Re-education,Home Exercise Program,Safety Education & Training,Balance Training,Endurance Training,Functional Mobility Training,Transfer Training,Gait Training,Equipment Evaluation, Education, & procurement,Patient/Caregiver Education & Training  Safety Devices  Type of devices:  All fall risk precautions in place,Call light within reach,Gait belt,Bed alarm in place,Patient at risk for falls,Nurse notified,Left in bed     Therapy Time   Individual Concurrent Group Co-treatment   Time In       1113   Time Out       1151   Minutes       38   Timed Code Treatment Minutes: 45 Minutes    If pt is discharged prior to next therapy session, this note will serve as discharge summary.     Bud Padilla, PT

## 2022-01-17 NOTE — PROGRESS NOTES
or organomegaly. Skin: No rashes, wounds, or lesions. Pulses: 2+ and symmetric. Extremities: No clubbing, cyanosis, or edema. Neuro: Alert. Advanced dementia.         Labs   CBC:   Lab Results   Component Value Date    WBC 9.9 01/17/2022    RBC 3.94 01/17/2022    HGB 12.2 01/17/2022    HCT 37.2 01/17/2022    MCV 94.6 01/17/2022    RDW 14.6 01/17/2022     01/17/2022     CMP:  Lab Results   Component Value Date     01/17/2022    K 3.7 01/17/2022    K 3.7 01/16/2022     01/17/2022    CO2 24 01/17/2022    BUN 16 01/17/2022    CREATININE 1.0 01/17/2022    GFRAA >60 01/17/2022    GFRAA >60 06/14/2013    AGRATIO 0.8 01/15/2022    LABGLOM >60 01/17/2022    GLUCOSE 89 01/17/2022    PROT 6.9 01/15/2022    PROT 6.2 08/13/2012    CALCIUM 9.6 01/17/2022    BILITOT 0.5 01/15/2022    ALKPHOS 76 01/15/2022    AST 28 01/15/2022    ALT 17 01/15/2022     PT/INR:  No results found for: PTINR  HgBA1c:  Lab Results   Component Value Date    LABA1C 5.7 11/03/2021     Lab Results   Component Value Date    CKTOTAL 172 01/17/2022    TROPONINI 0.02 (H) 01/15/2022         Cardiac Data     Last EKG: Atrial fibrillation with RVR and PVCs or aberrantly conducted complexes, RBBB, possible old inferior infarct      Echo:  TTE with bubble 11/4/21:  Conclusions    Summary   Technically difficult study due to body surface area and poor acoustic   window.   Normal left ventricular size with mild concentric left ventricular   hypertrophy.   The left ventricular systolic function is normal with visually estimated   LVEF of 55%.   No regional wall motion abnormalities.   Grade I diastolic dysfunction with normal filling pressure.   The right ventricle is normal in size and function.   Compared to previous study from 11-8-2018 no changes noted in left   ventricular function.   Mild mitral and aortic regurgitation.   A bubble study was performed.  The study was of good quality and fails to  [facility] evidence of shunting.   Systolic pulmonic artery pressure (SPAP) is normal estimated at 30 mmHg   (Right atrial pressure of 3 mmHg).    Stress Test:  Pharmacologic Nuclear SPECT Stress 9/5/15:  Conclusions        Summary    Normal LVEF >60%    Possible basal inferior hypokinesis    There is a mixed defect in the inferolateral wall consistent with mixed    ischemia/scar in this territory      Cath:  [facility] 11/9/19:  IMPRESSIONS:  Non-obstructive coronary disease. SUMMARY:     1. LAD: Mild diffuse disease of the mid to distal LAD   2. Left circumflex: Mild diffuse disease. 3. Right coronary: Distal vessel lesion: There is a 30% stenosis. 4. Non-obstructive coronary disease.         Other Studies:   Chest X-ray 1/15/22:  Interval decrease in the right upper lobe pneumonia, when compared to the   prior study performed 01/07/2022.  Rule out CHF versus bilateral pneumonia,   as described above.  Rule out small left pleural effusion.          Non-contrast head CT 1/15/22:  1.  Moderate, diffuse, cerebral and cerebellar atrophy again identified, when   compared to the previous study performed 01/07/2022.       2.  Microvascular ischemic changes again appreciated.       3.  Sinus disease, as described above. Assessment and Plan      1. Atrial fibrillation with RVR - Transitioned to metoprolol succinate yesterday and weaned off IV diltiazem infusion. Ventricular rates currently in 90-100s.   -Recommend increasing metoprolol succinate to 100 mg BID and can continue to titrate as needed  -As previously documented, although patient has elevated [id number] and history of CVA, given dementia and frequent falls, the risks of continued anticoagulation likely outweigh benefits, and would recommend discontinuing Eliquis  -Given other co-morbidities and that family is considering transitioning to hospice care, Watchman device placement would likely not be consistent with goals of care  -Continue aspirin 81 mg daily  -Continue to monitor on telemetry while remains inpatient  -Replete electrolytes as needed to maintain goal K>4 and Mg>2  -Avoid large fluid shifts as able     2. Mild troponin T elevation - Likely secondary to combination of JACKELYN and demand ischemia from tachycardia. Overall presentation and troponin trend do not appear to be consistent with ACS. -Can stop trending troponins  -Management of atrial fibrillation as above     3. Mild non-obstructive coronary artery disease  -Continue aspirin 81 mg daily and atorvastatin 40 mg daily     4. Acute kidney injury  -Secondary to prerenal azotemia  -Resolved with IVF     5. Essential hypertension  -BP currently controlled  -Increasing metoprolol succinate to 100 mg BID as above     6. Carotid artery disease  -On aspirin and statin     7. Hyperlipidemia  -Continue statin     8. Recurrent falls  -PT/OT     9. Dementia  -On donepezil       Thank you for allowing us to participate in the care of Larissa Zhang. Please call me with any questions [phone]. Aleks Real DO  [facility]  [phone] [address] [place] [address]  [phone] [address] [place] [address]  1/17/2022 8:55 AM    I will address the patient's cardiac risk factors and adjusted pharmacologic treatment as needed. In addition, I have reinforced the need for patient directed risk factor modification. All questions and concerns were addressed to the patient/family. Alternatives to my treatment were discussed. The note was completed using EMR. Every effort was made to ensure accuracy; however, inadvertent computerized transcription errors may be present.

## 2022-01-17 NOTE — CARE COORDINATION
CASE MANAGEMENT DISCHARGE SUMMARY      Discharge to: the [address] [facility] completed: [facility] Exemption Notification (HENS) completed: na    IMM given: (date) 1/15/22    New Durable Medical Equipment ordered/agency: bariatric bed ordered per hospice agency    Transportation:    Medical Transport explained to pt/family. Pt/family voice no agency preference. Agency used: [facility] up time: 1600   Ambulance form completed: Yes    Confirmed discharge plan with:     Patient: no. Advance dementia     Family:  Yes - spouse, She Davis, [phone]     Facility/Agency, name:  [facility] able to be obtained via epic access per  at hospice agency     Phone number for report to facility: [phone]     RN, name: Haven Powers    Note: Discharging nurse to complete LIV, reconcile AVS, and place final copy with patient's discharge packet. RN to ensure that written prescriptions for Level II medications are sent with patient to the facility as per protocol.     León Blank RN

## 2022-01-17 NOTE — PROGRESS NOTES
Patient discharged. IV removed, telemetry box and leads removed and returned. Lockbox emptied. All belongings gathered and returned to patient. Discharge instructions reviewed with [facility], all questions answered by RN. No further needs. [facility] here to transport pt. Via stretcher x 2 attendants.

## 2022-01-17 NOTE — PROGRESS NOTES
Occupational Therapy  Facility/Department: [facility] B3 - MED SURG  Daily Treatment Note  NAME: Rebekah Ding  : 1940  MRN: [id number]    Date of Service: 2022    Discharge Recommendations:  Subacute/Skilled Nursing Facility       Assessment   Performance deficits / Impairments: Decreased functional mobility ; Decreased safe awareness;Decreased balance;Decreased ADL status; Decreased cognition;Decreased strength  Assessment: Pt supine, agreeable. Pt cont to require 2 person assist for supine<>sit, able to sit EOB for grooming and ex tasks. Pt will randomly shift hips sitting EOB, not safe to sit up in chiar unattended as chair does not fully recline. pt with decreased safety, requires assist for all mobility. Prognosis: Fair  OT Education: OT Role;Plan of Care;Precautions  Patient Education: disease specific: importance of use RED/nurse call light for Assist with ADL/toileting needs & repositioning  Barriers to Learning: cognition  REQUIRES OT FOLLOW UP: Yes  Activity Tolerance  Activity Tolerance: Patient Tolerated treatment well  Activity Tolerance: 157/104  O2 92% on 1.5L O2  Safety Devices  Safety Devices in place: Yes  Type of devices: Bed alarm in place;Call light within reach; Left in bed;Nurse notified         Patient Diagnosis(es): The primary encounter diagnosis was Atrial fibrillation with RVR ([facility] Diagnoses of JACKELYN (acute kidney injury) ([facility] Acute respiratory failure with hypoxia ([facility] Fall, initial encounter, Anticoagulated, Abrasion of forehead, initial encounter, Abrasion, shoulder without infection, and Pneumonia due to COVID-19 virus were also pertinent to this visit. has a past medical history of Hyperlipemia, Hypertension, Prostate enlargement, and Unspecified cerebral artery occlusion with cerebral infarction. has a past surgical history that includes Carotid endarterectomy; Foot surgery; Skin cancer excision; Eye surgery; Facial reconstruction surgery;  Tonsillectomy; Colonoscopy; Colonoscopy (2014); and Mohs surgery. Restrictions  Restrictions/Precautions  Restrictions/Precautions: General Precautions,Fall Risk  Position Activity Restriction  Other position/activity restrictions: telemetry, IV, 1.5 L O 2  Subjective   General  Chart Reviewed: Yes  Patient assessed for rehabilitation services?: Yes  Family / Caregiver Present: No  Referring Practitioner: Dr. Kip Roberson  Diagnosis: fall, found down at ALU  Subjective  Subjective: Pt supine, agreeable  General Comment  Comments: Rn clears for therapy  Pain Assessment  Frias-Leary Pain Rating: Hurts little more  Pain Type: Acute pain  Pain Location: Hip  Pain Orientation: Right  Pre Treatment Pain Screening  Intervention List: Patient able to continue with treatment  Vital Signs  Patient Currently in Pain: Yes   Orientation  Orientation  Overall Orientation Status: Impaired  Orientation Level: Oriented to person  Objective    ADL  Grooming: Minimal assistance (thoroughness)  UE Dressing: Dependent/Total (doff/keyur gown)  LE Dressing: Dependent/Total  Toileting: Dependent/Total        Balance  Sitting Balance: Contact guard assistance                             Cognition  Overall Cognitive Status: Exceptions  Arousal/Alertness: Delayed responses to stimuli  Following Commands:  Follows one step commands with increased time  Attention Span: Attends with cues to redirect  Memory: Decreased short term memory;Decreased recall of recent events;Decreased long term memory;Decreased recall of precautions  Safety Judgement: Decreased awareness of need for safety;Decreased awareness of need for assistance  Insights: Decreased awareness of deficits  Initiation: Requires cues for all  Sequencing: Requires cues for some        Plan   Plan  Times per week: 3-4x/ week  Current Treatment Recommendations: Balance Training,Functional Mobility Training,Safety Education & Training,Positioning,Self-Care / ADL,Endurance Training    AM-PAC Score        AM-PAC Inpatient Daily Activity Raw Score: 12 (01/17/22 1504)  AM-PAC Inpatient ADL T-Scale Score : 30.6 (01/17/22 1504)  ADL Inpatient CMS 0-100% Score: 66.57 (01/17/22 1504)  ADL Inpatient CMS G-Code Modifier : CL (01/17/22 1504)    Goals  Short term goals  Time Frame for Short term goals: 1 week(1-23-22)  Short term goal 1: mod assist of 2 with functional transfers with PROSPER ROBLES by 1-21-22-ongoing  Short term goal 2: supervision for supported sitting in chair for 1 meal by 1-23-22-ongoing  Short term goal 3: set up for UE ADL's-ongoing  Patient Goals   Patient goals : unable to identify/verbalize       Therapy Time   Individual Concurrent Group Co-treatment   Time In 1113         Time Out 1151         Minutes 38         Timed Code Treatment Minutes: 38 Minutes   If pt discharges prior to next session, this note will serve as discharge summary. See case management note for discharge disposition.         Fabi Love, OT

## 2022-01-17 NOTE — DISCHARGE INSTR - COC
Continuity of Care Form    Patient Name: Adam Yoder   :  1940  MRN:  [id number]    Admit date:  1/15/2022  Discharge date:  22    Code Status Order: DNR-CC   Advance Directives:      Admitting Physician:  Nichole Wheat MD  PCP: Darlene Zhang MD    Discharging Nurse: Yumi Graham Unit/Room#: 0402/6337-31  Discharging Unit Phone Number: [phone]    Emergency Contact:   Extended Emergency Contact Information  Primary Emergency Contact: Madeline Coates  Address: [address] [place] [address]           [place], Augustin Nazarioe [address] Rachel Najjar of [address] [place] [address] Phone: [phone]  Relation: Spouse   needed? No  Secondary Emergency Contact: Genie Lyles   [address] Phone: [phone]  Relation: Child   needed?  No    Past Surgical History:  Past Surgical History:   Procedure Laterality Date    CAROTID ENDARTERECTOMY      right    COLONOSCOPY      COLONOSCOPY      polyps    EYE SURGERY      FACIAL RECONSTRUCTION SURGERY      metal plate under left eye    FOOT SURGERY      nerve right    MOHS SURGERY      SKIN CANCER EXCISION      face x several    TONSILLECTOMY         Immunization History:   Immunization History   Administered Date(s) Administered    COVID-19, Jean Soria, Primary or Immunocompromised, PF, 100mcg/0.5mL 2021, 2021, 2021       Active Problems:  Patient Active Problem List   Diagnosis Code    TIA (transient ischemic attack) G45.9    HTN (hypertension), benign I10    Hyperlipidemia E78.5    PVD (peripheral vascular disease) ([facility]) I73.9    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 ([facility] S24.133A    Basal cell carcinoma of left ear C44.219    Visit for wound check Z51.89    Atrial fibrillation with RVR ([facility] I48.91    Aphasia R47.01    Arterial ischemic stroke, ICA, left, acute (HCC) I63.232    COVID-19 U07.1    PAF [facility]oxysmal atrial fibrillation) [facility] I48.0    Acute pyelonephritis N10       Isolation/Infection:   Isolation            No Isolation          Patient Infection Status       Infection Onset Added Last Indicated Last Indicated By Review Planned Expiration Resolved Resolved By    None active    Resolved    COVID-19 12/25/21 12/25/21 12/25/21 COVID-19, Rapid   01/09/22 Mary Saleh, RN    Probably no longer contagious. Original positive Covid test 12/25/21. COVID-19 (Rule Out) 12/25/21 12/25/21 12/25/21 COVID-19, Rapid (Ordered)   12/25/21 Rule-Out Test Resulted    COVID-19 (Rule Out) 10/30/21 10/30/21 10/30/21 COVID-19, Rapid (Ordered)   10/30/21 Rule-Out Test Resulted            Nurse Assessment:  Last Vital Signs: /73   Pulse 101   Temp 97.3 °F (36.3 °C) (Oral)   Resp 18   Ht 5' 8\" (1.727 m)   Wt 170 lb (77.1 kg)   SpO2 91%   BMI 25.85 kg/m²     Last documented pain score (0-10 scale): Pain Level: 0  Last Weight:   Wt Readings from Last 1 Encounters:   01/15/22 170 lb (77.1 kg)     Mental Status:  alert and coherent    IV Access:  - None    Nursing Mobility/ADLs:  Walking   Dependent  Transfer  Dependent  Bathing  Dependent  Dressing  Assisted  Toileting  Dependent  Feeding  Assisted  Med Admin  Assisted  Med Delivery   whole    Wound Care Documentation and Therapy:  Puncture Back Lateral;Left;Upper (Active)   Number of days:         Elimination:  Continence: Bowel: No  Bladder: No  Urinary Catheter: None   Colostomy/Ileostomy/Ileal Conduit: No       Date of Last BM: unknown    Intake/Output Summary (Last 24 hours) at 1/17/2022 1015  Last data filed at 1/17/2022 0905  Gross per 24 hour   Intake 90 ml   Output --   Net 90 ml     I/O last 3 completed shifts: In: 170 [P.O.:170]  Out: -     Safety Concerns:      At Risk for Falls    Impairments/Disabilities:      Vision and Hearing    Nutrition Therapy:  Current Nutrition Therapy:   - Oral Diet:  Dysphagia 2 mechanically altered and soft and bite-sized    Routes of Feeding: Oral  Liquids: Nectar thick liquids and NO STRAWS  Daily Fluid Restriction: no  Last Modified Barium Swallow with Video (Video Swallowing Test): not done    Treatments at the Time of Hospital Discharge:   Respiratory Treatments: NONE  Oxygen Therapy:  is on oxygen at 2 L/min per nasal cannula. Ventilator:    - No ventilator support    Rehab Therapies: hospice  Weight Bearing Status/Restrictions: No weight bearing restirctions  Other Medical Equipment (for information only, NOT a DME order):  wheelchair and hospital bed  Other Treatments: none    Patient's personal belongings (please select all that are sent with patient):  None    RN SIGNATURE:  Electronically signed by Shaheen Scott RN on 1/17/22 at 11:44 AM EST    CASE MANAGEMENT/SOCIAL WORK SECTION    Inpatient Status Date: 1/15/22    Readmission Risk Assessment Score:  Readmission Risk              Risk of Unplanned Readmission:  22           Discharging to Facility/ Agency   Name: [facility]  Address:  Phone: [phone]  Fax:    / signature: Electronically signed by Gretchen Parikh RN on 1/17/22 at 12:39 PM EST    PHYSICIAN SECTION    Prognosis: Guarded    Condition at Discharge: Stable    Rehab Potential (if transferring to Rehab): Guarded    Recommended Labs or Other Treatments After Discharge:     Physician Certification: I certify the above information and transfer of Larissa Zhang  is necessary for the continuing treatment of the diagnosis listed and that he requires Kayode Condon with hsopice for greater 30 days with Hospice services.     Update Admission H&P: No change in H&P    PHYSICIAN SIGNATURE:  Electronically signed by Erik Caal MD on 1/17/22 at 10:16 AM EST

## 2022-01-17 NOTE — CARE COORDINATION
Per discussion at 1100 IDR, pt is discharging to [facility] today with [facility] hospice services. CM attempted to reach [facility]. No answer when transferred to nursing. CM called back and is now being transferred to \"Director of Care, Romel\". No answer when transferred.  left for call back. [facility] transport arranged for [address] . Per previous CM note 1/16, [facility] hospice ([phone]). CM called hospice agency and spoke with , who reviewed case. States \"bariatric bed ordered\" and all records were obtained via agency's Epic access. CM provided direct contact for [facility] , should additional information/documents be needed.  notified of transport eta of 1600.     12:38 PM  CM spoke with spouse to notify of dc and transport eta.

## 2022-01-17 NOTE — DISCHARGE SUMMARY
Hospital Discharge Summary    Patient's PCP: Carmelo Andrade MD  Admit Date: 1/15/2022   Discharge Date: 1/17/2022    Admitting Physician: Dr. Mele Charlton MD  Discharge Physician: Dr. Mele Charlton MD   Consults: cardiology    Brief HPI:    80 y. o. male history of paroxysmal A. Fib, dementia, frequent falls, hypertension, CVA--- recently admitted to the hospital 12/5-1/4/22 with encephalopathy and COVID-19 infection, readmitted 1/7-1/11/22 with fall at the nursing home, JACKELYN and altered mental status. This morning again he was found down on his bedroom floor, unclear how long he spent there.        In the ED, he found to be in atrial fibrillation with RVR, heart rate up to 140s, /63, temperature  Consult, oxygen saturation of 93%. .. His oxygenation then dropped to 85% on room air and was placed on 2 L nasal cannula. .. Chest x-ray showed improving lung infiltrates from recent COVID-pneumonia. .  CT head, cervical spine showed no acute issues. Leonela Mallet showed no evidence of infection. Koko Bustamante was noted to have mild JACKELYN with creatinine of 1.6, CK was 455. ... He was started on Cardizem bolus and fluids and given IV fluids    Brief hospital course:     Treated for the following       -Paroxysmal A. fib with RVR-treated initially with Cardizem bolus and drip, improved . Holley Young Changed metoprolol to tartrate to succinate and increase dose to 100 mg twice daily-rate control improved. Eliquis was discontinued given recurrent falls, advanced dementia , family wish to pursue hospice care at the skilled nursing facility  -Recurrent falls--fall precautions instituted.   Eliquis discontinued  -Advanced dementia-continue Aricept  -Acute kidney injury--creatinine 1.6, baseline 1.1--resolved with IV fluids  -Recent COVID-pneumonia--- improving based on chest x-ray and symptoms--asymptomatic  -Acute respiratory failure hypoxia, oxygen saturation 85% on room air--required 2 to 3 L on presentation--weaned down to 1.5--continued supplemental oxygen on discharge  -Elevated CK. Whitney Doty traumatic rhabdo status post fall--resolved with fluids  -Oropharyngeal dysphagia due to dementia on a modified diet  -Essential hypertension -stable-increased dose of Metoprolol for heart rate control. Discontinued Lisinopril and amlodipine   -History of CVA--no residual focal deficits  -Reactive leukocytosis--resolved    Invasive procedures:  none    Discharge Diagnoses: Active Problems:    Atrial fibrillation with RVR (HCC)  Resolved Problems:    * No resolved hospital problems. *      Physical Exam: /73   Pulse 101   Temp 97.3 °F (36.3 °C) (Oral)   Resp 18   Ht 5' 8\" (1.727 m)   Wt 170 lb (77.1 kg)   SpO2 91%   BMI 25.85 kg/m²   Gen/overall appearance: Not in acute distress. Alert. Head: Normocephalic, atraumatic  Eyes: EOMI, good acuity  ENT:- Oral mucosa moist  Neck: No JVD, thyromegaly  CVS: Nml S1S2, no MRG, RRR  Pulm: Clear bilaterally. No crackles/wheezes  Gastrointestinal: Soft, NT/ND, +BS  Musculoskeletal: No edema. Warm  Neuro: No focal deficit. Moves extremity spontaneously. Psychiatry: Appropriate affect. Not agitated. Skin: Warm, dry with normal turgor. No rash        Significant Diagnostic Studies:    See above        Treatments: As above.       Discharge Medications:     Medication List      START taking these medications    metoprolol succinate 100 MG extended release tablet  Commonly known as: TOPROL XL  Take 1 tablet by mouth 2 times daily        CHANGE how you take these medications    simvastatin 40 MG tablet  Commonly known as: ZOCOR  Take 1 tablet by mouth nightly  What changed:   · medication strength  · how much to take        CONTINUE taking these medications    ANTACID E-X PO     cyclobenzaprine 5 MG tablet  Commonly known as: FLEXERIL  Take 1 tablet by mouth 2 times daily as needed for Muscle spasms     divalproex 250 MG DR tablet  Commonly known as: DEPAKOTE     donepezil 10 MG tablet  Commonly known as: ARICEPT     esomeprazole 20 MG delayed release capsule  Commonly known as: NEXIUM     OCUVITE PO     tamsulosin 0.4 MG capsule  Commonly known as: FLOMAX     terazosin 5 MG capsule  Commonly known as: HYTRIN     VITAMIN D PO     vitamin D3 10 MCG (400 UNIT) Tabs tablet  Commonly known as: CHOLECALCIFEROL  Take 1 tablet by mouth daily        STOP taking these medications    amLODIPine 5 MG tablet  Commonly known as: NORVASC     apixaban 5 MG Tabs tablet  Commonly known as: ELIQUIS     lisinopril 20 MG tablet  Commonly known as: PRINIVIL;ZESTRIL     metoprolol tartrate 25 MG tablet  Commonly known as: LOPRESSOR     ondansetron 4 MG disintegrating tablet  Commonly known as: ZOFRAN-ODT           Where to Get Your Medications      Information about where to get these medications is not yet available    Ask your nurse or doctor about these medications  · metoprolol succinate 100 MG extended release tablet  · simvastatin 40 MG tablet         Activity: activity as tolerated  Diet: ADULT DIET; Dysphagia - Soft and Bite Sized; Mildly Thick (Nectar); No Drinking Straws      Disposition: SNF with hospice  Discharged Condition: Stable  Follow Up:   No follow-up provider specified. Code status:  DNR-CC         Total time spent on discharge, finalizing medications, referrals and arranging outpatient follow up was more than 45 minutes      Thank you Dr. Kristen Alvarez MD for the opportunity to be involved in this patients care.

## 2022-01-25 ENCOUNTER — TELEPHONE (OUTPATIENT)
Dept: CARDIOLOGY CLINIC | Age: 82
End: 2022-01-25

## 2022-02-15 ENCOUNTER — RX ONLY (OUTPATIENT)
Age: 82
Setting detail: RX ONLY
End: 2022-02-15

## 2022-02-15 RX ORDER — METRONIDAZOLE 10 MG/G
GEL TOPICAL
Qty: 60 | Refills: 2 | Status: ERX | COMMUNITY
Start: 2022-02-15

## 2022-02-15 RX ORDER — CLOBETASOL PROPIONATE 0.5 MG/ML
SOLUTION TOPICAL
Qty: 1 | Refills: 2 | Status: ERX | COMMUNITY
Start: 2022-02-15

## 2022-11-02 ENCOUNTER — APPOINTMENT (RX ONLY)
Dept: URBAN - NONMETROPOLITAN AREA CLINIC 18 | Facility: CLINIC | Age: 82
Setting detail: DERMATOLOGY
End: 2022-11-02

## 2022-11-02 DIAGNOSIS — D22 MELANOCYTIC NEVI: ICD-10-CM | Status: STABLE

## 2022-11-02 DIAGNOSIS — L71.8 OTHER ROSACEA: ICD-10-CM | Status: STABLE

## 2022-11-02 DIAGNOSIS — L57.8 OTHER SKIN CHANGES DUE TO CHRONIC EXPOSURE TO NONIONIZING RADIATION: ICD-10-CM | Status: STABLE

## 2022-11-02 DIAGNOSIS — L20.89 OTHER ATOPIC DERMATITIS: ICD-10-CM | Status: STABLE

## 2022-11-02 DIAGNOSIS — Z85.828 PERSONAL HISTORY OF OTHER MALIGNANT NEOPLASM OF SKIN: ICD-10-CM

## 2022-11-02 DIAGNOSIS — L21.8 OTHER SEBORRHEIC DERMATITIS: ICD-10-CM | Status: STABLE

## 2022-11-02 DIAGNOSIS — L82.1 OTHER SEBORRHEIC KERATOSIS: ICD-10-CM | Status: STABLE

## 2022-11-02 DIAGNOSIS — D18.0 HEMANGIOMA: ICD-10-CM | Status: STABLE

## 2022-11-02 PROBLEM — D18.01 HEMANGIOMA OF SKIN AND SUBCUTANEOUS TISSUE: Status: ACTIVE | Noted: 2022-11-02

## 2022-11-02 PROBLEM — L20.84 INTRINSIC (ALLERGIC) ECZEMA: Status: ACTIVE | Noted: 2022-11-02

## 2022-11-02 PROBLEM — D22.61 MELANOCYTIC NEVI OF RIGHT UPPER LIMB, INCLUDING SHOULDER: Status: ACTIVE | Noted: 2022-11-02

## 2022-11-02 PROCEDURE — ? PRESCRIPTION MEDICATION MANAGEMENT

## 2022-11-02 PROCEDURE — ? ADDITIONAL NOTES

## 2022-11-02 PROCEDURE — ? COUNSELING

## 2022-11-02 PROCEDURE — ? TREATMENT REGIMEN

## 2022-11-02 PROCEDURE — ? PRESCRIPTION

## 2022-11-02 PROCEDURE — 99214 OFFICE O/P EST MOD 30 MIN: CPT

## 2022-11-02 PROCEDURE — ? FULL BODY SKIN EXAM

## 2022-11-02 RX ORDER — METRONIDAZOLE 7.5 MG/G
CREAM TOPICAL
Qty: 45 | Refills: 3 | Status: ERX | COMMUNITY
Start: 2022-11-02

## 2022-11-02 RX ORDER — TRIAMCINOLONE ACETONIDE 1 MG/G
CREAM TOPICAL
Qty: 1 | Refills: 3 | Status: ERX | COMMUNITY
Start: 2022-11-02

## 2022-11-02 RX ORDER — CLOBETASOL PROPIONATE 0.5 MG/ML
SOLUTION TOPICAL
Qty: 50 | Refills: 3 | Status: ERX

## 2022-11-02 RX ADMIN — TRIAMCINOLONE ACETONIDE: 1 CREAM TOPICAL at 00:00

## 2022-11-02 RX ADMIN — METRONIDAZOLE: 7.5 CREAM TOPICAL at 00:00

## 2022-11-02 ASSESSMENT — LOCATION DETAILED DESCRIPTION DERM
LOCATION DETAILED: RIGHT MEDIAL MALAR CHEEK
LOCATION DETAILED: RIGHT PROXIMAL POSTERIOR UPPER ARM
LOCATION DETAILED: RIGHT MEDIAL SUPERIOR CHEST
LOCATION DETAILED: RIGHT SUPERIOR PARIETAL SCALP
LOCATION DETAILED: LEFT MID-UPPER BACK
LOCATION DETAILED: RIGHT DISTAL PRETIBIAL REGION

## 2022-11-02 ASSESSMENT — LOCATION ZONE DERM
LOCATION ZONE: LEG
LOCATION ZONE: SCALP
LOCATION ZONE: ARM
LOCATION ZONE: TRUNK
LOCATION ZONE: FACE

## 2022-11-02 ASSESSMENT — LOCATION SIMPLE DESCRIPTION DERM
LOCATION SIMPLE: CHEST
LOCATION SIMPLE: RIGHT POSTERIOR UPPER ARM
LOCATION SIMPLE: LEFT UPPER BACK
LOCATION SIMPLE: SCALP
LOCATION SIMPLE: RIGHT CHEEK
LOCATION SIMPLE: RIGHT PRETIBIAL REGION

## 2022-11-02 NOTE — PROCEDURE: TREATMENT REGIMEN
Initiate Treatment: Sunscreen (SPF 30 or greater) should be applied every 1.5-2 hours, during peak UV exposure (between 10am and 2pm) and reapplied after exercise or swimming.
Plan: The ABCDEs of melanoma were reviewed with the patient, and the importance of monthly self-examination of moles was emphasized. Should any moles change in shape or color, or itch, bleed or burn, patient will contact our office for evaluation sooner than their interval appointment.\\nSun protective clothing is also effective at protecting against UV rays that cause skin cancer.
Detail Level: Zone

## 2022-11-02 NOTE — PROCEDURE: MIPS QUALITY
Quality 47: Advance Care Plan: Advance Care Planning discussed and documented in the medical record; patient did not wish or was not able to name a surrogate decision maker or provide an advance care plan.
Quality 265: Biopsy Follow-Up: Biopsy results reviewed, communicated, tracked, and documented
Quality 431: Preventive Care And Screening: Unhealthy Alcohol Use - Screening: Patient not identified as an unhealthy alcohol user when screened for unhealthy alcohol use using a systematic screening method
Detail Level: Simple
Quality 130: Documentation Of Current Medications In The Medical Record: Current Medications Documented
Quality 226: Preventive Care And Screening: Tobacco Use: Screening And Cessation Intervention: Patient screened for tobacco use and is an ex/non-smoker

## 2023-06-12 NOTE — ED NOTES
Patient ambulated in hallway with walker using gait belt. Gait steady with assistance, pt states he feels he is at his baseline with ambulation.      Grecia Mckeon RN  11/24/21 8702 Yes

## 2024-05-23 ENCOUNTER — APPOINTMENT (RX ONLY)
Dept: URBAN - NONMETROPOLITAN AREA CLINIC 22 | Facility: CLINIC | Age: 84
Setting detail: DERMATOLOGY
End: 2024-05-23

## 2024-05-23 DIAGNOSIS — L21.8 OTHER SEBORRHEIC DERMATITIS: ICD-10-CM | Status: STABLE

## 2024-05-23 DIAGNOSIS — L82.1 OTHER SEBORRHEIC KERATOSIS: ICD-10-CM | Status: STABLE

## 2024-05-23 DIAGNOSIS — D22 MELANOCYTIC NEVI: ICD-10-CM | Status: STABLE

## 2024-05-23 DIAGNOSIS — L20.89 OTHER ATOPIC DERMATITIS: ICD-10-CM | Status: STABLE

## 2024-05-23 DIAGNOSIS — L82.0 INFLAMED SEBORRHEIC KERATOSIS: ICD-10-CM | Status: WORSENING

## 2024-05-23 DIAGNOSIS — Z85.828 PERSONAL HISTORY OF OTHER MALIGNANT NEOPLASM OF SKIN: ICD-10-CM

## 2024-05-23 DIAGNOSIS — L57.8 OTHER SKIN CHANGES DUE TO CHRONIC EXPOSURE TO NONIONIZING RADIATION: ICD-10-CM | Status: STABLE

## 2024-05-23 DIAGNOSIS — L71.8 OTHER ROSACEA: ICD-10-CM | Status: STABLE

## 2024-05-23 DIAGNOSIS — D18.0 HEMANGIOMA: ICD-10-CM | Status: STABLE

## 2024-05-23 PROBLEM — D22.5 MELANOCYTIC NEVI OF TRUNK: Status: ACTIVE | Noted: 2024-05-23

## 2024-05-23 PROBLEM — D18.01 HEMANGIOMA OF SKIN AND SUBCUTANEOUS TISSUE: Status: ACTIVE | Noted: 2024-05-23

## 2024-05-23 PROCEDURE — ? PRESCRIPTION MEDICATION MANAGEMENT

## 2024-05-23 PROCEDURE — ? FULL BODY SKIN EXAM

## 2024-05-23 PROCEDURE — 17110 DESTRUCTION B9 LES UP TO 14: CPT

## 2024-05-23 PROCEDURE — ? LIQUID NITROGEN

## 2024-05-23 PROCEDURE — ? PRESCRIPTION

## 2024-05-23 PROCEDURE — ? TREATMENT REGIMEN

## 2024-05-23 PROCEDURE — ? COUNSELING

## 2024-05-23 PROCEDURE — 99214 OFFICE O/P EST MOD 30 MIN: CPT | Mod: 25

## 2024-05-23 RX ORDER — TRIAMCINOLONE ACETONIDE 1 MG/G
CREAM TOPICAL
Qty: 30 | Refills: 3 | Status: ERX

## 2024-05-23 RX ORDER — CLOBETASOL PROPIONATE 0.5 MG/ML
SOLUTION TOPICAL
Qty: 50 | Refills: 3 | Status: ERX

## 2024-05-23 RX ORDER — METRONIDAZOLE 7.5 MG/G
CREAM TOPICAL
Qty: 45 | Refills: 3 | Status: ERX

## 2024-05-23 ASSESSMENT — LOCATION DETAILED DESCRIPTION DERM
LOCATION DETAILED: RIGHT SUPERIOR PARIETAL SCALP
LOCATION DETAILED: RIGHT DISTAL PRETIBIAL REGION
LOCATION DETAILED: RIGHT MEDIAL MALAR CHEEK
LOCATION DETAILED: RIGHT SUPERIOR UPPER BACK
LOCATION DETAILED: LEFT SUPERIOR MEDIAL UPPER BACK
LOCATION DETAILED: RIGHT LATERAL SUPERIOR CHEST
LOCATION DETAILED: NASAL SUPRATIP
LOCATION DETAILED: LEFT CLAVICULAR NECK

## 2024-05-23 ASSESSMENT — LOCATION ZONE DERM
LOCATION ZONE: NECK
LOCATION ZONE: FACE
LOCATION ZONE: NOSE
LOCATION ZONE: TRUNK
LOCATION ZONE: SCALP
LOCATION ZONE: LEG

## 2024-05-23 ASSESSMENT — LOCATION SIMPLE DESCRIPTION DERM
LOCATION SIMPLE: LEFT UPPER BACK
LOCATION SIMPLE: CHEST
LOCATION SIMPLE: RIGHT CHEEK
LOCATION SIMPLE: SCALP
LOCATION SIMPLE: RIGHT PRETIBIAL REGION
LOCATION SIMPLE: RIGHT UPPER BACK
LOCATION SIMPLE: LEFT ANTERIOR NECK
LOCATION SIMPLE: NOSE

## 2024-05-23 NOTE — PROCEDURE: LIQUID NITROGEN
Medical Necessity Information: It is in your best interest to select a reason for this procedure from the list below. All of these items fulfill various CMS LCD requirements except the new and changing color options.
Show Topical Anesthesia Variable?: Yes
Consent: The patient's consent was obtained including but not limited to risks of crusting, scabbing, blistering, scarring, darker or lighter pigmentary change, recurrence, incomplete removal and infection.
Add 52 Modifier (Optional): no
Medical Necessity Clause: This procedure was medically necessary because the lesions that were treated were:
Number Of Freeze-Thaw Cycles: 1 freeze-thaw cycle
Application Tool (Optional): Liquid Nitrogen Sprayer
Detail Level: Detailed
Spray Paint Text: The liquid nitrogen was applied to the skin utilizing a spray paint frosting technique.
Post-Care Instructions: I reviewed with the patient in detail post-care instructions. Patient is to wear sunprotection, and avoid picking at any of the treated lesions. Pt may apply Vaseline to crusted or scabbing areas.
Duration Of Freeze Thaw-Cycle (Seconds): 5-10

## 2024-07-15 NOTE — ED PROVIDER NOTES
**ADVANCED PRACTICE PROVIDER, I HAVE EVALUATED THIS [facility]  ED  EMERGENCY DEPARTMENT ENCOUNTER      Pt Name: Liz Fitzpatrick  GQQ:[id number]  [place] 1940  Date of evaluation: 11/24/2021  Provider: RADHA Williamson      Chief Complaint:    Chief Complaint   Patient presents with    Fall     slight demesia pt. fall while getting into shower. . denies sycop. . states slipped getting in shower chair. . pain to neck and right shoulder. . small abrasion to right elbo. . pt arrived in c collar         Nursing Notes, Past Medical Hx, Past Surgical Hx, Social Hx, Allergies, and Family Hx were all reviewed and agreed with or any disagreements were addressed in the HPI.    HPI: (Location, Duration, Timing, Severity, Quality, Assoc Sx, Context, Modifying factors)    Chief Complaint of fall with neck pain and right shoulder pain. This is a  80 y.o. male who presents via EMS with past medical history of hyperlipidemia, hypertension, dementia, presenting from home after fall. EMS reports that the patient had been getting into the shower when he slipped onto his bottom hitting his neck/back onto the shower chair. The patient is currently in a c-collar. Due to his dementia he is unable to provide me with very much history. He states he hit his back and has right shoulder pain and neck pain. He denies any loss of consciousness. He denies being on a blood thinner however per chart review he is on Eliquis. Currently rates pain as a 5/10 worse in his right shoulder. He denies any chest.,  Shortness of breath or cough. No abdominal pain, nausea or vomiting.     PastMedical/Surgical History:      Diagnosis Date    Hyperlipemia     Hypertension     Prostate enlargement     Unspecified cerebral artery occlusion with cerebral infarction 2000         Procedure Laterality Date    CAROTID ENDARTERECTOMY      right    COLONOSCOPY      COLONOSCOPY  2014    polyps    EYE SURGERY Department of Anesthesiology  Postprocedure Note    Patient: Crystal Gandara  MRN: [id number]  YOB: 1989  Date of evaluation: 7/15/2024    Procedure Summary       Date: 07/15/24 [facility] [address] [place] [address]    Anesthesia Start: 726 Anesthesia Stop: 843    Procedure:  SECTION (Abdomen) Diagnosis:       Uterine scar from previous  delivery, with delivery      (Uterine scar from previous  delivery, with delivery [O34.219])    Surgeons: Roldan Chavez MD Responsible Provider: Isaias Rust MD    Anesthesia Type: spinal ASA Status: 2            Anesthesia Type: No value filed.    Angelita Phase I: Angelita Score: 9    Angelita Phase II: Angelita Score: 10    Anesthesia Post Evaluation    Patient location during evaluation: bedside  Patient participation: complete - patient participated  Level of consciousness: awake  Airway patency: patent  Nausea & Vomiting: no nausea  Cardiovascular status: blood pressure returned to baseline  Respiratory status: acceptable  Hydration status: euvolemic  Pain management: adequate    No notable events documented.    FACIAL RECONSTRUCTION SURGERY      metal plate under left eye    FOOT SURGERY      nerve right    MOHS SURGERY      SKIN CANCER EXCISION      face x several    TONSILLECTOMY         Medications:  Previous Medications    APIXABAN (ELIQUIS) 2.5 MG TABS TABLET    Take 1 tablet by mouth 2 times daily    CALCIUM CARBONATE ANTACID (ANTACID E-X PO)    Take by mouth    CYCLOBENZAPRINE (FLEXERIL) 5 MG TABLET    Take 1 tablet by mouth 2 times daily as needed for Muscle spasms    DIVALPROEX (DEPAKOTE) 125 MG DR TABLET    250 mg     DONEPEZIL (ARICEPT) 10 MG TABLET    Take 10 mg by mouth nightly    ESOMEPRAZOLE (NEXIUM) 20 MG DELAYED RELEASE CAPSULE    Take 20 mg by mouth every morning (before breakfast)    LISINOPRIL (PRINIVIL;ZESTRIL) 20 MG TABLET    Take 1 tablet by mouth daily    METOPROLOL TARTRATE (LOPRESSOR) 25 MG TABLET    Take 2 tablets by mouth 2 times daily    MULTIPLE VITAMINS-MINERALS (OCUVITE PO)    Take by mouth    ONDANSETRON (ZOFRAN-ODT) 4 MG DISINTEGRATING TABLET    Take 1 tablet by mouth every 8 hours as needed for Nausea or Vomiting    SIMVASTATIN (ZOCOR) 80 MG TABLET    Take 40 mg by mouth nightly     TAMSULOSIN (FLOMAX) 0.4 MG CAPSULE    Take 0.4 mg by mouth daily    TERAZOSIN (HYTRIN) 5 MG CAPSULE    Take 1 capsule by mouth nightly    VITAMIN D PO    Take by mouth    VITAMIN D3 (CHOLECALCIFEROL) 10 MCG (400 UNIT) TABS TABLET    Take 1 tablet by mouth daily         Review of Systems:  (2-9 systems needed)  Review of Systems   Unable to perform ROS: Dementia       Physical Exam:  Physical Exam  Vitals and nursing note reviewed. Constitutional:       Appearance: Normal appearance. He is not diaphoretic. HENT:      Head: Normocephalic and atraumatic. Nose: Nose normal.      Mouth/Throat:      Mouth: Mucous membranes are moist.   Eyes:      General:         Right eye: No discharge. Left eye: No discharge. Extraocular Movements: Extraocular movements intact.       Pupils: Pupils are equal, round, and reactive to light. Pulmonary:      Effort: Pulmonary effort is normal. No respiratory distress. Abdominal:      General: Abdomen is flat. Palpations: Abdomen is soft. Tenderness: There is no abdominal tenderness. There is no guarding or rebound. Musculoskeletal:         General: Normal range of motion. Cervical back: Normal range of motion and neck supple. Tenderness present. Comments: Patient in c-collar at time of exam   Skin:     General: Skin is warm and dry. Coloration: Skin is not pale. Neurological:      Mental Status: He is alert and oriented to person, place, and time. Psychiatric:         Mood and Affect: Mood normal.         Behavior: Behavior normal.         MEDICAL DECISION MAKING    Vitals:    Vitals:    11/24/21 0751 11/24/21 1200 11/24/21 1308 11/24/21 1400   BP: (!) 142/107 (!) 160/104 (!) 144/91 (!) 166/90   Pulse: 99   100   Resp: 16  18    Temp: 97.7 °F (36.5 °C)      TempSrc: Oral      SpO2: 96% 96% 97% 97%   Weight: 170 lb (77.1 kg)      Height: 5' 10\" (1.778 m)          LABS:  Labs Reviewed   COVID-19, RAPID    Narrative:     Performed at:  [address]   Phone  of labs reviewed and were negative at this time or not returned at the time of this note. RADIOLOGY:   Non-plain film images such as CT, Ultrasound and MRI are read by the radiologist. RADHA Montano have directly visualized the radiologic plain film image(s) with the below findings:      Interpretation per the Radiologist below, if available at the time of this note:    XR SHOULDER RIGHT (MIN 2 VIEWS)   Final Result   No acute bone or joint abnormality. CT HEAD WO CONTRAST   Final Result   No acute intracranial abnormality. CT CERVICAL SPINE WO CONTRAST   Final Result   No acute abnormality of the cervical spine.               Echo Complete    Result Date: 11/4/2021  Transthoracic Echocardiography Report (TTE)  Demographics   Patient Name       Everton Kumar   Date of Study      11/04/2021         Gender               Male   Patient Number     [id number]         Date of Birth        1940   Visit Number       [id number]          Age                  80 year(s)   Accession Number   [id number]         Room Number          0207   Corporate ID       [id number]           Davidson Chao, RT   Ordering Physician Marc Chauhan,  Interpreting         [address] [place] [address].                     CNP                Physician            Barbara Goodrich MD  Procedure Type of Study   TTE procedure:ECHOCARDIOGRAM COMPLETE 2D W DOPPLER W COLOR. Procedure Date Date: 11/04/2021 Start: 09:11 AM Study Location: [address] [place] [address] - Echo Lab Technical Quality: Adequate visualization Indications:CVA. Patient Status: Routine Contrast Medium: Bubble Study. Height: 70 inches Weight: 216 pounds BSA: 2.16 m2 BMI: 30.99 kg/m2 HR: 67 bpm BP: 136/83 mmHg  Conclusions   Summary  Technically difficult study due to body surface area and poor acoustic  window. Normal left ventricular size with mild concentric left ventricular  hypertrophy. The left ventricular systolic function is normal with visually estimated  LVEF of 55%. No regional wall motion abnormalities. Grade I diastolic dysfunction with normal filling pressure. The right ventricle is normal in size and function. Compared to previous study from 11-8-2018 no changes noted in left  ventricular function. Mild mitral and aortic regurgitation. A bubble study was performed. The study was of good quality and fails to  show evidence of shunting. Systolic pulmonic artery pressure (SPAP) is normal estimated at 30 mmHg  (Right atrial pressure of 3 mmHg). Compared to the prior study performed 11/8/2018, No significant changes are  noted.    Signature ------------------------------------------------------------------  Electronically signed by Raza Aguilar MD (Interpreting  physician) on 11/04/2021 at 12:59 PM  ------------------------------------------------------------------   Findings   Left Ventricle  Normal left ventricular size with mild concentric left ventricular  hypertrophy. The left ventricular systolic function is normal with visually estimated  LVEF of 55%. No regional wall motion abnormalities. Grade I diastolic dysfunction with normal filling pressure. Mitral Valve  Mild thickening of leaflets of mitral valve. Mild-moderate mitral annular calcification. No mitral stenosis. Mild mitral regurgitation. Left Atrium  Left atrium is of normal size. A bubble study was performed and fails to show evidence of shunting. Aortic Valve  Trileaflet aortic valve appears sclerotic but opens adequately. Mild aortic regurgitation. Aorta  The aortic root is normal in size. Right Ventricle  The right ventricle is normal in size and function. TAPSE is measured at 24 mm. S'prime velocity is measured at 16 cm/s. Tricuspid Valve  Tricuspid valve is structurally normal.  Trivial tricuspid regurgitation. Systolic pulmonic artery pressure (SPAP) is normal estimated at 30 mmHg  (Right atrial pressure of 3 mmHg). Right Atrium  The right atrium is normal in size at 13 cm2. Pulmonic Valve  The pulmonic valve is not well visualized. Mild pulmonic regurgitation present. Pericardial Effusion  There is a trivial localized near right ventricle pericardial effusion  noted. No tamponade physiology. Pleural Effusion  No pleural effusion. Miscellaneous  IVC size is normal (<2.1cm) and collapses > 50% with respiration consistent  with normal RA pressure (3mmHg). Technically difficult study due to body  surface area and poor acoustic window.   M-Mode/2D Measurements (cm)   LV Diastolic Dimension: 0.60 cm LV Systolic Dimension: 2.86 cm  LV Septum Diastolic: 1.3 cm  LV PW Diastolic: 0.50 cm        AO Root Dimension: 2.8 cm                                  AV Cusp Separation: 1.9 cm                                  LA Dimension: 3.4 cm                                  LA Area: 10.7 cm2                                  LA volume/Index: 24.1 ml /11 ml/m2  Doppler Measurements   AV Peak Velocity: 151 cm/s     MV Peak E-Wave: 59.6 cm/s  AV Peak Gradient: 9.12 mmHg    MV Peak A-Wave: 87.8 cm/s                                 MV E/A Ratio: 0.68   TR Velocity:261 cm/s  TR Gradient:27.25 mmHg  Estimated RAP:3 mmHg  Estimated RVSP: 30 mmHg  E' Septal Velocity: 3.92 cm/s  E' Lateral Velocity: 4.79 cm/s  E/E' ratio: 13.8  PV Peak Velocity: 121 cm/s  PV Peak Gradient: 5.86 mmHg   Aortic Valve   Peak Velocity: 151 cm/s  Peak Gradient: 9.12 mmHg   AI P1/2t: 504 msec  Cusp Separation: 1.9 cm  Aorta   Aortic Root: 2.8 cm      CT HEAD WO CONTRAST    Result Date: 11/1/2021  EXAMINATION: CT OF THE HEAD WITHOUT CONTRAST  11/1/2021 4:30 am TECHNIQUE: CT of the head was performed without the administration of intravenous contrast. Dose modulation, iterative reconstruction, and/or weight based adjustment of the mA/kV was utilized to reduce the radiation dose to as low as reasonably achievable. COMPARISON: 10/30/2021 CT HISTORY: ORDERING SYSTEM PROVIDED HISTORY: Fall TECHNOLOGIST PROVIDED HISTORY: Reason for exam:->Fall Has a \"code stroke\" or \"stroke alert\" been called? ->No Reason for Exam: fall with possible head injury Acuity: Acute Type of Exam: Initial FINDINGS: BRAIN/VENTRICLES: The ventricles and sulci are prominent. Pattern is consistent with age-related atrophy. No extra-axial fluid collections, and no sign of recent intracranial hemorrhage. Decreased attenuation is noted within the periventricular white matter. Pattern is consistent with chronic small vessel ischemic change. No acute edema or mass effect. No mass lesions are detected.  ORBITS: The visualized portion of the orbits demonstrate no acute abnormality. SINUSES: Mild mucosal thickening is partially observed in the ethmoid air cells and maxillary sinuses. SOFT TISSUES/SKULL:  No acute abnormality of the visualized skull or soft tissues. No acute intracranial abnormality. CT HEAD WO CONTRAST    Result Date: 10/30/2021  EXAMINATION: CT OF THE HEAD WITHOUT CONTRAST  10/30/2021 8:47 am TECHNIQUE: CT of the head was performed without the administration of intravenous contrast. Dose modulation, iterative reconstruction, and/or weight based adjustment of the mA/kV was utilized to reduce the radiation dose to as low as reasonably achievable. COMPARISON: March 27, 2020. HISTORY: ORDERING SYSTEM PROVIDED HISTORY: confusion TECHNOLOGIST PROVIDED HISTORY: Reason for exam:->confusion Has a \"code stroke\" or \"stroke alert\" been called? ->No Decision Support Exception - unselect if not a suspected or confirmed emergency medical condition->Emergency Medical Condition (MA) Reason for Exam: fall, confusion Acuity: Acute Type of Exam: Initial FINDINGS: BRAIN/VENTRICLES: The gyri and sulci have a normal appearance. There is moderate cortical and cerebellar volume loss with associated ex vacuo ventricular dilation. Moderate diffuse periventricular and subcortical deep white matter hypoattenuation noted, findings compatible with chronic small vessel ischemic disease. The gray-white matter differentiation is otherwise preserved throughout. There is no acute hemorrhage, mass, or mass effect. No evidence of acute territorial infarct. No abnormal extraaxial fluid collections. ORBITS:  The visualized portion of the orbits demonstrate no acute abnormality. SINUSES:  The mastoid air cells are normally aerated. The visualized paranasal sinuses are grossly clear. SOFT TISSUES/SKULL:  No significant abnormality of the visualized skull or soft tissues. No acute fracture. No scalp hematoma. No evidence of acute intracranial process.   Moderate atrophy and chronic small vessel ischemic changes noted. XR CHEST PORTABLE    Result Date: 10/30/2021  EXAMINATION: ONE XRAY VIEW OF THE CHEST 10/30/2021 9:00 am COMPARISON: 03/27/2020 HISTORY: ORDERING SYSTEM PROVIDED HISTORY: AMS TECHNOLOGIST PROVIDED HISTORY: Reason for exam:->AMS Reason for Exam: fall, AMS Acuity: Acute Type of Exam: Initial FINDINGS: Incomplete inspiration. Heart size borderline. Pulmonary vasculature appears within normal limits. Lungs grossly clear. Costophrenic angles sharp     No acute cardiopulmonary process demonstrated     VL DUP CAROTID BILATERAL    Result Date: 11/4/2021  Carotid Duplex Study  Demographics   Patient Name       Hunter Ferguson   Date of Study      11/04/2021         Gender              Male   Patient Number     [id number]         Date of Birth       1940   Visit Number       [id number]          Age                 80 year(s)   Accession Number   [id number]         Room Number         0207   Corporate ID       [id number]           Sonographer         Nydia Kelly RVT   Ordering Physician Otoniel Salinas,  Interpreting        [facility]                     CNP                Physician           Vascular                                                            Rasheed David MD, [facility], [facility]  Procedure Type of Study:   Cerebral:Carotid, VL CAROTID DUPLEX BILATERAL. Vascular Sonographer Report  Indications for Study:CVA. Additional Indications:Patient poor historian and confused, acute CVA. Carotid Artery Duplex Scan: Transverse and longitudinal grayscale and color imaging of the extracranial carotid system including Doppler spectral waveform analysis. Impressions Right Impression The right internal carotid artery reveals a <50% diameter reducing stenosis. The right vertebral artery demonstrates normal antegrade flow.  The right subclavian artery is visualized and demonstrates turbulent flow. Left Impression The left internal carotid artery reveals a <50% diameter reducing stenosis. The left vertebral artery demonstrates normal antegrade flow. The left subclavian artery is visualized and demonstrates multiphasic flow. Previous exam on 03/06/2019, <50% stenosis in bilateral internal carotid arteries. Conclusions   Summary   The bilateral internal carotid arteries reveal <50% diameter reducing  stenosis. The bilateral vertebral arteries have normal antegrade flow. Signature   ------------------------------------------------------------------  Electronically signed by Kya Trammell MD, Usama Wen  (Interpreting physician) on 11/04/2021 at 02:46 PM  ------------------------------------------------------------------  Patient Status:Routine. Study [facility] [address] - Vascular Lab. Technical Quality:Adequate visualization. Risk Factors History +----------------------+----------+------------------------------------------+ ! Diagnosis             ! Date      ! Comments                                  ! +----------------------+----------+------------------------------------------+ ! Other                 !04/24/2018! History of CVA and right CEA > 10 yrs ago ! +----------------------+----------+------------------------------------------+ ! Previous Procedure    !          !1/5/15: No significant plaque bilat       ! +----------------------+----------+------------------------------------------+ ! Previous Procedure    !          !3/07/19: JOHAN min <20% LICA mod <15%      ! +----------------------+----------+------------------------------------------+ ! Carotid Endarterectomy! 11/04/2021! Right carotid endarterectomy 10 years ago. ! +----------------------+----------+------------------------------------------+ Plaque   - A plaque was found in the Right Prox ICA.  The plaque characteristics are: low echogenicity, minimal severity and +---------------+----+----+-----+----+ ! Vertebral      !58. 0!74. 3!60   !0.81! +---------------+----+----+-----+----+ ! Prox Subclavian! 93.5!0   !60   !1   ! +---------------+----+----+-----+----+   - There is antegrade vertebral flow noted on the left side. - Additional Measurements:ICAPSV/CCAPSV 0.93. ICAEDV/CCAEDV 2.41. MRI BRAIN WO CONTRAST    Result Date: 11/2/2021  EXAMINATION: MRI OF THE BRAIN WITHOUT CONTRAST  11/2/2021 3:32 pm TECHNIQUE: Multiplanar multisequence MRI of the brain was performed without the administration of intravenous contrast. COMPARISON: 08/01/2018 HISTORY: ORDERING SYSTEM PROVIDED HISTORY: AMS TECHNOLOGIST PROVIDED HISTORY: Reason for exam:->AMS FINDINGS: INTRACRANIAL STRUCTURES/VENTRICLES: Single punctate focus of diffusion restriction in the posterior right thalamus. No intracranial mass. Diffuse parenchymal volume loss with severe chronic white matter microvascular ischemic changes. Increased small foci of susceptibility artifact in the cerebellum, brainstem, basal ganglia and thalami, and to a lesser extent over the cerebral convexities. No mass effect or midline shift. No evidence of an acute intracranial hemorrhage. The ventricles and sulci are normal in size and configuration. The sellar/suprasellar regions appear unremarkable. The normal signal voids within the major intracranial vessels appear maintained. ORBITS: The visualized portion of the orbits demonstrate no acute abnormality. SINUSES: Mild scattered paranasal sinus mucoperiosteal thickening. No mastoid effusion. BONES/SOFT TISSUES: The bone marrow signal intensity appears normal. The soft tissues demonstrate no acute abnormality. 1. Punctate acute ischemic infarct in the posterior right thalamus. 2. No acute intracranial hemorrhage or mass effect. 3. 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 Detected       I estimate there is LOW risk for SUBARACHNOID HEMORRHAGE, MENINGITIS, INTRACRANIAL HEMORRHAGE, SUBDURAL HEMATOMA, OR STROKE, thus I consider the discharge disposition reasonable. Kevin Left and I have discussed the diagnosis and risks, and we agree with discharging home to follow-up with their primary doctor. We also discussed returning to the Emergency Department immediately if new or worsening symptoms occur. We have discussed the symptoms which are most concerning (e.g., changing or worsening pain, weakness, vomiting, fever) that necessitate immediate return. Final Impression    1. Fall, initial encounter    2. Unsteady gait        Discharge Vital Signs:  Blood pressure (!) 166/90, pulse 100, temperature 97.7 °F (36.5 °C), temperature source Oral, resp. rate 18, height 5' 10\" (1.778 m), weight 170 lb (77.1 kg), SpO2 97 %.         DISPOSITION Decision To Discharge 11/24/2021 04:27:36 PM      PATIENT REFERRED TO:  Chandler Lindsay MD  [facility] [address] [place] [address] [phone]    Schedule an appointment as soon as possible for a visit on 11/29/2021      [facility]  ED  [address]  Go to   As needed, If symptoms worsen      DISCHARGE MEDICATIONS:  New Prescriptions    No medications on file       DISCONTINUED MEDICATIONS:  Discontinued Medications    No medications on file              (Please note the MDM and HPI sections of this note were completed with a voice recognition program.  Efforts were made to edit the dictations but occasionally words are mis-transcribed.)    Electronically signed, Fermin Stephens,           Fermin Stephens  11/24/21 [id number]

## 2024-10-07 NOTE — PROGRESS NOTES
Department of [facility]  Dr. Magdiel Daly Progress Note  5/17/2019  2:15 PM    Patient Name:   Danny Ortiz  YOB: 1940    Diagnosis: T7-T8 thoracic disc decompression of spinal cord, with fusion. 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 Patient was taken to surgery and 5/14 for left thoracotomy and T8, T9 partial discectomy for anterior decompression and T8-9 thoracic interbody fusion with placement of a cage. Postoperatively the patient was fitted with a TLSO body jacket, started in therapies. The patient needs more therapy before discharged to home safely. Patient also has a past medical history positive for hypertension, depression, sleep apnea, and previous mild stroke. There were no vitals taken for this visit. Last 24 hour lab  No results found for this or any previous visit (from the past 24 hour(s)). Plan: We'll get patient admitted to our rehabilitation unit today.       Dr. Magdiel Daly Patient presents to ED via EMS with C/O ingestion. EMS states they were called to patient residence for ingestion of cleaning solution. Dr Oviedo at bedside. Patient coughing up and throwing up bloody frothy secretions. Patient is not answering questions but is alert. RT at bedside. Crash cart to room. Preparing to intubate patient at this time.

## 2024-12-11 NOTE — PROGRESS NOTES
What Type Of Note Output Would You Prefer (Optional)?: Standard Output
Yamilex Granados received a viral test for COVID-19. They were educated on isolation and quarantine as appropriate. For any symptoms, they were directed to seek care from their PCP, given contact information to establish with a doctor, directed to an urgent care or the emergency room.
How Severe Is Your Skin Lesion?: moderate
Has Your Skin Lesion Been Treated?: not been treated
Is This A New Presentation, Or A Follow-Up?: Skin Lesions